# Patient Record
Sex: FEMALE | Race: WHITE | NOT HISPANIC OR LATINO | Employment: OTHER | URBAN - METROPOLITAN AREA
[De-identification: names, ages, dates, MRNs, and addresses within clinical notes are randomized per-mention and may not be internally consistent; named-entity substitution may affect disease eponyms.]

---

## 2018-07-23 ENCOUNTER — TRANSCRIBE ORDERS (OUTPATIENT)
Dept: ADMINISTRATIVE | Facility: HOSPITAL | Age: 18
End: 2018-07-23

## 2018-07-23 ENCOUNTER — APPOINTMENT (OUTPATIENT)
Dept: LAB | Facility: HOSPITAL | Age: 18
End: 2018-07-23
Payer: COMMERCIAL

## 2018-07-23 DIAGNOSIS — Z79.899 ENCOUNTER FOR LONG-TERM (CURRENT) USE OF MEDICATIONS: ICD-10-CM

## 2018-07-23 DIAGNOSIS — Z79.899 ENCOUNTER FOR LONG-TERM (CURRENT) USE OF MEDICATIONS: Primary | ICD-10-CM

## 2018-07-23 LAB
ALBUMIN SERPL BCP-MCNC: 4.2 G/DL (ref 3–5.2)
ALP SERPL-CCNC: 88 U/L (ref 43–122)
ALT SERPL W P-5'-P-CCNC: 28 U/L (ref 9–52)
ANION GAP SERPL CALCULATED.3IONS-SCNC: 10 MMOL/L (ref 5–14)
AST SERPL W P-5'-P-CCNC: 14 U/L (ref 14–36)
BASOPHILS # BLD AUTO: 0 THOUSANDS/ΜL (ref 0–0.1)
BASOPHILS NFR BLD AUTO: 1 % (ref 0–1)
BILIRUB SERPL-MCNC: 0.3 MG/DL
BUN SERPL-MCNC: 10 MG/DL (ref 5–25)
CALCIUM SERPL-MCNC: 9.5 MG/DL (ref 8.4–10.2)
CHLORIDE SERPL-SCNC: 103 MMOL/L (ref 97–108)
CHOLEST SERPL-MCNC: 194 MG/DL
CO2 SERPL-SCNC: 29 MMOL/L (ref 22–30)
CREAT SERPL-MCNC: 0.77 MG/DL (ref 0.6–1.2)
EOSINOPHIL # BLD AUTO: 0.1 THOUSAND/ΜL (ref 0–0.4)
EOSINOPHIL NFR BLD AUTO: 1 % (ref 0–6)
ERYTHROCYTE [DISTWIDTH] IN BLOOD BY AUTOMATED COUNT: 14.1 %
GFR SERPL CREATININE-BSD FRML MDRD: 112 ML/MIN/1.73SQ M
GLUCOSE P FAST SERPL-MCNC: 92 MG/DL (ref 70–99)
HCT VFR BLD AUTO: 37.8 % (ref 36–46)
HDLC SERPL-MCNC: 50 MG/DL (ref 40–59)
HGB BLD-MCNC: 12.1 G/DL (ref 12–16)
LDLC SERPL CALC-MCNC: 133 MG/DL
LYMPHOCYTES # BLD AUTO: 2.2 THOUSANDS/ΜL (ref 0.5–4)
LYMPHOCYTES NFR BLD AUTO: 29 % (ref 20–50)
MCH RBC QN AUTO: 26.1 PG (ref 26–34)
MCHC RBC AUTO-ENTMCNC: 31.9 G/DL (ref 31–36)
MCV RBC AUTO: 82 FL (ref 80–100)
MONOCYTES # BLD AUTO: 0.5 THOUSAND/ΜL (ref 0.2–0.9)
MONOCYTES NFR BLD AUTO: 6 % (ref 1–10)
NEUTROPHILS # BLD AUTO: 4.7 THOUSANDS/ΜL (ref 1.8–7.8)
NEUTS SEG NFR BLD AUTO: 63 % (ref 45–65)
NONHDLC SERPL-MCNC: 144 MG/DL
PLATELET # BLD AUTO: 352 THOUSANDS/UL (ref 150–450)
PMV BLD AUTO: 8.6 FL (ref 8.9–12.7)
POTASSIUM SERPL-SCNC: 4.4 MMOL/L (ref 3.6–5)
PROT SERPL-MCNC: 6.8 G/DL (ref 5.9–8.4)
RBC # BLD AUTO: 4.61 MILLION/UL (ref 4–5.2)
SODIUM SERPL-SCNC: 142 MMOL/L (ref 137–147)
TRIGL SERPL-MCNC: 56 MG/DL
TSH SERPL DL<=0.05 MIU/L-ACNC: 0.78 UIU/ML (ref 0.47–4.68)
WBC # BLD AUTO: 7.5 THOUSAND/UL (ref 4.5–11)

## 2018-07-23 PROCEDURE — 85025 COMPLETE CBC W/AUTO DIFF WBC: CPT

## 2018-07-23 PROCEDURE — 80053 COMPREHEN METABOLIC PANEL: CPT

## 2018-07-23 PROCEDURE — 84443 ASSAY THYROID STIM HORMONE: CPT

## 2018-07-23 PROCEDURE — 36415 COLL VENOUS BLD VENIPUNCTURE: CPT

## 2018-07-23 PROCEDURE — 80061 LIPID PANEL: CPT

## 2018-09-18 ENCOUNTER — OFFICE VISIT (OUTPATIENT)
Dept: OBGYN CLINIC | Facility: CLINIC | Age: 18
End: 2018-09-18
Payer: COMMERCIAL

## 2018-09-18 VITALS
DIASTOLIC BLOOD PRESSURE: 70 MMHG | HEIGHT: 58 IN | SYSTOLIC BLOOD PRESSURE: 120 MMHG | WEIGHT: 169 LBS | BODY MASS INDEX: 35.48 KG/M2

## 2018-09-18 DIAGNOSIS — Z30.011 ENCOUNTER FOR INITIAL PRESCRIPTION OF CONTRACEPTIVE PILLS: Primary | ICD-10-CM

## 2018-09-18 PROCEDURE — 99202 OFFICE O/P NEW SF 15 MIN: CPT | Performed by: OBSTETRICS & GYNECOLOGY

## 2018-09-18 RX ORDER — LEVONORGESTREL AND ETHINYL ESTRADIOL 0.1-0.02MG
1 KIT ORAL DAILY
Qty: 28 TABLET | Refills: 11 | Status: SHIPPED | OUTPATIENT
Start: 2018-09-18 | End: 2019-02-04 | Stop reason: ALTCHOICE

## 2018-09-18 NOTE — PROGRESS NOTES
Assessment/Plan:             Diagnoses and all orders for this visit:    Encounter for initial prescription of contraceptive pills  -     levonorgestrel-ethinyl estradiol (AVIANE,ALESSE,LESSINA) 0 1-20 MG-MCG per tablet; Take 1 tablet by mouth daily    Other orders  -     lurasidone (LATUDA) 40 mg tablet; Take 20 mg by mouth daily with breakfast              Subjective:      Patient ID: Vero Estevez is a 19 y o  female who presents w/ complaint of menstrual cycle changes and to begin OCP's  Her LMP began on 9/6 and lasted 3 days  She describes she had 1 light day then 2 moderately heavy days of bleeding  Her periods have only lasted 3 days for the last 4 months whereas since her menarche at age 4 her periods have lasted 5 days  She expressed she would like to begin taking OCP's to help keep her period regulated  She denies any sexual activity  Her only medication she is taking is Chloé Si  HPI    The following portions of the patient's history were reviewed and updated as appropriate: allergies, current medications, past family history, past medical history, past social history, past surgical history and problem list     Review of Systems      Objective:      /70   Ht 4' 10" (1 473 m)   Wt 76 7 kg (169 lb)   LMP 09/06/2018 (Exact Date)   BMI 35 32 kg/m²          Physical Exam   Constitutional: She is oriented to person, place, and time  She appears well-developed and well-nourished  No distress  Neck: Neck supple  No thyromegaly present  Cardiovascular: Normal rate, regular rhythm and normal heart sounds  Pulmonary/Chest: Effort normal and breath sounds normal  No respiratory distress  She has no wheezes  She has no rales  She exhibits no tenderness  Neurological: She is alert and oriented to person, place, and time  Nursing note and vitals reviewed

## 2018-09-24 ENCOUNTER — CLINICAL SUPPORT (OUTPATIENT)
Dept: FAMILY MEDICINE CLINIC | Facility: CLINIC | Age: 18
End: 2018-09-24
Payer: COMMERCIAL

## 2018-09-24 DIAGNOSIS — Z23 NEED FOR INFLUENZA VACCINATION: Primary | ICD-10-CM

## 2018-09-24 PROCEDURE — 90471 IMMUNIZATION ADMIN: CPT

## 2018-09-24 PROCEDURE — 90682 RIV4 VACC RECOMBINANT DNA IM: CPT

## 2018-10-02 ENCOUNTER — OFFICE VISIT (OUTPATIENT)
Dept: OBGYN CLINIC | Facility: CLINIC | Age: 18
End: 2018-10-02
Payer: COMMERCIAL

## 2018-10-02 VITALS
WEIGHT: 172 LBS | DIASTOLIC BLOOD PRESSURE: 60 MMHG | SYSTOLIC BLOOD PRESSURE: 120 MMHG | HEIGHT: 58 IN | BODY MASS INDEX: 36.11 KG/M2

## 2018-10-02 DIAGNOSIS — N89.8 VAGINAL ITCHING: Primary | ICD-10-CM

## 2018-10-02 PROCEDURE — 87660 TRICHOMONAS VAGIN DIR PROBE: CPT | Performed by: OBSTETRICS & GYNECOLOGY

## 2018-10-02 PROCEDURE — 87510 GARDNER VAG DNA DIR PROBE: CPT | Performed by: OBSTETRICS & GYNECOLOGY

## 2018-10-02 PROCEDURE — 99213 OFFICE O/P EST LOW 20 MIN: CPT | Performed by: OBSTETRICS & GYNECOLOGY

## 2018-10-02 PROCEDURE — 87480 CANDIDA DNA DIR PROBE: CPT | Performed by: OBSTETRICS & GYNECOLOGY

## 2018-10-02 RX ORDER — FLUCONAZOLE 150 MG/1
150 TABLET ORAL ONCE
Qty: 1 TABLET | Refills: 1 | Status: SHIPPED | OUTPATIENT
Start: 2018-10-02 | End: 2018-10-02

## 2018-10-02 NOTE — PROGRESS NOTES
Assessment/Plan:    Patient is a 19year old female presented today with one day duration of labia and vaginal burning and itchiness  Vaginosis DNA probe was done at the office  Fluconazole 150mg PO once is prescribed at this time for suspected Candidiasis  Will follow up test result and further management as needed  Diagnoses and all orders for this visit:    Vaginal itching  -     Cancel: VAGINOSIS DNA PROBE (AFFIRM); Future  -     fluconazole (DIFLUCAN) 150 mg tablet; Take 1 tablet (150 mg total) by mouth once for 1 dose  -     VAGINOSIS DNA PROBE (AFFIRM); Future  -     VAGINOSIS DNA PROBE (AFFIRM)          Subjective:      Patient ID: Nuria Bravo is a 19 y o  female  HPI     Patient is a 19year old female presented today with one day duration of vaginal itching and burning  She also reported urinary burning  Denied any vaginal discharge  Patient is not sexually active  Her LMP was 9/6/18  Reported regular menses montly, usually last for 3 days  Denied fever, chills, nausea, vomiting, abdominal pain  UA was done at the office which was negative  The following portions of the patient's history were reviewed and updated as appropriate: allergies, current medications, past family history, past medical history, past social history, past surgical history and problem list     Review of Systems   Constitutional: Negative for chills and fever  HENT: Negative for sore throat  Respiratory: Negative for shortness of breath  Cardiovascular: Negative for chest pain  Gastrointestinal: Negative for abdominal pain  Genitourinary: Positive for dysuria, frequency, urgency and vaginal pain  Negative for difficulty urinating, vaginal bleeding and vaginal discharge  Musculoskeletal: Negative for back pain  Neurological: Negative for headaches           Objective:      /60   Ht 4' 10" (1 473 m)   Wt 78 kg (172 lb)   LMP 09/06/2018 (Exact Date)   BMI 35 95 kg/m²          Physical Exam Constitutional: She appears well-developed and well-nourished  No distress  Eyes: Pupils are equal, round, and reactive to light  Right eye exhibits no discharge  Left eye exhibits no discharge  Neck: Normal range of motion  Cardiovascular: Normal rate, regular rhythm and normal heart sounds  No murmur heard  Pulmonary/Chest: Breath sounds normal  No respiratory distress  She has no wheezes  She exhibits no tenderness  Abdominal: Soft  Bowel sounds are normal  She exhibits no distension  There is no tenderness  There is no rebound  Genitourinary:       There is tenderness on the right labia  There is tenderness on the left labia  There is tenderness in the vagina

## 2018-10-05 DIAGNOSIS — N76.0 BV (BACTERIAL VAGINOSIS): Primary | ICD-10-CM

## 2018-10-05 DIAGNOSIS — B96.89 BV (BACTERIAL VAGINOSIS): Primary | ICD-10-CM

## 2018-10-05 LAB
CANDIDA RRNA VAG QL PROBE: NEGATIVE
G VAGINALIS RRNA GENITAL QL PROBE: POSITIVE
T VAGINALIS RRNA GENITAL QL PROBE: NEGATIVE

## 2018-10-05 RX ORDER — METRONIDAZOLE 500 MG/1
500 TABLET ORAL EVERY 12 HOURS SCHEDULED
Qty: 14 TABLET | Refills: 0 | Status: SHIPPED | OUTPATIENT
Start: 2018-10-05 | End: 2018-10-12

## 2018-10-28 PROBLEM — E78.2 MIXED HYPERLIPIDEMIA: Status: ACTIVE | Noted: 2017-03-07

## 2018-10-30 ENCOUNTER — OFFICE VISIT (OUTPATIENT)
Dept: FAMILY MEDICINE CLINIC | Facility: CLINIC | Age: 18
End: 2018-10-30
Payer: COMMERCIAL

## 2018-10-30 VITALS
TEMPERATURE: 98.3 F | HEIGHT: 58 IN | WEIGHT: 166 LBS | OXYGEN SATURATION: 98 % | DIASTOLIC BLOOD PRESSURE: 80 MMHG | BODY MASS INDEX: 34.85 KG/M2 | HEART RATE: 69 BPM | SYSTOLIC BLOOD PRESSURE: 110 MMHG

## 2018-10-30 DIAGNOSIS — R11.11 NON-INTRACTABLE VOMITING WITHOUT NAUSEA, UNSPECIFIED VOMITING TYPE: Primary | ICD-10-CM

## 2018-10-30 PROCEDURE — 99213 OFFICE O/P EST LOW 20 MIN: CPT | Performed by: FAMILY MEDICINE

## 2018-10-30 RX ORDER — GABAPENTIN 600 MG/1
TABLET ORAL EVERY EVENING
Status: ON HOLD | COMMUNITY
Start: 2018-10-24 | End: 2018-12-02

## 2018-10-30 RX ORDER — ALBUTEROL SULFATE 90 UG/1
2 AEROSOL, METERED RESPIRATORY (INHALATION) EVERY 6 HOURS PRN
COMMUNITY
Start: 2014-11-19 | End: 2019-04-23

## 2018-10-30 RX ORDER — LORATADINE 10 MG/1
TABLET ORAL EVERY 24 HOURS
Status: ON HOLD | COMMUNITY
Start: 2017-08-28 | End: 2018-11-30 | Stop reason: ALTCHOICE

## 2018-10-30 RX ORDER — IBUPROFEN 200 MG
TABLET ORAL
Status: ON HOLD | COMMUNITY
Start: 2018-08-31 | End: 2018-11-30 | Stop reason: ALTCHOICE

## 2018-10-30 RX ORDER — CLINDAMYCIN PHOSPHATE 10 MG/G
GEL TOPICAL EVERY 12 HOURS PRN
COMMUNITY
Start: 2018-05-30 | End: 2018-12-24 | Stop reason: HOSPADM

## 2018-10-30 NOTE — PROGRESS NOTES
Subjective:   Chief Complaint   Patient presents with    Other     vomitting for 3 days        Patient ID: Janny Sloan is a 19 y o  female  Vomiting    This is a new problem  The current episode started in the past 7 days  The problem occurs less than 2 times per day  The problem has been rapidly improving  There has been no fever  Pertinent negatives include no abdominal pain, chest pain, chills, coughing, diarrhea, dizziness, fever, headaches, myalgias, sweats or weight loss  She has tried nothing for the symptoms  The following portions of the patient's history were reviewed and updated as appropriate: allergies, current medications, past family history, past medical history, past social history, past surgical history and problem list     Review of Systems   Constitutional: Negative for chills, fatigue, fever and weight loss  HENT: Negative for ear pain, sinus pain, sinus pressure and sore throat  Eyes: Negative for pain and redness  Respiratory: Negative for cough, chest tightness and shortness of breath  Cardiovascular: Negative for chest pain, palpitations and leg swelling  Gastrointestinal: Positive for vomiting  Negative for abdominal pain, blood in stool, constipation, diarrhea and nausea  Genitourinary: Negative for flank pain, frequency and hematuria  Musculoskeletal: Negative for back pain, joint swelling and myalgias  Skin: Negative for rash  Neurological: Negative for dizziness, numbness and headaches  Hematological: Does not bruise/bleed easily  Objective:  Vitals:    10/30/18 1107   BP: 110/80   Pulse: 69   Temp: 98 3 °F (36 8 °C)   TempSrc: Oral   SpO2: 98%   Weight: 75 3 kg (166 lb)   Height: 4' 10" (1 473 m)      Physical Exam   Constitutional: She is oriented to person, place, and time  She appears well-developed and well-nourished  HENT:   Head: Normocephalic     Right Ear: External ear normal    Left Ear: External ear normal    Eyes: Conjunctivae and EOM are normal  Right eye exhibits no discharge  Left eye exhibits no discharge  Neck: No JVD present  Cardiovascular: Normal rate, regular rhythm and normal heart sounds  Exam reveals no gallop  No murmur heard  Pulmonary/Chest: Effort normal  No respiratory distress  She has no wheezes  She has no rales  She exhibits no tenderness  Abdominal: She exhibits no mass  There is no tenderness  There is no rebound  Musculoskeletal: She exhibits no edema or tenderness  Neurological: She is alert and oriented to person, place, and time  Skin: No rash noted  No erythema  Assessment/Plan:    No problem-specific Assessment & Plan notes found for this encounter  Diagnoses and all orders for this visit:    Non-intractable vomiting without nausea, unspecified vomiting type  Comments:  Asymptomatic acute we discussed with the patient BRAT diet supportive treatmen including will hydration and rest there is no improvement to call the office    Other orders  -     [facility] IBUPROFEN 200 MG tablet;   -     clindamycin (CLINDAGEL) 1 % gel; Every 12 hours  -     gabapentin (NEURONTIN) 600 MG tablet;   -     loratadine (CLARITIN) 10 mg tablet; every 24 hours  -     albuterol (VENTOLIN HFA) 90 mcg/act inhaler;  Inhale 2 puffs

## 2018-10-31 ENCOUNTER — HOSPITAL ENCOUNTER (EMERGENCY)
Facility: HOSPITAL | Age: 18
Discharge: HOME | End: 2018-10-31
Attending: EMERGENCY MEDICINE | Admitting: EMERGENCY MEDICINE
Payer: COMMERCIAL

## 2018-10-31 ENCOUNTER — APPOINTMENT (EMERGENCY)
Dept: ULTRASOUND IMAGING | Facility: HOSPITAL | Age: 18
End: 2018-10-31
Payer: COMMERCIAL

## 2018-10-31 VITALS
HEART RATE: 73 BPM | BODY MASS INDEX: 34.56 KG/M2 | TEMPERATURE: 97 F | RESPIRATION RATE: 16 BRPM | SYSTOLIC BLOOD PRESSURE: 135 MMHG | OXYGEN SATURATION: 99 % | WEIGHT: 165.34 LBS | DIASTOLIC BLOOD PRESSURE: 76 MMHG

## 2018-10-31 DIAGNOSIS — K80.20 CHOLELITHIASIS: ICD-10-CM

## 2018-10-31 DIAGNOSIS — K29.70 GASTRITIS: ICD-10-CM

## 2018-10-31 DIAGNOSIS — N39.0 UTI (URINARY TRACT INFECTION): Primary | ICD-10-CM

## 2018-10-31 LAB
ALBUMIN SERPL BCP-MCNC: 4.6 G/DL (ref 3–5.2)
ALP SERPL-CCNC: 96 U/L (ref 43–122)
ALT SERPL W P-5'-P-CCNC: 279 U/L (ref 9–52)
ANION GAP SERPL CALCULATED.3IONS-SCNC: 11 MMOL/L (ref 5–14)
AST SERPL W P-5'-P-CCNC: 147 U/L (ref 14–36)
B-HCG SERPL-ACNC: <3 MIU/ML
BACTERIA UR QL AUTO: ABNORMAL /HPF
BASOPHILS # BLD AUTO: 0 THOUSANDS/ΜL (ref 0–0.1)
BASOPHILS NFR BLD AUTO: 1 % (ref 0–1)
BILIRUB SERPL-MCNC: 0.9 MG/DL
BILIRUB UR QL STRIP: ABNORMAL
BUN SERPL-MCNC: 10 MG/DL (ref 5–25)
CALCIUM SERPL-MCNC: 9.2 MG/DL (ref 8.4–10.2)
CHLORIDE SERPL-SCNC: 105 MMOL/L (ref 97–108)
CLARITY UR: ABNORMAL
CO2 SERPL-SCNC: 24 MMOL/L (ref 22–30)
COLOR UR: ABNORMAL
CREAT SERPL-MCNC: 0.76 MG/DL (ref 0.6–1.2)
EOSINOPHIL # BLD AUTO: 0.1 THOUSAND/ΜL (ref 0–0.4)
EOSINOPHIL NFR BLD AUTO: 1 % (ref 0–6)
ERYTHROCYTE [DISTWIDTH] IN BLOOD BY AUTOMATED COUNT: 14.3 %
GFR SERPL CREATININE-BSD FRML MDRD: 113 ML/MIN/1.73SQ M
GLUCOSE SERPL-MCNC: 95 MG/DL (ref 70–99)
GLUCOSE UR STRIP-MCNC: NEGATIVE MG/DL
HCT VFR BLD AUTO: 34.8 % (ref 36–46)
HGB BLD-MCNC: 11.5 G/DL (ref 12–16)
HGB UR QL STRIP.AUTO: 250
KETONES UR STRIP-MCNC: ABNORMAL MG/DL
LEUKOCYTE ESTERASE UR QL STRIP: 25
LIPASE SERPL-CCNC: 30 U/L (ref 23–300)
LYMPHOCYTES # BLD AUTO: 2.1 THOUSANDS/ΜL (ref 0.5–4)
LYMPHOCYTES NFR BLD AUTO: 25 % (ref 20–50)
MCH RBC QN AUTO: 25.9 PG (ref 26–34)
MCHC RBC AUTO-ENTMCNC: 33 G/DL (ref 31–36)
MCV RBC AUTO: 79 FL (ref 80–100)
MONOCYTES # BLD AUTO: 0.8 THOUSAND/ΜL (ref 0.2–0.9)
MONOCYTES NFR BLD AUTO: 9 % (ref 1–10)
MUCOUS THREADS UR QL AUTO: ABNORMAL
NEUTROPHILS # BLD AUTO: 5.6 THOUSANDS/ΜL (ref 1.8–7.8)
NEUTS SEG NFR BLD AUTO: 65 % (ref 45–65)
NITRITE UR QL STRIP: NEGATIVE
NON-SQ EPI CELLS URNS QL MICRO: ABNORMAL /HPF
PH UR STRIP.AUTO: 6 [PH] (ref 4.5–8)
PLATELET # BLD AUTO: 276 THOUSANDS/UL (ref 150–450)
PLATELET BLD QL SMEAR: ADEQUATE
PMV BLD AUTO: 9.1 FL (ref 8.9–12.7)
POTASSIUM SERPL-SCNC: 3.6 MMOL/L (ref 3.6–5)
PROT SERPL-MCNC: 7.4 G/DL (ref 5.9–8.4)
PROT UR STRIP-MCNC: ABNORMAL MG/DL
RBC # BLD AUTO: 4.43 MILLION/UL (ref 4–5.2)
RBC #/AREA URNS AUTO: ABNORMAL /HPF
RBC MORPH BLD: NORMAL
SODIUM SERPL-SCNC: 140 MMOL/L (ref 137–147)
SP GR UR STRIP.AUTO: 1.02 (ref 1–1.04)
UROBILINOGEN UA: 8 MG/DL
WBC # BLD AUTO: 8.6 THOUSAND/UL (ref 4.5–11)
WBC #/AREA URNS AUTO: ABNORMAL /HPF

## 2018-10-31 PROCEDURE — 84702 CHORIONIC GONADOTROPIN TEST: CPT | Performed by: PHYSICIAN ASSISTANT

## 2018-10-31 PROCEDURE — 81003 URINALYSIS AUTO W/O SCOPE: CPT | Performed by: PHYSICIAN ASSISTANT

## 2018-10-31 PROCEDURE — 96374 THER/PROPH/DIAG INJ IV PUSH: CPT

## 2018-10-31 PROCEDURE — 96361 HYDRATE IV INFUSION ADD-ON: CPT

## 2018-10-31 PROCEDURE — 83690 ASSAY OF LIPASE: CPT | Performed by: PHYSICIAN ASSISTANT

## 2018-10-31 PROCEDURE — 81001 URINALYSIS AUTO W/SCOPE: CPT | Performed by: PHYSICIAN ASSISTANT

## 2018-10-31 PROCEDURE — 99284 EMERGENCY DEPT VISIT MOD MDM: CPT

## 2018-10-31 PROCEDURE — 36415 COLL VENOUS BLD VENIPUNCTURE: CPT | Performed by: PHYSICIAN ASSISTANT

## 2018-10-31 PROCEDURE — 80053 COMPREHEN METABOLIC PANEL: CPT | Performed by: PHYSICIAN ASSISTANT

## 2018-10-31 PROCEDURE — 76705 ECHO EXAM OF ABDOMEN: CPT

## 2018-10-31 PROCEDURE — 85025 COMPLETE CBC W/AUTO DIFF WBC: CPT | Performed by: PHYSICIAN ASSISTANT

## 2018-10-31 RX ORDER — ACETAMINOPHEN 325 MG/1
TABLET ORAL
Status: COMPLETED
Start: 2018-10-31 | End: 2018-10-31

## 2018-10-31 RX ORDER — ONDANSETRON 2 MG/ML
INJECTION INTRAMUSCULAR; INTRAVENOUS
Status: COMPLETED
Start: 2018-10-31 | End: 2018-10-31

## 2018-10-31 RX ORDER — SODIUM CHLORIDE 9 MG/ML
250 INJECTION, SOLUTION INTRAVENOUS CONTINUOUS
Status: DISCONTINUED | OUTPATIENT
Start: 2018-10-31 | End: 2018-10-31 | Stop reason: HOSPADM

## 2018-10-31 RX ORDER — SUCRALFATE ORAL 1 G/10ML
SUSPENSION ORAL
Status: COMPLETED
Start: 2018-10-31 | End: 2018-10-31

## 2018-10-31 RX ORDER — ONDANSETRON 4 MG/1
4 TABLET, FILM COATED ORAL EVERY 6 HOURS
Qty: 12 TABLET | Refills: 0 | Status: ON HOLD | OUTPATIENT
Start: 2018-10-31 | End: 2018-11-30 | Stop reason: ALTCHOICE

## 2018-10-31 RX ORDER — MAGNESIUM HYDROXIDE/ALUMINUM HYDROXICE/SIMETHICONE 120; 1200; 1200 MG/30ML; MG/30ML; MG/30ML
SUSPENSION ORAL
Status: COMPLETED
Start: 2018-10-31 | End: 2018-10-31

## 2018-10-31 RX ORDER — SUCRALFATE 1 G/1
1 TABLET ORAL 4 TIMES DAILY
Qty: 40 TABLET | Refills: 0 | Status: ON HOLD | OUTPATIENT
Start: 2018-10-31 | End: 2018-11-30 | Stop reason: ALTCHOICE

## 2018-10-31 RX ORDER — FAMOTIDINE 20 MG/1
20 TABLET, FILM COATED ORAL 2 TIMES DAILY
Qty: 30 TABLET | Refills: 0 | Status: SHIPPED | OUTPATIENT
Start: 2018-10-31 | End: 2018-11-02 | Stop reason: SDUPTHER

## 2018-10-31 RX ORDER — MAGNESIUM HYDROXIDE/ALUMINUM HYDROXICE/SIMETHICONE 120; 1200; 1200 MG/30ML; MG/30ML; MG/30ML
30 SUSPENSION ORAL ONCE
Status: COMPLETED | OUTPATIENT
Start: 2018-10-31 | End: 2018-10-31

## 2018-10-31 RX ORDER — SUCRALFATE ORAL 1 G/10ML
1000 SUSPENSION ORAL ONCE
Status: COMPLETED | OUTPATIENT
Start: 2018-10-31 | End: 2018-10-31

## 2018-10-31 RX ORDER — ONDANSETRON 2 MG/ML
4 INJECTION INTRAMUSCULAR; INTRAVENOUS ONCE
Status: COMPLETED | OUTPATIENT
Start: 2018-10-31 | End: 2018-10-31

## 2018-10-31 RX ORDER — ACETAMINOPHEN 325 MG/1
650 TABLET ORAL ONCE
Status: COMPLETED | OUTPATIENT
Start: 2018-10-31 | End: 2018-10-31

## 2018-10-31 RX ORDER — CEPHALEXIN 500 MG/1
500 CAPSULE ORAL EVERY 8 HOURS SCHEDULED
Qty: 30 CAPSULE | Refills: 0 | Status: SHIPPED | OUTPATIENT
Start: 2018-10-31 | End: 2018-11-10

## 2018-10-31 RX ADMIN — ONDANSETRON 4 MG: 2 INJECTION INTRAMUSCULAR; INTRAVENOUS at 08:03

## 2018-10-31 RX ADMIN — ACETAMINOPHEN 650 MG: 325 TABLET ORAL at 08:44

## 2018-10-31 RX ADMIN — ALUMINUM HYDROXIDE, MAGNESIUM HYDROXIDE, AND SIMETHICONE 30 ML: 200; 200; 20 SUSPENSION ORAL at 08:43

## 2018-10-31 RX ADMIN — SODIUM CHLORIDE 250 ML/HR: 9 INJECTION, SOLUTION INTRAVENOUS at 08:04

## 2018-10-31 RX ADMIN — MAGNESIUM HYDROXIDE/ALUMINUM HYDROXICE/SIMETHICONE 30 ML: 120; 1200; 1200 SUSPENSION ORAL at 08:43

## 2018-10-31 RX ADMIN — SUCRALFATE 1000 MG: 1 SUSPENSION ORAL at 08:43

## 2018-10-31 RX ADMIN — ONDANSETRON 4 MG: 2 INJECTION, SOLUTION INTRAMUSCULAR; INTRAVENOUS at 08:03

## 2018-10-31 RX ADMIN — SUCRALFATE ORAL 1000 MG: 1 SUSPENSION ORAL at 08:43

## 2018-10-31 NOTE — ED NOTES
Patient transported to [address] [facility] Check, [address] [facility]  10/31/18 2244

## 2018-10-31 NOTE — DISCHARGE INSTRUCTIONS
Gallstones   WHAT YOU NEED TO KNOW:   Gallstones, also called cholelithiasis, are hard substances that form in your gallbladder or bile duct  Your gallbladder and bile duct are located on the right side of your abdomen, near your liver  Your gallbladder stores bile, which helps break down the fat that you eat  Your gallbladder also helps remove certain chemicals from your body  DISCHARGE INSTRUCTIONS:   Medicines:   · Prescription pain medicine  may be given  Ask your healthcare provider how to take this medicine safely  · Take your medicine as directed  Contact your healthcare provider if you think your medicine is not helping or if you have side effects  Tell him if you are allergic to any medicine  Keep a list of the medicines, vitamins, and herbs you take  Include the amounts, and when and why you take them  Bring the list or the pill bottles to follow-up visits  Carry your medicine list with you in case of an emergency  Follow up with your healthcare provider as directed:  Write down your questions so you remember to ask them during your visits  Eat a variety of healthy foods: This may help you have more energy and heal faster  Healthy foods include fruit, vegetables, whole-grain breads, low-fat dairy products, beans, lean meat, and fish  Ask if you need to be on a special diet  Exercise as directed:  Talk to your healthcare provider about the best exercise plan for you  Exercise can help you lose weight and improve your health  Contact your healthcare provider if:   · You have nausea and are vomiting  · Your urine is dark  · You have basim-colored bowel movements  · You have questions or concerns about your condition or care  Return to the emergency department if:   · You have a fever and chills  · Your skin or eyes turn yellow  · You have severe pain in your upper abdomen, just below the right ribcage    © 2017 [address] [employer] Information is for End User's use only and may not be sold, redistributed or otherwise used for commercial purposes  All illustrations and images included in CareNotes® are the copyrighted property of [employer]  or Curt Grace  The above information is an  only  It is not intended as medical advice for individual conditions or treatments  Talk to your doctor, nurse or pharmacist before following any medical regimen to see if it is safe and effective for you  Gallstones   WHAT YOU NEED TO KNOW:   What are gallstones? Gallstones, also called cholelithiasis, are hard substances that form in your gallbladder or bile duct  Your gallbladder and bile duct are located on the right side of your abdomen, near your liver  Your gallbladder stores bile, which helps break down the fat that you eat  Your gallbladder also helps remove certain chemicals from your body  What causes gallstones? Gallstones develop when your gallbladder does not empty correctly  Stones can form from different bile materials  The following may increase your risk:  · Obesity    · Pregnancy    · Having a family member with gallstones    · Diabetes or previous surgery of the intestines    · Rapid weight loss    · Certain medicines, such as estrogen, antibiotics, and cholesterol-lowering medicines  What are the signs and symptoms of gallstones? The most common symptom is severe, constant pain in the right upper abdomen  It is usually just below the ribcage  The pain may also be felt in the right shoulder and between the shoulder blades  You may also have any of the following:  · Nausea and vomiting    · Feeling bloated    · Pale bowel movements    · Dark urine  How are gallstones diagnosed? · Blood tests  may show signs of infection or inflammation  · An abdominal ultrasound  uses sound waves to show pictures of your abdomen on a monitor  · A liver and gallbladder scan  may also be called a HIDA scan   You are given a small amount of radioactive dye in your IV and pictures are taken by a scanner  Your healthcare provider looks at the pictures to see if your liver and gallbladder are working normally  · An ERCP  is also called an endoscopic retrograde cholangiopancreatography  This test is done during an endoscopy to find stones, tumors, or other problems  Dye is put into the endoscopy tube  The dye helps your pancreas and bile ducts show up better on x-rays  Tell the healthcare provider if you have ever had an allergic reaction to contrast dye  If you have stones, they may be removed during ERCP  · Oral cholecystography  is a test to look at your gallbladder and its ducts (passages)  You will take pills that have a special dye in them  Then x-rays are taken over time  The dye makes your gallbladder and its ducts show up on the x-rays  This may make it easier for your healthcare provider to see any stones or swelling in your gallbladder  Tell the healthcare provider if you have ever had an allergic reaction to contrast dye  It is also very important to tell your healthcare provider if there is any chance you could be pregnant  Your healthcare provider will tell you what you can and cannot eat before the test  It is important to follow your healthcare provider's instructions or the test may not work  How are gallstones treated? · Antinausea medicine  may be given to calm your stomach and to help prevent vomiting  · Prescription pain medicine  may be given  Ask your healthcare provider how to take this medicine safely  · A cholecystectomy  is surgery to remove your gallbladder  When should I contact my healthcare provider? · You have nausea and are vomiting  · Your urine is dark  · You have basim-colored bowel movements  · You have questions or concerns about your condition or care  When should I seek immediate care or call 911? · You have a fever and chills  · Your eyes or skin turn yellow      · You have severe pain in your upper abdomen, just below the right ribcage  CARE AGREEMENT:   You have the right to help plan your care  Learn about your health condition and how it may be treated  Discuss treatment options with your caregivers to decide what care you want to receive  You always have the right to refuse treatment  The above information is an  only  It is not intended as medical advice for individual conditions or treatments  Talk to your doctor, nurse or pharmacist before following any medical regimen to see if it is safe and effective for you  © 2017 [address] [facility] Information is for End User's use only and may not be sold, redistributed or otherwise used for commercial purposes  All illustrations and images included in CareNotes® are the copyrighted property of [employer]  or Curt Grace  Gastritis   AMBULATORY CARE:   Gastritis  is inflammation or irritation of the lining of your stomach  Common symptoms include the following:   · Stomach pain, burning, or tenderness when you press on it    · Stomach fullness or tightness    · Nausea or vomiting    · Loss of appetite, or feeling full quickly when you eat    · Bad breath    · Fatigue or feeling more tired than usual    · Heartburn  Call 911 for any of the following:   · You develop chest pain or shortness of breath  Seek care immediately if:   · You vomit blood  · You have black or bloody bowel movements  · You have severe stomach or back pain  Contact your healthcare provider if:   · You have a fever  · You have new or worsening symptoms, even after treatment  · You have questions or concerns about your condition or care  Treatment for gastritis:  Your symptoms may go away without treatment  Treatment will depend on what is causing your gastritis  Your healthcare provider may recommend changes to the medicines you take   Medicines may be given to help treat a bacterial infection or decrease stomach acid  Manage or prevent gastritis:   · Do not smoke  Nicotine and other chemicals in cigarettes and cigars can make your symptoms worse and cause lung damage  Ask your healthcare provider for information if you currently smoke and need help to quit  E-cigarettes or smokeless tobacco still contain nicotine  Talk to your healthcare provider before you use these products  · Do not drink alcohol  Alcohol can prevent healing and make your gastritis worse  Talk to your healthcare provider if you need help to stop drinking  · Do not take NSAIDs or aspirin unless directed  These and similar medicines can cause irritation  If your healthcare provider says it is okay to take NSAIDs, take them with food  · Do not eat foods that cause irritation  Foods such as oranges and salsa can cause burning or pain  Eat a variety of healthy foods  Examples include fruits (not citrus), vegetables, low-fat dairy products, beans, whole-grain breads, and lean meats and fish  Try to eat small meals, and drink water with your meals  Do not eat for at least 3 hours before you go to bed  · Find ways to relax and decrease stress  Stress can increase stomach acid and make gastritis worse  Activities such as yoga, meditation, or listening to music can help you relax  Spend time with friends, or do things you enjoy  Follow up with your healthcare provider as directed: You may need ongoing tests or treatment, or referral to a gastroenterologist  Write down your questions so you remember to ask them during your visits  © 2017 [address] [employer] Information is for End User's use only and may not be sold, redistributed or otherwise used for commercial purposes  All illustrations and images included in CareNotes® are the copyrighted property of [employer]  or Curt Grace  The above information is an  only  It is not intended as medical advice for individual conditions or treatments  Talk to your doctor, nurse or pharmacist before following any medical regimen to see if it is safe and effective for you  Urinary Tract Infection in Women, Markie Milton 61 of Obstetricians and Gynecologists: ACOG Practice Bulletin No  91: Treatment of urinary tract infections in nonpregnant women  Obstet Gynecol 2008; 111(3):785-794  [employer]: Recurrent Lower Urinary Tract Infections in Women  [employer]  [place], [place]  2008  Available from URL: https://yeny info/  aspx  As accessed 2009-04-07  Noah MOE & Ольга D : Use of Lactobacillus probiotics for bacterial genitourinary infections in women: a review  Clin Ther 2008; 30(3):453-468  Car J : Urinary tract infections in women: diagnosis and management in primary care  BMJ 2006; 332(1871):94-97  Kiana ME , Abdulkadir GI , Debo T , et al: Probiotics for prevention of recurrent urinary tract infections in women: a review of the evidence from microbiological and clinical studies  Drugs 2006; 66(9):4890-7861  Foster RT : Uncomplicated urinary tract infections in women  Obstet Gynecol Clin [place] 2008; 35(2):235-48, viii  [employer]: Uncomplicated Urinary Tract Infrection in Women  [employer]  [place], [place]  2006  Available from URL: Rylan trinh  As accessed 2009-04-07  Yumiko Linton MA : Evidence-based practice for evaluation and management of female urinary tract infection  Urol Nurs 2007; 27(2):133-136  Emily Joshi , Crow Rodriguez , et al: Cranberry or trimethoprim for the prevention of recurrent urinary tract infections? A randomized controlled trial in older women   J Antimicrob Chemother 2009; 63(2):389-395  Duane CORONA , Gertrude LANG , et al: Uncomplicated urinary tract infection in women  Current practice and the effect of antibiotic resistance on empiric treatment  Can Fam Physician 2006; 05 14 [phone]  Shaista JONES , Gulshan M , Momo R , et al: Oestrogens for preventing recurrent urinary tract infection in postmenopausal women  Breanna Database Syst Rev 2008; 2008(2):1-  Haley JONES , Luis ELLIOTT , [facility] JEN et al: Differences in the pattern of antibiotic prescription profile and recurrence rate for possible urinary tract infections in women with and without diabetes  Diabetes Care 2008; 31(7):0112-4108  Lilia JS & Hollie FG: Urinary tract infection in women  Obstet Gynecol 2005; 106(5 Pt 1):9783-6800  © 2014 [address] is for End User's use only and may not be sold, redistributed or otherwise used for commercial purposes  All illustrations and images included in CareNotes® are the copyrighted property of A D A [employer] , [employer]  or Curt Grace  The above information is an  only  It is not intended as medical advice for individual conditions or treatments  Talk to your doctor, nurse or pharmacist before following any medical regimen to see if it is safe and effective for you

## 2018-11-02 ENCOUNTER — OFFICE VISIT (OUTPATIENT)
Dept: FAMILY MEDICINE CLINIC | Facility: CLINIC | Age: 18
End: 2018-11-02
Payer: COMMERCIAL

## 2018-11-02 VITALS
SYSTOLIC BLOOD PRESSURE: 110 MMHG | HEART RATE: 58 BPM | TEMPERATURE: 99 F | DIASTOLIC BLOOD PRESSURE: 70 MMHG | OXYGEN SATURATION: 97 % | HEIGHT: 57 IN | BODY MASS INDEX: 35.38 KG/M2 | WEIGHT: 164 LBS

## 2018-11-02 DIAGNOSIS — K80.20 CALCULUS OF GALLBLADDER WITHOUT CHOLECYSTITIS WITHOUT OBSTRUCTION: ICD-10-CM

## 2018-11-02 DIAGNOSIS — R74.8 ABNORMAL AST AND ALT: ICD-10-CM

## 2018-11-02 DIAGNOSIS — Z00.01 ENCOUNTER FOR ROUTINE ADULT MEDICAL EXAM WITH ABNORMAL FINDINGS: Primary | ICD-10-CM

## 2018-11-02 DIAGNOSIS — E66.09 CLASS 2 OBESITY DUE TO EXCESS CALORIES WITHOUT SERIOUS COMORBIDITY WITH BODY MASS INDEX (BMI) OF 35.0 TO 35.9 IN ADULT: ICD-10-CM

## 2018-11-02 DIAGNOSIS — K29.60 OTHER GASTRITIS WITHOUT HEMORRHAGE, UNSPECIFIED CHRONICITY: ICD-10-CM

## 2018-11-02 DIAGNOSIS — R71.0 DROP IN HEMOGLOBIN: ICD-10-CM

## 2018-11-02 PROCEDURE — 99395 PREV VISIT EST AGE 18-39: CPT | Performed by: FAMILY MEDICINE

## 2018-11-02 PROCEDURE — 99213 OFFICE O/P EST LOW 20 MIN: CPT | Performed by: FAMILY MEDICINE

## 2018-11-02 PROCEDURE — 3725F SCREEN DEPRESSION PERFORMED: CPT | Performed by: FAMILY MEDICINE

## 2018-11-02 RX ORDER — FAMOTIDINE 20 MG/1
20 TABLET, FILM COATED ORAL DAILY
Qty: 30 TABLET | Refills: 1 | Status: SHIPPED | OUTPATIENT
Start: 2018-11-02 | End: 2018-12-24 | Stop reason: HOSPADM

## 2018-11-02 NOTE — PROGRESS NOTES
[facility] HEALTH MAINTENANCE OFFICE VISIT  [facility] [place] [facility]    NAME: Kiki Key  AGE: 19 y o   SEX: female  : 2000     DATE: 11/3/2018    Assessment and Plan     Problem List Items Addressed This Visit     Class 2 obesity due to excess calories without serious comorbidity with body mass index (BMI) of 35 0 to 35 9 in adult     Increased activity 30 min a day 5 days a week healthy diet portion control discussed with the patient we recommend refer to  dietitian and she declined for today         Calculus of gallbladder without cholecystitis without obstruction     Symptomatic status post ER visit ultrasound of the right upper quadrant her positive for calculus but no cholecystitis patient asymptomatic with abdomen pain proper diet low fat diet low greasy food discussed with the patient will refer her to see general surgeon         Relevant Orders    Ambulatory referral to General Surgery    Drop in hemoglobin     A new diagnosis asymptomatic planned to have anemia workup we discussed with the patient InCase a short of breath or palpitation to call the office         Relevant Orders    CBC and differential    Ferritin    Folate    Iron    Iron Saturation %    Occult Blood, Fecal Immunochemical    Reticulocytes    Vitamin B12    Abnormal AST and ALT     A new diagnosis patient recently had a episode of abdomen pain diagnosed with the calculus of the gallbladder we discussed the patient can cause increased liver enzyme will recheck it          Relevant Orders    CBC and differential    Ferritin    Folate    Iron    Iron Saturation %    Occult Blood, Fecal Immunochemical    Reticulocytes    Vitamin B12      Other Visit Diagnoses     Encounter for routine adult medical exam with abnormal findings    -  Primary    Increased activity healthy diet portion control immunization appropriate for the age discussed with the patient sunscreen and will hydration    Other gastritis without hemorrhage, unspecified chronicity        Relevant Medications    famotidine (PEPCID) 20 mg tablet    Other Relevant Orders    CBC and differential    Ferritin    Folate    Iron    Iron Saturation %    Occult Blood, Fecal Immunochemical    Reticulocytes    Vitamin B12            · Patient Counseling:   · Nutrition: Stressed importance of a well balanced diet, moderation of sodium/saturated fat, caloric balance and sufficient intake of fiber  · Exercise: Stressed the importance of regular exercise with a goal of 150 minutes per week  · Dental Health: Discussed daily flossing and brushing and regular dental visits     · Immunizations reviewed Patient is up-to-date with immunization  · Discussed benefits of screening pt is up-to-date to resume screening for hyperlipidemia and diabetic patient followed by gyn for her woman health  · Discussed the patient's BMI with her  The BMI is above average; BMI management plan is completed     Return in about 4 weeks (around 11/30/2018)          Chief Complaint     Chief Complaint   Patient presents with    Physical Exam       History of Present Illness     Patient in today for her annual physical exam deny any chest pain short of breath no palpitation no headache no blurred vision no weakness or lateralized of the symptom no rash and no change in the weight and no change in the mood patient recently on October 31st was emergency room with abdomen pain and vomiting and nausea during her emergency room visit patient had ultrasound of the abdomen that showed she had call if he acid is without cholecystitis patient was placed on Pepcid today in my office she continued to have abdomen pain mostly in her epigastric area and sometimes sure to her right upper quadrant her and she is not nauseous anymore but her pain comes and goes and she described it as dull 5/10 no diarrhea no constipation no blood in the stool no weight change no fever no chills  The recent blood work discussed with the patient show elevated liver function test which can be secondary to the her recent GI upset the also her hemoglobin is low but patient asymptomatic        Well Adult Physical   Patient here for a comprehensive physical exam       Diet and Physical Activity  Diet: poor diet  Weight concerns: Patient has class 2 obesity (BMI 35 0-39  9)  Exercise: frequently      Depression Screen  PHQ-9 Depression Screening    PHQ-9:    Frequency of the following problems over the past two weeks:       Little interest or pleasure in doing things:  0 - not at all  Feeling down, depressed, or hopeless:  0 - not at all  PHQ-2 Score:  0          General Health  Hearing: Normal:  bilateral  Vision: wears glasses  Dental: regular dental visits    [facility]  [facility] 10/13/18      The following portions of the patient's history were reviewed and updated as appropriate: allergies, current medications, past family history, past medical history, past social history, past surgical history and problem list     Review of Systems     Review of Systems   Constitutional: Negative for fatigue and fever  HENT: Negative for ear pain, sinus pain, sinus pressure and sore throat  Eyes: Negative for pain and redness  Respiratory: Negative for cough, chest tightness and shortness of breath  Cardiovascular: Negative for chest pain, palpitations and leg swelling  Gastrointestinal: Positive for abdominal pain  Negative for blood in stool, constipation, diarrhea and nausea  Genitourinary: Negative for flank pain, frequency and hematuria  Musculoskeletal: Negative for back pain and joint swelling  Skin: Negative for rash  Neurological: Negative for dizziness, numbness and headaches  Hematological: Does not bruise/bleed easily         Past Medical History     Past Medical History:   Diagnosis Date    Asthma     Class 2 obesity due to excess calories without serious comorbidity with body mass index (BMI) of 35 0 to 35 9 in adult 11/3/2018    Scoliosis        Past Surgical History     History reviewed  No pertinent surgical history  Social History     Social History     Social History    Marital status: Single     Spouse name: N/A    Number of children: N/A    Years of education: N/A     Social History Main Topics    Smoking status: Never Smoker    Smokeless tobacco: Never Used      Comment: no passive smoke exposure    Alcohol use No    Drug use: No    Sexual activity: No     Other Topics Concern    None     Social History Narrative    None       Family History     History reviewed  No pertinent family history      Current Medications       Current Outpatient Prescriptions:     albuterol (VENTOLIN HFA) 90 mcg/act inhaler, Inhale 2 puffs, Disp: , Rfl:     cephalexin (KEFLEX) 500 mg capsule, Take 1 capsule (500 mg total) by mouth every 8 (eight) hours for 10 days, Disp: 30 capsule, Rfl: 0    clindamycin (CLINDAGEL) 1 % gel, Every 12 hours, Disp: , Rfl:     [employer] IBUPROFEN 200 MG tablet, , Disp: , Rfl:     famotidine (PEPCID) 20 mg tablet, Take 1 tablet (20 mg total) by mouth daily, Disp: 30 tablet, Rfl: 1    gabapentin (NEURONTIN) 600 MG tablet, , Disp: , Rfl:     levonorgestrel-ethinyl estradiol (AVIANE,ALESSE,LESSINA) 0 1-20 MG-MCG per tablet, Take 1 tablet by mouth daily, Disp: 28 tablet, Rfl: 11    loratadine (CLARITIN) 10 mg tablet, every 24 hours, Disp: , Rfl:     lurasidone (LATUDA) 40 mg tablet, Take 20 mg by mouth daily with breakfast, Disp: , Rfl:     ondansetron (ZOFRAN) 4 mg tablet, Take 1 tablet (4 mg total) by mouth every 6 (six) hours, Disp: 12 tablet, Rfl: 0    sucralfate (CARAFATE) 1 g tablet, Take 1 tablet (1 g total) by mouth 4 (four) times a day, Disp: 40 tablet, Rfl: 0     Allergies     No Known Allergies    Objective     /70   Pulse 58   Temp 99 °F (37 2 °C) (Oral)   Ht 4' 9 25" (1 454 m)   Wt 74 4 kg (164 lb)   LMP 10/13/2018   SpO2 97% Breastfeeding? No   BMI 35 18 kg/m²      Physical Exam   Constitutional: She is oriented to person, place, and time  She appears well-developed and well-nourished  HENT:   Head: Normocephalic  Right Ear: External ear normal    Left Ear: External ear normal    Eyes: Conjunctivae and EOM are normal  Right eye exhibits no discharge  Left eye exhibits no discharge  Neck: No JVD present  Cardiovascular: Normal rate, regular rhythm and normal heart sounds  Exam reveals no gallop  No murmur heard  Pulmonary/Chest: Effort normal  No respiratory distress  She has no wheezes  She has no rales  She exhibits no tenderness  Abdominal: She exhibits no mass  There is tenderness  There is no rebound  Mild tenderness in the epigastric area no rebound no rigidity   Musculoskeletal: She exhibits no edema or tenderness  Neurological: She is alert and oriented to person, place, and time  Skin: No rash noted  No erythema           Health Maintenance     Health Maintenance   Topic Date Due    Depression Screening PHQ  11/02/2019    DTaP,Tdap,and Td Vaccines (8 - Td) 05/20/2025    INFLUENZA VACCINE  Completed     Immunization History   Administered Date(s) Administered    DTaP 5 1998, 1998, 1998, 07/14/1999, 09/20/2002    HPV Quadrivalent 04/02/2009, 06/09/2009, 02/04/2011    Hep A, adult 06/05/2008, 02/04/2011    Hep A, ped/adol, 2 dose 06/05/2008, 05/20/2015    Hep B, adult 1998, 1998, 03/26/2001    Hib (PRP-OMP) 1998, 1998, 03/26/2001    IPV 1998, 1998, 04/14/1999, 09/20/2002    Influenza 10/16/2015, 08/16/2016    Influenza TIV (IM) 10/13/1999, 01/05/2007, 02/04/2011, 10/05/2011    Influenza, recombinant, quadrivalent,injectable, preservative free 09/24/2018    MMR 04/14/1999, 09/20/2002    Meningococcal Conjugate (MCV4O) 05/20/2015    Meningococcal, Unknown Serogroups 02/04/2011, 11/19/2014    Tdap 02/04/2011, 05/20/2015    Varicella 04/14/1999, 06/05/2008, 08/05/2008, 04/02/2009       Sin Orellana MD  [address] [place] [address]

## 2018-11-02 NOTE — PATIENT INSTRUCTIONS

## 2018-11-03 PROBLEM — R71.0 DROP IN HEMOGLOBIN: Status: ACTIVE | Noted: 2018-11-03

## 2018-11-03 PROBLEM — E66.09 CLASS 2 OBESITY DUE TO EXCESS CALORIES WITHOUT SERIOUS COMORBIDITY WITH BODY MASS INDEX (BMI) OF 35.0 TO 35.9 IN ADULT: Status: ACTIVE | Noted: 2018-11-03

## 2018-11-03 PROBLEM — K80.20 CALCULUS OF GALLBLADDER WITHOUT CHOLECYSTITIS WITHOUT OBSTRUCTION: Status: ACTIVE | Noted: 2018-11-03

## 2018-11-03 PROBLEM — R74.8 ABNORMAL AST AND ALT: Status: ACTIVE | Noted: 2018-11-03

## 2018-11-03 PROBLEM — E66.812 CLASS 2 OBESITY DUE TO EXCESS CALORIES WITHOUT SERIOUS COMORBIDITY WITH BODY MASS INDEX (BMI) OF 35.0 TO 35.9 IN ADULT: Status: ACTIVE | Noted: 2018-11-03

## 2018-11-03 NOTE — ASSESSMENT & PLAN NOTE
Increased activity 30 min a day 5 days a week healthy diet portion control discussed with the patient we recommend refer to  dietitian and she declined for today

## 2018-11-03 NOTE — ASSESSMENT & PLAN NOTE
A new diagnosis asymptomatic planned to have anemia workup we discussed with the patient InCase a short of breath or palpitation to call the office

## 2018-11-12 ENCOUNTER — OFFICE VISIT (OUTPATIENT)
Dept: SURGERY | Facility: CLINIC | Age: 18
End: 2018-11-12
Payer: COMMERCIAL

## 2018-11-12 VITALS
WEIGHT: 164 LBS | DIASTOLIC BLOOD PRESSURE: 82 MMHG | SYSTOLIC BLOOD PRESSURE: 120 MMHG | RESPIRATION RATE: 16 BRPM | BODY MASS INDEX: 34.43 KG/M2 | HEART RATE: 73 BPM | HEIGHT: 58 IN

## 2018-11-12 DIAGNOSIS — K80.20 CALCULUS OF GALLBLADDER WITHOUT CHOLECYSTITIS WITHOUT OBSTRUCTION: ICD-10-CM

## 2018-11-12 PROCEDURE — 99243 OFF/OP CNSLTJ NEW/EST LOW 30: CPT | Performed by: PHYSICIAN ASSISTANT

## 2018-11-12 RX ORDER — DOXEPIN HYDROCHLORIDE 10 MG/ML
SOLUTION ORAL
Status: ON HOLD | COMMUNITY
Start: 2018-11-11 | End: 2018-11-30 | Stop reason: ALTCHOICE

## 2018-11-12 RX ORDER — BUPROPION HYDROCHLORIDE 300 MG/1
TABLET ORAL
Status: ON HOLD | COMMUNITY
Start: 2018-11-11 | End: 2018-11-27 | Stop reason: ALTCHOICE

## 2018-11-12 NOTE — PROGRESS NOTES
Assessment/Plan:   Garry Mukherjee is a 19 y  o female who is here for Gallbladder disease     Patient with constant upper abdomen into chest pain for 12 days  Associated with nausea and vomiting  Any type of food makes it worse  Patient takes some Prilosec with some relief  Patient seen in ED on 10/31 and found to have gallstone along with urinary tract infection  Elevated AST and ALT  Upon evaluation today patient has: Gallstones on Ultrasound       Plan: Would get Upper Endoscopy to evaluate for H  Pylori, ulcer disease or acid reflux  If EGD negative consider  Laparoscopic Cholecystectomy with possible Intraoperative Cholangiogram  Possible Robotic assisted  Low Fat diet discussed  Preoperative Clearance: None          Images:         ____________________________________________________    HPI:  aGrry Mukherjee is a 19 y  o female who was referred for evaluation of The encounter diagnosis was Calculus of gallbladder without cholecystitis without obstruction  Abdominal pain Location: in the epigastrium with radiation to chest  Quality: cramping and pressure-like  Quantity: 4/10 in intensity  Chronicity: Onset 12 days ago, unchanged since then  Aggravating factors: food  Alleviating factors: prilosec   which is Intermittent      nausea and vomiting,     regular bowel movement       Duration of pain or symptoms: Constant for 12 dyas      Prior Colonoscopy : None     Prior Abdominal Surgery: none    Anticoagulation: none    Imaging:   No orders to display           LABS:    Lab Results   Component Value Date    K 3 6 10/31/2018     10/31/2018    CO2 24 10/31/2018    BUN 10 10/31/2018    CREATININE 0 76 10/31/2018    GLUF 92 07/23/2018    CALCIUM 9 2 10/31/2018     (H) 10/31/2018     (H) 10/31/2018    ALKPHOS 96 10/31/2018    EGFR 113 10/31/2018       Lab Results   Component Value Date    WBC 8 60 10/31/2018    HGB 11 5 (L) 10/31/2018    HCT 34 8 (L) 10/31/2018    MCV 79 (L) 10/31/2018     10/31/2018     No results found for: INR, PROTIME  No results found for: HGBA1C        ROS:  General ROS: negative for - chills, fatigue, fever or night sweats, weight loss  Respiratory ROS: no cough, shortness of breath, or wheezing  Cardiovascular ROS: no chest pain or dyspnea on exertion  Genito-Urinary ROS: no dysuria, trouble voiding, or hematuria  Musculoskeletal ROS: negative for - gait disturbance, joint pain or muscle pain  Neurological ROS: no TIA or stroke symptoms  Gastrointestinal ROS: See HPI   GI ROS: see HPI  Skin ROS: no new rashes or lesions   Lymphatic ROS: no new adenopathy noted by pt  GYN ROS: see HPI, no new GYN history or bleeding noted  Psy ROS: no new mental or behavioral disturbances       Patient Active Problem List   Diagnosis    Acne vulgaris    ADHD (attention deficit hyperactivity disorder)    Asthma    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    Mixed hyperlipidemia    Scoliosis (and kyphoscoliosis), idiopathic    Class 2 obesity due to excess calories without serious comorbidity with body mass index (BMI) of 35 0 to 35 9 in adult    Calculus of gallbladder without cholecystitis without obstruction    Drop in hemoglobin    Abnormal AST and ALT         Allergies:  Patient has no known allergies        Current Outpatient Prescriptions:     albuterol (VENTOLIN HFA) 90 mcg/act inhaler, Inhale 2 puffs, Disp: , Rfl:     buPROPion (WELLBUTRIN XL) 300 mg 24 hr tablet, , Disp: , Rfl:     clindamycin (CLINDAGEL) 1 % gel, Every 12 hours, Disp: , Rfl:     CVS IBUPROFEN 200 MG tablet, , Disp: , Rfl:     doxepin (SINEquan) 10 mg/mL solution, , Disp: , Rfl:     famotidine (PEPCID) 20 mg tablet, Take 1 tablet (20 mg total) by mouth daily, Disp: 30 tablet, Rfl: 1    gabapentin (NEURONTIN) 600 MG tablet, , Disp: , Rfl:     levonorgestrel-ethinyl estradiol (AVIANE,ALESSE,LESSINA) 0 1-20 MG-MCG per tablet, Take 1 tablet by mouth daily, Disp: 28 tablet, Rfl: 11    loratadine (CLARITIN) 10 mg tablet, every 24 hours, Disp: , Rfl:     lurasidone (LATUDA) 40 mg tablet, Take 20 mg by mouth daily with breakfast, Disp: , Rfl:     ondansetron (ZOFRAN) 4 mg tablet, Take 1 tablet (4 mg total) by mouth every 6 (six) hours, Disp: 12 tablet, Rfl: 0    sucralfate (CARAFATE) 1 g tablet, Take 1 tablet (1 g total) by mouth 4 (four) times a day, Disp: 40 tablet, Rfl: 0    Past Medical History:   Diagnosis Date    Asthma     Class 2 obesity due to excess calories without serious comorbidity with body mass index (BMI) of 35 0 to 35 9 in adult 11/3/2018    Scoliosis        No past surgical history on file  History reviewed  No pertinent family history  reports that she has never smoked  She has never used smokeless tobacco  She reports that she does not drink alcohol or use drugs  Exam:   Vitals:    11/12/18 1310   BP: 120/82   Pulse: 73   Resp: 16       PHYSICAL EXAM  General Appearance:    Alert, cooperative, no distress   Head:    Normocephalic without obvious abnormality   Eyes:    PERRL, conjunctiva/corneas clear, EOM's intact        Neck:   Supple, no adenopathy, no JVD   Back:     Symmetric, no spinal or CVA tenderness   Lungs:     Clear to auscultation bilaterally, no wheezing or rhonchi   Heart:    Regular rate and rhythm, S1 and S2 normal, no murmur   Abdomen:     mild tenderness in the in the epigastrium  Bowel sounds are normal      Extremities:   Extremities normal  No clubbing, cyanosis or edema   Psych:   Normal Affect, AOx3  Neurologic:  Skin:   CNII-XII intact  Strength symmetric, speech intact    Warm, dry, intact, no visible rashes or lesions      Informed consent for procedure was personally discussed, reviewed, and signed by Dr Barby Cameron  Discussion by Dr Barby Cameron was carried out regarding risks, benefits, and alternatives with the patient   Risks include but are not limited to:  bleeding, infection, and delayed wound healing or an open wound, pulmonary embolus, leaks from bowel or bile ducts or other viscus, transfusions, death  Discussed in further detail the more common complications and their rates of occurrence   was used if necessary  Patient expressed understanding of the issues discussed and wished/consented to proceed  All questions were answered by Dr Pricila Walsh  Discussion performed between patient and the provider signing below  Signature:   Mare Brittle, MD    Date: 11/12/2018 Time: 1:38 PM                          Some portions of this record may have been generated with voice recognition software  There may be translation, syntax,  or grammatical errors  Occasional wrong word or "sound-a-like" substitutions may have occurred due to the inherent limitations of the voice recognition software  Read the chart carefully and recognize, using context, where substitutions may have occurred  If you have any questions, please contact the dictating provider for clarification or correction, as needed  This encounter has been coded by a non-certified coder

## 2018-11-12 NOTE — LETTER
November 12, 2018     Yasmany Taylor MD  [address] [facility]  [address]    Patient: Dennis Fields   YOB: 2000   Date of Visit: 11/12/2018       Dear Dr Gary Lawson: Thank you for referring Lennox Raman to me for evaluation  Below are my notes for this consultation  If you have questions, please do not hesitate to call me  I look forward to following your patient along with you  Sincerely,        Jimi Hernandez MD        CC: No Recipients  Manolo Santos  11/12/2018  1:44 PM  Sign at close encounter  Assessment/Plan:   Dennis Fields is a 19 y  o female who is here for Gallbladder disease     Patient with constant upper abdomen into chest pain for 12 days  Associated with nausea and vomiting  Any type of food makes it worse  Patient takes some Prilosec with some relief  Patient seen in ED on 10/31 and found to have gallstone along with urinary tract infection  Elevated AST and ALT  Upon evaluation today patient has: Gallstones on Ultrasound       Plan: Would get Upper Endoscopy to evaluate for H  Pylori, ulcer disease or acid reflux  If EGD negative consider  Laparoscopic Cholecystectomy with possible Intraoperative Cholangiogram  Possible Robotic assisted  Low Fat diet discussed  Preoperative Clearance: None          Images:         ____________________________________________________    HPI:  Dennis Fields is a 19 y  o female who was referred for evaluation of The encounter diagnosis was Calculus of gallbladder without cholecystitis without obstruction  Abdominal pain Location: in the epigastrium with radiation to chest  Quality: cramping and pressure-like  Quantity: 4/10 in intensity  Chronicity: Onset 12 days ago, unchanged since then  Aggravating factors: food  Alleviating factors: prilosec   which is Intermittent      nausea and vomiting,     regular bowel movement       Duration of pain or symptoms: Constant for 12 dyas      Prior Colonoscopy : None     Prior Abdominal Surgery: none    Anticoagulation: none    Imaging:   No orders to display           LABS:    Lab Results   Component Value Date    K 3 6 10/31/2018     10/31/2018    CO2 24 10/31/2018    BUN 10 10/31/2018    CREATININE 0 76 10/31/2018    GLUF 92 07/23/2018    CALCIUM 9 2 10/31/2018     (H) 10/31/2018     (H) 10/31/2018    ALKPHOS 96 10/31/2018    EGFR 113 10/31/2018       Lab Results   Component Value Date    WBC 8 60 10/31/2018    HGB 11 5 (L) 10/31/2018    HCT 34 8 (L) 10/31/2018    MCV 79 (L) 10/31/2018     10/31/2018     No results found for: INR, PROTIME  No results found for: HGBA1C        ROS:  General ROS: negative for - chills, fatigue, fever or night sweats, weight loss  Respiratory ROS: no cough, shortness of breath, or wheezing  Cardiovascular ROS: no chest pain or dyspnea on exertion  Genito-Urinary ROS: no dysuria, trouble voiding, or hematuria  Musculoskeletal ROS: negative for - gait disturbance, joint pain or muscle pain  Neurological ROS: no TIA or stroke symptoms  Gastrointestinal ROS: See HPI   GI ROS: see HPI  Skin ROS: no new rashes or lesions   Lymphatic ROS: no new adenopathy noted by pt  GYN ROS: see HPI, no new GYN history or bleeding noted  Psy ROS: no new mental or behavioral disturbances       Patient Active Problem List   Diagnosis    Acne vulgaris    ADHD (attention deficit hyperactivity disorder)    Asthma    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    Mixed hyperlipidemia    Scoliosis (and kyphoscoliosis), idiopathic    Class 2 obesity due to excess calories without serious comorbidity with body mass index (BMI) of 35 0 to 35 9 in adult    Calculus of gallbladder without cholecystitis without obstruction    Drop in hemoglobin    Abnormal AST and ALT         Allergies:  Patient has no known allergies        Current Outpatient Prescriptions:    albuterol (VENTOLIN HFA) 90 mcg/act inhaler, Inhale 2 puffs, Disp: , Rfl:     buPROPion (WELLBUTRIN XL) 300 mg 24 hr tablet, , Disp: , Rfl:     clindamycin (CLINDAGEL) 1 % gel, Every 12 hours, Disp: , Rfl:     [employer] IBUPROFEN 200 MG tablet, , Disp: , Rfl:     doxepin (SINEquan) 10 mg/mL solution, , Disp: , Rfl:     famotidine (PEPCID) 20 mg tablet, Take 1 tablet (20 mg total) by mouth daily, Disp: 30 tablet, Rfl: 1    gabapentin (NEURONTIN) 600 MG tablet, , Disp: , Rfl:     levonorgestrel-ethinyl estradiol (AVIANE,ALESSE,LESSINA) 0 1-20 MG-MCG per tablet, Take 1 tablet by mouth daily, Disp: 28 tablet, Rfl: 11    loratadine (CLARITIN) 10 mg tablet, every 24 hours, Disp: , Rfl:     lurasidone (LATUDA) 40 mg tablet, Take 20 mg by mouth daily with breakfast, Disp: , Rfl:     ondansetron (ZOFRAN) 4 mg tablet, Take 1 tablet (4 mg total) by mouth every 6 (six) hours, Disp: 12 tablet, Rfl: 0    sucralfate (CARAFATE) 1 g tablet, Take 1 tablet (1 g total) by mouth 4 (four) times a day, Disp: 40 tablet, Rfl: 0    Past Medical History:   Diagnosis Date    Asthma     Class 2 obesity due to excess calories without serious comorbidity with body mass index (BMI) of 35 0 to 35 9 in adult 11/3/2018    Scoliosis        No past surgical history on file  History reviewed  No pertinent family history  reports that she has never smoked  She has never used smokeless tobacco  She reports that she does not drink alcohol or use drugs        Exam:   Vitals:    11/12/18 1310   BP: 120/82   Pulse: 73   Resp: 16       PHYSICAL EXAM  General Appearance:    Alert, cooperative, no distress   Head:    Normocephalic without obvious abnormality   Eyes:    PERRL, conjunctiva/corneas clear, EOM's intact        Neck:   Supple, no adenopathy, no JVD   Back:     Symmetric, no spinal or CVA tenderness   Lungs:     Clear to auscultation bilaterally, no wheezing or rhonchi   Heart:    Regular rate and rhythm, S1 and S2 normal, no murmur Abdomen:     mild tenderness in the in the epigastrium  Bowel sounds are normal      Extremities:   Extremities normal  No clubbing, cyanosis or edema   Psych:   Normal Affect, AOx3  Neurologic:  Skin:   CNII-XII intact  Strength symmetric, speech intact    Warm, dry, intact, no visible rashes or lesions      Informed consent for procedure was personally discussed, reviewed, and signed by Dr Concepcion Gan  Discussion by Dr Concepcion Gan was carried out regarding risks, benefits, and alternatives with the patient  Risks include but are not limited to:  bleeding, infection, and delayed wound healing or an open wound, pulmonary embolus, leaks from bowel or bile ducts or other viscus, transfusions, death  Discussed in further detail the more common complications and their rates of occurrence   was used if necessary  Patient expressed understanding of the issues discussed and wished/consented to proceed  All questions were answered by Dr Concepcion Gan  Discussion performed between patient and the provider signing below  Signature:   Stephania Zamora MD    Date: 11/12/2018 Time: 1:38 PM                          Some portions of this record may have been generated with voice recognition software  There may be translation, syntax,  or grammatical errors  Occasional wrong word or "sound-a-like" substitutions may have occurred due to the inherent limitations of the voice recognition software  Read the chart carefully and recognize, using context, where substitutions may have occurred  If you have any questions, please contact the dictating provider for clarification or correction, as needed  This encounter has been coded by a non-certified coder

## 2018-11-14 PROBLEM — K21.00 REFLUX ESOPHAGITIS: Status: ACTIVE | Noted: 2018-11-14

## 2018-11-26 ENCOUNTER — ANESTHESIA EVENT (OUTPATIENT)
Dept: GASTROENTEROLOGY | Facility: HOSPITAL | Age: 18
End: 2018-11-26
Payer: COMMERCIAL

## 2018-11-27 ENCOUNTER — ANESTHESIA (OUTPATIENT)
Dept: GASTROENTEROLOGY | Facility: HOSPITAL | Age: 18
End: 2018-11-27
Payer: COMMERCIAL

## 2018-11-27 ENCOUNTER — HOSPITAL ENCOUNTER (OUTPATIENT)
Facility: HOSPITAL | Age: 18
Setting detail: OUTPATIENT SURGERY
Discharge: HOME | End: 2018-11-27
Attending: SURGERY | Admitting: SURGERY
Payer: COMMERCIAL

## 2018-11-27 VITALS
BODY MASS INDEX: 34.63 KG/M2 | WEIGHT: 165 LBS | RESPIRATION RATE: 16 BRPM | SYSTOLIC BLOOD PRESSURE: 102 MMHG | TEMPERATURE: 98.4 F | HEIGHT: 58 IN | DIASTOLIC BLOOD PRESSURE: 74 MMHG | HEART RATE: 72 BPM | OXYGEN SATURATION: 99 %

## 2018-11-27 DIAGNOSIS — K21.00 REFLUX ESOPHAGITIS: ICD-10-CM

## 2018-11-27 LAB — EXT PREGNANCY TEST URINE: NEGATIVE

## 2018-11-27 PROCEDURE — 88305 TISSUE EXAM BY PATHOLOGIST: CPT | Performed by: PATHOLOGY

## 2018-11-27 PROCEDURE — 88342 IMHCHEM/IMCYTCHM 1ST ANTB: CPT | Performed by: PATHOLOGY

## 2018-11-27 PROCEDURE — 81025 URINE PREGNANCY TEST: CPT | Performed by: ANESTHESIOLOGY

## 2018-11-27 PROCEDURE — 43239 EGD BIOPSY SINGLE/MULTIPLE: CPT | Performed by: SURGERY

## 2018-11-27 RX ORDER — PROPOFOL 10 MG/ML
INJECTION, EMULSION INTRAVENOUS AS NEEDED
Status: DISCONTINUED | OUTPATIENT
Start: 2018-11-27 | End: 2018-11-27 | Stop reason: SURG

## 2018-11-27 RX ORDER — SODIUM CHLORIDE 9 MG/ML
125 INJECTION, SOLUTION INTRAVENOUS CONTINUOUS
Status: DISCONTINUED | OUTPATIENT
Start: 2018-11-27 | End: 2018-11-27 | Stop reason: HOSPADM

## 2018-11-27 RX ADMIN — LIDOCAINE HYDROCHLORIDE 50 MG: 20 INJECTION, SOLUTION INTRAVENOUS at 11:45

## 2018-11-27 RX ADMIN — SODIUM CHLORIDE 125 ML/HR: 0.9 INJECTION, SOLUTION INTRAVENOUS at 10:53

## 2018-11-27 RX ADMIN — PROPOFOL 200 MG: 10 INJECTION, EMULSION INTRAVENOUS at 11:45

## 2018-11-27 NOTE — OP NOTE
ESOPHAGOGASTRODUODENOSCOPY (EGD)  Postoperative Note  PATIENT NAME: Tamiko Munroe  : 2000  MRN: [id number]  [facility] ROOM 01    Surgery Date: 2018      Pre operative diagnosis:   Reflux esophagitis [K21 0]    Operative Indications:  GERD    Consent:  The risks, benefits, and alternatives to the surgery were discussed with the patient and with the family prior to surgery if available, personally by Dr Charu Hobson  If the consent was obtained by the physician assistant or other representative, the consent was reviewed once again personally by the operating physician  Common complications particular for this procedure as well as unusual complications were discussed, including but not limited to:  bleeding, wound infection, prolonged wound healing, open wounds, reoperation, leak from the bowel or viscus, leak from the bile duct or injury to adjacent or other organs or blood vessels in the abdomen  If the surgery was laparoscopic, it was discussed that possible open surgery could also occur during that same surgery and is always an option in laparoscopic surgery and/or possible reoperation  A  was used if necessary  The patient expressed understanding of the issues discussed and wished and consented to the procedure to proceed  All questions were answered  Dr Charu Hobson personally discussed the informed consent with this patient  Post operative dx and Findings:  Normal Duodenum, Normal Esophagus, Mild Gastritis and Hiatal hernia < 1 cm    Procedure:   Procedure(s):  ESOPHAGOGASTRODUODENOSCOPY (EGD)  EGD with multiple cold biopsies     Surgeon(s) and Role:     * Brooke Fuentes MD - Primary  I was present for the entire procedure       Specimens:  ID Type Source Tests Collected by Time Destination   1 : random duodenum bx  Tissue Duodenum TISSUE EXAM Brooke Fuentes MD 2018 1147    2 : gastric antrum bx, gastritis, R/O H  Pylori Tissue Stomach TISSUE EXAM Brooke Fuentes MD 2018 1148    3 : bx R/O Maria's Tissue Esophagogastric junction TISSUE EXAM Shanthi Lyles MD 2018 1148        Estimated Blood Loss:   Minimal    Anesthesia Type:   Choice     Procedure: The patient was seen in the Holding Room  The risks, benefits, complications, treatment options, and expected outcomes were discussed with the patient  The possibilities of reaction to medication, pulmonary aspiration, perforation of viscus, bleeding, recurrent infection, the need for additional procedures, failure to diagnose a condition, and creating a complication requiring transfusion or operation were discussed with the patient  The patient concurred with the proposed plan, giving informed consent  The patient was taken to Endoscopy Suite, identified as [facility]  Staff confirmed patient name, , and procedure  A Time Out was held and the above information confirmed  The endoscope was passed per orum into the stomach and duodeum without difficulty  The duodenum showed Normal Duodenum  The duodenum was biopsied with cold forceps and sent for pathology  The scope was withdrawn into the antrum  There was  no  obvious ulcer in the antrum; there was  mild gastritis was seen  The antrum and body of the stomach were biopsied using the cold forceps,  in multiple locations, and sent for pathology  The scope was retroflexed  There was < 1 cm  hiatal hernia  The GE junction otherwise showed No  esophagitis  The esophagus was otherwise normal       The scope was removed  The patient tolerated the procedure well  Some portions of this record may have been generated with voice recognition software  There may be translation, syntax,  or grammatical errors  Occasional wrong word or "sound-a-like" substitutions may have occurred due to the inherent limitations of the voice recognition software  Read the chart carefully and recognize, using context, where substations may have occurred   If you have any questions, please contact the dictating provider for clarification or correction, as needed     Complications: None    EBL: < 0 5 or none cc    Condition: Stable to PACU    SIGNATURE: Judd Mccloud MD   DATE: November 27, 2018   TIME: 11:49 AM

## 2018-11-27 NOTE — DISCHARGE SUMMARY
Discharge Summary - Gavin Arnold 19 y o  female MRN: [id number]    Unit/Bed#: ENDO POOL Encounter: [id number]      Pre-Operative Diagnosis: Pre-Op Diagnosis Codes: * Reflux esophagitis [K21 0]    Post-Operative Diagnosis: Post-Op Diagnosis Codes: * Reflux esophagitis [K21 0]     * Hiatal hernia [K44 9]     * Gastritis determined by endoscopy [K29 70]    Procedures Performed:  Procedure(s):  ESOPHAGOGASTRODUODENOSCOPY (EGD)    Surgeon: Mare Brittle, MD    See H & P for full details of admission and Operative Note for full details of operations performed  Patient was seen and examined prior to discharge  Provisions for Follow-Up Care:  See After Visit Summary for information related to follow-up care and home orders  Disposition: Home, in stable condition  Planned Readmission: No    Discharge Medications:  See after visit summary for reconciled discharge medications provided to patient and family  Post Operative instructions: Reviewed with patient and/or family  Some portions of this record may have been generated with voice recognition software  There may be translation, syntax,  or grammatical errors  Occasional wrong word or "sound-a-like" substitutions may have occurred due to the inherent limitations of the voice recognition software  Read the chart carefully and recognize, using context, where substitutions may have occurred  If you have any questions, please contact the dictating provider for clarification or correction, as needed  This encounter has been coded by non certified Coder      Signature:   Mare Brittle, MD  Date: 11/27/2018 Time: 11:50 AM

## 2018-11-27 NOTE — DISCHARGE INSTRUCTIONS
Nitin Samuels  Endoscopy Post-Operative Instructions  Dr Rose King MD, FACS    Procedure: egd    Findings:  Gastritis  and Small hiatal hernia     May proceed with gallbladder surgery  Please call office to schedule    Follow-Up: You will need a repeat Endoscopy in (generally)3 years  Will await final pathology report for final determination of number of years until your follow up endoscopy, if you had polyps on this exam   Different types of polyps require different lengths of follow up surveillance  Please call our office or your primary doctor's office if you have any questions, once the report is returned  You should have an endoscopy sooner than recommended if you have any symptoms of bleeding or change in stools or other concerns  You will receive a call from our office with your results, in addition to the the preliminary results you received today  You will usually receive a follow-up letter from our office in 1-2 weeks  Call the office if you do not hear from us  You are welcome to also schedule an office visit if desired to discuss the results further  It is your responsibility to contact our office for results in 1- 2 weeks if you do not hear from us  If a follow up endoscopy is needed, you are responsible for arranging that follow up appointment at the appropriate time  The office may or may not issue a reminder at that future time  Please take responsibility for your own follow up healthcare  Diet: Eat a light snack first, and then resume your previous diet  Discharge Medications:  See after visit summary for reconciled discharge medications provided to patient and family        Current Facility-Administered Medications:     sodium chloride 0 9 % infusion, 125 mL/hr, Intravenous, Continuous, Laboni Benjamin, DO, Last Rate: 125 mL/hr at 11/27/18 1053, 125 mL/hr at 11/27/18 1053  Do not take aspirin or blood thinning medications for 2 days following procedure  Tylenol is okay  You may have been given a new medication  Please take this (usually an anti-ulcer -type medication) and see your primary care doctor for refills and follow up medications if needed       After the test: Notify physician for arm swelling or pain at the intravenous site  You may have some abdominal discomfort following the procedure  Belching or passing flatus will help relieve it  Following upper endoscopy, you may experience a temporary sore throat  Saline gargles or lozenges can be taken to relieve sore throat Following lower endoscopy, minor rectal bleeding is not uncommon      Activity: Do not drive a car, operate machinery, or sign legal documents for 24 hours after your procedure  Normal activity may be resumed on the day following the procedure      Call the office at [phone] for any of the following: Severe abdominal pain, significant rectal bleeding, chills, or fever above 100°, new onset of persistent cough or persistent vomiting      [address], [address] [place] [address], [address]   Phone: [phone]            Hiatal Hernia   WHAT YOU NEED TO KNOW:   A hiatal hernia is a condition that causes part of your stomach to bulge through the hiatus (small opening) in your diaphragm  The part of the stomach may move up and down, or it may get trapped above the diaphragm  DISCHARGE INSTRUCTIONS:   Seek care immediately if:   · You have severe abdominal pain  · You try to vomit but nothing comes out (retching)  · You have severe chest pain and sudden trouble breathing  · Your bowel movements are black or bloody  · Your vomit looks like coffee grounds or has blood in it  Contact your healthcare provider if:   · Your symptoms are getting worse  · You have nausea, and you are vomiting  · You are losing weight without trying  · You have questions or concerns about your condition or care    Medicines: · Medicines  may be given to relieve heartburn symptoms  These medicines help to decrease or block stomach acid  You may also be given medicines that help to tighten the esophageal sphincter  · Take your medicine as directed  Contact your healthcare provider if you think your medicine is not helping or if you have side effects  Tell him or her if you are allergic to any medicine  Keep a list of the medicines, vitamins, and herbs you take  Include the amounts, and when and why you take them  Bring the list or the pill bottles to follow-up visits  Carry your medicine list with you in case of an emergency  Follow up with your healthcare provider as directed:  Write down your questions so you remember to ask them during your visits  Self care:   · Avoid foods that make your symptoms worse  These may include spicy foods, fruit juices, alcohol, caffeine, chocolate, and mint  · Eat several small meals during the day  Small meals give your stomach less food to digest     · Avoid lying down and bending forward after you eat  Do not eat meals 2 to 3 hours before bedtime  This decreases your risk for reflux  · Maintain a healthy weight  If you are overweight, weight loss may help relieve your symptoms  · Sleep with your head elevated  at least 6 inches  · Do not smoke  Smoking can increase your symptoms of heartburn  © 2017 [address] [facility] Information is for End User's use only and may not be sold, redistributed or otherwise used for commercial purposes  All illustrations and images included in CareNotes® are the copyrighted property of [employer]  or [employer]  The above information is an  only  It is not intended as medical advice for individual conditions or treatments  Talk to your doctor, nurse or pharmacist before following any medical regimen to see if it is safe and effective for you        Gastritis   WHAT YOU NEED TO KNOW:   Gastritis is inflammation or irritation of the lining of your stomach  DISCHARGE INSTRUCTIONS:   Call 911 for any of the following:   · You develop chest pain or shortness of breath  Seek care immediately if:   · You vomit blood  · You have black or bloody bowel movements  · You have severe stomach or back pain  Contact your healthcare provider if:   · You have a fever  · You have new or worsening symptoms, even after treatment  · You have questions or concerns about your condition or care  Medicines:   · Medicines  may be given to help treat a bacterial infection or decrease stomach acid  · Take your medicine as directed  Contact your healthcare provider if you think your medicine is not helping or if you have side effects  Tell him or her if you are allergic to any medicine  Keep a list of the medicines, vitamins, and herbs you take  Include the amounts, and when and why you take them  Bring the list or the pill bottles to follow-up visits  Carry your medicine list with you in case of an emergency  Manage or prevent gastritis:   · Do not smoke  Nicotine and other chemicals in cigarettes and cigars can make your symptoms worse and cause lung damage  Ask your healthcare provider for information if you currently smoke and need help to quit  E-cigarettes or smokeless tobacco still contain nicotine  Talk to your healthcare provider before you use these products  · Do not drink alcohol  Alcohol can prevent healing and make your gastritis worse  Talk to your healthcare provider if you need help to stop drinking  · Do not take NSAIDs or aspirin unless directed  These and similar medicines can cause irritation  If your healthcare provider says it is okay to take NSAIDs, take them with food  · Do not eat foods that cause irritation  Foods such as oranges and salsa can cause burning or pain  Eat a variety of healthy foods   Examples include fruits (not citrus), vegetables, low-fat dairy products, beans, whole-grain breads, and lean meats and fish  Try to eat small meals, and drink water with your meals  Do not eat for at least 3 hours before you go to bed  · Find ways to relax and decrease stress  Stress can increase stomach acid and make gastritis worse  Activities such as yoga, meditation, or listening to music can help you relax  Spend time with friends, or do things you enjoy  Follow up with your healthcare provider as directed: You may need ongoing tests or treatment, or referral to a gastroenterologist  Write down your questions so you remember to ask them during your visits  © 2017 [address] [employer]  Information is for End User's use only and may not be sold, redistributed or otherwise used for commercial purposes  All illustrations and images included in CareNotes® are the copyrighted property of [employer]  or Curt Grace  The above information is an  only  It is not intended as medical advice for individual conditions or treatments  Talk to your doctor, nurse or pharmacist before following any medical regimen to see if it is safe and effective for you  Gastritis   WHAT YOU NEED TO KNOW:   Gastritis is inflammation or irritation of the lining of your stomach  DISCHARGE INSTRUCTIONS:   Call 911 for any of the following:   · You develop chest pain or shortness of breath  Seek care immediately if:   · You vomit blood  · You have black or bloody bowel movements  · You have severe stomach or back pain  Contact your healthcare provider if:   · You have a fever  · You have new or worsening symptoms, even after treatment  · You have questions or concerns about your condition or care  Medicines:   · Medicines  may be given to help treat a bacterial infection or decrease stomach acid  · Take your medicine as directed    Contact your healthcare provider if you think your medicine is not helping or if you have side effects  Tell him or her if you are allergic to any medicine  Keep a list of the medicines, vitamins, and herbs you take  Include the amounts, and when and why you take them  Bring the list or the pill bottles to follow-up visits  Carry your medicine list with you in case of an emergency  Manage or prevent gastritis:   · Do not smoke  Nicotine and other chemicals in cigarettes and cigars can make your symptoms worse and cause lung damage  Ask your healthcare provider for information if you currently smoke and need help to quit  E-cigarettes or smokeless tobacco still contain nicotine  Talk to your healthcare provider before you use these products  · Do not drink alcohol  Alcohol can prevent healing and make your gastritis worse  Talk to your healthcare provider if you need help to stop drinking  · Do not take NSAIDs or aspirin unless directed  These and similar medicines can cause irritation  If your healthcare provider says it is okay to take NSAIDs, take them with food  · Do not eat foods that cause irritation  Foods such as oranges and salsa can cause burning or pain  Eat a variety of healthy foods  Examples include fruits (not citrus), vegetables, low-fat dairy products, beans, whole-grain breads, and lean meats and fish  Try to eat small meals, and drink water with your meals  Do not eat for at least 3 hours before you go to bed  · Find ways to relax and decrease stress  Stress can increase stomach acid and make gastritis worse  Activities such as yoga, meditation, or listening to music can help you relax  Spend time with friends, or do things you enjoy  Follow up with your healthcare provider as directed: You may need ongoing tests or treatment, or referral to a gastroenterologist  Write down your questions so you remember to ask them during your visits    © 2017 [address] [employer] Information is for End User's use only and may not be sold, redistributed or otherwise used for commercial purposes  All illustrations and images included in CareNotes® are the copyrighted property of [employer] M [employer]  or [employer]  The above information is an  only  It is not intended as medical advice for individual conditions or treatments  Talk to your doctor, nurse or pharmacist before following any medical regimen to see if it is safe and effective for you

## 2018-11-27 NOTE — ANESTHESIA PREPROCEDURE EVALUATION
Review of Systems/Medical History  Patient summary reviewed  Chart reviewed  No history of anesthetic complications     Cardiovascular  Hyperlipidemia,    Pulmonary  Asthma , well controlled/ stable Last rescue: < 6 months ago Asthma type of rescue: PRN inhaler,        GI/Hepatic    GERD well controlled,             Endo/Other    Obesity    GYN       Hematology  Negative hematology ROS      Musculoskeletal  Scoliosis lumbar scoliosis and thoracic kyphosis,        Neurology   Psychology   Psychiatric history (adhd), Anxiety, Depression ,              Physical Exam    Airway    Mallampati score: III  TM Distance: >3 FB  Neck ROM: full     Dental   No notable dental hx     Cardiovascular  Rhythm: regular, Rate: normal, Cardiovascular exam normal    Pulmonary  Pulmonary exam normal Breath sounds clear to auscultation,     Other Findings        Anesthesia Plan  ASA Score- 3     Anesthesia Type- IV sedation with anesthesia with ASA Monitors  Additional Monitors:   Airway Plan:         Plan Factors-Patient not instructed to abstain from smoking on day of procedure  Patient did not smoke on day of surgery  Induction- intravenous  Postoperative Plan-     Informed Consent- Anesthetic plan and risks discussed with patient and mother  I personally reviewed this patient with the CRNA  Discussed and agreed on the Anesthesia Plan with the CRNA  Osvaldo [place]

## 2018-11-27 NOTE — H&P (VIEW-ONLY)
Assessment/Plan:   Neil Mortensen is a 19 y  o female who is here for Gallbladder disease     Patient with constant upper abdomen into chest pain for 12 days  Associated with nausea and vomiting  Any type of food makes it worse  Patient takes some Prilosec with some relief  Patient seen in ED on 10/31 and found to have gallstone along with urinary tract infection  Elevated AST and ALT  Upon evaluation today patient has: Gallstones on Ultrasound       Plan: Would get Upper Endoscopy to evaluate for H  Pylori, ulcer disease or acid reflux  If EGD negative consider  Laparoscopic Cholecystectomy with possible Intraoperative Cholangiogram  Possible Robotic assisted  Low Fat diet discussed  Preoperative Clearance: None          Images:         ____________________________________________________    HPI:  Neil Mortensen is a 19 y  o female who was referred for evaluation of The encounter diagnosis was Calculus of gallbladder without cholecystitis without obstruction  Abdominal pain Location: in the epigastrium with radiation to chest  Quality: cramping and pressure-like  Quantity: 4/10 in intensity  Chronicity: Onset 12 days ago, unchanged since then  Aggravating factors: food  Alleviating factors: prilosec   which is Intermittent      nausea and vomiting,     regular bowel movement       Duration of pain or symptoms: Constant for 12 dyas      Prior Colonoscopy : None     Prior Abdominal Surgery: none    Anticoagulation: none    Imaging:   No orders to display           LABS:    Lab Results   Component Value Date    K 3 6 10/31/2018     10/31/2018    CO2 24 10/31/2018    BUN 10 10/31/2018    CREATININE 0 76 10/31/2018    GLUF 92 07/23/2018    CALCIUM 9 2 10/31/2018     (H) 10/31/2018     (H) 10/31/2018    ALKPHOS 96 10/31/2018    EGFR 113 10/31/2018       Lab Results   Component Value Date    WBC 8 60 10/31/2018    HGB 11 5 (L) 10/31/2018    HCT 34 8 (L) 10/31/2018    MCV 79 (L) 10/31/2018     10/31/2018     No results found for: INR, PROTIME  No results found for: HGBA1C        ROS:  General ROS: negative for - chills, fatigue, fever or night sweats, weight loss  Respiratory ROS: no cough, shortness of breath, or wheezing  Cardiovascular ROS: no chest pain or dyspnea on exertion  Genito-Urinary ROS: no dysuria, trouble voiding, or hematuria  Musculoskeletal ROS: negative for - gait disturbance, joint pain or muscle pain  Neurological ROS: no TIA or stroke symptoms  Gastrointestinal ROS: See HPI   GI ROS: see HPI  Skin ROS: no new rashes or lesions   Lymphatic ROS: no new adenopathy noted by pt  GYN ROS: see HPI, no new GYN history or bleeding noted  Psy ROS: no new mental or behavioral disturbances       Patient Active Problem List   Diagnosis    Acne vulgaris    ADHD (attention deficit hyperactivity disorder)    Asthma    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    Mixed hyperlipidemia    Scoliosis (and kyphoscoliosis), idiopathic    Class 2 obesity due to excess calories without serious comorbidity with body mass index (BMI) of 35 0 to 35 9 in adult    Calculus of gallbladder without cholecystitis without obstruction    Drop in hemoglobin    Abnormal AST and ALT         Allergies:  Patient has no known allergies        Current Outpatient Prescriptions:     albuterol (VENTOLIN HFA) 90 mcg/act inhaler, Inhale 2 puffs, Disp: , Rfl:     buPROPion (WELLBUTRIN XL) 300 mg 24 hr tablet, , Disp: , Rfl:     clindamycin (CLINDAGEL) 1 % gel, Every 12 hours, Disp: , Rfl:     [employer] IBUPROFEN 200 MG tablet, , Disp: , Rfl:     doxepin (SINEquan) 10 mg/mL solution, , Disp: , Rfl:     famotidine (PEPCID) 20 mg tablet, Take 1 tablet (20 mg total) by mouth daily, Disp: 30 tablet, Rfl: 1    gabapentin (NEURONTIN) 600 MG tablet, , Disp: , Rfl:     levonorgestrel-ethinyl estradiol (AVIANE,ALESSE,LESSINA) 0 1-20 MG-MCG per tablet, Take 1 tablet by mouth daily, Disp: 28 tablet, Rfl: 11    loratadine (CLARITIN) 10 mg tablet, every 24 hours, Disp: , Rfl:     lurasidone (LATUDA) 40 mg tablet, Take 20 mg by mouth daily with breakfast, Disp: , Rfl:     ondansetron (ZOFRAN) 4 mg tablet, Take 1 tablet (4 mg total) by mouth every 6 (six) hours, Disp: 12 tablet, Rfl: 0    sucralfate (CARAFATE) 1 g tablet, Take 1 tablet (1 g total) by mouth 4 (four) times a day, Disp: 40 tablet, Rfl: 0    Past Medical History:   Diagnosis Date    Asthma     Class 2 obesity due to excess calories without serious comorbidity with body mass index (BMI) of 35 0 to 35 9 in adult 11/3/2018    Scoliosis        No past surgical history on file  History reviewed  No pertinent family history  reports that she has never smoked  She has never used smokeless tobacco  She reports that she does not drink alcohol or use drugs  Exam:   Vitals:    11/12/18 1310   BP: 120/82   Pulse: 73   Resp: 16       PHYSICAL EXAM  General Appearance:    Alert, cooperative, no distress   Head:    Normocephalic without obvious abnormality   Eyes:    PERRL, conjunctiva/corneas clear, EOM's intact        Neck:   Supple, no adenopathy, no JVD   Back:     Symmetric, no spinal or CVA tenderness   Lungs:     Clear to auscultation bilaterally, no wheezing or rhonchi   Heart:    Regular rate and rhythm, S1 and S2 normal, no murmur   Abdomen:     mild tenderness in the in the epigastrium  Bowel sounds are normal      Extremities:   Extremities normal  No clubbing, cyanosis or edema   Psych:   Normal Affect, AOx3  Neurologic:  Skin:   CNII-XII intact  Strength symmetric, speech intact    Warm, dry, intact, no visible rashes or lesions      Informed consent for procedure was personally discussed, reviewed, and signed by Dr Danni Bravo  Discussion by Dr Danni Bravo was carried out regarding risks, benefits, and alternatives with the patient   Risks include but are not limited to:  bleeding, infection, and delayed wound healing or an open wound, pulmonary embolus, leaks from bowel or bile ducts or other viscus, transfusions, death  Discussed in further detail the more common complications and their rates of occurrence   was used if necessary  Patient expressed understanding of the issues discussed and wished/consented to proceed  All questions were answered by Dr Bony Sanford  Discussion performed between patient and the provider signing below  Signature:   Dequan Rivera MD    Date: 11/12/2018 Time: 1:38 PM                          Some portions of this record may have been generated with voice recognition software  There may be translation, syntax,  or grammatical errors  Occasional wrong word or "sound-a-like" substitutions may have occurred due to the inherent limitations of the voice recognition software  Read the chart carefully and recognize, using context, where substitutions may have occurred  If you have any questions, please contact the dictating provider for clarification or correction, as needed  This encounter has been coded by a non-certified coder

## 2018-11-28 ENCOUNTER — TELEPHONE (OUTPATIENT)
Dept: SURGERY | Facility: CLINIC | Age: 18
End: 2018-11-28

## 2018-11-28 NOTE — TELEPHONE ENCOUNTER
Called patient post EGD 11/27/18  Patient's mother states patient is feeling well  No F/C/N/V  Aware that pathology has been sent out and that she will receive a call with results  Patient aware that she will receive a letter in the mail with recommended follow up  Confirmed appt for 11/30  Will call office with questions or concerns  Path pending

## 2018-11-29 ENCOUNTER — HOSPITAL ENCOUNTER (EMERGENCY)
Facility: HOSPITAL | Age: 18
Discharge: HOME | End: 2018-11-29
Attending: EMERGENCY MEDICINE | Admitting: EMERGENCY MEDICINE
Payer: COMMERCIAL

## 2018-11-29 ENCOUNTER — HOSPITAL ENCOUNTER (EMERGENCY)
Facility: HOSPITAL | Age: 18
End: 2018-11-30
Attending: EMERGENCY MEDICINE
Payer: COMMERCIAL

## 2018-11-29 VITALS
HEIGHT: 58 IN | TEMPERATURE: 100.2 F | SYSTOLIC BLOOD PRESSURE: 135 MMHG | DIASTOLIC BLOOD PRESSURE: 72 MMHG | HEART RATE: 73 BPM | RESPIRATION RATE: 16 BRPM | OXYGEN SATURATION: 99 % | WEIGHT: 156 LBS | BODY MASS INDEX: 32.75 KG/M2

## 2018-11-29 DIAGNOSIS — F32.A DEPRESSION: Primary | ICD-10-CM

## 2018-11-29 DIAGNOSIS — R79.89 ELEVATED LIVER FUNCTION TESTS: ICD-10-CM

## 2018-11-29 DIAGNOSIS — R45.851 SUICIDAL IDEATION: ICD-10-CM

## 2018-11-29 LAB
ALBUMIN SERPL BCP-MCNC: 5.1 G/DL (ref 3–5.2)
ALP SERPL-CCNC: 105 U/L (ref 43–122)
ALT SERPL W P-5'-P-CCNC: 592 U/L (ref 9–52)
AMORPH URATE CRY URNS QL MICRO: ABNORMAL /HPF
AMPHETAMINES SERPL QL SCN: NEGATIVE
ANION GAP SERPL CALCULATED.3IONS-SCNC: 17 MMOL/L (ref 5–14)
AST SERPL W P-5'-P-CCNC: 212 U/L (ref 14–36)
BACTERIA UR QL AUTO: ABNORMAL /HPF
BARBITURATES UR QL: NEGATIVE
BASOPHILS # BLD AUTO: 0 THOUSANDS/ΜL (ref 0–0.1)
BASOPHILS NFR BLD AUTO: 0 % (ref 0–1)
BENZODIAZ UR QL: NEGATIVE
BILIRUB SERPL-MCNC: 1 MG/DL
BILIRUB UR QL STRIP: NEGATIVE
BUN SERPL-MCNC: 9 MG/DL (ref 5–25)
CALCIUM SERPL-MCNC: 9.9 MG/DL (ref 8.4–10.2)
CHLORIDE SERPL-SCNC: 104 MMOL/L (ref 97–108)
CLARITY UR: ABNORMAL
CO2 SERPL-SCNC: 20 MMOL/L (ref 22–30)
COCAINE UR QL: NEGATIVE
COLOR UR: YELLOW
CREAT SERPL-MCNC: 0.7 MG/DL (ref 0.6–1.2)
EOSINOPHIL # BLD AUTO: 0 THOUSAND/ΜL (ref 0–0.4)
EOSINOPHIL NFR BLD AUTO: 0 % (ref 0–6)
ERYTHROCYTE [DISTWIDTH] IN BLOOD BY AUTOMATED COUNT: 14.7 %
ETHANOL SERPL-MCNC: <10 MG/DL (ref 0–10)
EXT PREG TEST URINE: NEGATIVE
GFR SERPL CREATININE-BSD FRML MDRD: 125 ML/MIN/1.73SQ M
GLUCOSE SERPL-MCNC: 99 MG/DL (ref 70–99)
GLUCOSE UR STRIP-MCNC: NEGATIVE MG/DL
HCT VFR BLD AUTO: 39.6 % (ref 36–46)
HGB BLD-MCNC: 12.5 G/DL (ref 12–16)
HGB UR QL STRIP.AUTO: NEGATIVE
KETONES UR STRIP-MCNC: NEGATIVE MG/DL
LEUKOCYTE ESTERASE UR QL STRIP: NEGATIVE
LYMPHOCYTES # BLD AUTO: 0.8 THOUSANDS/ΜL (ref 0.5–4)
LYMPHOCYTES NFR BLD AUTO: 7 % (ref 20–50)
MCH RBC QN AUTO: 25.1 PG (ref 26–34)
MCHC RBC AUTO-ENTMCNC: 31.6 G/DL (ref 31–36)
MCV RBC AUTO: 79 FL (ref 80–100)
METHADONE UR QL: NEGATIVE
MICROCYTES BLD QL AUTO: PRESENT
MONOCYTES # BLD AUTO: 0.6 THOUSAND/ΜL (ref 0.2–0.9)
MONOCYTES NFR BLD AUTO: 5 % (ref 1–10)
NEUTROPHILS # BLD AUTO: 11 THOUSANDS/ΜL (ref 1.8–7.8)
NEUTS SEG NFR BLD AUTO: 88 % (ref 45–65)
NITRITE UR QL STRIP: NEGATIVE
NON-SQ EPI CELLS URNS QL MICRO: ABNORMAL /HPF
OPIATES UR QL SCN: NEGATIVE
PCP UR QL: NEGATIVE
PH UR STRIP.AUTO: 5 [PH] (ref 4.5–8)
PLATELET # BLD AUTO: 315 THOUSANDS/UL (ref 150–450)
PLATELET BLD QL SMEAR: ADEQUATE
PMV BLD AUTO: 9.4 FL (ref 8.9–12.7)
POTASSIUM SERPL-SCNC: 4.1 MMOL/L (ref 3.6–5)
PROT SERPL-MCNC: 8.3 G/DL (ref 5.9–8.4)
PROT UR STRIP-MCNC: ABNORMAL MG/DL
RBC # BLD AUTO: 4.99 MILLION/UL (ref 4–5.2)
RBC #/AREA URNS AUTO: ABNORMAL /HPF
RBC MORPH BLD: NORMAL
SODIUM SERPL-SCNC: 141 MMOL/L (ref 137–147)
SP GR UR STRIP.AUTO: 1.02 (ref 1–1.04)
THC UR QL: NEGATIVE
UROBILINOGEN UA: NEGATIVE MG/DL
WBC # BLD AUTO: 12.4 THOUSAND/UL (ref 4.5–11)
WBC #/AREA URNS AUTO: ABNORMAL /HPF

## 2018-11-29 PROCEDURE — 80053 COMPREHEN METABOLIC PANEL: CPT | Performed by: EMERGENCY MEDICINE

## 2018-11-29 PROCEDURE — 81001 URINALYSIS AUTO W/SCOPE: CPT | Performed by: EMERGENCY MEDICINE

## 2018-11-29 PROCEDURE — 81025 URINE PREGNANCY TEST: CPT | Performed by: EMERGENCY MEDICINE

## 2018-11-29 PROCEDURE — 99285 EMERGENCY DEPT VISIT HI MDM: CPT

## 2018-11-29 PROCEDURE — 80307 DRUG TEST PRSMV CHEM ANLYZR: CPT | Performed by: EMERGENCY MEDICINE

## 2018-11-29 PROCEDURE — 85025 COMPLETE CBC W/AUTO DIFF WBC: CPT | Performed by: EMERGENCY MEDICINE

## 2018-11-29 PROCEDURE — 96372 THER/PROPH/DIAG INJ SC/IM: CPT

## 2018-11-29 PROCEDURE — 80320 DRUG SCREEN QUANTALCOHOLS: CPT | Performed by: EMERGENCY MEDICINE

## 2018-11-29 PROCEDURE — 36415 COLL VENOUS BLD VENIPUNCTURE: CPT | Performed by: EMERGENCY MEDICINE

## 2018-11-29 PROCEDURE — 99283 EMERGENCY DEPT VISIT LOW MDM: CPT

## 2018-11-29 RX ORDER — HALOPERIDOL 5 MG/ML
5 INJECTION INTRAMUSCULAR ONCE
Status: COMPLETED | OUTPATIENT
Start: 2018-11-29 | End: 2018-11-29

## 2018-11-29 RX ORDER — LORAZEPAM 2 MG/ML
2 INJECTION INTRAMUSCULAR ONCE
Status: DISCONTINUED | OUTPATIENT
Start: 2018-11-29 | End: 2018-11-29

## 2018-11-29 RX ORDER — LORAZEPAM 2 MG/ML
2 INJECTION INTRAMUSCULAR ONCE
Status: COMPLETED | OUTPATIENT
Start: 2018-11-29 | End: 2018-11-29

## 2018-11-29 RX ORDER — BENZTROPINE MESYLATE 1 MG/ML
1 INJECTION INTRAMUSCULAR; INTRAVENOUS ONCE
Status: COMPLETED | OUTPATIENT
Start: 2018-11-29 | End: 2018-11-29

## 2018-11-29 RX ADMIN — LORAZEPAM 2 MG: 2 INJECTION INTRAMUSCULAR; INTRAVENOUS at 21:31

## 2018-11-29 RX ADMIN — BENZTROPINE MESYLATE 1 MG: 1 INJECTION INTRAMUSCULAR; INTRAVENOUS at 21:32

## 2018-11-29 RX ADMIN — HALOPERIDOL LACTATE 5 MG: 5 INJECTION, SOLUTION INTRAMUSCULAR at 21:31

## 2018-11-29 NOTE — LETTER
[facility] EMERGENCY DEPARTMENT  [address] [facility] [id number] [phone]  Dept: [address] [facility] TRANSFER CONSENT    NAME Diamond Rosa                                         2000                              MRN [id number]    I have been informed of my rights regarding examination, treatment, and transfer   by Dr Richie Pham DO    Benefits: Specialized equipment and/or services available at the receiving facility (Include comment)________________________ (psych)    Risks: Increased discomfort during transfer      Consent for Transfer:  I acknowledge that my medical condition has been evaluated and explained to me by the emergency department physician or other qualified medical person and/or my attending physician, who has recommended that I be transferred to the service of  Accepting Physician: Dr Balta Koenig at [address] [facility] Name, [facility] : Sherif CORONA  The above potential benefits of such transfer, the potential risks associated with such transfer, and the probable risks of not being transferred have been explained to me, and I fully understand them  The doctor has explained that, in my case, the benefits of transfer outweigh the risks  I agree to be transferred  I authorize the performance of emergency medical procedures and treatments upon me in both transit and upon arrival at the receiving facility  Additionally, I authorize the release of any and all medical records to the receiving facility and request they be transported with me, if possible  I understand that the safest mode of transportation during a medical emergency is an ambulance and that the Hospital advocates the use of this mode of transport  Risks of traveling to the receiving facility by car, including absence of medical control, life sustaining equipment, such as oxygen, and medical personnel has been explained to me and I fully understand them      ([address]RECT [address]OX BELOW)  [ X ]  I consent to the stated transfer and to be transported by ambulance/helicopter  [  ]  I consent to the stated transfer, but refuse transportation by ambulance and accept full responsibility for my transportation by car  I understand the risks of non-ambulance transfers and I exonerate the Hospital and its staff from any deterioration in my condition that results from this refusal     X___________________________________________    DATE  18  TIME________  Signature of patient or legally responsible individual signing on patient behalf           RELATIONSHIP TO PATIENT_________________________              Provider Certification    NAME Penny George                                         2000                              MRN [id number]    A medical screening exam was performed on the above named patient  Based on the examination:    Condition Necessitating Transfer The primary encounter diagnosis was Depression  A diagnosis of Suicidal ideation was also pertinent to this visit      Patient Condition: The patient has been stabilized such that within reasonable medical probability, no material deterioration of the patient condition or the condition of the unborn child(vicenta) is likely to result from the transfer    Reason for Transfer: Level of Care needed not available at this facility    Transfer Requirements: [address] [place]   · Space available and qualified personnel available for treatment as acknowledged by Angelique Damon [phone]  · Agreed to accept transfer and to provide appropriate medical treatment as acknowledged by       Dr Kristina Tipton  · Appropriate medical records of the examination and treatment of the patient are provided at the time of transfer   [address], [address] __[place]_____  · Transfer will be performed by qualified personnel from [facility]  and appropriate transfer equipment as required, including the use of necessary and appropriate life support measures  Provider Certification: I have examined the patient and explained the following risks and benefits of being transferred/refusing transfer to the patient/family:         Based on these reasonable risks and benefits to the patient and/or the unborn child(vicenta), and based upon the information available at the time of the patients examination, I certify that the medical benefits reasonably to be expected from the provision of appropriate medical treatments at another medical facility outweigh the increasing risks, if any, to the individuals medical condition, and in the case of labor to the unborn child, from effecting the transfer      X____________________________________________ DATE 11/30/18        TIME_______      ORIGINAL - SEND TO MEDICAL RECORDS   COPY - SEND WITH PATIENT DURING TRANSFER

## 2018-11-29 NOTE — ED NOTES
Pt changed into paper scrubs per protocol and all non essential equipment removed from room for pt's safety  Pt's family remains at bedside        Janny Her RN  11/29/18 6027

## 2018-11-29 NOTE — ED PROVIDER NOTES
History  Chief Complaint   Patient presents with    Psychiatric Evaluation     pt reports increased anxiety and intentionally gagging herself to make herself throw up and dry heave  pt reports desire to hurt herself but denies being suicidal      57-year-old female presents with thoughts of self-harm and self-induced vomiting  Her family reports that two days ago she had to stop her medications to have an EGD performed  They believe that she was without them for approximately 24 hours  She had been doing well until she returned home after having the EGD performed  At that time she began to repeatedly gag herself to induce vomiting  The patient states that she is doing this with the intent of self-harm but does not specifically want to kill herself  She denies homicidal ideation or hallucinations  Other than positive medication and/or to have the procedure performed, she has not missed any doses and has regularly been following up with her therapist and physicians  There is no report of other acute issues, problems, complaints  Psychiatric Evaluation   Presenting symptoms: depression and self-mutilation (Self-harm in the form of recurrent induced vomiting)    Presenting symptoms: no aggressive behavior, no agitation, no bizarre behavior, no delusions, no hallucinations, no homicidal ideas, no paranoid behavior, no suicidal threats and no suicide attempt    Degree of incapacity (severity): Moderate  Onset quality:  Gradual  Duration:  2 days  Timing:  Constant  Progression:  Waxing and waning  Treatment compliance:   All of the time  Worsened by:  Nothing  Ineffective treatments:  None tried  Associated symptoms: anxiety    Associated symptoms: no abdominal pain, no anhedonia, no appetite change, no chest pain, no decreased need for sleep, not distractible, no euphoric mood, no fatigue, no feelings of worthlessness, no headaches, no hypersomnia, no hyperventilation, no insomnia and no irritability Prior to Admission Medications   Prescriptions Last Dose Informant Patient Reported? Taking? [facility] IBUPROFEN 200 MG tablet  Self Yes No   albuterol (VENTOLIN HFA) 90 mcg/act inhaler  Self Yes No   Sig: Inhale 2 puffs   benzonatate (TESSALON PERLES) 100 mg capsule   Yes No   Sig: 3 (three) times a day   clindamycin (CLINDAGEL) 1 % gel  Self Yes No   Sig: Every 12 hours   doxepin (SINEquan) 10 mg/mL solution  Self Yes No   famotidine (PEPCID) 20 mg tablet  Self No No   Sig: Take 1 tablet (20 mg total) by mouth daily   fluconazole (DIFLUCAN) 150 mg tablet   Yes No   Sig: TAKE 1 TABLET BY MOUTH AS ONE DOSE   fluticasone (FLONASE) 50 mcg/act nasal spray   Yes No   Sig: every 24 hours   gabapentin (NEURONTIN) 600 MG tablet  Self Yes No   levonorgestrel-ethinyl estradiol (AVIANE,ALESSE,LESSINA) 0 1-20 MG-MCG per tablet  Self No No   Sig: Take 1 tablet by mouth daily   loratadine (CLARITIN) 10 mg tablet  Self Yes No   Sig: every 24 hours     lurasidone (LATUDA) 40 mg tablet  Self Yes No   Sig: Take 20 mg by mouth daily with breakfast   ondansetron (ZOFRAN) 4 mg tablet  Self No No   Sig: Take 1 tablet (4 mg total) by mouth every 6 (six) hours   sucralfate (CARAFATE) 1 g tablet  Self No No   Sig: Take 1 tablet (1 g total) by mouth 4 (four) times a day      Facility-Administered Medications: None       Past Medical History:   Diagnosis Date    ADHD     Anxiety     Asthma     Class 2 obesity due to excess calories without serious comorbidity with body mass index (BMI) of 35 0 to 35 9 in adult 11/3/2018    Depression     Gallstones     GERD (gastroesophageal reflux disease)     Hyperlipidemia     Scoliosis        Past Surgical History:   Procedure Laterality Date    EYE MUSCLE SURGERY      RI ESOPHAGOGASTRODUODENOSCOPY TRANSORAL DIAGNOSTIC N/A 11/27/2018    Procedure: ESOPHAGOGASTRODUODENOSCOPY (EGD) with bx;  Surgeon: Jimi Hernandez MD;  Location: [facility]; Service: General       History reviewed   No pertinent family history  I have reviewed and agree with the history as documented  Social History   Substance Use Topics    Smoking status: Never Smoker    Smokeless tobacco: Never Used      Comment: no passive smoke exposure    Alcohol use No        Review of Systems   Constitutional: Negative for appetite change, chills, fatigue, fever and irritability  HENT: Negative for postnasal drip, sinus pain and trouble swallowing  Eyes: Negative for redness and itching  Respiratory: Negative for chest tightness, shortness of breath and wheezing  Cardiovascular: Negative for chest pain and leg swelling  Gastrointestinal: Negative for abdominal pain, constipation, diarrhea, nausea and vomiting  Endocrine: Negative  Genitourinary: Negative for difficulty urinating and dysuria  Musculoskeletal: Negative for back pain and myalgias  Skin: Negative for rash  Allergic/Immunologic: Negative  Neurological: Negative for dizziness, numbness and headaches  Hematological: Negative  Psychiatric/Behavioral: Positive for self-injury (Self-harm in the form of recurrent induced vomiting)  Negative for agitation, hallucinations, homicidal ideas and paranoia  The patient is nervous/anxious  The patient does not have insomnia  Physical Exam  Physical Exam   Constitutional: She is oriented to person, place, and time  She appears well-developed and well-nourished  HENT:   Head: Normocephalic and atraumatic  Nose: Nose normal    Mouth/Throat: Oropharynx is clear and moist    Eyes: Pupils are equal, round, and reactive to light  Conjunctivae and EOM are normal    Neck: Normal range of motion  Neck supple  Cardiovascular: Normal rate, regular rhythm and normal heart sounds  Pulmonary/Chest: Effort normal and breath sounds normal  No respiratory distress  She has no wheezes  Abdominal: Soft  Bowel sounds are normal  There is no tenderness  There is no guarding     Musculoskeletal: She exhibits no edema, tenderness or deformity  Neurological: She is alert and oriented to person, place, and time  Skin: Skin is warm and dry  Capillary refill takes less than 2 seconds  No rash noted  Psychiatric: She has a normal mood and affect  Her behavior is normal    Nursing note and vitals reviewed  Vital Signs  ED Triage Vitals [11/29/18 1503]   Temperature Pulse Respirations Blood Pressure SpO2   99 5 °F (37 5 °C) 85 16 148/83 96 %      Temp Source Heart Rate Source Patient Position - Orthostatic VS BP Location FiO2 (%)   Tympanic Monitor Sitting Left arm --      Pain Score       5           Vitals:    11/29/18 1503   BP: 148/83   Pulse: 85   Patient Position - Orthostatic VS: Sitting       Visual Acuity      ED Medications  Medications - No data to display    Diagnostic Studies  Results Reviewed     Procedure Component Value Units Date/Time    Ethanol [[id number]]  (Normal) Collected:  11/29/18 1553    Lab Status:  Final result Specimen:  Blood from Hand, Right Updated:  11/29/18 1614     Ethanol Lvl <10 mg/dL     Comprehensive metabolic panel [[id number]]  (Abnormal) Collected:  11/29/18 1553    Lab Status:  Final result Specimen:  Blood from Hand, Right Updated:  11/29/18 1613     Sodium 141 mmol/L      Potassium 4 1 mmol/L      Chloride 104 mmol/L      CO2 20 (L) mmol/L      ANION GAP 17 (H) mmol/L      BUN 9 mg/dL      Creatinine 0 70 mg/dL      Glucose 99 mg/dL      Calcium 9 9 mg/dL       (H) U/L       (H) U/L      Alkaline Phosphatase 105 U/L      Total Protein 8 3 g/dL      Albumin 5 1 g/dL      Total Bilirubin 1 00 mg/dL      eGFR 125 ml/min/1 73sq m     Narrative:         National Kidney Disease Education Program recommendations are as follows:  GFR calculation is accurate only with a steady state creatinine  Chronic Kidney disease less than 60 ml/min/1 73 sq  meters  Kidney failure less than 15 ml/min/1 73 sq  meters      CBC and differential [[id number]]  (Abnormal) Collected: 11/29/18 1553    Lab Status:  Final result Specimen:  Blood from Hand, Right Updated:  11/29/18 1606     WBC 12 40 (H) Thousand/uL      RBC 4 99 Million/uL      Hemoglobin 12 5 g/dL      Hematocrit 39 6 %      MCV 79 (L) fL      MCH 25 1 (L) pg      MCHC 31 6 g/dL      RDW 14 7 %      MPV 9 4 fL      Platelets 255 Thousands/uL      Neutrophils Relative 88 (H) %      Lymphocytes Relative 7 (L) %      Monocytes Relative 5 %      Eosinophils Relative 0 %      Basophils Relative 0 %      Neutrophils Absolute 11 00 (H) Thousands/µL      Lymphocytes Absolute 0 80 Thousands/µL      Monocytes Absolute 0 60 Thousand/µL      Eosinophils Absolute 0 00 Thousand/µL      Basophils Absolute 0 00 Thousands/µL     UA w Reflex to Microscopic [[id number]]     Lab Status:  No result Specimen:  Urine     Rapid drug screen, urine [[id number]]     Lab Status:  No result Specimen:  Urine     POCT pregnancy, urine [[id number]]     Lab Status:  No result                  No orders to display              Procedures  Procedures       Phone Contacts  ED Phone Contact    ED Course                               MDM  Number of Diagnoses or Management Options  Depression:   Elevated liver function tests:   Diagnosis management comments: 12-year-old female presents with symptoms of increasing depression and self induce vomiting  She was evaluated by crisis and it was questionable whether not she would evening criteria for admission  Patient's family at this time is requesting discharge  The patient prefers to go home as well  They have contacted her therapist and have follow-up arranged  I discussed the elevated liver function tests  They will follow up with her family physician for this to trend them and make sure that there are no other acute issues going on  They are aware of the importance of close outpatient follow-up as well as reasons to return to the ER         Amount and/or Complexity of Data Reviewed  Clinical lab tests: ordered and reviewed  Tests in the medicine section of CPT®: ordered and reviewed      CritCare Time    Disposition  Final diagnoses:   Depression   Elevated liver function tests     Time reflects when diagnosis was documented in both MDM as applicable and the Disposition within this note     Time User Action Codes Description Comment    11/29/2018  4:31 PM Ame Osprey Add [F32 9] Depression     11/29/2018  4:32 PM Ame Osprey Add [R94 5] Elevated liver function tests       ED Disposition     ED Disposition Condition Comment    Discharge  Exie Lat discharge to home/self care  Condition at discharge: Good        Follow-up Information     Follow up With Specialties Details Why Contact Info       Follow-up as discussed           Patient's Medications   Discharge Prescriptions    No medications on file     No discharge procedures on file      ED Provider  Electronically Signed by           Trevor Pickett DO  11/29/18 9800

## 2018-11-29 NOTE — LETTER
Section I - General Information    Name of Patient: Janny Sloan                 : 2000    Medicare #:____________________  Transport Date: 18 (PCS is valid for round trips on this date and for all repetitive trips in the 60-day range as noted below )  Origin: [address] [place] [address]                                                         Destination:__[facility]______________  Is the pt's stay covered under Medicare Part A (PPS/DRG)     (_) YES  (X) NO  Closest appropriate facility?  (_) YES  (X) NO  If no, why is transport to more distant facility required?_____201_____________  If hosp-hosp transfer, describe services needed at 2nd facility not available at 1st facility? _________________________________  If hospice pt, is this transport related to pt's terminal illness? (_) YES (_) NO Describe____________________________________    Section II - Medical Necessity Questionnaire  Ambulance transportation is medically necessary only if other means of transport are contraindicated or would be potentially harmful to the patient  To meet this requirement, the patient must either be "bed confined" or suffer from a condition such that transport by means other than ambulance is contraindicated by the patient's condition   The following questions must be answered by the medical professional signing below for this form to be valid:    1)  Describe the MEDICAL CONDITION (physical and/or mental) of this patient AT [facility] that requires the patient to be transported in an ambulance and why transport by other means is contraindicated by the patient's condition:__Behavioral Health Transport_________________________________________________________________________    2) Is the patient "bed confined" as defined below?     (_) YES  (X) NO  To be "be confined" the patient must satisfy all three of the following conditions: (1) unable to get up from bed without Assistance; AND (2) unable to ambulate; AND (3) unable to sit in a chair or wheelchair  3) Can this patient safely be transported by car or wheelchair [facility] (i e , seated during transport without a medical attendant or monitoring)?   (_) YES  (X) NO    4) In addition to completing questions 1-3 above, please check any of the following conditions that apply*:  *Note: supporting documentation for any boxes checked must be maintained in the patient's medical records  (_)Contractures   (_)Non-Healed Fractures  (_)Patient is confused (_)Patient is comatose (_)Moderate/severe pain on movement (X)Danger to self/others  (_)IV meds/fluids required (_)Patient is combative(_)Need or possible need for restraints (_)DVT requires elevation of lower extremity  (_)Medical attendant required (_)Requires oxygen-unable to self administer (_)Special handling/isolation/infection control precautions required (_)Unable to tolerate seated position for time needed to transport (_)Hemodynamic monitoring required en route (_)Unable to sit in a chair or wheelchair due to decubitus ulcers or other wounds (_)Cardiac monitoring required en route (_)Morbid obesity requires additional personnel/equipment to safely handle patient (_)Orthopedic device (backboard, halo, pins, traction, brace, wedge, etc,) requiring special handling during transport (_)Other(specify)_______________________________________________    Section III - Signature of Physician or Healthcare Professional  I certify that the above information is true and correct based on my evaluation of this patient, and represent that the patient requires transport by ambulance and that other forms of transport are contraindicated   I understand that this information will be used by the Centers for Medicare and Medicaid Services (CMS) to support the determination of medical necessity for ambulance services, and I represent that I have personal knowledge of the patient's condition at time of transport  (_) If this box is checked, I also certify that the patient is physically or mentally incapable of signing the ambulance service's claim and that the institution with which I am affiliated has furnished care, services, or assistance to the patient  My signature below is made on behalf of the patient pursuant to 42 CFR §424 36(b)(4)  In accordance with 42 CFR §424 37, the specific reason(s) that the patient is physically or mentally incapable of signing the claim form is as follows: _________________________________________________________________________________________________________      Signature of Physician* or Healthcare Professional______________________________________________________________  Signature Date 11/30/18 (For scheduled repetitive transports, this form is not valid for transports performed more than 60 days after this date)    Printed Name & Credentials of Physician or Healthcare Professional (MD, DO, RN, etc )_Kalina CLANCY (Crisis Worker)  *Form must be signed by patient's attending physician for scheduled, repetitive transports   For non-repetitive, unscheduled ambulance transports, if unable to obtain the signature of the attending physician, any of the following may sign (choose appropriate option below)  (_) Physician Assistant (_)  Clinical Nurse Specialist (_)  Registered Nurse  (_)  Nurse Practitioner  (X) Discharge Planner

## 2018-11-29 NOTE — DISCHARGE INSTRUCTIONS
Depression, Ambulatory Care   GENERAL INFORMATION:   Depression  is a medical condition that causes feelings of sadness or hopelessness that do not go away  Depression may cause you to lose interest in things you used to enjoy  These feelings may interfere with your daily life  Common symptoms include the following:   · Appetite changes, or weight gain or loss    · Trouble going to sleep or staying asleep, or sleeping too much    · Fatigue or lack of energy    · Feeling restless, irritable, or withdrawn    · Feeling worthless, hopeless, discouraged, or very guilty    · Trouble concentrating, remembering things, doing daily tasks, or making decisions    · Thoughts about hurting or killing yourself  Seek immediate care for the following symptoms:   · You think about harming yourself or someone else  Treatment for depression  may include medicine to improve or balance your mood  Therapy may also be used to treat your depression  A therapist will help you learn to cope with your thoughts and feelings  Therapy can be done alone or in a group  It may also be done with family members or a significant other  Manage depression:   · Get regular physical activity  Try to exercise for 30 minutes, 3 to 5 days a week  Work with your healthcare provider to develop an exercise plan that you enjoy  · Get enough sleep  Create a routine to help you relax before bed  Try to go to bed and wake up at the same time every day  Sleep is important for emotional health  · Eat a variety of healthy foods  Healthy foods include fruits, vegetables, whole-grain breads, low-fat dairy products, beans, lean meats, and fish  A healthy meal plan is low in fat, salt, and added sugar  · Avoid or limit alcohol  Ask your healthcare provider how much alcohol is safe for you to drink  A drink of alcohol is 12 ounces of beer, 5 ounces of wine, or 1½ ounces of liquor  Follow up with your healthcare provider as directed:   You will need to return so your healthcare provider can monitor your progress  Write down your questions so you remember to ask them during your visits  CARE AGREEMENT:   You have the right to help plan your care  Learn about your health condition and how it may be treated  Discuss treatment options with your caregivers to decide what care you want to receive  You always have the right to refuse treatment  The above information is an  only  It is not intended as medical advice for individual conditions or treatments  Talk to your doctor, nurse or pharmacist before following any medical regimen to see if it is safe and effective for you  © 2014 [address] is for End User's use only and may not be sold, redistributed or otherwise used for commercial purposes  All illustrations and images included in CareNotes® are the copyrighted property of A D A M [employer]  or [employer]  Liver Profile   WHAT YOU NEED TO KNOW:   What is a liver profile? A liver profile is a group of blood tests that show how well your liver is working  The liver makes enzymes and bile that help digest food and gives your body energy  It also removes harmful material from your body, such as alcohol and other chemicals  What blood tests are part of a liver profile? · Liver enzyme tests  measure alanine aminotransferase (ALT), alkaline phosphatase (ALP), and aspartate aminotransferase (AST) enzymes  These tests may also include gamma-glutamyl transpeptidase (GGT)  · Liver protein tests  measure albumin and other proteins in your blood  This includes antibodies that help to fight infections  · Bilirubin tests  measure the amount of bilirubin in your blood  Bilirubin is a yellow fluid made in your body when red blood cells break down  How do I get ready for the test?  Healthcare providers may tell you not to eat or drink anything, except water, after midnight   Several medicines can affect the results of your liver function tests  Ask your healthcare provider if you should wait to take your medicines until after your blood is taken  Wear a short-sleeved or loose shirt on the day of the test  This will make it easier to draw your blood  What do abnormal test results mean? Abnormal levels of liver enzymes, proteins, or bilirubin may be a sign of liver damage or disease  You may need another liver profile or other tests to find the cause of your abnormal test results  CARE AGREEMENT:   You have the right to help plan your care  To help with this plan, you must learn about your lab tests  You can then discuss the results with your caregivers  Work with them to decide what care may be used to treat you  You always have the right to refuse treatment  The above information is an  only  It is not intended as medical advice for individual conditions or treatments  Talk to your doctor, nurse or pharmacist before following any medical regimen to see if it is safe and effective for you  © 2017 [address] [facility] Information is for End User's use only and may not be sold, redistributed or otherwise used for commercial purposes  All illustrations and images included in CareNotes® are the copyrighted property of A D A M [employer]  or [employer] Grace

## 2018-11-30 ENCOUNTER — APPOINTMENT (INPATIENT)
Dept: ULTRASOUND IMAGING | Facility: HOSPITAL | Age: 18
DRG: 753 | End: 2018-11-30
Payer: COMMERCIAL

## 2018-11-30 ENCOUNTER — HOSPITAL ENCOUNTER (INPATIENT)
Facility: HOSPITAL | Age: 18
LOS: 1 days | DRG: 753 | End: 2018-12-01
Attending: PSYCHIATRY & NEUROLOGY | Admitting: PSYCHIATRY & NEUROLOGY
Payer: COMMERCIAL

## 2018-11-30 VITALS
RESPIRATION RATE: 16 BRPM | TEMPERATURE: 97.7 F | BODY MASS INDEX: 31.6 KG/M2 | OXYGEN SATURATION: 98 % | DIASTOLIC BLOOD PRESSURE: 91 MMHG | HEART RATE: 84 BPM | SYSTOLIC BLOOD PRESSURE: 171 MMHG | WEIGHT: 156.75 LBS | HEIGHT: 59 IN

## 2018-11-30 DIAGNOSIS — K80.20 CALCULUS OF GALLBLADDER WITHOUT CHOLECYSTITIS WITHOUT OBSTRUCTION: ICD-10-CM

## 2018-11-30 DIAGNOSIS — F39 MOOD DISORDER (HCC): ICD-10-CM

## 2018-11-30 DIAGNOSIS — R74.8 ABNORMAL AST AND ALT: ICD-10-CM

## 2018-11-30 DIAGNOSIS — F32.A DEPRESSION: Primary | ICD-10-CM

## 2018-11-30 LAB — APAP SERPL-MCNC: <10 UG/ML (ref 10–30)

## 2018-11-30 PROCEDURE — 99252 IP/OBS CONSLTJ NEW/EST SF 35: CPT | Performed by: PHYSICIAN ASSISTANT

## 2018-11-30 PROCEDURE — 76700 US EXAM ABDOM COMPLETE: CPT

## 2018-11-30 PROCEDURE — 99223 1ST HOSP IP/OBS HIGH 75: CPT | Performed by: PSYCHIATRY & NEUROLOGY

## 2018-11-30 PROCEDURE — 80329 ANALGESICS NON-OPIOID 1 OR 2: CPT | Performed by: INTERNAL MEDICINE

## 2018-11-30 RX ORDER — BENZTROPINE MESYLATE 1 MG/ML
1 INJECTION INTRAMUSCULAR; INTRAVENOUS EVERY 6 HOURS PRN
Status: DISCONTINUED | OUTPATIENT
Start: 2018-11-30 | End: 2018-12-01 | Stop reason: HOSPADM

## 2018-11-30 RX ORDER — MAGNESIUM HYDROXIDE/ALUMINUM HYDROXICE/SIMETHICONE 120; 1200; 1200 MG/30ML; MG/30ML; MG/30ML
30 SUSPENSION ORAL EVERY 4 HOURS PRN
Status: CANCELLED | OUTPATIENT
Start: 2018-11-30

## 2018-11-30 RX ORDER — HALOPERIDOL 5 MG/ML
5 INJECTION INTRAMUSCULAR EVERY 6 HOURS PRN
Status: CANCELLED | OUTPATIENT
Start: 2018-11-30

## 2018-11-30 RX ORDER — LORAZEPAM 0.5 MG/1
1 TABLET ORAL ONCE
Status: COMPLETED | OUTPATIENT
Start: 2018-11-30 | End: 2018-11-30

## 2018-11-30 RX ORDER — BENZTROPINE MESYLATE 1 MG/1
1 TABLET ORAL EVERY 6 HOURS PRN
Status: DISCONTINUED | OUTPATIENT
Start: 2018-11-30 | End: 2018-12-01 | Stop reason: HOSPADM

## 2018-11-30 RX ORDER — BENZTROPINE MESYLATE 1 MG/ML
1 INJECTION INTRAMUSCULAR; INTRAVENOUS EVERY 6 HOURS PRN
Status: CANCELLED | OUTPATIENT
Start: 2018-11-30

## 2018-11-30 RX ORDER — ONDANSETRON 4 MG/1
4 TABLET, ORALLY DISINTEGRATING ORAL ONCE
Status: COMPLETED | OUTPATIENT
Start: 2018-11-30 | End: 2018-11-30

## 2018-11-30 RX ORDER — HALOPERIDOL 5 MG
5 TABLET ORAL EVERY 6 HOURS PRN
Status: DISCONTINUED | OUTPATIENT
Start: 2018-11-30 | End: 2018-12-01 | Stop reason: HOSPADM

## 2018-11-30 RX ORDER — HALOPERIDOL 5 MG/ML
5 INJECTION INTRAMUSCULAR EVERY 6 HOURS PRN
Status: DISCONTINUED | OUTPATIENT
Start: 2018-11-30 | End: 2018-12-01 | Stop reason: HOSPADM

## 2018-11-30 RX ORDER — LORAZEPAM 2 MG/ML
2 INJECTION INTRAMUSCULAR EVERY 6 HOURS PRN
Status: DISCONTINUED | OUTPATIENT
Start: 2018-11-30 | End: 2018-12-01 | Stop reason: HOSPADM

## 2018-11-30 RX ORDER — ACETAMINOPHEN 325 MG/1
650 TABLET ORAL EVERY 6 HOURS PRN
Status: DISCONTINUED | OUTPATIENT
Start: 2018-11-30 | End: 2018-12-01 | Stop reason: HOSPADM

## 2018-11-30 RX ORDER — LORAZEPAM 1 MG/1
1 TABLET ORAL EVERY 6 HOURS PRN
Status: DISCONTINUED | OUTPATIENT
Start: 2018-11-30 | End: 2018-12-01 | Stop reason: HOSPADM

## 2018-11-30 RX ORDER — ACETAMINOPHEN 325 MG/1
650 TABLET ORAL EVERY 6 HOURS PRN
Status: CANCELLED | OUTPATIENT
Start: 2018-11-30

## 2018-11-30 RX ORDER — TRAZODONE HYDROCHLORIDE 100 MG/1
50 TABLET ORAL
Status: CANCELLED | OUTPATIENT
Start: 2018-11-30

## 2018-11-30 RX ORDER — ONDANSETRON 4 MG/1
4 TABLET, ORALLY DISINTEGRATING ORAL EVERY 6 HOURS PRN
Status: DISCONTINUED | OUTPATIENT
Start: 2018-11-30 | End: 2018-12-01 | Stop reason: HOSPADM

## 2018-11-30 RX ORDER — HALOPERIDOL 5 MG
5 TABLET ORAL EVERY 6 HOURS PRN
Status: CANCELLED | OUTPATIENT
Start: 2018-11-30

## 2018-11-30 RX ORDER — RISPERIDONE 1 MG/1
1 TABLET, ORALLY DISINTEGRATING ORAL 2 TIMES DAILY
Status: DISCONTINUED | OUTPATIENT
Start: 2018-11-30 | End: 2018-12-01 | Stop reason: HOSPADM

## 2018-11-30 RX ORDER — MAGNESIUM HYDROXIDE/ALUMINUM HYDROXICE/SIMETHICONE 120; 1200; 1200 MG/30ML; MG/30ML; MG/30ML
30 SUSPENSION ORAL EVERY 4 HOURS PRN
Status: DISCONTINUED | OUTPATIENT
Start: 2018-11-30 | End: 2018-12-01 | Stop reason: HOSPADM

## 2018-11-30 RX ORDER — LORAZEPAM 2 MG/ML
2 INJECTION INTRAMUSCULAR EVERY 6 HOURS PRN
Status: CANCELLED | OUTPATIENT
Start: 2018-11-30

## 2018-11-30 RX ORDER — LORAZEPAM 0.5 MG/1
1 TABLET ORAL EVERY 6 HOURS PRN
Status: CANCELLED | OUTPATIENT
Start: 2018-11-30

## 2018-11-30 RX ORDER — TRAZODONE HYDROCHLORIDE 50 MG/1
50 TABLET ORAL
Status: DISCONTINUED | OUTPATIENT
Start: 2018-11-30 | End: 2018-12-01 | Stop reason: HOSPADM

## 2018-11-30 RX ORDER — GABAPENTIN 300 MG/1
600 CAPSULE ORAL
Status: DISCONTINUED | OUTPATIENT
Start: 2018-11-30 | End: 2018-12-01 | Stop reason: HOSPADM

## 2018-11-30 RX ORDER — BENZTROPINE MESYLATE 0.5 MG/1
1 TABLET ORAL EVERY 6 HOURS PRN
Status: CANCELLED | OUTPATIENT
Start: 2018-11-30

## 2018-11-30 RX ADMIN — ONDANSETRON 4 MG: 4 TABLET, ORALLY DISINTEGRATING ORAL at 06:06

## 2018-11-30 RX ADMIN — GABAPENTIN 600 MG: 300 CAPSULE ORAL at 21:24

## 2018-11-30 RX ADMIN — ALUMINUM HYDROXIDE, MAGNESIUM HYDROXIDE, AND SIMETHICONE 30 ML: 200; 200; 20 SUSPENSION ORAL at 21:24

## 2018-11-30 RX ADMIN — RISPERIDONE 1 MG: 1 TABLET, ORALLY DISINTEGRATING ORAL at 10:57

## 2018-11-30 RX ADMIN — RISPERIDONE 1 MG: 1 TABLET, ORALLY DISINTEGRATING ORAL at 17:12

## 2018-11-30 RX ADMIN — ALUMINUM HYDROXIDE, MAGNESIUM HYDROXIDE, AND SIMETHICONE 30 ML: 200; 200; 20 SUSPENSION ORAL at 17:13

## 2018-11-30 RX ADMIN — LORAZEPAM 1 MG: 0.5 TABLET ORAL at 06:14

## 2018-11-30 NOTE — ED NOTES
Pt threw up small amount of emesis on floor by doorway, pt directed back into room to maintain pt safety       Mahendra Magallanes RN  11/29/18 6808

## 2018-11-30 NOTE — ED NOTES
Per Venkata Estimable from [place], [address] has a history of purging, first onset October 2015

## 2018-11-30 NOTE — ED NOTES
Pt changed into psych scrubs as per protocol  All non essential equipment removed from the room to maintain pt safety       Julián Nelson RN  11/29/18 6633

## 2018-11-30 NOTE — ED NOTES
This is the patient's second presentation within several hours  She has a history of ADHD and behavioral issues  It is apparent there are some learning issues and/ or developmental issues; however, the family was not specifying this  Mother stated the patient has had 2 previous admissions, the last about 2 years ago  She has been stable as an outpatient  Mother states the patient had an EGD done 2 days ago and since that time has been depressed, gagging and "making herself vomit  The patient is not physically inducing vomiting but coughs and gags until she dry heaves  Mother stated she has not eaten or had anything to drink "for 2 days"  The patient states she is depressed but vacilates on suicidality  Mother stated the patient has been attempting to elope from the home, which she has not done for 2 years  She suspects her daughter is responding to auditory hallucinations, which was the case prior to previous admissions  Patient denies hearing anything except "it telling me to go to sleep " She cannot identify what "it" refers to  The patient was tearful at times  Family had decided to return home with the patient as they had her psychiatric appointment moved up to 12/4/2018  The patient now returns with her mother and reports she is suicidal and plans to starve herself to death

## 2018-11-30 NOTE — ED NOTES
201 faxed to Eunice Olguin at [place] to be reviewed for [facility] ER admission

## 2018-11-30 NOTE — ED NOTES
Pt was out of bed, pacing in the room coughing trying to make herself vomit  When asked what was the purpose of making herself vomit, pt stated that she would like to loose weight and the voices in her head tell her to make herself vomit  Pt states people make fun of her wait and she would like to feel happy about herself  We spoke about excersice and healthy eating, which pt confirms she has tried before with no success    When asked if she felt her parents loved her, she said she knows that they do love her and that is why she was brought in      William Rucker RN  11/30/18 8841

## 2018-11-30 NOTE — ED NOTES
Pt received in bed, seems to be sleeping at this time  Chest rising and dropping  Pt seems not to be in distress or discomfort will continue to monitor       Jr Portillo RN  11/30/18 0970

## 2018-11-30 NOTE — ED NOTES
Insurance Authorization:   Phone call placed to [facility]  Phone number: [phone]  Spoke to [email or web address]      4 days approved  Level of care: inpatient psych  Review date pending admission date  Authorization # upon arrival to accepting facility

## 2018-11-30 NOTE — ED NOTES
Pt calm and cooperative, pt compliant with medications  No distress noted  Lights dimmed, pt provided with pillow as requested        Lester Christian RN  11/29/18 2140       Lester Christian RN  11/29/18 2141

## 2018-11-30 NOTE — H&P
Psychiatric Evaluation - Boaz 19 y o  female MRN: [id number]  Unit/Bed#: [facility] 254-01 Encounter: [id number]    Assessment/Plan   Principal Problem:    Mood disorder ([address] )  Active Problems:    ADHD (attention deficit hyperactivity disorder)    Class 2 obesity due to excess calories without serious comorbidity with body mass index (BMI) of 35 0 to 35 9 in adult    Abnormal AST and ALT    Plan:   1  Check admission labs  2  Collaborate with family for baseline assessment and disposition planning  3  Consult Gastroenterology for abnormal increase in AST and ALT with recent increase in nausea and vomiting  4  Switch lurasidone to risperidone M tab 1 mg b i d  for psychosis and mood stabilization  5  Continue gabapentin 600 mg at HS  Risks, benefits and possible side effects of Medications:   Risks, benefits, and possible side effects of medications explained to patient and patient verbalizes understanding  Risks of medications in pregnancy explained if female patient  Patient verbalizes understanding and agrees to notify her doctor if she becomes pregnant  Chief Complaint: "I was hearing voices telling me to hurt myself"    History of Present Illness     Patient is a 19 y o  female presents on 12 with recent increase in command auditory hallucination telling her to not eat meals and is asking her to induce vomiting  Patient had EGD performed 2 days ago  According to family patient started gagging herself and is inducing vomiting  Patient is not eating food and/or water for last 2 days  In the emergency room patient expressed depression and suicidal ideation and was noted to be crying frequently  Patient with reported history of bipolar disorder & intellectual disability is stable on combination of lurasidone and gabapentin  Patient is not compliant with medication for 2 days  Patient express difficulty swallowing medication at this time    We discussed the option of using ODT formulations of medication  Risperidone and Zyprexa were discussed in detail  Patient is concerned about excessive weight gain from Zyprexa compared to risperidone  Patient agreed with risperidone 1 mg b i d  with slow titration  Patient is on lurasidone and agreed with plan of discontinuing lurasidone and monitoring for mood destabilization after discontinuation  Patient is complaining of nausea but no other physical complaint  Patient did remained cooperative during entire evaluation  Patient later verbalized that she is hearing many voices telling her to not eat  She describes hearing voices all the time and agreed with risperidone b i d  dosing at this time  According to family patient has attempted to elope in last 2 days from home  This behavior is consistent with her past to inpatient psychiatric hospitalization  Last admission was 2 years ago  Patient does report feeling safe on the unit and is consenting for safety on the unit  I reviewed her labs- only abnormality is marked increase in ALT and AST  Gastroenterology consult is ordered for management recommendation  Medical Review Of Systems:  nausea    Psychiatric Review Of Systems:  sleep: yes  appetite changes: yes  weight changes: no  energy/anergy: yes  interest/pleasure/anhedonia: yes  somatic symptoms: yes  anxiety/panic: yes  diamond: no  guilty/hopeless: yes  self injurious behavior/risky behavior: yes    Historical Information     Past Psychiatric History: To prior inpatient psychiatric admissions in past   Currently in treatment with outpatient psychiatrist-name not known to patient  Past Suicide attempts:   Denies  Past Violent behavior:   Denies  Past Psychiatric medication trial:   Lurasidone, gabapentin    Patient do not remember names of other medication trials in past     Substance Abuse History:  Denies    Social History     Tobacco History     Smoking Status  Never Smoker    Smokeless Tobacco Use  Never Used    Tobacco Comment  no passive smoke exposure          Alcohol History     Alcohol Use Status  No          Drug Use     Drug Use Status  No          Sexual Activity     Sexually Active  No          Activities of Daily Living    Not Asked               Additional Substance Use Detail     Questions Responses    Substance Use Assessment Denies substance use within the past 12 months    Alcohol Use Frequency Denies use in past 12 months    Cannabis frequency Never used    Comment: Never used on 11/30/2018     Heroin Frequency Denies use in past 12 months    Cocaine frequency Never used    Comment: Never used on 11/30/2018     Crack Cocaine Frequency Denies use in past 12 months    Methamphetamine Frequency Denies use in past 12 months    Narcotic Frequency Denies use in past 12 months    Benzodiazepine Frequency Denies use in past 12 months    Amphetamine frequency Denies use in past 12 months    Barbituate Frequency Denies use use in past 12 months    Inhalant frequency Never used    Comment: Never used on 11/30/2018     Hallucinogen frequency Never used    Comment: Never used on 11/30/2018     Ecstasy frequency Never used    Comment: Never used on 11/30/2018     Other drug frequency Never used    Comment: Never used on 11/30/2018     Opiate frequency Denies use in past 12 months    Last reviewed by Debbie Martinez RN on 11/30/2018        I have assessed this patient for substance use within the past 12 months    Family Psychiatric History:   Not known    Social History:  Lives with family  Describes family as supportive      Past Medical History:   Diagnosis Date    ADHD     Anxiety     Asthma     Class 2 obesity due to excess calories without serious comorbidity with body mass index (BMI) of 35 0 to 35 9 in adult 11/3/2018    Depression     Gallstones     GERD (gastroesophageal reflux disease)     Hyperlipidemia     Psychiatric illness     Scoliosis            Meds/Allergies   all current active meds have been reviewed  No Known Allergies    Objective   Vital signs in last 24 hours:  Temp:  [97 1 °F (36 2 °C)-100 2 °F (37 9 °C)] 97 1 °F (36 2 °C)  HR:  [] 80  Resp:  [16-20] 20  BP: (135-171)/(60-99) 135/60    No intake or output data in the 24 hours ending 11/30/18 1201    Mental Status Evaluation:  Appearance:  casually dressed   Behavior:  guarded   Speech:  soft   Mood:  anxious and depressed   Affect:  constricted   Language: naming objects and repeating phrases   Thought Process:  circumstantial and disorganized   Thought Content:  delusions  persecutory   Perceptual Disturbances: Auditory hallucinations with commands to not eat meals   Risk Potential: Suicidal Ideations without plan, Homicidal Ideations none and Potential for Aggression No   Sensorium:  person and place   Cognition:  grossly intact   Consciousness:  awake    Attention: attention span appeared shorter than expected for age   Intellect: decreased   Fund of Knowledge: awareness of current events: fair   Insight:  limited   Judgment: limited   Muscle Strength and Tone: arm(s): bilateral   Gait/Station: normal gait/station   Motor Activity: no abnormal movements     Memory: Short and long term memory  fair       Laboratory results:    I have personally reviewed all pertinent laboratory/tests results    Labs in last 72 hours:   Recent Labs      11/29/18   1553   WBC  12 40*   RBC  4 99   HGB  12 5   HCT  39 6   PLT  315   RDW  14 7   NEUTROABS  11 00*   SODIUM  141   K  4 1   CL  104   CO2  20*   BUN  9   CREATININE  0 70   GLUC  99   CALCIUM  9 9   AST  212*   ALT  592*   ALKPHOS  105   TP  8 3   ALB  5 1   TBILI  1 00     Admission Labs:   Admission on 11/29/2018, Discharged on 11/30/2018   Component Date Value    Amph/Meth UR 11/29/2018 Negative     Barbiturate Ur 11/29/2018 Negative     Benzodiazepine Urine 11/29/2018 Negative     Cocaine Urine 11/29/2018 Negative     Methadone Urine 11/29/2018 Negative     Opiate Urine 11/29/2018 Negative     PCP Ur 11/29/2018 Negative     THC Urine 11/29/2018 Negative     Color, UA 11/29/2018 Yellow     Clarity, UA 11/29/2018 Slightly Cloudy*    Specific Gravity, UA 11/29/2018 1 025     pH, UA 11/29/2018 5 0     Leukocytes, UA 11/29/2018 Negative     Nitrite, UA 11/29/2018 Negative     Protein, UA 11/29/2018 30 (1+)*    Glucose, UA 11/29/2018 Negative     Ketones, UA 11/29/2018 Negative     Bilirubin, UA 11/29/2018 Negative     Blood, UA 11/29/2018 Negative     UROBILINOGEN UA 11/29/2018 Negative     EXT PREG TEST UR (Ref: N* 11/29/2018 Negative     RBC, UA 11/29/2018 2-4*    WBC, UA 11/29/2018 2-4*    Epithelial Cells 11/29/2018 Moderate*    Bacteria, UA 11/29/2018 Moderate*    AMORPH URATES 11/29/2018 Occasional      Risks / Benefits of Treatment:     Risks, benefits, and possible side effects of medications explained to patient  The patient verbalizes understanding and agreement for treatment  Counseling / Coordination of Care:     Patient's presentation on admission and proposed treatment plan discussed with treatment team   Diagnosis, medication changes and treatment plan reviewed with patient  Recent stressors discussed with patient     Events leading to admission reviewed with patient  Importance of medication and treatment compliance reviewed with patient  Inpatient Psychiatric Certification:     Certification: Based upon physical, mental and social evaluations, I certify that inpatient psychiatric services are medically necessary for this patient for a duration of 7 midnights for the treatment of Mood disorder [facility])    Available alternative community resources do not meet the patient's mental health care needs  I further attest that an established written individualized plan of care has been implemented and is outlined in the patient's medical records  This note has been constructed using a voice recognition system      There may be translation, syntax,  or grammatical errors  If you have any questions, please contact the dictating provider

## 2018-11-30 NOTE — ED NOTES
Pt got out of bed, sat on the floor and vomited  When asked why was she on the floor, pt stated: " I was coughing and I needed more oxygen"  She was repeating the statement  Pt was given a towel to clean her emesis and go back to bed, which pt did       Nanette Dash, RN  11/30/18 1604

## 2018-11-30 NOTE — TREATMENT PLAN
TREATMENT PLAN REVIEW - [facility] 19 y o  2000 female MRN: [id number]    [address] [place] [address] Room / Bed: [facility] 254/[facility] [address] Encounter: [id number]          Admit Date/Time:  11/30/2018  9:30 AM    Treatment Team: Attending Provider: Samantha Christopher; Registered Nurse: Jean-Paul Willams, RN; Medications RN: Delpha Romberg, RN    Diagnosis: Principal Problem:    Mood disorder [facility]  Active Problems:    ADHD (attention deficit hyperactivity disorder)    Class 2 obesity due to excess calories without serious comorbidity with body mass index (BMI) of 35 0 to 35 9 in adult    Abnormal AST and ALT    Patient Strengths/Assets: cooperative, good past treatment response, motivation for treatment/growth, patient is on a voluntary commitment    Patient Barriers/Limitations: low self esteem    Short Term Goals: decrease in depressive symptoms, decrease in anxiety symptoms, decrease in psychotic symptoms, decrease in suicidal thoughts    Long Term Goals: improvement in depression, improvement in anxiety, stabilization of mood, free of suicidal thoughts, resolution of psychotic symptoms, acceptance of need for psychiatric medications, acceptance of need for psychiatric treatment, acceptance of need for psychiatric follow up after discharge    Progress Towards Goals: starting psychiatric medications as prescribed    Recommended Treatment: medication management, patient medication education, group therapy, milieu therapy, continued Behavioral Health psychiatric evaluation/assessment process    Treatment Frequency: daily medication monitoring, group and milieu therapy daily, monitoring through interdisciplinary rounds, monitoring through weekly patient care conferences    Expected Discharge Date: 7 days    Discharge Plan: referral for outpatient medication management with a psychiatrist, referral for outpatient psychotherapy    Treatment Plan Created/Updated By: Tommas Heriberto

## 2018-11-30 NOTE — CONSULTS
Telepsychiatry Telephone Admission Note    I was consulted by phone to review the case of this 24yo woman who was medically cleared and admitted for self-harming behavior, questionable psychosis  PMH significant for obesity, HLD, GERD, asthma, scoliosis  NKDA  AVSS, reassuring admission labs  UDS/BAL negative  Pregnancy test negative  Plan:   --Admit to psychiatry  --Suicide precautions  --Routine admission orders placed

## 2018-11-30 NOTE — ED NOTES
Pt tachycardic and hypertensive due to coughing and inducing vomit       Chery Wilkerson, RN  11/30/18 0458

## 2018-11-30 NOTE — CASE MANAGEMENT
CHARLIE outreached to [facility] and spoke with Mike Colón who confirmed a good fax number and was also able to confirm the appointments that have been scheduled for pt  CHARLIE advised Mike Colón that appointments may need to be changed if pt is still hospitalized

## 2018-11-30 NOTE — PLAN OF CARE
Problem: DISCHARGE PLANNING  Goal: Discharge to home or other facility with appropriate resources  INTERVENTIONS:  - Identify barriers to discharge w/patient and caregiver  - Arrange for needed discharge resources and transportation as appropriate  - Identify discharge learning needs (meds, wound care, etc )  - Arrange for interpretive services to assist at discharge as needed  - Refer to Case Management Department for coordinating discharge planning if the patient needs post-hospital services based on physician/advanced practitioner order or complex needs related to functional status, cognitive ability, or social support system   Outcome: Progressing  Outpatient appointments are set up  Pt not agreeable to Partial at this time  Not agreeable to psych rehab or ICM referral  Mother is able to  and plans on visiting tonight

## 2018-11-30 NOTE — CASE MANAGEMENT
SW met with pt to discuss dc planning and hospitalization  Pt reports that she was admitted to [facility] Emergency room after missing her medications for 2 days  Pt reports this was due to an EGD exam  Family brought pt to ED after pt complained of wanting to self harm and induce vomiting  Pt admitted to [facility] on a 201 commitment  Pt reports family are very supportive  Extensive mental health issues in both mother and father (depression and bipolar)  Pt lives with parents and brother at [de-identified] address  Pt denies trauma hx  Pt has been admitted to [facility] approximately 2 years ago  Pt is supported well in the community  Pt goes to [facility] in [facility]  Pt sees Psychiatrist Dr Radha Rodríguez (appointment scheduled for 12-4-18 @ 4:15)  Pt also sees therapist Jayna Cummins on 12-4-18 @ 2:30  Pt reports having a good rapport with therapist      Pt states she does not have an ICM at this time  Not agreeable to a referral      Pt reports she wants to vomit to lost weight  Pt is motivated to be discharged to she can see her therapist on Tuesday  Pt experiences voices but reports they are more pronounces when she is stressed  Pt receives $533 in SSI  Food stamps  No driving license  No legal hx  No PO  No D&A use  Pharmacy preference is [facility]  [address]  Pt signed TX plan and RAQUEL's for the following people: Mother - Cass Meade ([phone]);  [facility] ([phone]); PCP Dr Chyna Alaniz ([phone])

## 2018-11-30 NOTE — CONSULTS
Consultation - Gavin Arnold 19 y o  female MRN: [id number]    Unit/Bed#: U 254-01 Encounter: [id number]      Assessment/Plan     Mood disorder  Medically cleared for inpatient psychiatric evaluation and treatment    Intermittent Nausea with elevated LFTs  GI on board with possible GB disease as source, Gen/Surg consulted  History of Present Illness   HPI: Gavin Arnold is a 19y o  year old female who presents with increased depression and anxiety with self harm  She reports she has been making herself vomit as a form of self harm  She reports nausea which began two days ago and denies abdominal pain to me  She denies chronic illness, recent illness, open wounds, or pain  Inpatient consult for Medical Clearance for [facility] patient  Consult performed by: Abhay Jordan  Consult ordered by: Ayesha Mix          Review of Systems   Constitutional: Negative for activity change, chills, diaphoresis, fatigue and fever  HENT: Negative  Eyes: Negative  Respiratory: Negative  Cardiovascular: Negative  Gastrointestinal: Positive for nausea and vomiting (reports it is self-induced)  Negative for abdominal distention, abdominal pain and diarrhea  Genitourinary: Negative  Musculoskeletal: Negative  Skin: Negative  Neurological: Negative  Psychiatric/Behavioral: Positive for behavioral problems, dysphoric mood, self-injury and suicidal ideas  The patient is nervous/anxious          Historical Information   Past Medical History:   Diagnosis Date    ADHD     Anxiety     Asthma     Class 2 obesity due to excess calories without serious comorbidity with body mass index (BMI) of 35 0 to 35 9 in adult 11/3/2018    Depression     Gallstones     GERD (gastroesophageal reflux disease)     Hyperlipidemia     Psychiatric illness     Scoliosis      Past Surgical History:   Procedure Laterality Date    EYE MUSCLE SURGERY      [place] ESOPHAGOGASTRODUODENOSCOPY TRANSORAL DIAGNOSTIC N/A 11/27/2018    Procedure: ESOPHAGOGASTRODUODENOSCOPY (EGD) with bx;  Surgeon: Alysia Ambrose MD;  Location: [facility]; Service: General     Social History   History   Alcohol Use No     History   Drug Use No     History   Smoking Status    Never Smoker   Smokeless Tobacco    Never Used     Comment: no passive smoke exposure     Family History: non-contributory    Meds/Allergies   PTA meds:   Prior to Admission Medications   Prescriptions Last Dose Informant Patient Reported? Taking? albuterol (VENTOLIN HFA) 90 mcg/act inhaler  Self Yes No   Sig: Inhale 2 puffs every 6 (six) hours as needed     clindamycin (CLINDAGEL) 1 % gel  Self Yes No   Sig: every 12 (twelve) hours as needed     famotidine (PEPCID) 20 mg tablet  Self No No   Sig: Take 1 tablet (20 mg total) by mouth daily   gabapentin (NEURONTIN) 600 MG tablet  Self Yes No   Sig: Take by mouth every evening     levonorgestrel-ethinyl estradiol (AVIANE,ALESSE,LESSINA) 0 1-20 MG-MCG per tablet  Self No No   Sig: Take 1 tablet by mouth daily   lurasidone (LATUDA) 40 mg tablet  Self Yes No   Sig: Take 40 mg by mouth daily with dinner        Facility-Administered Medications: None     No Known Allergies    Objective   Vitals: Blood pressure 135/60, pulse 80, temperature (!) 97 1 °F (36 2 °C), temperature source Tympanic, resp  rate 20, height 4' 10" (1 473 m), weight 70 9 kg (156 lb 6 4 oz), last menstrual period 11/13/2018, not currently breastfeeding  No intake or output data in the 24 hours ending 11/30/18 1500  Invasive Devices          No matching active lines, drains, or airways          Physical Exam   Constitutional: She is oriented to person, place, and time  She appears well-developed and well-nourished  No distress  HENT:   Head: Normocephalic and atraumatic  Eyes: Pupils are equal, round, and reactive to light  EOM are normal    Neck: Normal range of motion  Cardiovascular: Normal rate and regular rhythm    Exam reveals no gallop and no friction rub  No murmur heard  Pulmonary/Chest: Effort normal  She has no wheezes  She has no rales  Abdominal: Soft  She exhibits no distension  There is no tenderness  There is no rebound  Musculoskeletal: Normal range of motion  Neurological: She is alert and oriented to person, place, and time  Skin: Skin is warm and dry  Psychiatric: Her speech is delayed  She is slowed and withdrawn  She exhibits a depressed mood  Lab Results: I have personally reviewed pertinent reports  Imaging Studies: I have personally reviewed pertinent reports

## 2018-11-30 NOTE — CASE MANAGEMENT
Pt's mother Bentley Edeg ([phone]) is not sure what triggered this latest episode  The last time pt struggles psychiatrically was at [facility] adolescent unit and then was transferred to [facility]  Mother reports that she is also not sure why pt has been wanting to elope from the house  Mother seemed genuinely unable to understand what has triggered this admission  Mother did say that pt has been diagnosed with autism this year

## 2018-11-30 NOTE — ED NOTES
Patient is accepted at [address] [facility]  Patient is accepted by Dr Edward Barney per Sree Mcbride  Transportation is arranged with [address] is scheduled for 730-8am  Patient may go to the floor at any time    Nurse report is to be called to [phone] prior to patient transfer

## 2018-11-30 NOTE — CONSULTS
Consultation - Anabella Eid 19 y o  female MRN: [id number]  Unit/Bed#: [facility] 254-01 Encounter: [id number]    Consults    ASSESSMENT:  Nausea and abnormal LFTs - LFTs normal in 7/2018, transaminitis noted in 10/2018, now doubled  and   UTOX negative  US showing gallstones  EGD 11/28 w gastritis/biopsies negative  Differential diagnosis includes biliary colic given the clinical picture of intermittent nausea and bump in LFTs, but also on the differential is other etiologies for abnormal LFTs including medication induced (mult psych meds often causes elev LFTs), autoimmune, metabolic, and viral      PLAN:  - Abd US   - Monitor LFTs  - Full work up for abnormal LFTs including above  - Medication Review recommended given worsening LFTs  - Surgery consult for lap charanjit once other etiologies for abnl LFTs ruled out given pt is currently asymptomatic    CC: nausea, suicidal ideations    HPI:   This is a 19 y  o female who GI is consulted for abnormal lfts and nausea  Pt is currently admitted in psych unit for hallucinations, suicide ideations, attempts for eloping  RE her GI symptoms, she's been having intermittent nausea for the past 2 months  She had a similar episode one month ago showing abnormal LFTs and US showing gallstones  She had an EGD done 2 days ago showing mild gastritis (biopsies negative) and a small hiatal hernia  Pt states that she gets these waves of nausea every once in awhile  Never really vomits  No significant abdominal pains or fevers  Bowels are normal  She's eating but does not want to  Never any GI bleeding      Past Medical History:   Diagnosis Date    ADHD     Anxiety     Asthma     Class 2 obesity due to excess calories without serious comorbidity with body mass index (BMI) of 35 0 to 35 9 in adult 11/3/2018    Depression     Gallstones     GERD (gastroesophageal reflux disease)     Hyperlipidemia     Psychiatric illness     Scoliosis        Past Surgical History:   Procedure Laterality Date    EYE MUSCLE SURGERY      [place] ESOPHAGOGASTRODUODENOSCOPY TRANSORAL DIAGNOSTIC N/A 11/27/2018    Procedure: ESOPHAGOGASTRODUODENOSCOPY (EGD) with bx;  Surgeon: Kera Castro MD;  Location: [facility]; Service: General       Prescriptions Prior to Admission   Medication    albuterol (VENTOLIN HFA) 90 mcg/act inhaler    clindamycin (CLINDAGEL) 1 % gel    famotidine (PEPCID) 20 mg tablet    gabapentin (NEURONTIN) 600 MG tablet    levonorgestrel-ethinyl estradiol (AVIANE,ALESSE,LESSINA) 0 1-20 MG-MCG per tablet    lurasidone (LATUDA) 40 mg tablet       No Known Allergies    Social History     History reviewed  No pertinent family history      Review of Systems:  General ROS: negative for - chills, fatigue, fever, night sweats or weight loss  Psychological ROS: positive for depression/anxiety/SI  ENT ROS: negative for - headaches or sore throat  Hematological and Lymphatic ROS: negative for - bleeding problems or bruising  Endocrine ROS: negative for - malaise/lethargy, palpitations, polydipsia/polyuria or temperature intolerance  Respiratory ROS: no cough, shortness of breath, or wheezing  negative for - orthopnea, shortness of breath, sputum changes, stridor or wheezing  Cardiovascular ROS: negative for - chest pain, dyspnea on exertion, edema, irregular heartbeat or palpitations  Gastrointestinal ROS: positive for - appetite loss, nausea/vomiting  Negative for abdominal pain, blood in stools, change in bowel habits, change in stools, constipation, diarrhea, gas/bloating, heartburn, hematemesis, melena,  stool incontinence  Genito-Urinary ROS: negative for - hematuria, incontinence or urinary frequency/urgency  Musculoskeletal ROS: negative for - joint pain or muscular weakness  Neurological ROS: negative for - confusion, dizziness, headaches, seizures or tremors    /60 (BP Location: Right arm)   Pulse 80   Temp (!) 97 1 °F (36 2 °C) (Tympanic)   Resp 20   Ht 4' 10" (1 473 m)   Wt 70 9 kg (156 lb 6 4 oz)   LMP 11/13/2018 (Exact Date)   BMI 32 69 kg/m²     PHYSICALEXAM:  General appearance: alert, appears stated age and cooperative, flat affect  HEENT: Normocephalic, w/o obvious abnormality, atraumatic, PERRL, EOM's intact  Throat: lips, mucosa, and tongue normal; teeth and gums normal  Neck: no adenopathy, no JVD, supple, symmetrical, trachea midline  Lungs: clear to auscultation bilaterally, No wheeze/rales  Heart: regular rate and rhythm, S1, S2 normal, no murmur  Abdomen: soft, non-tender; bowel sounds normal; no masses,  no organomegaly  Extremities: extremities normal, atraumatic, no cyanosis or edema  Skin: Skin color, texture, turgor normal  No rashes or lesions  Neurologic: Grossly normal      Lab Results   Component Value Date    CALCIUM 9 9 11/29/2018    K 4 1 11/29/2018    CO2 20 (L) 11/29/2018     11/29/2018    BUN 9 11/29/2018    CREATININE 0 70 11/29/2018     Lab Results   Component Value Date    WBC 12 40 (H) 11/29/2018    HGB 12 5 11/29/2018    HCT 39 6 11/29/2018    MCV 79 (L) 11/29/2018     11/29/2018     Lab Results   Component Value Date     (H) 11/29/2018     (H) 11/29/2018    ALKPHOS 105 11/29/2018     No results found for: INR  No components found for: AMYLJKJJJASE  Lab Results   Component Value Date    LIPASE 30 10/31/2018     No results found for: IRON, TIBC, FERRITIN

## 2018-11-30 NOTE — EMTALA/ACUTE CARE TRANSFER
[facility] EMERGENCY DEPARTMENT  [address] [facility] [address]  [phone]  Dept: [address] [facility] TRANSFER CONSENT    NAME Adam Smith                                         2000                              MRN [id number]    I have been informed of my rights regarding examination, treatment, and transfer   by Dr Jose Resendiz DO    Benefits: Specialized equipment and/or services available at the receiving facility (Include comment)________________________ (psych)    Risks: Increased discomfort during transfer      Consent for Transfer:  I acknowledge that my medical condition has been evaluated and explained to me by the emergency department physician or other qualified medical person and/or my attending physician, who has recommended that I be transferred to the service of  Accepting Physician: Dr Javid Farias at [address]  Name, [facility] & State : [facility]  The above potential benefits of such transfer, the potential risks associated with such transfer, and the probable risks of not being transferred have been explained to me, and I fully understand them  The doctor has explained that, in my case, the benefits of transfer outweigh the risks  I agree to be transferred  I authorize the performance of emergency medical procedures and treatments upon me in both transit and upon arrival at the receiving facility  Additionally, I authorize the release of any and all medical records to the receiving facility and request they be transported with me, if possible  I understand that the safest mode of transportation during a medical emergency is an ambulance and that the Hospital advocates the use of this mode of transport  Risks of traveling to the receiving facility by car, including absence of medical control, life sustaining equipment, such as oxygen, and medical personnel has been explained to me and I fully understand them      ([address]OW)  [  x]  I consent to the stated transfer and to be transported by ambulance/helicopter  [  ]  I consent to the stated transfer, but refuse transportation by ambulance and accept full responsibility for my transportation by car  I understand the risks of non-ambulance transfers and I exonerate the Hospital and its staff from any deterioration in my condition that results from this refusal     X___________________________________________    DATE  18  TIME________  Signature of patient or legally responsible individual signing on patient behalf           RELATIONSHIP TO PATIENT_________________________          Provider Certification    NAME Gavin Arnold                                        THUY 2000                              MRN [id number]    A medical screening exam was performed on the above named patient  Based on the examination:    Condition Necessitating Transfer The primary encounter diagnosis was Depression  A diagnosis of Suicidal ideation was also pertinent to this visit      Patient Condition: The patient has been stabilized such that within reasonable medical probability, no material deterioration of the patient condition or the condition of the unborn child(vicenta) is likely to result from the transfer    Reason for Transfer: Level of Care needed not available at this facility    Transfer Requirements: [address] [place]   · Space available and qualified personnel available for treatment as acknowledged by Joe Oreilly [phone]  · Agreed to accept transfer and to provide appropriate medical treatment as acknowledged by       Dr Liyah Barton  · Appropriate medical records of the examination and treatment of the patient are provided at the time of transfer   [address] [facility], [address] _______  · Transfer will be performed by qualified personnel from [facility]  and appropriate transfer equipment as required, including the use of necessary and appropriate life support measures  Provider Certification: I have examined the patient and explained the following risks and benefits of being transferred/refusing transfer to the patient/family:         Based on these reasonable risks and benefits to the patient and/or the unborn child(vicenta), and based upon the information available at the time of the patients examination, I certify that the medical benefits reasonably to be expected from the provision of appropriate medical treatments at another medical facility outweigh the increasing risks, if any, to the individuals medical condition, and in the case of labor to the unborn child, from effecting the transfer      X____________________________________________ DATE 11/30/18        TIME_______      ORIGINAL - SEND TO MEDICAL RECORDS   COPY - SEND WITH PATIENT DURING TRANSFER

## 2018-11-30 NOTE — ED NOTES
Patient is not following nurses expectations to stay on the bed  Patient continues to attempt to manipulate the situation by waiting for the nurse to be distracted so she can hide in the corner behind the wall to attempt to purge

## 2018-11-30 NOTE — ED PROVIDER NOTES
History  Chief Complaint   Patient presents with    Suicidal     pt states she is returning because she is still depressed and wants to kill herself  pt states her plan is to not eat or drink and vomit until she dies  pt denies HI/AH/VH  23-year-old female presents again with depression  She was seen here recently and at that time was denying suicidality  She now states that she is suicidal   She states that she wants to starve herself so that she can die  She denies homicidal ideation or hallucinations  There are no other acute changes from when she was just evaluated  Psychiatric Evaluation   Presenting symptoms: depression, suicidal thoughts and suicidal threats    Duration:  1 hour  Timing:  Constant  Progression:  Worsening  Chronicity:  Recurrent  Relieved by:  Nothing  Worsened by:  Nothing  Ineffective treatments:  None tried  Associated symptoms: no abdominal pain, no appetite change, no chest pain, no decreased need for sleep, not distractible, no fatigue, no headaches, no hyperventilation, no insomnia and no irritability        Prior to Admission Medications   Prescriptions Last Dose Informant Patient Reported? Taking?    [facility] IBUPROFEN 200 MG tablet  Self Yes No   albuterol (VENTOLIN HFA) 90 mcg/act inhaler  Self Yes No   Sig: Inhale 2 puffs   benzonatate (TESSALON PERLES) 100 mg capsule   Yes No   Sig: 3 (three) times a day   clindamycin (CLINDAGEL) 1 % gel  Self Yes No   Sig: Every 12 hours   doxepin (SINEquan) 10 mg/mL solution  Self Yes No   famotidine (PEPCID) 20 mg tablet  Self No No   Sig: Take 1 tablet (20 mg total) by mouth daily   fluconazole (DIFLUCAN) 150 mg tablet   Yes No   Sig: TAKE 1 TABLET BY MOUTH AS ONE DOSE   fluticasone (FLONASE) 50 mcg/act nasal spray   Yes No   Sig: every 24 hours   gabapentin (NEURONTIN) 600 MG tablet  Self Yes No   levonorgestrel-ethinyl estradiol (AVIANE,ALESSE,LESSINA) 0 1-20 MG-MCG per tablet  Self No No   Sig: Take 1 tablet by mouth daily loratadine (CLARITIN) 10 mg tablet  Self Yes No   Sig: every 24 hours     lurasidone (LATUDA) 40 mg tablet  Self Yes No   Sig: Take 20 mg by mouth daily with breakfast   ondansetron (ZOFRAN) 4 mg tablet  Self No No   Sig: Take 1 tablet (4 mg total) by mouth every 6 (six) hours   sucralfate (CARAFATE) 1 g tablet  Self No No   Sig: Take 1 tablet (1 g total) by mouth 4 (four) times a day      Facility-Administered Medications: None       Past Medical History:   Diagnosis Date    ADHD     Anxiety     Asthma     Class 2 obesity due to excess calories without serious comorbidity with body mass index (BMI) of 35 0 to 35 9 in adult 11/3/2018    Depression     Gallstones     GERD (gastroesophageal reflux disease)     Hyperlipidemia     Scoliosis        Past Surgical History:   Procedure Laterality Date    EYE MUSCLE SURGERY      [place] ESOPHAGOGASTRODUODENOSCOPY TRANSORAL DIAGNOSTIC N/A 11/27/2018    Procedure: ESOPHAGOGASTRODUODENOSCOPY (EGD) with bx;  Surgeon: Fran Espana MD;  Location: [facility]; Service: General       History reviewed  No pertinent family history  I have reviewed and agree with the history as documented  Social History   Substance Use Topics    Smoking status: Never Smoker    Smokeless tobacco: Never Used      Comment: no passive smoke exposure    Alcohol use No        Review of Systems   Constitutional: Negative for appetite change, chills, fatigue, fever and irritability  HENT: Negative for postnasal drip, sinus pain and trouble swallowing  Eyes: Negative for redness and itching  Respiratory: Negative for chest tightness, shortness of breath and wheezing  Cardiovascular: Negative for chest pain and leg swelling  Gastrointestinal: Positive for vomiting (Self-induced only)  Negative for abdominal pain, constipation, diarrhea and nausea  Endocrine: Negative  Genitourinary: Negative for difficulty urinating and dysuria     Musculoskeletal: Negative for back pain and myalgias  Skin: Negative for rash  Allergic/Immunologic: Negative  Neurological: Negative for dizziness, numbness and headaches  Hematological: Negative  Psychiatric/Behavioral: Positive for sleep disturbance and suicidal ideas  The patient does not have insomnia  Physical Exam  Physical Exam   Constitutional: She is oriented to person, place, and time  She appears well-developed and well-nourished  HENT:   Head: Normocephalic and atraumatic  Nose: Nose normal    Mouth/Throat: Oropharynx is clear and moist    Eyes: Pupils are equal, round, and reactive to light  Conjunctivae and EOM are normal    Neck: Normal range of motion  Neck supple  Cardiovascular: Normal rate, regular rhythm and normal heart sounds  Pulmonary/Chest: Effort normal and breath sounds normal  No respiratory distress  She has no wheezes  Abdominal: Soft  Bowel sounds are normal  There is no tenderness  There is no guarding  Musculoskeletal: She exhibits no edema, tenderness or deformity  Neurological: She is alert and oriented to person, place, and time  Skin: Skin is warm and dry  Capillary refill takes less than 2 seconds  No rash noted  Psychiatric: She has a normal mood and affect  Her behavior is normal    Nursing note and vitals reviewed        Vital Signs  ED Triage Vitals [11/29/18 1841]   Temperature Pulse Respirations Blood Pressure SpO2   97 7 °F (36 5 °C) (!) 106 18 137/91 99 %      Temp Source Heart Rate Source Patient Position - Orthostatic VS BP Location FiO2 (%)   Tympanic Monitor Sitting Left arm --      Pain Score       --           Vitals:    11/29/18 1841   BP: 137/91   Pulse: (!) 106   Patient Position - Orthostatic VS: Sitting       Visual Acuity      ED Medications  Medications   haloperidol lactate (HALDOL) injection 5 mg (not administered)   benztropine (COGENTIN) injection 1 mg (not administered)   LORazepam (ATIVAN) 2 mg/mL injection 2 mg (not administered)       Diagnostic Studies  Results Reviewed     Procedure Component Value Units Date/Time    Rapid drug screen, urine [[id number]]  (Normal) Collected:  11/29/18 1945    Lab Status:  Final result Specimen:  Urine from Urine, Clean Catch Updated:  11/29/18 2025     Amph/Meth UR Negative     Barbiturate Ur Negative     Benzodiazepine Urine Negative     Cocaine Urine Negative     Methadone Urine Negative     Opiate Urine Negative     PCP Ur Negative     THC Urine Negative    Narrative:         FOR MEDICAL PURPOSES ONLY  IF CONFIRMATION NEEDED PLEASE CONTACT THE LAB WITHIN 5 DAYS      Drug Screen Cutoff Levels:  AMPHETAMINE/METHAMPHETAMINES  1000 ng/mL  BARBITURATES     200 ng/mL  BENZODIAZEPINES     200 ng/mL  COCAINE      300 ng/mL  METHADONE      300 ng/mL  OPIATES      300 ng/mL  PHENCYCLIDINE     25 ng/mL  THC       50 ng/mL    Urine Microscopic [[id number]]  (Abnormal) Collected:  11/29/18 1945    Lab Status:  Final result Specimen:  Urine from Urine, Clean Catch Updated:  11/29/18 2012     RBC, UA 2-4 (A) /hpf      WBC, UA 2-4 (A) /hpf      Epithelial Cells Moderate (A) /hpf      Bacteria, UA Moderate (A) /hpf      AMORPH URATES Occasional /hpf     UA w Reflex to Microscopic [[id number]]  (Abnormal) Collected:  11/29/18 1945    Lab Status:  Final result Specimen:  Urine from Urine, Clean Catch Updated:  11/29/18 1959     Color, UA Yellow     Clarity, UA Slightly Cloudy (A)     Specific Gravity, UA 1 025     pH, UA 5 0     Leukocytes, UA Negative     Nitrite, UA Negative     Protein, UA 30 (1+) (A) mg/dl      Glucose, UA Negative mg/dl      Ketones, UA Negative mg/dl      Bilirubin, UA Negative     Blood, UA Negative     UROBILINOGEN UA Negative mg/dL     POCT pregnancy, urine [[id number]]  (Normal) Resulted:  11/29/18 1951    Lab Status:  Final result Updated:  11/29/18 1951     EXT PREG TEST UR (Ref: Negative) Negative                 No orders to display              Procedures  Procedures       Phone Contacts  ED Phone Contact    ED Course                               MDM  Number of Diagnoses or Management Options  Depression:   Suicidal ideation:   Diagnosis management comments: 20-year-old female presents depression and increasing suicidal ideation  She states that she now is from the wanting to kill herself and plans to do so by starting herself and making herself vomit recurrently  Will have crisis re-evaluate her  After a period of observation in department she continued to induce vomiting despite multiple attempts by staff to intervene  Mother states that she has not slept well in days  Will give medications to help relax sleep at this time  Amount and/or Complexity of Data Reviewed  Clinical lab tests: ordered and reviewed  Tests in the medicine section of CPT®: ordered and reviewed      CritCare Time    Disposition  Final diagnoses:   Depression   Suicidal ideation     Time reflects when diagnosis was documented in both MDM as applicable and the Disposition within this note     Time User Action Codes Description Comment    11/29/2018  9:16 PM Nhi Berea [F32 9] Depression     11/29/2018  9:16 PM Ame Phoenicia Add [R46 89] Suicidal behavior     11/29/2018  9:16 PM Ame Phoenicia Remove [R46 89] Suicidal behavior     11/29/2018  9:16 PM Ame Phoenicia Add [C30 954] Suicidal ideation       ED Disposition     None      MD Documentation      Most Recent Value   Sending MD Dr Ha Vang      Follow-up Information    None         Patient's Medications   Discharge Prescriptions    No medications on file     No discharge procedures on file      ED Provider  Electronically Signed by           Trevor Pickett,   11/29/18 [address] [place] [address],   11/29/18 1344

## 2018-12-01 ENCOUNTER — ANESTHESIA (INPATIENT)
Dept: PERIOP | Facility: HOSPITAL | Age: 18
DRG: 263 | End: 2018-12-01
Payer: COMMERCIAL

## 2018-12-01 ENCOUNTER — ANESTHESIA EVENT (INPATIENT)
Dept: PERIOP | Facility: HOSPITAL | Age: 18
DRG: 263 | End: 2018-12-01
Payer: COMMERCIAL

## 2018-12-01 ENCOUNTER — APPOINTMENT (INPATIENT)
Dept: RADIOLOGY | Facility: HOSPITAL | Age: 18
DRG: 263 | End: 2018-12-01
Payer: COMMERCIAL

## 2018-12-01 ENCOUNTER — HOSPITAL ENCOUNTER (INPATIENT)
Facility: HOSPITAL | Age: 18
LOS: 1 days | Discharge: HOME | DRG: 263 | End: 2018-12-02
Attending: SURGERY | Admitting: SURGERY
Payer: COMMERCIAL

## 2018-12-01 VITALS
TEMPERATURE: 96.8 F | WEIGHT: 144.6 LBS | SYSTOLIC BLOOD PRESSURE: 141 MMHG | BODY MASS INDEX: 30.35 KG/M2 | RESPIRATION RATE: 16 BRPM | HEIGHT: 58 IN | HEART RATE: 106 BPM | DIASTOLIC BLOOD PRESSURE: 75 MMHG

## 2018-12-01 DIAGNOSIS — F39 MOOD DISORDER (HCC): ICD-10-CM

## 2018-12-01 DIAGNOSIS — K80.71: ICD-10-CM

## 2018-12-01 DIAGNOSIS — F90.9 ATTENTION DEFICIT HYPERACTIVITY DISORDER (ADHD), UNSPECIFIED ADHD TYPE: ICD-10-CM

## 2018-12-01 DIAGNOSIS — K80.20 CALCULUS OF GALLBLADDER WITHOUT CHOLECYSTITIS WITHOUT OBSTRUCTION: Primary | ICD-10-CM

## 2018-12-01 PROBLEM — F79 INTELLECTUAL DISABILITY: Status: ACTIVE | Noted: 2018-12-01

## 2018-12-01 PROBLEM — K80.12 CALCULUS OF GALLBLADDER WITH ACUTE AND CHRONIC CHOLECYSTITIS WITHOUT OBSTRUCTION: Status: ACTIVE | Noted: 2018-12-01

## 2018-12-01 LAB
ALBUMIN SERPL BCP-MCNC: 3.9 G/DL (ref 3.5–5)
ALP SERPL-CCNC: 95 U/L (ref 46–116)
ALT SERPL W P-5'-P-CCNC: 559 U/L (ref 12–78)
ANION GAP SERPL CALCULATED.3IONS-SCNC: 14 MMOL/L (ref 4–13)
AST SERPL W P-5'-P-CCNC: 131 U/L (ref 5–45)
BASOPHILS # BLD AUTO: 0.06 THOUSANDS/ΜL (ref 0–0.1)
BASOPHILS NFR BLD AUTO: 0 % (ref 0–1)
BILIRUB SERPL-MCNC: 0.8 MG/DL (ref 0.2–1)
BUN SERPL-MCNC: 11 MG/DL (ref 5–25)
CALCIUM SERPL-MCNC: 8.8 MG/DL (ref 8.3–10.1)
CHLORIDE SERPL-SCNC: 105 MMOL/L (ref 100–108)
CO2 SERPL-SCNC: 24 MMOL/L (ref 21–32)
CREAT SERPL-MCNC: 0.81 MG/DL (ref 0.6–1.3)
EOSINOPHIL # BLD AUTO: 0.04 THOUSAND/ΜL (ref 0–0.61)
EOSINOPHIL NFR BLD AUTO: 0 % (ref 0–6)
ERYTHROCYTE [DISTWIDTH] IN BLOOD BY AUTOMATED COUNT: 14.8 % (ref 11.6–15.1)
FERRITIN SERPL-MCNC: 140 NG/ML (ref 8–388)
FERRITIN SERPL-MCNC: 142 NG/ML (ref 8–388)
GFR SERPL CREATININE-BSD FRML MDRD: 105 ML/MIN/1.73SQ M
GLUCOSE P FAST SERPL-MCNC: 81 MG/DL (ref 65–99)
GLUCOSE SERPL-MCNC: 81 MG/DL (ref 65–140)
HCT VFR BLD AUTO: 37.3 % (ref 34.8–46.1)
HGB BLD-MCNC: 11.7 G/DL (ref 11.5–15.4)
IMM GRANULOCYTES # BLD AUTO: 0.06 THOUSAND/UL (ref 0–0.2)
IMM GRANULOCYTES NFR BLD AUTO: 0 % (ref 0–2)
IRON SATN MFR SERPL: 19 %
IRON SERPL-MCNC: 58 UG/DL (ref 50–170)
LYMPHOCYTES # BLD AUTO: 3.51 THOUSANDS/ΜL (ref 0.6–4.47)
LYMPHOCYTES NFR BLD AUTO: 26 % (ref 14–44)
MCH RBC QN AUTO: 25.4 PG (ref 26.8–34.3)
MCHC RBC AUTO-ENTMCNC: 31.4 G/DL (ref 31.4–37.4)
MCV RBC AUTO: 81 FL (ref 82–98)
MONOCYTES # BLD AUTO: 1.31 THOUSAND/ΜL (ref 0.17–1.22)
MONOCYTES NFR BLD AUTO: 10 % (ref 4–12)
NEUTROPHILS # BLD AUTO: 8.46 THOUSANDS/ΜL (ref 1.85–7.62)
NEUTS SEG NFR BLD AUTO: 64 % (ref 43–75)
NRBC BLD AUTO-RTO: 0 /100 WBCS
PLATELET # BLD AUTO: 312 THOUSANDS/UL (ref 149–390)
PMV BLD AUTO: 11.3 FL (ref 8.9–12.7)
POTASSIUM SERPL-SCNC: 3.6 MMOL/L (ref 3.5–5.3)
PROT SERPL-MCNC: 6.9 G/DL (ref 6.4–8.2)
RBC # BLD AUTO: 4.6 MILLION/UL (ref 3.81–5.12)
SALICYLATES SERPL-MCNC: <3 MG/DL (ref 3–20)
SODIUM SERPL-SCNC: 143 MMOL/L (ref 136–145)
TIBC SERPL-MCNC: 306 UG/DL (ref 250–450)
WBC # BLD AUTO: 13.44 THOUSAND/UL (ref 4.31–10.16)

## 2018-12-01 PROCEDURE — 99220 PR INITIAL OBSERVATION CARE/DAY 70 MINUTES: CPT | Performed by: SURGERY

## 2018-12-01 PROCEDURE — 82784 ASSAY IGA/IGD/IGG/IGM EACH: CPT | Performed by: INTERNAL MEDICINE

## 2018-12-01 PROCEDURE — 85025 COMPLETE CBC W/AUTO DIFF WBC: CPT | Performed by: INTERNAL MEDICINE

## 2018-12-01 PROCEDURE — 47563 LAPARO CHOLECYSTECTOMY/GRAPH: CPT | Performed by: SURGERY

## 2018-12-01 PROCEDURE — 86255 FLUORESCENT ANTIBODY SCREEN: CPT | Performed by: INTERNAL MEDICINE

## 2018-12-01 PROCEDURE — 86038 ANTINUCLEAR ANTIBODIES: CPT | Performed by: INTERNAL MEDICINE

## 2018-12-01 PROCEDURE — 86665 EPSTEIN-BARR CAPSID VCA: CPT | Performed by: INTERNAL MEDICINE

## 2018-12-01 PROCEDURE — 86663 EPSTEIN-BARR ANTIBODY: CPT | Performed by: INTERNAL MEDICINE

## 2018-12-01 PROCEDURE — 83516 IMMUNOASSAY NONANTIBODY: CPT | Performed by: INTERNAL MEDICINE

## 2018-12-01 PROCEDURE — 88304 TISSUE EXAM BY PATHOLOGIST: CPT | Performed by: PATHOLOGY

## 2018-12-01 PROCEDURE — 82728 ASSAY OF FERRITIN: CPT | Performed by: INTERNAL MEDICINE

## 2018-12-01 PROCEDURE — BF101ZZ FLUOROSCOPY OF BILE DUCTS USING LOW OSMOLAR CONTRAST: ICD-10-PCS | Performed by: SURGERY

## 2018-12-01 PROCEDURE — 86235 NUCLEAR ANTIGEN ANTIBODY: CPT | Performed by: INTERNAL MEDICINE

## 2018-12-01 PROCEDURE — 82390 ASSAY OF CERULOPLASMIN: CPT | Performed by: INTERNAL MEDICINE

## 2018-12-01 PROCEDURE — 80053 COMPREHEN METABOLIC PANEL: CPT | Performed by: INTERNAL MEDICINE

## 2018-12-01 PROCEDURE — 47563 LAPARO CHOLECYSTECTOMY/GRAPH: CPT | Performed by: PHYSICIAN ASSISTANT

## 2018-12-01 PROCEDURE — 86664 EPSTEIN-BARR NUCLEAR ANTIGEN: CPT | Performed by: INTERNAL MEDICINE

## 2018-12-01 PROCEDURE — 80329 ANALGESICS NON-OPIOID 1 OR 2: CPT | Performed by: PHYSICIAN ASSISTANT

## 2018-12-01 PROCEDURE — 74300 X-RAY BILE DUCTS/PANCREAS: CPT

## 2018-12-01 PROCEDURE — 83550 IRON BINDING TEST: CPT | Performed by: INTERNAL MEDICINE

## 2018-12-01 PROCEDURE — 80074 ACUTE HEPATITIS PANEL: CPT | Performed by: INTERNAL MEDICINE

## 2018-12-01 PROCEDURE — 0FT44ZZ RESECTION OF GALLBLADDER, PERCUTANEOUS ENDOSCOPIC APPROACH: ICD-10-PCS | Performed by: SURGERY

## 2018-12-01 PROCEDURE — 86256 FLUORESCENT ANTIBODY TITER: CPT | Performed by: INTERNAL MEDICINE

## 2018-12-01 PROCEDURE — 99254 IP/OBS CNSLTJ NEW/EST MOD 60: CPT | Performed by: SURGERY

## 2018-12-01 PROCEDURE — 99239 HOSP IP/OBS DSCHRG MGMT >30: CPT | Performed by: STUDENT IN AN ORGANIZED HEALTH CARE EDUCATION/TRAINING PROGRAM

## 2018-12-01 PROCEDURE — 83540 ASSAY OF IRON: CPT | Performed by: INTERNAL MEDICINE

## 2018-12-01 RX ORDER — CEFAZOLIN SODIUM 1 G/50ML
SOLUTION INTRAVENOUS AS NEEDED
Status: DISCONTINUED | OUTPATIENT
Start: 2018-12-01 | End: 2018-12-01 | Stop reason: SURG

## 2018-12-01 RX ORDER — ONDANSETRON 2 MG/ML
INJECTION INTRAMUSCULAR; INTRAVENOUS AS NEEDED
Status: DISCONTINUED | OUTPATIENT
Start: 2018-12-01 | End: 2018-12-01 | Stop reason: SURG

## 2018-12-01 RX ORDER — ONDANSETRON 2 MG/ML
4 INJECTION INTRAMUSCULAR; INTRAVENOUS EVERY 6 HOURS PRN
Status: DISCONTINUED | OUTPATIENT
Start: 2018-12-01 | End: 2018-12-02 | Stop reason: HOSPADM

## 2018-12-01 RX ORDER — OXYCODONE HYDROCHLORIDE AND ACETAMINOPHEN 5; 325 MG/1; MG/1
2 TABLET ORAL EVERY 6 HOURS PRN
Status: DISCONTINUED | OUTPATIENT
Start: 2018-12-01 | End: 2018-12-02 | Stop reason: HOSPADM

## 2018-12-01 RX ORDER — SODIUM CHLORIDE, SODIUM LACTATE, POTASSIUM CHLORIDE, CALCIUM CHLORIDE 600; 310; 30; 20 MG/100ML; MG/100ML; MG/100ML; MG/100ML
INJECTION, SOLUTION INTRAVENOUS CONTINUOUS PRN
Status: DISCONTINUED | OUTPATIENT
Start: 2018-12-01 | End: 2018-12-01 | Stop reason: SURG

## 2018-12-01 RX ORDER — FENTANYL CITRATE/PF 50 MCG/ML
25 SYRINGE (ML) INJECTION
Status: DISCONTINUED | OUTPATIENT
Start: 2018-12-01 | End: 2018-12-01 | Stop reason: HOSPADM

## 2018-12-01 RX ORDER — SODIUM CHLORIDE, SODIUM LACTATE, POTASSIUM CHLORIDE, CALCIUM CHLORIDE 600; 310; 30; 20 MG/100ML; MG/100ML; MG/100ML; MG/100ML
75 INJECTION, SOLUTION INTRAVENOUS CONTINUOUS
Status: DISCONTINUED | OUTPATIENT
Start: 2018-12-01 | End: 2018-12-02 | Stop reason: HOSPADM

## 2018-12-01 RX ORDER — GLYCOPYRROLATE 0.2 MG/ML
INJECTION INTRAMUSCULAR; INTRAVENOUS AS NEEDED
Status: DISCONTINUED | OUTPATIENT
Start: 2018-12-01 | End: 2018-12-01 | Stop reason: SURG

## 2018-12-01 RX ORDER — KETOROLAC TROMETHAMINE 30 MG/ML
15 INJECTION, SOLUTION INTRAMUSCULAR; INTRAVENOUS EVERY 6 HOURS PRN
Status: DISCONTINUED | OUTPATIENT
Start: 2018-12-01 | End: 2018-12-02 | Stop reason: HOSPADM

## 2018-12-01 RX ORDER — FAMOTIDINE 20 MG/1
20 TABLET, FILM COATED ORAL 2 TIMES DAILY
Status: DISCONTINUED | OUTPATIENT
Start: 2018-12-01 | End: 2018-12-02 | Stop reason: HOSPADM

## 2018-12-01 RX ORDER — HYDROMORPHONE HCL/PF 1 MG/ML
0.5 SYRINGE (ML) INJECTION
Status: DISCONTINUED | OUTPATIENT
Start: 2018-12-01 | End: 2018-12-01 | Stop reason: HOSPADM

## 2018-12-01 RX ORDER — RISPERIDONE 1 MG/1
1 TABLET, ORALLY DISINTEGRATING ORAL 2 TIMES DAILY
Qty: 60 TABLET | Refills: 0 | Status: ON HOLD | OUTPATIENT
Start: 2018-12-01 | End: 2018-12-02

## 2018-12-01 RX ORDER — ACETAMINOPHEN 325 MG/1
650 TABLET ORAL EVERY 6 HOURS PRN
Status: DISCONTINUED | OUTPATIENT
Start: 2018-12-01 | End: 2018-12-02 | Stop reason: HOSPADM

## 2018-12-01 RX ORDER — ROCURONIUM BROMIDE 10 MG/ML
INJECTION, SOLUTION INTRAVENOUS AS NEEDED
Status: DISCONTINUED | OUTPATIENT
Start: 2018-12-01 | End: 2018-12-01 | Stop reason: SURG

## 2018-12-01 RX ORDER — HEPARIN SODIUM 5000 [USP'U]/ML
5000 INJECTION, SOLUTION INTRAVENOUS; SUBCUTANEOUS EVERY 8 HOURS SCHEDULED
Status: DISCONTINUED | OUTPATIENT
Start: 2018-12-02 | End: 2018-12-02 | Stop reason: HOSPADM

## 2018-12-01 RX ORDER — MORPHINE SULFATE 4 MG/ML
4 INJECTION, SOLUTION INTRAMUSCULAR; INTRAVENOUS EVERY 4 HOURS PRN
Status: DISCONTINUED | OUTPATIENT
Start: 2018-12-01 | End: 2018-12-02 | Stop reason: HOSPADM

## 2018-12-01 RX ORDER — BUPIVACAINE HYDROCHLORIDE 5 MG/ML
INJECTION, SOLUTION PERINEURAL AS NEEDED
Status: DISCONTINUED | OUTPATIENT
Start: 2018-12-01 | End: 2018-12-01 | Stop reason: HOSPADM

## 2018-12-01 RX ORDER — MIDAZOLAM HYDROCHLORIDE 1 MG/ML
INJECTION INTRAMUSCULAR; INTRAVENOUS AS NEEDED
Status: DISCONTINUED | OUTPATIENT
Start: 2018-12-01 | End: 2018-12-01 | Stop reason: SURG

## 2018-12-01 RX ORDER — ALBUTEROL SULFATE 2.5 MG/3ML
2.5 SOLUTION RESPIRATORY (INHALATION) EVERY 6 HOURS PRN
Status: DISCONTINUED | OUTPATIENT
Start: 2018-12-01 | End: 2018-12-02 | Stop reason: HOSPADM

## 2018-12-01 RX ORDER — METOCLOPRAMIDE HYDROCHLORIDE 5 MG/ML
10 INJECTION INTRAMUSCULAR; INTRAVENOUS ONCE AS NEEDED
Status: DISCONTINUED | OUTPATIENT
Start: 2018-12-01 | End: 2018-12-01 | Stop reason: HOSPADM

## 2018-12-01 RX ORDER — PROPOFOL 10 MG/ML
INJECTION, EMULSION INTRAVENOUS AS NEEDED
Status: DISCONTINUED | OUTPATIENT
Start: 2018-12-01 | End: 2018-12-01 | Stop reason: SURG

## 2018-12-01 RX ORDER — NEOSTIGMINE METHYLSULFATE 1 MG/ML
INJECTION INTRAVENOUS AS NEEDED
Status: DISCONTINUED | OUTPATIENT
Start: 2018-12-01 | End: 2018-12-01 | Stop reason: SURG

## 2018-12-01 RX ORDER — FENTANYL CITRATE 50 UG/ML
INJECTION, SOLUTION INTRAMUSCULAR; INTRAVENOUS AS NEEDED
Status: DISCONTINUED | OUTPATIENT
Start: 2018-12-01 | End: 2018-12-01 | Stop reason: SURG

## 2018-12-01 RX ADMIN — ONDANSETRON 4 MG: 2 INJECTION INTRAMUSCULAR; INTRAVENOUS at 17:29

## 2018-12-01 RX ADMIN — ALUMINUM HYDROXIDE, MAGNESIUM HYDROXIDE, AND SIMETHICONE 30 ML: 200; 200; 20 SUSPENSION ORAL at 08:29

## 2018-12-01 RX ADMIN — PROPOFOL 200 MG: 10 INJECTION, EMULSION INTRAVENOUS at 16:40

## 2018-12-01 RX ADMIN — FENTANYL CITRATE 100 MCG: 50 INJECTION, SOLUTION INTRAMUSCULAR; INTRAVENOUS at 16:27

## 2018-12-01 RX ADMIN — FENTANYL CITRATE 100 MCG: 50 INJECTION, SOLUTION INTRAMUSCULAR; INTRAVENOUS at 17:00

## 2018-12-01 RX ADMIN — ROCURONIUM BROMIDE 30 MG: 10 SOLUTION INTRAVENOUS at 16:41

## 2018-12-01 RX ADMIN — GLYCOPYRROLATE 0.6 MG: 0.2 INJECTION, SOLUTION INTRAMUSCULAR; INTRAVENOUS at 17:36

## 2018-12-01 RX ADMIN — MORPHINE SULFATE 4 MG: 4 INJECTION, SOLUTION INTRAMUSCULAR; INTRAVENOUS at 20:23

## 2018-12-01 RX ADMIN — ONDANSETRON 4 MG: 4 TABLET, ORALLY DISINTEGRATING ORAL at 08:59

## 2018-12-01 RX ADMIN — MIDAZOLAM HYDROCHLORIDE 1 MG: 1 INJECTION, SOLUTION INTRAMUSCULAR; INTRAVENOUS at 16:11

## 2018-12-01 RX ADMIN — SODIUM CHLORIDE, SODIUM LACTATE, POTASSIUM CHLORIDE, AND CALCIUM CHLORIDE 75 ML/HR: .6; .31; .03; .02 INJECTION, SOLUTION INTRAVENOUS at 20:17

## 2018-12-01 RX ADMIN — FENTANYL CITRATE 25 MCG: 50 INJECTION, SOLUTION INTRAMUSCULAR; INTRAVENOUS at 18:20

## 2018-12-01 RX ADMIN — LORAZEPAM 1 MG: 1 TABLET ORAL at 11:40

## 2018-12-01 RX ADMIN — FAMOTIDINE 20 MG: 20 TABLET ORAL at 20:23

## 2018-12-01 RX ADMIN — NEOSTIGMINE METHYLSULFATE 3 MG: 1 INJECTION INTRAVENOUS at 17:35

## 2018-12-01 RX ADMIN — SODIUM CHLORIDE, SODIUM LACTATE, POTASSIUM CHLORIDE, AND CALCIUM CHLORIDE: .6; .31; .03; .02 INJECTION, SOLUTION INTRAVENOUS at 16:09

## 2018-12-01 RX ADMIN — CEFAZOLIN SODIUM 1000 MG: 1 SOLUTION INTRAVENOUS at 16:50

## 2018-12-01 RX ADMIN — MIDAZOLAM HYDROCHLORIDE 2 MG: 1 INJECTION, SOLUTION INTRAMUSCULAR; INTRAVENOUS at 17:00

## 2018-12-01 RX ADMIN — MIDAZOLAM HYDROCHLORIDE 1 MG: 1 INJECTION, SOLUTION INTRAMUSCULAR; INTRAVENOUS at 16:27

## 2018-12-01 RX ADMIN — FENTANYL CITRATE 25 MCG: 50 INJECTION, SOLUTION INTRAMUSCULAR; INTRAVENOUS at 18:01

## 2018-12-01 RX ADMIN — RISPERIDONE 1 MG: 1 TABLET, ORALLY DISINTEGRATING ORAL at 08:19

## 2018-12-01 NOTE — ANESTHESIA POSTPROCEDURE EVALUATION
Post-Op Assessment Note      CV Status:  Stable    Mental Status:  Alert and awake    Hydration Status:  Euvolemic    PONV Controlled:  Controlled    Airway Patency:  Patent    Post Op Vitals Reviewed: Yes          Staff: Anesthesiologist           BP (!) 175/89 (12/01/18 1752)    Temp 98 °F (36 7 °C) (12/01/18 1752)    Pulse 100 (12/01/18 1752)   Resp 14 (12/01/18 1752)    SpO2

## 2018-12-01 NOTE — NURSING NOTE
Pt discharged to surgical unit as needed for care  Positive mood, understanding of need for medical procedure

## 2018-12-01 NOTE — PROGRESS NOTES
Patient reports PRN Ativan effective  Denies AH and nausea  Currently sitting in dayroom sipping water  Says she has not vomited since meeting with the Dr Malka Grayson

## 2018-12-01 NOTE — PROGRESS NOTES
Pt reports nausea that is worse after eating, received PRN Mylanta and reports it was effective  Pt reports feeling depressed and anxious, reports thoughts to harm herself by vomiting  Pt reports she hears "a little voice that says to throw up," reports she hears voice inside her head and that it sounds like her own voice  Pt verbally contracts for safety  Pt completed ultrasound at 1600, went to meals and group but was otherwise seclusive to room  Blunted affect, cooperative

## 2018-12-01 NOTE — CONSULTS
Consultation - General Surgery   Diamond Rosa 19 y o  female MRN: [id number]  Unit/Bed#: [facility] 254-01 Encounter: [id number]    Assessment/Plan     Assessment:  The patient is a pleasant 78-year-old female inpatient of the behavioral health unit who has a past medical history significant for bipolar disorder, asthma and GERD who reports 72 hour history of anorexia nausea and vomiting as well as pain localized to the right upper quadrant of the abdomen  Following her admission her evaluation has included history and physical examination as well as serum blood work demonstrating a leukocytosis and transaminase elevations  A stat ultrasound of the right upper quadrant confirm the presence of cholelithiasis and biliary sludge  Plan:  After examination of the patient review of the chart the patient is deemed an appropriate candidate for laparoscopic cholecystectomy with intraoperative cholangiogram     The technical details laparoscopic cholecystectomy with intraoperative cholangiogram were explained as were the risks related to anesthesia, bleeding, infection, postoperative bile leak and bile duct injury necessitating additional surgical interventions  All questions answered to the satisfaction of the patient and informed consent obtained to proceed  History of Present Illness     HPI:  Diamond Rosa is a 19 y o  female who presents with signs and symptoms of acute calculous cholecystitis for which definitive treatment by laparoscopic cholecystectomy is now indicated  Inpatient consult to Acute Care Surgery  Consult performed by: Shelbie Boudreaux ordered by: Brandon Slaughter          Review of Systems   Constitutional: Positive for activity change and appetite change  Negative for chills and fever  HENT: Negative  Eyes: Negative  Respiratory: Negative  Patient reports a history of asthma  Cardiovascular: Negative      Gastrointestinal: Positive for abdominal pain, nausea and vomiting  Negative for diarrhea  The patient reports a history of GERD  She is status post EGD within the past week which revealed a small hiatal hernia  It also demonstrated mild gastritis  Endocrine: Negative  Genitourinary: Negative  Musculoskeletal: Negative  Skin: Negative  Allergic/Immunologic: Negative  Neurological: Negative  Hematological: Negative  Psychiatric/Behavioral:        The patient has a history of bipolar disorder  Historical Information   Past Medical History:   Diagnosis Date    ADHD     Anxiety     Asthma     Class 2 obesity due to excess calories without serious comorbidity with body mass index (BMI) of 35 0 to 35 9 in adult 11/3/2018    Depression     Gallstones     GERD (gastroesophageal reflux disease)     Hyperlipidemia     Psychiatric illness     Scoliosis      Past Surgical History:   Procedure Laterality Date    EYE MUSCLE SURGERY      [place] ESOPHAGOGASTRODUODENOSCOPY TRANSORAL DIAGNOSTIC N/A 11/27/2018    Procedure: ESOPHAGOGASTRODUODENOSCOPY (EGD) with bx;  Surgeon: Fior Braxton MD;  Location: [facility];   Service: General     Social History   History   Alcohol Use No     History   Drug Use No     History   Smoking Status    Never Smoker   Smokeless Tobacco    Never Used     Comment: no passive smoke exposure     Family History: non-contributory    Meds/Allergies   all current active meds have been reviewed  No Known Allergies    Objective   First Vitals:   Blood Pressure: 135/60 (11/30/18 0943)  Pulse: 80 (11/30/18 0943)  Temperature: (!) 97 1 °F (36 2 °C) (11/30/18 0943)  Temp Source: Tympanic (11/30/18 0943)  Respirations: 20 (11/30/18 0943)  Height: 4' 10" (147 3 cm) (11/30/18 0943)  Weight - Scale: 70 9 kg (156 lb 6 4 oz) (11/30/18 0943)    Current Vitals:   Blood Pressure: 141/75 (12/01/18 0751)  Pulse: (!) 106 (12/01/18 0751)  Temperature: (!) 96 8 °F (36 °C) (12/01/18 0751)  Temp Source: Tympanic (12/01/18 7464)  Respirations: 16 (12/01/18 0751)  Height: 4' 10" (147 3 cm) (11/30/18 0943)  Weight - Scale: 65 6 kg (144 lb 9 6 oz) (12/01/18 0835)    No intake or output data in the 24 hours ending 12/01/18 1436    Invasive Devices          No matching active lines, drains, or airways          Physical Exam   Constitutional: She is oriented to person, place, and time  She appears well-developed and well-nourished  Patient is a well-nourished well-developed female  She has a child-like affect  She does appear to be a reliable historian and competent to make decisions regarding her own health care  HENT:   Head: Normocephalic and atraumatic  Eyes: Pupils are equal, round, and reactive to light  Conjunctivae are normal    Neck: Normal range of motion  Neck supple  Cardiovascular: Normal rate and regular rhythm  Pulmonary/Chest: Effort normal and breath sounds normal    Abdominal: Soft  Bowel sounds are normal    Her abdomen is soft, rotund  Focally tender to palpation limited to the right upper quadrant  No true guarding rebound or peritoneal signs  No masses noted no hernias appreciated  Musculoskeletal: Normal range of motion  Neurological: She is alert and oriented to person, place, and time  Skin: Skin is warm and dry  Psychiatric: Her behavior is normal  Judgment and thought content normal        Lab Results: I have personally reviewed pertinent lab results  Imaging: I have personally reviewed pertinent reports  EKG, Pathology, and Other Studies: I have personally reviewed pertinent reports  Counseling / Coordination of Care  Total floor / unit time spent today 35 minutes  Greater than 50% of total time was spent with the patient and / or family counseling and / or coordination of care  A description of the counseling / coordination of care: 25

## 2018-12-01 NOTE — PROGRESS NOTES
Patient began shift stating she threw up after eating Qatari toast for breakfast  Pt requested Mylanta rather than Zofran but did come back for Zofran which she reported being effective  I then found out that patient didn't go down for breakfast at all  She refused to go to breakfast after taking medication  Small amount of yellow foamy bile/mucous in the toilet  Patient reports CAH telling her not to eat and to throw up  Says the voices are demanding  Reports pain in RUQ  Unsure if she feels safe on the unit  She is not suicidal but not eating and vomiting is how she is harming herself  Says she induces vomit  Provided patient with water

## 2018-12-01 NOTE — PROGRESS NOTES
Progress Note - Boaz 19 y o  female MRN: [id number]  Unit/Bed#: [facility] 254-01 Encounter: [id number]    Subjective:    Per nursing, patient reports nausea is worse after eating, receiving Mylanta prn which has been effective  Patient reports feeling depressed and anxious, thoughts to harm herself by vomiting  Per patient, patient reports that she is hearing thoughts to go throw up  Patient reports that her stomach has been bothering her, reports feeling nauseous a lot of the time  She reports that it started about 2 days ago  She denies anything causing it  She reports feeling fine until about 5 days ago when she started vomiting frequently  She denies taking any Ativan  Patient reports her mood has been "alright "  She reports not eating much for breakfast this morning  Patient reports that she has been having trouble staying asleep, waking up a lot during the night  Patient denies anxiety or worries  Patient denies anger or irritability  Patient reports feeling sad lately  Patient reports that she lives in [facility], spends days helping out with chores around the house  Behavior over the last 24 hours:  unchanged  Medication side effects: No  ROS: nausea and vomiting    Objective:    Temp:  [96 8 °F (36 °C)-99 4 °F (37 4 °C)] 96 8 °F (36 °C)  HR:  [] 106  Resp:  [15-18] 16  BP: (124-141)/(66-75) 141/75    Mental Status Evaluation:  Appearance:  Sitting uncomfortably in chair, unkempt, cooperative, childish in behavior   Behavior:  No tics, tremors, or behaviors observed   Speech:  Normal rate, rhythm, and volume   Mood:  "Alright"   Affect:  blunted   Thought Process:  Linear and goal directed   Associations intact associations   Thought Content:  No passive or active suicidal or homicidal ideation, intent, or plan     Perceptual Disturbances: Endorses AH to vomit, does not appear internally preoccupied   Sensorium:  Oriented to person, place, time, and situation Memory:  recent and remote memory grossly intact   Consciousness:  alert and awake   Attention: attention span and concentration were age appropriate   Insight:  poor   Judgment: impaired   Gait/Station: normal gait/station   Motor Activity: no abnormal movements       Labs: I have personally reviewed all pertinent laboratory/tests results  Labs in last 72 hours:   Recent Labs      12/01/18   0659   WBC  13 44*   RBC  4 60   HGB  11 7   HCT  37 3   PLT  312   RDW  14 8   NEUTROABS  8 46*   SODIUM  143   K  3 6   CL  105   CO2  24   BUN  11   CREATININE  0 81   GLUC  81   GLUF  81   CALCIUM  8 8   AST  131*   ALT  559*   ALKPHOS  95   TP  6 9   ALB  3 9   TBILI  0 80       Progress Toward Goals: Progressing    Recommended Treatment: Continue with group therapy, milieu therapy and occupational therapy  Risks, benefits and possible side effects of Medications:   Risks, benefits, and possible side effects of medications explained to patient and patient verbalizes understanding  Medications: all current active meds have been reviewed      Current Facility-Administered Medications:  acetaminophen 650 mg Oral Q6H PRN Mendy Zavaleta MD   aluminum-magnesium hydroxide-simethicone 30 mL Oral Q4H PRN Mendy Zavaleta MD   benztropine 1 mg Intramuscular Q6H PRN Mendy Zavaleta MD   benztropine 1 mg Oral Q6H PRN Mendy Zavaleta MD   gabapentin 600 mg Oral HS Jb Canales   haloperidol 5 mg Oral Q6H PRN Mendy Zavaleta MD   haloperidol lactate 5 mg Intramuscular Q6H PRN Mendy Zavaleta MD   LORazepam 2 mg Intramuscular Q6H PRN Mendy Zavaleta MD   LORazepam 1 mg Oral Q6H PRN Mendy Zavaleta MD   magnesium hydroxide 30 mL Oral Daily PRN Mendy Zavaleta MD   ondansetron 4 mg Oral Q6H PRN Donna Morin PA-C   risperiDONE 1 mg Oral BID Jb Canales   traZODone 50 mg Oral HS PRN Mendy Zavaleta MD       Assessment/Plan   Principal Problem:    Mood disorder [facility])  Active Problems:    ADHD (attention deficit hyperactivity disorder)    Class 2 obesity due to excess calories without serious comorbidity with body mass index (BMI) of 35 0 to 35 9 in adult    Abnormal AST and ALT    20 y/o Female with mood d/o, intellectual disability, GI distress- continues to have worsening GI symptoms with multiple episodes of yellowish liquid emesis today, reports auditory hallucinations telling her to vomit but does not seem internally preoccupied, reports "voices are in my head" likely more patient's own thoughts given cognitive limitations  Plan:  -Adivsed nursing to use Ativan as needed for nausea or anxiety   -Will consult GI for further management of worsening symptoms, would consider surgery consult if recommended by GI or internal medicine for possible need for cholecystectomy   -Continue rest of medication regimen

## 2018-12-01 NOTE — DISCHARGE SUMMARY
Discharge Summary - Boaz 19 y o  female MRN: [id number]  Unit/Bed#: -01 Encounter: [id number]     Admission Date:   Admission Orders     Ordered        11/30/18 0932  DISCHARGE READMIT Admit Patient to [address] [place] [address] Unit (use with Discharge Readmit Navigator in Dani Ibarra 3568 Discharge Readmit scenario including from any IP unit or different campus ED to [facility])  Nurse to release order when pt  arrives to [facility] Unit  Once                   Discharge Date: 12/1/2018    Attending Psychiatrist: Carin Osborne    Reason for Admission/HPI:   History of Present Illness   Per Initial Psych Assessment by Dr Tania Todd on 11/30/18:  "Patient is a 19 y o  female presents on 12 with recent increase in command auditory hallucination telling her to not eat meals and is asking her to induce vomiting  Patient had EGD performed 2 days ago  According to family patient started gagging herself and is inducing vomiting  Patient is not eating food and/or water for last 2 days  In the emergency room patient expressed depression and suicidal ideation and was noted to be crying frequently  Patient with reported history of bipolar disorder & intellectual disability is stable on combination of lurasidone and gabapentin  Patient is not compliant with medication for 2 days  Patient express difficulty swallowing medication at this time  We discussed the option of using ODT formulations of medication  Risperidone and Zyprexa were discussed in detail  Patient is concerned about excessive weight gain from Zyprexa compared to risperidone  Patient agreed with risperidone 1 mg b i d  with slow titration  Patient is on lurasidone and agreed with plan of discontinuing lurasidone and monitoring for mood destabilization after discontinuation  Patient is complaining of nausea but no other physical complaint  Patient did remained cooperative during entire evaluation    Patient later verbalized that she is hearing many voices telling her to not eat  She describes hearing voices all the time and agreed with risperidone b i d  dosing at this time  According to family patient has attempted to elope in last 2 days from home  This behavior is consistent with her past to inpatient psychiatric hospitalization  Last admission was 2 years ago  Patient does report feeling safe on the unit and is consenting for safety on the unit "    Hospital Course:   Patient was admitted to [facility] on 11/30/18  On admission, she was started on Risperdal 1 mg bid for auditory hallucinations and continued on Gabapentin 600 mg qhs for anxiety  On 12/1/18, provider met with patient  She continued to express concerns about nausea and vomitting, having 4 episodes of emesis  She reports not eating dinner last night or breakfast this morning  On evaluation, patient had an observed episode of emesis with a bilious, liquid emesis  Labwork showed continued elevations of WBC, elevated LFTs  Abdominal ultrasound on 11/30/18 showed "shadowing gallbladder calculi along with sludge "  Given worsening GI symptoms, gastroenterology recommended general surgery consult on 12/1/18 for possible cholescystecomy  Per surgery recommendations, patient will have an urgent cholecystectomy given continued worsening of symptoms  Patient was discharged to surgery service on 12/1/18  Mental Status Evaluation:  Appearance:  Sitting uncomfortably in chair, unkempt, cooperative, childish in behavior   Behavior:  No tics, tremors, or behaviors observed   Speech:  Normal rate, rhythm, and volume   Mood:  "Alright"   Affect:  blunted   Thought Process:  Linear and goal directed   Associations intact associations   Thought Content:  No passive or active suicidal or homicidal ideation, intent, or plan     Perceptual Disturbances: Endorses AH to vomit, does not appear internally preoccupied   Sensorium:  Oriented to person, place, time, and situation   Memory: recent and remote memory grossly intact   Consciousness:  alert and awake   Attention: attention span and concentration were age appropriate   Insight:  poor   Judgment: impaired   Gait/Station: normal gait/station   Motor Activity: no abnormal movements       Discharge Diagnosis:   1  Unspecified mood disorder, 2  Intellectual disability- unclear severity, 3  ADHD    Resolved Problems  Date Reviewed: 12/1/2018    None              Discharge Medications:  See after visit summary for reconciled discharge medications provided to patient and family  Discharge instructions/Information to patient and family:   See after visit summary for information provided to patient and family  Provisions for Follow-Up Care:  See after visit summary for information related to follow-up care and any pertinent home health orders  Discharge Statement   I spent 30 minutes discharging the patient  This time was spent on the day of discharge  I had direct contact with the patient on the day of discharge  Additional documentation is required if more than 30 minutes were spent on discharge

## 2018-12-01 NOTE — PROGRESS NOTES
Progress Note - GI   Ever Roxanne 19 y o  female MRN: [id number]  Unit/Bed#: [facility] 254-01 Encounter: [id number]      ASSESSMENT:  Nausea and abnormal LFTs - LFTs normal in 7/2018, transaminitis noted in 10/2018, now doubled  and   UTOX negative  US showing gallstones  EGD 11/28/18 w gastritis/biopsies negative  Differential diagnosis includes biliary colic given the clinical picture of intermittent nausea and bump in LFTs, but also on the differential is other etiologies for abnormal LFTs including medication induced (mult psych meds often causes elev LFTs), autoimmune, metabolic, and viral       PLAN:  - Abd US neg for acute charanjit but shadowing gallstones and sludge  - Discussed with Surgery - for Lap Charanjit  - Monitor LFTs post op  - Full work up for abnormal LFTs including above pending: iron panel, salicylate level normal   - Medication Review recommended given worsening LFTs    No chief complaint on file        SUBJECTIVE/HPI  RUQ abd pain, Nausea, poor PO intake    /75 (BP Location: Right arm)   Pulse (!) 106   Temp (!) 96 8 °F (36 °C) (Tympanic)   Resp 16   Ht 4' 10" (1 473 m)   Wt 65 6 kg (144 lb 9 6 oz)   LMP 11/13/2018 (Exact Date)   BMI 30 22 kg/m²       PHYSICALEXAM    General appearance: alert, appears stated age and cooperative, flat affect  HEENT: Normocephalic, w/o obvious abnormality, atraumatic, PERRL, EOM's intact  Throat: lips, mucosa, and tongue normal; teeth and gums normal  Neck: no adenopathy, no JVD, supple, symmetrical, trachea midline  Lungs: clear to auscultation bilaterally, No wheeze/rales  Heart: regular rate and rhythm, S1, S2 normal, no murmur  Abdomen: soft, non-tender; bowel sounds normal; no masses,  no organomegaly  Extremities: extremities normal, atraumatic, no cyanosis or edema  Skin: Skin color, texture, turgor normal  No rashes or lesions  Neurologic: Grossly normal      Lab Results   Component Value Date    CALCIUM 8 8 12/01/2018    K 3 6 12/01/2018    CO2 24 12/01/2018     12/01/2018    BUN 11 12/01/2018    CREATININE 0 81 12/01/2018     Lab Results   Component Value Date    WBC 13 44 (H) 12/01/2018    HGB 11 7 12/01/2018    HCT 37 3 12/01/2018    MCV 81 (L) 12/01/2018     12/01/2018     Lab Results   Component Value Date     (H) 12/01/2018     (H) 12/01/2018    ALKPHOS 95 12/01/2018     No results found for: INR  No components found for: AMYLJKJJJASE  Lab Results   Component Value Date    LIPASE 30 10/31/2018     Lab Results   Component Value Date    IRON 58 12/01/2018    TIBC 306 12/01/2018    FERRITIN 140 12/01/2018    FERRITIN 142 12/01/2018           Hardeep Sales MD

## 2018-12-01 NOTE — ANESTHESIA PREPROCEDURE EVALUATION
Review of Systems/Medical History          Cardiovascular  Hyperlipidemia,    Pulmonary  Asthma ,        GI/Hepatic    GERD well controlled,             Endo/Other     GYN       Hematology   Musculoskeletal  Scoliosis lumbar scoliosis, thoracic scoliosis and lumbar kyphosis,   Comment: BMI 30      Neurology   Psychology   Anxiety, Depression ,              Physical Exam    Airway    Mallampati score: III  TM Distance: <3 FB       Dental       Cardiovascular  Rhythm: regular, Rate: normal, Murmur,     Pulmonary  Breath sounds clear to auscultation,     Other Findings        Anesthesia Plan  ASA Score- 3 Emergent    Anesthesia Type- general with ASA Monitors  Additional Monitors:   Airway Plan:         Plan Factors-    Induction- intravenous  Postoperative Plan- Plan for postoperative opioid use  Informed Consent- Anesthetic plan and risks discussed with patient

## 2018-12-02 ENCOUNTER — TELEPHONE (OUTPATIENT)
Dept: OTHER | Facility: OTHER | Age: 18
End: 2018-12-02

## 2018-12-02 VITALS
DIASTOLIC BLOOD PRESSURE: 69 MMHG | SYSTOLIC BLOOD PRESSURE: 129 MMHG | OXYGEN SATURATION: 96 % | WEIGHT: 158.73 LBS | RESPIRATION RATE: 16 BRPM | HEART RATE: 76 BPM | HEIGHT: 59 IN | TEMPERATURE: 98.7 F | BODY MASS INDEX: 32 KG/M2

## 2018-12-02 PROBLEM — K80.12 CALCULUS OF GALLBLADDER WITH ACUTE AND CHRONIC CHOLECYSTITIS WITHOUT OBSTRUCTION: Status: RESOLVED | Noted: 2018-12-01 | Resolved: 2018-12-02

## 2018-12-02 PROBLEM — K80.20 CALCULUS OF GALLBLADDER WITHOUT CHOLECYSTITIS WITHOUT OBSTRUCTION: Status: RESOLVED | Noted: 2018-11-03 | Resolved: 2018-12-02

## 2018-12-02 LAB
ALBUMIN SERPL BCP-MCNC: 3.3 G/DL (ref 3.5–5)
ALP SERPL-CCNC: 88 U/L (ref 46–116)
ALT SERPL W P-5'-P-CCNC: 403 U/L (ref 12–78)
ANION GAP SERPL CALCULATED.3IONS-SCNC: 11 MMOL/L (ref 4–13)
AST SERPL W P-5'-P-CCNC: 91 U/L (ref 5–45)
BASOPHILS # BLD AUTO: 0.05 THOUSANDS/ΜL (ref 0–0.1)
BASOPHILS NFR BLD AUTO: 1 % (ref 0–1)
BILIRUB SERPL-MCNC: 0.7 MG/DL (ref 0.2–1)
BUN SERPL-MCNC: 11 MG/DL (ref 5–25)
CALCIUM SERPL-MCNC: 8.3 MG/DL (ref 8.3–10.1)
CERULOPLASMIN SERPL-MCNC: 45.4 MG/DL (ref 19–39)
CHLORIDE SERPL-SCNC: 105 MMOL/L (ref 100–108)
CO2 SERPL-SCNC: 26 MMOL/L (ref 21–32)
CREAT SERPL-MCNC: 0.82 MG/DL (ref 0.6–1.3)
EOSINOPHIL # BLD AUTO: 0.07 THOUSAND/ΜL (ref 0–0.61)
EOSINOPHIL NFR BLD AUTO: 1 % (ref 0–6)
ERYTHROCYTE [DISTWIDTH] IN BLOOD BY AUTOMATED COUNT: 14.9 % (ref 11.6–15.1)
GFR SERPL CREATININE-BSD FRML MDRD: 103 ML/MIN/1.73SQ M
GLUCOSE SERPL-MCNC: 82 MG/DL (ref 65–140)
HCT VFR BLD AUTO: 34.9 % (ref 34.8–46.1)
HGB BLD-MCNC: 10.8 G/DL (ref 11.5–15.4)
IMM GRANULOCYTES # BLD AUTO: 0.04 THOUSAND/UL (ref 0–0.2)
IMM GRANULOCYTES NFR BLD AUTO: 0 % (ref 0–2)
LYMPHOCYTES # BLD AUTO: 2.94 THOUSANDS/ΜL (ref 0.6–4.47)
LYMPHOCYTES NFR BLD AUTO: 28 % (ref 14–44)
MCH RBC QN AUTO: 25.4 PG (ref 26.8–34.3)
MCHC RBC AUTO-ENTMCNC: 30.9 G/DL (ref 31.4–37.4)
MCV RBC AUTO: 82 FL (ref 82–98)
MONOCYTES # BLD AUTO: 1 THOUSAND/ΜL (ref 0.17–1.22)
MONOCYTES NFR BLD AUTO: 10 % (ref 4–12)
NEUTROPHILS # BLD AUTO: 6.28 THOUSANDS/ΜL (ref 1.85–7.62)
NEUTS SEG NFR BLD AUTO: 60 % (ref 43–75)
NRBC BLD AUTO-RTO: 0 /100 WBCS
PLATELET # BLD AUTO: 266 THOUSANDS/UL (ref 149–390)
PMV BLD AUTO: 11.3 FL (ref 8.9–12.7)
POTASSIUM SERPL-SCNC: 3.1 MMOL/L (ref 3.5–5.3)
PROT SERPL-MCNC: 6.4 G/DL (ref 6.4–8.2)
RBC # BLD AUTO: 4.25 MILLION/UL (ref 3.81–5.12)
SODIUM SERPL-SCNC: 142 MMOL/L (ref 136–145)
WBC # BLD AUTO: 10.38 THOUSAND/UL (ref 4.31–10.16)

## 2018-12-02 PROCEDURE — 80053 COMPREHEN METABOLIC PANEL: CPT | Performed by: PHYSICIAN ASSISTANT

## 2018-12-02 PROCEDURE — 99253 IP/OBS CNSLTJ NEW/EST LOW 45: CPT | Performed by: STUDENT IN AN ORGANIZED HEALTH CARE EDUCATION/TRAINING PROGRAM

## 2018-12-02 PROCEDURE — 85025 COMPLETE CBC W/AUTO DIFF WBC: CPT | Performed by: PHYSICIAN ASSISTANT

## 2018-12-02 RX ORDER — RISPERIDONE 1 MG/1
1 TABLET, ORALLY DISINTEGRATING ORAL 2 TIMES DAILY
Qty: 60 TABLET | Refills: 0 | Status: SHIPPED | OUTPATIENT
Start: 2018-12-02 | End: 2018-12-24 | Stop reason: HOSPADM

## 2018-12-02 RX ORDER — GABAPENTIN 600 MG/1
600 TABLET ORAL
Qty: 30 TABLET | Refills: 0 | Status: SHIPPED | OUTPATIENT
Start: 2018-12-02 | End: 2018-12-24 | Stop reason: HOSPADM

## 2018-12-02 RX ADMIN — FAMOTIDINE 20 MG: 20 TABLET ORAL at 08:58

## 2018-12-02 RX ADMIN — OXYCODONE HYDROCHLORIDE AND ACETAMINOPHEN 2 TABLET: 5; 325 TABLET ORAL at 06:33

## 2018-12-02 RX ADMIN — HEPARIN SODIUM 5000 UNITS: 5000 INJECTION INTRAVENOUS; SUBCUTANEOUS at 06:32

## 2018-12-02 RX ADMIN — FAMOTIDINE 20 MG: 20 TABLET ORAL at 17:21

## 2018-12-02 RX ADMIN — HEPARIN SODIUM 5000 UNITS: 5000 INJECTION INTRAVENOUS; SUBCUTANEOUS at 13:12

## 2018-12-02 NOTE — TELEPHONE ENCOUNTER
Anne-Marie Varela a Nurse from [facility] called in asking to speak with Dr Chantale Vega in regards to discharging this patient  Dr Chantale Vega paged to Anne-Marie Varela the nurse

## 2018-12-03 ENCOUNTER — TELEPHONE (OUTPATIENT)
Dept: FAMILY MEDICINE CLINIC | Facility: CLINIC | Age: 18
End: 2018-12-03

## 2018-12-03 ENCOUNTER — HOSPITAL ENCOUNTER (EMERGENCY)
Facility: HOSPITAL | Age: 18
Discharge: HOME | End: 2018-12-03
Attending: EMERGENCY MEDICINE | Admitting: EMERGENCY MEDICINE
Payer: COMMERCIAL

## 2018-12-03 VITALS
TEMPERATURE: 98.2 F | HEART RATE: 91 BPM | DIASTOLIC BLOOD PRESSURE: 72 MMHG | OXYGEN SATURATION: 98 % | BODY MASS INDEX: 32.28 KG/M2 | SYSTOLIC BLOOD PRESSURE: 148 MMHG | WEIGHT: 159.83 LBS | RESPIRATION RATE: 18 BRPM

## 2018-12-03 DIAGNOSIS — R33.9 URINARY RETENTION: Primary | ICD-10-CM

## 2018-12-03 LAB
ACTIN IGG SERPL-ACNC: 3 UNITS (ref 0–19)
AMORPH PHOS CRY URNS QL MICRO: NORMAL /HPF
BACTERIA UR QL AUTO: NORMAL /HPF
BILIRUB UR QL STRIP: NEGATIVE
CLARITY UR: ABNORMAL
COLOR UR: ABNORMAL
EBV EA IGG SER-ACNC: <9 U/ML (ref 0–8.9)
EBV NA IGG SER IA-ACNC: 106 U/ML (ref 0–17.9)
EBV PATRN SPEC IB-IMP: ABNORMAL
EBV VCA IGG SER IA-ACNC: <18 U/ML (ref 0–17.9)
EBV VCA IGM SER IA-ACNC: <36 U/ML (ref 0–35.9)
ENDOMYSIUM IGA SER QL: NEGATIVE
GLIADIN PEPTIDE IGA SER-ACNC: 2 UNITS (ref 0–19)
GLIADIN PEPTIDE IGG SER-ACNC: 2 UNITS (ref 0–19)
GLUCOSE UR STRIP-MCNC: NEGATIVE MG/DL
HAV IGM SER QL: NORMAL
HBV CORE IGM SER QL: NORMAL
HBV SURFACE AG SER QL: NORMAL
HCV AB SER QL: NORMAL
HGB UR QL STRIP.AUTO: NEGATIVE
IGA SERPL-MCNC: 57 MG/DL (ref 87–352)
KETONES UR STRIP-MCNC: ABNORMAL MG/DL
LEUKOCYTE ESTERASE UR QL STRIP: NEGATIVE
MITOCHONDRIA M2 IGG SER-ACNC: 2.1 UNITS (ref 0–20)
NITRITE UR QL STRIP: NEGATIVE
NON-SQ EPI CELLS URNS QL MICRO: NORMAL /HPF
PH UR STRIP.AUTO: 7 [PH] (ref 4.5–8)
PROT UR STRIP-MCNC: ABNORMAL MG/DL
RBC #/AREA URNS AUTO: NORMAL /HPF
RYE IGE QN: NEGATIVE
SP GR UR STRIP.AUTO: 1.01 (ref 1–1.04)
TTG IGA SER-ACNC: <2 U/ML (ref 0–3)
TTG IGG SER-ACNC: <2 U/ML (ref 0–5)
UROBILINOGEN UA: NEGATIVE MG/DL
WBC #/AREA URNS AUTO: NORMAL /HPF

## 2018-12-03 PROCEDURE — 99283 EMERGENCY DEPT VISIT LOW MDM: CPT

## 2018-12-03 PROCEDURE — 81001 URINALYSIS AUTO W/SCOPE: CPT | Performed by: EMERGENCY MEDICINE

## 2018-12-03 PROCEDURE — 81003 URINALYSIS AUTO W/O SCOPE: CPT | Performed by: EMERGENCY MEDICINE

## 2018-12-03 NOTE — ED PROVIDER NOTES
History  Chief Complaint   Patient presents with    Urinary Retention     Patient had gallbladder out 2 days ago and states she has not urinated since then  Patient seen at [facility]      61-year-old female presents with complaint of urinary retention  She was recently admitted for depression and suicidality  At that time it was noted that she had elevated liver function tests which were trended during her hospitalization  Toward the end of her hospitalization she had her gallbladder removed  She has been taking Percocets since that time reports that she has had minimal urine output over the past day or so  On arrival a bladder scan was performed showing over 500 cc of urine being present in the bladder  She has pelvic discomfort described as a pressure pain  She denies any other acute issues or concerns at this point time  Urinary Frequency   Quality:  Decreased frequency  Onset quality:  Gradual  Timing:  Constant  Progression:  Worsening  Chronicity:  New  Relieved by:  Nothing  Worsened by:  Nothing  Ineffective treatments:  None tried  Associated symptoms: abdominal pain (Pelvic discomfort)    Associated symptoms: no chest pain, no congestion, no cough, no diarrhea, no ear pain, no fatigue, no fever, no headaches, no myalgias, no nausea, no rash, no rhinorrhea, no shortness of breath, no sore throat, no vomiting and no wheezing        Prior to Admission Medications   Prescriptions Last Dose Informant Patient Reported? Taking?    albuterol (VENTOLIN HFA) 90 mcg/act inhaler  Self Yes No   Sig: Inhale 2 puffs every 6 (six) hours as needed     clindamycin (CLINDAGEL) 1 % gel  Self Yes No   Sig: every 12 (twelve) hours as needed     famotidine (PEPCID) 20 mg tablet  Self No No   Sig: Take 1 tablet (20 mg total) by mouth daily   gabapentin (NEURONTIN) 600 MG tablet   No No   Sig: Take 1 tablet (600 mg total) by mouth daily at bedtime   levonorgestrel-ethinyl estradiol (AVIANE,ALESSE,LESSINA) 0 1-20 MG-MCG per tablet  Self No No   Sig: Take 1 tablet by mouth daily   risperiDONE (RisperDAL M-TABS) 1 mg dispersible tablet   No No   Sig: Take 1 tablet (1 mg total) by mouth 2 (two) times a day      Facility-Administered Medications: None       Past Medical History:   Diagnosis Date    ADHD     Anxiety     Asthma     Class 2 obesity due to excess calories without serious comorbidity with body mass index (BMI) of 35 0 to 35 9 in adult 11/3/2018    Depression     Gallstones     GERD (gastroesophageal reflux disease)     Heart murmur 1/30/2015    Hyperlipidemia     Psychiatric illness     Scoliosis        Past Surgical History:   Procedure Laterality Date    CHOLANGIOGRAM N/A 12/1/2018    Procedure: CHOLANGIOGRAM;  Surgeon: Claudette Brock, MD;  Location:  MAIN OR;  Service: General    CHOLECYSTECTOMY      CHOLECYSTECTOMY LAPAROSCOPIC N/A 12/1/2018    Procedure: CHOLECYSTECTOMY LAPAROSCOPIC;  Surgeon: Claudette Brock, MD;  Location:  MAIN OR;  Service: General    EYE MUSCLE SURGERY      LA ESOPHAGOGASTRODUODENOSCOPY TRANSORAL DIAGNOSTIC N/A 11/27/2018    Procedure: ESOPHAGOGASTRODUODENOSCOPY (EGD) with bx;  Surgeon: Liliana Gutierres MD;  Location: [place] [facility]; Service: General       History reviewed  No pertinent family history  I have reviewed and agree with the history as documented  Social History   Substance Use Topics    Smoking status: Never Smoker    Smokeless tobacco: Never Used      Comment: no passive smoke exposure    Alcohol use No        Review of Systems   Constitutional: Negative for appetite change, chills, fatigue and fever  HENT: Negative for congestion, ear pain, postnasal drip, rhinorrhea, sinus pain, sore throat and trouble swallowing  Eyes: Negative for redness and itching  Respiratory: Negative for cough, chest tightness, shortness of breath and wheezing  Cardiovascular: Negative for chest pain and leg swelling     Gastrointestinal: Positive for abdominal pain (Pelvic discomfort)  Negative for constipation, diarrhea, nausea and vomiting  Endocrine: Negative  Genitourinary: Positive for decreased urine volume and frequency (Decreased frequency)  Negative for difficulty urinating, dysuria and urgency  Musculoskeletal: Negative for back pain and myalgias  Skin: Negative for rash  Allergic/Immunologic: Negative  Neurological: Negative for dizziness, numbness and headaches  Hematological: Negative  Psychiatric/Behavioral: Negative  Physical Exam  Physical Exam   Constitutional: She is oriented to person, place, and time  She appears well-developed and well-nourished  HENT:   Head: Normocephalic and atraumatic  Nose: Nose normal    Mouth/Throat: Oropharynx is clear and moist    Eyes: Pupils are equal, round, and reactive to light  Conjunctivae and EOM are normal    Neck: Normal range of motion  Neck supple  Cardiovascular: Normal rate, regular rhythm and normal heart sounds  Pulmonary/Chest: Effort normal and breath sounds normal  No respiratory distress  She has no wheezes  Abdominal: Soft  Bowel sounds are normal  There is tenderness (Suprapubic tenderness)  There is no guarding  Musculoskeletal: She exhibits no edema, tenderness or deformity  Neurological: She is alert and oriented to person, place, and time  Skin: Skin is warm and dry  Capillary refill takes less than 2 seconds  No rash noted  Psychiatric: She has a normal mood and affect  Her behavior is normal    Nursing note and vitals reviewed        Vital Signs  ED Triage Vitals [12/03/18 1143]   Temperature Pulse Respirations Blood Pressure SpO2   98 2 °F (36 8 °C) 91 18 148/72 98 %      Temp Source Heart Rate Source Patient Position - Orthostatic VS BP Location FiO2 (%)   Tympanic Monitor Sitting Left arm --      Pain Score       Worst Possible Pain           Vitals:    12/03/18 1143   BP: 148/72   Pulse: 91   Patient Position - Orthostatic VS: Sitting       Visual Acuity      ED Medications  Medications - No data to display    Diagnostic Studies  Results Reviewed     Procedure Component Value Units Date/Time    UA w Reflex to Microscopic w Reflex to Culture [[id number]]     Lab Status:  No result Specimen:  Urine                  No orders to display              Procedures  Procedures       Phone Contacts  ED Phone Contact    ED Course                               MDM  Number of Diagnoses or Management Options  Diagnosis management comments: 77-year-old female presents with complaint of urinary tension  She recently had her gallbladder removed has been taking Percocet since that time  She has suprapubic tenderness and bladder scan showed greater than 500 cc of urine being present  Zhou was placed with over 750 cc of urine draining thus far  Discussed expected clinical course along with need to have fully removed in 1-2 days time  Also discussed starting to wean her off of Percocets as tolerated  Her mother reports a history urinary retention similar to this and is aware of the expected clinical course  CritCare Time    Disposition  Final diagnoses:   Urinary retention     Time reflects when diagnosis was documented in both MDM as applicable and the Disposition within this note     Time User Action Codes Description Comment    12/3/2018 12:31 PM Deborah Chavarria Add [R33 9] Urinary retention       ED Disposition     ED Disposition Condition Comment    Discharge  Olga Salmon discharge to home/self care  Condition at discharge: Good        Follow-up Information     Follow up With Specialties Details Why Contact Info       You should have the Zhou catheter removed in 1-2 days time as we discussed  Patient's Medications   Discharge Prescriptions    No medications on file     No discharge procedures on file      ED Provider  Electronically Signed by           Hershel Hamman, DO  12/03/18 7086

## 2018-12-03 NOTE — DISCHARGE INSTRUCTIONS
Acute Urinary Retention in Women   WHAT YOU NEED TO KNOW:   Acute urinary retention (AUR) is when your bladder is full, but you cannot urinate  This condition happens suddenly, gets worse quickly, and lasts a short time  DISCHARGE INSTRUCTIONS:   Medicines:   · Antibiotics  help treat or prevent a bacterial infection  · Take your medicine as directed  Contact your healthcare provider if you think your medicine is not helping or if you have side effects  Tell him if you are allergic to any medicine  Keep a list of the medicines, vitamins, and herbs you take  Include the amounts, and when and why you take them  Bring the list or the pill bottles to follow-up visits  Carry your medicine list with you in case of an emergency  Zhou catheter care: You may need a Zhou catheter while you are at home  Healthcare providers will give you a smaller leg bag to collect urine  Keep the bag below your waist  This will prevent urine from flowing back into your bladder and causing an infection or other problems  Also, keep the tube free of kinks so the urine will drain properly  Do not pull on the catheter  This can cause pain and bleeding, and may cause the catheter to come out  Ask your healthcare provider for more information on Zhou catheter care  Follow up with your healthcare provider as directed:  Write down your questions so you remember to ask them during your visits  Contact your healthcare provider if:   · You have a fever  · You have pain when you urinate  · You see blood in your urine  · You have problems with your catheter  · You have questions or concerns about your condition or care  Return to the emergency department if:   · You have severe abdominal pain  · You are breathing faster than usual     · Your heartbeat is faster than usual     · Your face, hands, feet, or ankles are swollen    © 2017 [employer] Information is for End User's use only and may not be sold, redistributed or otherwise used for commercial purposes  All illustrations and images included in CareNotes® are the copyrighted property of [employer]  or [employer]  The above information is an  only  It is not intended as medical advice for individual conditions or treatments  Talk to your doctor, nurse or pharmacist before following any medical regimen to see if it is safe and effective for you

## 2018-12-04 ENCOUNTER — TRANSITIONAL CARE MANAGEMENT (OUTPATIENT)
Dept: FAMILY MEDICINE CLINIC | Facility: CLINIC | Age: 18
End: 2018-12-04

## 2018-12-05 ENCOUNTER — HOSPITAL ENCOUNTER (EMERGENCY)
Facility: HOSPITAL | Age: 18
Discharge: HOME | End: 2018-12-05
Attending: EMERGENCY MEDICINE | Admitting: EMERGENCY MEDICINE
Payer: COMMERCIAL

## 2018-12-05 VITALS
RESPIRATION RATE: 16 BRPM | HEART RATE: 95 BPM | BODY MASS INDEX: 32.95 KG/M2 | SYSTOLIC BLOOD PRESSURE: 164 MMHG | OXYGEN SATURATION: 97 % | TEMPERATURE: 97 F | WEIGHT: 157 LBS | HEIGHT: 58 IN | DIASTOLIC BLOOD PRESSURE: 80 MMHG

## 2018-12-05 DIAGNOSIS — Z46.6 ENCOUNTER FOR FOLEY CATHETER REMOVAL: Primary | ICD-10-CM

## 2018-12-05 PROCEDURE — 99282 EMERGENCY DEPT VISIT SF MDM: CPT

## 2018-12-05 NOTE — ED PROVIDER NOTES
History  Chief Complaint   Patient presents with    Urinary Catheter Problem     I am here to have urinary catheter removed  It was placed 2 days ago, for urinary retention  I have had this problem before  Patient is a 57-year-old female who is here to have her Zhou removed after being inserted 2 days ago for urinary retention  Patient denies abdominal pain no fevers sweats chills no nausea vomiting diarrhea  Is complaining of some mild irritation of the Zhou being in place  No back pain no medicines taken  Otherwise doing well  Positive output from the Zhou            Prior to Admission Medications   Prescriptions Last Dose Informant Patient Reported? Taking?    albuterol (VENTOLIN HFA) 90 mcg/act inhaler  Self Yes No   Sig: Inhale 2 puffs every 6 (six) hours as needed     clindamycin (CLINDAGEL) 1 % gel  Self Yes No   Sig: every 12 (twelve) hours as needed     famotidine (PEPCID) 20 mg tablet  Self No No   Sig: Take 1 tablet (20 mg total) by mouth daily   gabapentin (NEURONTIN) 600 MG tablet   No No   Sig: Take 1 tablet (600 mg total) by mouth daily at bedtime   levonorgestrel-ethinyl estradiol (AVIANE,ALESSE,LESSINA) 0 1-20 MG-MCG per tablet  Self No No   Sig: Take 1 tablet by mouth daily   risperiDONE (RisperDAL M-TABS) 1 mg dispersible tablet   No No   Sig: Take 1 tablet (1 mg total) by mouth 2 (two) times a day      Facility-Administered Medications: None       Past Medical History:   Diagnosis Date    ADHD     Anxiety     Asthma     Class 2 obesity due to excess calories without serious comorbidity with body mass index (BMI) of 35 0 to 35 9 in adult 11/3/2018    Depression     Gallstones     GERD (gastroesophageal reflux disease)     Heart murmur 1/30/2015    Hyperlipidemia     Psychiatric illness     Scoliosis        Past Surgical History:   Procedure Laterality Date    CHOLANGIOGRAM N/A 12/1/2018    Procedure: CHOLANGIOGRAM;  Surgeon: Esthela Vasquez MD;  Location:  MAIN OR; Service: General    CHOLECYSTECTOMY      CHOLECYSTECTOMY LAPAROSCOPIC N/A 12/1/2018    Procedure: CHOLECYSTECTOMY LAPAROSCOPIC;  Surgeon: Kaiden Johnson MD;  Location:  MAIN OR;  Service: General    EYE MUSCLE SURGERY      [place] ESOPHAGOGASTRODUODENOSCOPY TRANSORAL DIAGNOSTIC N/A 11/27/2018    Procedure: ESOPHAGOGASTRODUODENOSCOPY (EGD) with bx;  Surgeon: Abel Roy MD;  [facility]; Service: General       History reviewed  No pertinent family history  I have reviewed and agree with the history as documented  Social History   Substance Use Topics    Smoking status: Never Smoker    Smokeless tobacco: Never Used      Comment: no passive smoke exposure    Alcohol use No        Review of Systems   Constitutional: Negative  Negative for activity change and appetite change  HENT: Negative  Eyes: Negative  Respiratory: Negative  Cardiovascular: Negative  Negative for chest pain  Gastrointestinal: Negative  Negative for abdominal pain, diarrhea, nausea and vomiting  Endocrine: Negative  Genitourinary: Positive for urgency  Musculoskeletal: Negative  Skin: Negative  Allergic/Immunologic: Negative  Neurological: Negative  Hematological: Negative  Psychiatric/Behavioral: Negative  All other systems reviewed and are negative  Physical Exam  Physical Exam   Constitutional: She is oriented to person, place, and time  She appears well-developed and well-nourished  HENT:   Head: Normocephalic  Neck: Normal range of motion  Neck supple  Cardiovascular: Normal rate, regular rhythm, normal heart sounds and intact distal pulses  Pulmonary/Chest: Effort normal and breath sounds normal    Abdominal: Soft  Bowel sounds are normal    Genitourinary:   Genitourinary Comments: Full in place positive urinary output  Musculoskeletal: Normal range of motion  Neurological: She is alert and oriented to person, place, and time  Skin: Skin is warm and dry  Capillary refill takes less than 2 seconds  Psychiatric: She has a normal mood and affect  Her behavior is normal    Nursing note and vitals reviewed  Vital Signs  ED Triage Vitals [12/05/18 0838]   Temperature Pulse Respirations Blood Pressure SpO2   (!) 97 °F (36 1 °C) 95 16 164/80 97 %      Temp Source Heart Rate Source Patient Position - Orthostatic VS BP Location FiO2 (%)   Tymp Core Monitor Sitting Left arm --      Pain Score       No Pain           Vitals:    12/05/18 0838   BP: 164/80   Pulse: 95   Patient Position - Orthostatic VS: Sitting       Visual Acuity      ED Medications  Medications - No data to display    Diagnostic Studies  Results Reviewed     None                 No orders to display              Procedures  Procedures       Phone Contacts  ED Phone Contact    ED Course                               MDM  Number of Diagnoses or Management Options  Diagnosis management comments: Patient is a 42-year-old female here for urinary retention status post Vines insertion 2 days ago  Now wants vines out  Will discharge and can follow up with her PCP  Amount and/or Complexity of Data Reviewed  Review and summarize past medical records: yes      CritCare Time    Disposition  Final diagnoses:   Encounter for Vines catheter removal     Time reflects when diagnosis was documented in both MDM as applicable and the Disposition within this note     Time User Action Codes Description Comment    12/5/2018  8:51 AM Anne Junior Add [Z46 6] Encounter for Vines catheter removal       ED Disposition     ED Disposition Condition Comment    Discharge  Hubertus Hook discharge to home/self care  Condition at discharge: Stable        Follow-up Information     Follow up With Specialties Details Why [address] [facility]   [address]  [phone]            Patient's Medications   Discharge Prescriptions    No medications on file No discharge procedures on file      ED Provider  Electronically Signed by           Roger Clay MD  12/05/18 9560

## 2018-12-13 ENCOUNTER — HOSPITAL ENCOUNTER (EMERGENCY)
Facility: HOSPITAL | Age: 18
Discharge: HOME | End: 2018-12-13
Attending: EMERGENCY MEDICINE | Admitting: EMERGENCY MEDICINE
Payer: COMMERCIAL

## 2018-12-13 ENCOUNTER — APPOINTMENT (EMERGENCY)
Dept: RADIOLOGY | Facility: HOSPITAL | Age: 18
End: 2018-12-13
Payer: COMMERCIAL

## 2018-12-13 VITALS
SYSTOLIC BLOOD PRESSURE: 153 MMHG | OXYGEN SATURATION: 100 % | TEMPERATURE: 96.2 F | WEIGHT: 151.56 LBS | DIASTOLIC BLOOD PRESSURE: 73 MMHG | BODY MASS INDEX: 31.68 KG/M2 | HEART RATE: 63 BPM | RESPIRATION RATE: 20 BRPM

## 2018-12-13 DIAGNOSIS — J06.9 URI (UPPER RESPIRATORY INFECTION): Primary | ICD-10-CM

## 2018-12-13 DIAGNOSIS — R11.10 VOMITING: ICD-10-CM

## 2018-12-13 PROCEDURE — 99283 EMERGENCY DEPT VISIT LOW MDM: CPT

## 2018-12-13 PROCEDURE — 71046 X-RAY EXAM CHEST 2 VIEWS: CPT

## 2018-12-13 RX ORDER — ONDANSETRON 4 MG/1
4 TABLET, ORALLY DISINTEGRATING ORAL ONCE
Status: COMPLETED | OUTPATIENT
Start: 2018-12-13 | End: 2018-12-13

## 2018-12-13 RX ORDER — BENZONATATE 100 MG/1
100 CAPSULE ORAL EVERY 8 HOURS
Qty: 21 CAPSULE | Refills: 0 | Status: SHIPPED | OUTPATIENT
Start: 2018-12-13 | End: 2018-12-24 | Stop reason: HOSPADM

## 2018-12-13 RX ORDER — ONDANSETRON 4 MG/1
4 TABLET, ORALLY DISINTEGRATING ORAL EVERY 6 HOURS PRN
Qty: 20 TABLET | Refills: 0 | Status: SHIPPED | OUTPATIENT
Start: 2018-12-13 | End: 2018-12-24 | Stop reason: HOSPADM

## 2018-12-13 RX ADMIN — ONDANSETRON 4 MG: 4 TABLET, ORALLY DISINTEGRATING ORAL at 11:07

## 2018-12-13 NOTE — DISCHARGE INSTRUCTIONS
Acute Nausea and Vomiting, Ambulatory Care   GENERAL INFORMATION:   Acute nausea and vomiting  starts suddenly, gets worse quickly, and lasts a short time  Nausea and vomiting may be caused by pregnancy, alcohol, infection, or medicines  Common related symptoms include the following:   · Fever    · Abdominal swelling    · Pain, tenderness, or a lump in the abdomen    · Splashing sounds heard in your stomach when you move  Seek immediate care for the following symptoms:   · Blood in your vomit or bowel movements    · Sudden, severe pain in your chest and upper abdomen after hard vomiting    · Dizziness, dry mouth, and thirst    · Urinating very little or not at all    · Muscle weakness, leg cramps, and trouble breathing    · A heart beat that is faster than normal    · Vomiting for more than 48 hours  Treatment for acute nausea and vomiting  may include medicines to calm your stomach and stop the vomiting  You may need IV fluids if you are dehydrated  Manage your nausea and vomiting:   · Drink liquids as directed to prevent dehydration  Ask how much liquid to drink each day and which liquids are best for you  You may need to drink an oral rehydration solution (ORS)  ORS contains water, salts, and sugar that are needed to replace the lost body fluids  Ask what kind of ORS to use, how much to drink, and where to get it  · Eat smaller meals, more often  Eat small amounts of food every 2 to 3 hours, even if you are not hungry  Food in your stomach may help decrease your nausea  · Avoid stress  Find ways to relax and manage your stress  Headaches due to stress may cause nausea and vomiting  Get more rest and sleep  Follow up with your healthcare provider as directed:  Write down your questions so you remember to ask them during your visits  CARE AGREEMENT:   You have the right to help plan your care  Learn about your health condition and how it may be treated   Discuss treatment options with your caregivers to decide what care you want to receive  You always have the right to refuse treatment  The above information is an  only  It is not intended as medical advice for individual conditions or treatments  Talk to your doctor, nurse or pharmacist before following any medical regimen to see if it is safe and effective for you  © 2014 [address] is for End User's use only and may not be sold, redistributed or otherwise used for commercial purposes  All illustrations and images included in CareNotes® are the copyrighted property of [employer]  or Curt Grace  Upper Respiratory Infection, Ambulatory Care   GENERAL INFORMATION:   An upper respiratory infection  is also called a common cold  It can affect your nose, throat, ears, and sinuses  Common symptoms include the following:   · Runny or stuffy nose    · Sneezing and coughing    · Sore throat or hoarseness    · Red, watery, and sore eyes    · Tiredness or restlessness    · Chills and fever    · Headache, body aches, or sore muscles  Seek immediate care for the following symptoms:   · Headaches or a stiff neck    · Bright lights hurt your eyes    · Chest pain or trouble breathing  Treatment for an upper respiratory infection  may include any of the following:  · Decongestants  help decrease nasal congestion and improve your breathing  Do not use decongestant sprays for more than a few days  · Cough suppressants  help decrease coughing  Ask your healthcare provider which type of cough medicine is best for you  Some cough medicines need a doctor's order  · NSAIDs  help decrease swelling and pain or fever  This medicine is available with or without a doctor's order  NSAIDs can cause stomach bleeding or kidney problems in certain people  If you take blood thinner medicine, always ask your healthcare provider if NSAIDs are safe for you  Always read the medicine label and follow directions    Care for an upper respiratory infection:   · Rest  until your fever is gone or you feel better  · Drink liquids as directed to prevent dehydration  You may need to drink 8 to 10 cups of liquid each day  Good liquids to drink include water, ginger ale, tea, or fruit juices  · Gargle  with warm salt water to help your sore throat feel better  Mix ¼ teaspoon salt with 1 cup warm water  You may also suck on hard candy or throat lozenges  · Saline nasal drops  help loosen your nasal congestion  They can be bought without a doctor's order  · Take a warm bath or shower  to help decrease body aches and help you breathe easier  · Use a cool-mist humidifier  to increase air moisture and make it easier for you to breathe  Prevent the spread of germs:   · Avoid others for the first 2 to 3 days of your cold  Germs are easily spread during this time  · Do not share food, drinks,  towels, or personal items with others  · Wash your hands often  Use soap and water  Wash your hands after you use the bathroom, change a child's diapers, or sneeze  Wash your hands before you prepare or eat food  Cover your mouth and nose with a tissue when you sneeze or cough  Follow up with your healthcare provider as directed:  Write down your questions so you remember to ask them during your visits  CARE AGREEMENT:   You have the right to help plan your care  Learn about your health condition and how it may be treated  Discuss treatment options with your caregivers to decide what care you want to receive  You always have the right to refuse treatment  The above information is an  only  It is not intended as medical advice for individual conditions or treatments  Talk to your doctor, nurse or pharmacist before following any medical regimen to see if it is safe and effective for you    © 2014 [address] is for End User's use only and may not be sold, redistributed or otherwise used for commercial purposes  All illustrations and images included in CareNotes® are the copyrighted property of A [employer] A M [employer]  or Curt Grace

## 2018-12-13 NOTE — ED PROVIDER NOTES
History  Chief Complaint   Patient presents with    Cold Like Symptoms     Cough and congestion for past few days;denies fevers States throat is a bit sore  78-year-old female presents with URI type symptoms over the past day or two  She has had a mild cough with runny nose and mild upset stomach with vomiting on occasion  Her brother was recently evaluated here and passed away  He was diagnosed with the flu and became septic  The patient's mother is concerned about this as a possibility and is requesting a chest x-ray be performed  The patient was also recently admitted for depression and suicidality and during that admission required a cholecystectomy  She denies acute abdominal complaints other than mild nausea with a couple episodes of vomiting  URI   Presenting symptoms: congestion, cough and rhinorrhea    Presenting symptoms: no fatigue, no fever and no sore throat    Severity:  Mild  Onset quality:  Gradual  Timing:  Constant  Progression:  Waxing and waning  Relieved by:  Nothing  Worsened by:  Nothing  Ineffective treatments:  None tried  Associated symptoms: no arthralgias, no headaches, no myalgias, no neck pain, no sinus pain, no sneezing, no swollen glands and no wheezing        Prior to Admission Medications   Prescriptions Last Dose Informant Patient Reported? Taking?    albuterol (VENTOLIN HFA) 90 mcg/act inhaler  Self Yes No   Sig: Inhale 2 puffs every 6 (six) hours as needed     clindamycin (CLINDAGEL) 1 % gel  Self Yes No   Sig: every 12 (twelve) hours as needed     famotidine (PEPCID) 20 mg tablet  Self No No   Sig: Take 1 tablet (20 mg total) by mouth daily   gabapentin (NEURONTIN) 600 MG tablet   No No   Sig: Take 1 tablet (600 mg total) by mouth daily at bedtime   levonorgestrel-ethinyl estradiol (AVIANE,ALESSE,LESSINA) 0 1-20 MG-MCG per tablet  Self No No   Sig: Take 1 tablet by mouth daily   risperiDONE (RisperDAL M-TABS) 1 mg dispersible tablet   No No   Sig: Take 1 tablet (1 mg total) by mouth 2 (two) times a day      Facility-Administered Medications: None       Past Medical History:   Diagnosis Date    ADHD     Anxiety     Asthma     Class 2 obesity due to excess calories without serious comorbidity with body mass index (BMI) of 35 0 to 35 9 in adult 11/3/2018    Depression     Gallstones     GERD (gastroesophageal reflux disease)     Heart murmur 1/30/2015    Hyperlipidemia     Psychiatric illness     Scoliosis        Past Surgical History:   Procedure Laterality Date    CHOLANGIOGRAM N/A 12/1/2018    Procedure: CHOLANGIOGRAM;  Surgeon: Carolina Uriostegui MD;  Location:  MAIN OR;  Service: General    CHOLECYSTECTOMY     [address] [place] [address] N/A 12/1/2018    Procedure: CHOLECYSTECTOMY LAPAROSCOPIC;  Surgeon: Carolina Uriostegui MD;  Location:  MAIN OR;  Service: General    EYE MUSCLE SURGERY      [place] ESOPHAGOGASTRODUODENOSCOPY TRANSORAL DIAGNOSTIC N/A 11/27/2018    Procedure: ESOPHAGOGASTRODUODENOSCOPY (EGD) with bx;  Surgeon: Charlotte Calvillo MD;  Location: [facility]; Service: General       History reviewed  No pertinent family history  I have reviewed and agree with the history as documented  Social History   Substance Use Topics    Smoking status: Never Smoker    Smokeless tobacco: Never Used      Comment: no passive smoke exposure    Alcohol use No        Review of Systems   Constitutional: Negative for appetite change, chills, fatigue and fever  HENT: Positive for congestion and rhinorrhea  Negative for postnasal drip, sinus pain, sneezing, sore throat and trouble swallowing  Eyes: Negative for redness and itching  Respiratory: Positive for cough  Negative for chest tightness, shortness of breath and wheezing  Cardiovascular: Negative for chest pain and leg swelling  Gastrointestinal: Positive for nausea and vomiting (a couple times)  Negative for abdominal pain, constipation and diarrhea  Endocrine: Negative      Genitourinary: Negative for difficulty urinating and dysuria  Musculoskeletal: Negative for arthralgias, back pain, myalgias and neck pain  Skin: Negative for rash  Allergic/Immunologic: Negative  Neurological: Negative for dizziness, numbness and headaches  Hematological: Negative  Psychiatric/Behavioral: Negative  Physical Exam  Physical Exam   Constitutional: She is oriented to person, place, and time  She appears well-developed and well-nourished  HENT:   Head: Normocephalic and atraumatic  Nose: Mucosal edema and rhinorrhea present  Mouth/Throat: Uvula is midline, oropharynx is clear and moist and mucous membranes are normal    Eyes: Pupils are equal, round, and reactive to light  Conjunctivae and EOM are normal    Neck: Normal range of motion  Neck supple  Cardiovascular: Normal rate, regular rhythm and normal heart sounds  Pulmonary/Chest: Effort normal and breath sounds normal  No respiratory distress  She has no wheezes  Abdominal: Soft  Bowel sounds are normal  She exhibits no distension and no mass  There is no tenderness  There is no guarding  Musculoskeletal: She exhibits no edema, tenderness or deformity  Neurological: She is alert and oriented to person, place, and time  Skin: Skin is warm and dry  Capillary refill takes less than 2 seconds  No rash noted  Psychiatric: She has a normal mood and affect  Her behavior is normal    Nursing note and vitals reviewed        Vital Signs  ED Triage Vitals [12/13/18 0939]   Temperature Pulse Respirations Blood Pressure SpO2   (!) 96 2 °F (35 7 °C) (!) 106 18 160/88 98 %      Temp Source Heart Rate Source Patient Position - Orthostatic VS BP Location FiO2 (%)   Tymp Core Monitor Lying Left arm --      Pain Score       2           Vitals:    12/13/18 0939 12/13/18 1104   BP: 160/88 153/73   Pulse: (!) 106 63   Patient Position - Orthostatic VS: Lying        Visual Acuity      ED Medications  Medications   ondansetron (ZOFRAN-ODT) dispersible tablet 4 mg (not administered)       Diagnostic Studies  Results Reviewed     None                 XR chest 2 views   ED Interpretation by Bhupendra Fuentes DO (12/13 1014)   No acute findings      Final Result by Nate Doe DO (12/13 1043)      No acute cardiopulmonary disease  Workstation performed: [id number]                    Procedures  Procedures       Phone Contacts  ED Phone Contact    ED Course                               MDM  Number of Diagnoses or Management Options  URI (upper respiratory infection):   Vomiting:   Diagnosis management comments: 59-year-old female presents with symptoms of a UR I  Her brother recently passed away from complications of the flu  Patient and family were concerned about this as a possibility  She is already on Tamiflu at this point time in on exam appears in no distress, well hydrated, and nontoxic  She has stable vital signs and a chest x-ray was unremarkable for any acute findings  She was given dose of Zofran here and will be given a prescription for the same  She was instructed on supportive care measures along with the importance of close outpatient follow-up and reasons to return to the ER  Amount and/or Complexity of Data Reviewed  Tests in the radiology section of CPT®: ordered and reviewed  Independent visualization of images, tracings, or specimens: yes      CritCare Time    Disposition  Final diagnoses:   URI (upper respiratory infection)   Vomiting     Time reflects when diagnosis was documented in both MDM as applicable and the Disposition within this note     Time User Action Codes Description Comment    12/13/2018  9:52 AM Girard Homans Add [J06 9] URI (upper respiratory infection)     12/13/2018  9:54 AM Girard Homans Add [R11 10] Vomiting       ED Disposition     ED Disposition Condition Comment    Discharge  Melanie Hewitt discharge to home/self care      Condition at discharge: Good        Follow-up Information Follow up With Specialties Details Why Alex Smith MD [facility] Medicine Call  [address] [facility] Emergency Department Emergency Medicine  If symptoms worsen [address] [address] [phone]          Patient's Medications   Discharge Prescriptions    BENZONATATE (TESSALON PERLES) 100 MG CAPSULE    Take 1 capsule (100 mg total) by mouth every 8 (eight) hours       Start Date: 12/13/2018End Date: --       Order Dose: 100 mg       Quantity: 21 capsule    Refills: 0    ONDANSETRON (ZOFRAN-ODT) 4 MG DISINTEGRATING TABLET    Take 1 tablet (4 mg total) by mouth every 6 (six) hours as needed for nausea or vomiting       Start Date: 12/13/2018End Date: --       Order Dose: 4 mg       Quantity: 20 tablet    Refills: 0     No discharge procedures on file      ED Provider  Electronically Signed by           Brayan Connell,   12/13/18 3056

## 2018-12-17 ENCOUNTER — HOSPITAL ENCOUNTER (EMERGENCY)
Facility: HOSPITAL | Age: 18
End: 2018-12-17
Attending: EMERGENCY MEDICINE | Admitting: EMERGENCY MEDICINE
Payer: COMMERCIAL

## 2018-12-17 ENCOUNTER — HOSPITAL ENCOUNTER (INPATIENT)
Facility: HOSPITAL | Age: 18
LOS: 7 days | Discharge: HOME | DRG: 751 | End: 2018-12-24
Attending: PSYCHIATRY & NEUROLOGY | Admitting: PSYCHIATRY & NEUROLOGY
Payer: COMMERCIAL

## 2018-12-17 VITALS
TEMPERATURE: 97.5 F | WEIGHT: 148.15 LBS | OXYGEN SATURATION: 98 % | HEART RATE: 94 BPM | DIASTOLIC BLOOD PRESSURE: 86 MMHG | HEIGHT: 58 IN | RESPIRATION RATE: 16 BRPM | SYSTOLIC BLOOD PRESSURE: 133 MMHG | BODY MASS INDEX: 31.1 KG/M2

## 2018-12-17 DIAGNOSIS — F33.3 SEVERE EPISODE OF RECURRENT MAJOR DEPRESSIVE DISORDER, WITH PSYCHOTIC FEATURES (HCC): Primary | ICD-10-CM

## 2018-12-17 DIAGNOSIS — F32.A DEPRESSION: Primary | ICD-10-CM

## 2018-12-17 DIAGNOSIS — M41.20 SCOLIOSIS (AND KYPHOSCOLIOSIS), IDIOPATHIC: ICD-10-CM

## 2018-12-17 PROBLEM — E66.9 OBESITY, CLASS I, BMI 30-34.9: Status: ACTIVE | Noted: 2018-12-17

## 2018-12-17 PROBLEM — E66.811 OBESITY, CLASS I, BMI 30-34.9: Status: ACTIVE | Noted: 2018-12-17

## 2018-12-17 PROBLEM — R74.01 TRANSAMINITIS: Status: ACTIVE | Noted: 2018-12-17

## 2018-12-17 LAB
ALBUMIN SERPL BCP-MCNC: 4.6 G/DL (ref 3–5.2)
ALP SERPL-CCNC: 91 U/L (ref 43–122)
ALT SERPL W P-5'-P-CCNC: 106 U/L (ref 9–52)
AMPHETAMINES SERPL QL SCN: NEGATIVE
ANION GAP SERPL CALCULATED.3IONS-SCNC: 12 MMOL/L (ref 5–14)
ANISOCYTOSIS BLD QL SMEAR: PRESENT
APAP SERPL-MCNC: <10 UG/ML (ref 10–30)
AST SERPL W P-5'-P-CCNC: 32 U/L (ref 14–36)
BACTERIA UR QL AUTO: ABNORMAL /HPF
BARBITURATES UR QL: NEGATIVE
BASOPHILS # BLD AUTO: 0.1 THOUSANDS/ΜL (ref 0–0.1)
BASOPHILS NFR BLD AUTO: 1 % (ref 0–1)
BENZODIAZ UR QL: NEGATIVE
BILIRUB SERPL-MCNC: 0.7 MG/DL
BILIRUB UR QL STRIP: ABNORMAL
BUN SERPL-MCNC: 10 MG/DL (ref 5–25)
CALCIUM SERPL-MCNC: 10 MG/DL (ref 8.4–10.2)
CHLORIDE SERPL-SCNC: 104 MMOL/L (ref 97–108)
CLARITY UR: CLEAR
CO2 SERPL-SCNC: 25 MMOL/L (ref 22–30)
COCAINE UR QL: NEGATIVE
COLOR UR: ABNORMAL
CREAT SERPL-MCNC: 0.73 MG/DL (ref 0.6–1.2)
EOSINOPHIL # BLD AUTO: 0 THOUSAND/ΜL (ref 0–0.4)
EOSINOPHIL NFR BLD AUTO: 0 % (ref 0–6)
ERYTHROCYTE [DISTWIDTH] IN BLOOD BY AUTOMATED COUNT: 15.3 %
ETHANOL SERPL-MCNC: <10 MG/DL (ref 0–10)
EXT PREG TEST URINE: NEGATIVE
GFR SERPL CREATININE-BSD FRML MDRD: 119 ML/MIN/1.73SQ M
GLUCOSE SERPL-MCNC: 121 MG/DL (ref 70–99)
GLUCOSE UR STRIP-MCNC: NEGATIVE MG/DL
HCT VFR BLD AUTO: 41.6 % (ref 36–46)
HGB BLD-MCNC: 13.5 G/DL (ref 12–16)
HGB UR QL STRIP.AUTO: NEGATIVE
KETONES UR STRIP-MCNC: ABNORMAL MG/DL
LEUKOCYTE ESTERASE UR QL STRIP: 25
LYMPHOCYTES # BLD AUTO: 1.2 THOUSANDS/ΜL (ref 0.5–4)
LYMPHOCYTES NFR BLD AUTO: 10 % (ref 25–45)
MCH RBC QN AUTO: 25.6 PG (ref 26–34)
MCHC RBC AUTO-ENTMCNC: 32.5 G/DL (ref 31–36)
MCV RBC AUTO: 79 FL (ref 80–100)
METHADONE UR QL: NEGATIVE
MICROCYTES BLD QL AUTO: PRESENT
MONOCYTES # BLD AUTO: 0.8 THOUSAND/ΜL (ref 0.2–0.9)
MONOCYTES NFR BLD AUTO: 7 % (ref 1–10)
NEUTROPHILS # BLD AUTO: 10.1 THOUSANDS/ΜL (ref 1.8–7.8)
NEUTS SEG NFR BLD AUTO: 83 % (ref 45–65)
NITRITE UR QL STRIP: NEGATIVE
NON-SQ EPI CELLS URNS QL MICRO: ABNORMAL /HPF
OPIATES UR QL SCN: NEGATIVE
PCP UR QL: NEGATIVE
PH UR STRIP.AUTO: 6 [PH] (ref 4.5–8)
PLATELET # BLD AUTO: 282 THOUSANDS/UL (ref 150–450)
PLATELET BLD QL SMEAR: ADEQUATE
PMV BLD AUTO: 9.6 FL (ref 8.9–12.7)
POTASSIUM SERPL-SCNC: 3.5 MMOL/L (ref 3.6–5)
PROT SERPL-MCNC: 7.6 G/DL (ref 5.9–8.4)
PROT UR STRIP-MCNC: ABNORMAL MG/DL
RBC # BLD AUTO: 5.27 MILLION/UL (ref 4–5.2)
RBC #/AREA URNS AUTO: ABNORMAL /HPF
RBC MORPH BLD: NORMAL
SALICYLATES SERPL-MCNC: <1 MG/DL (ref 10–30)
SODIUM SERPL-SCNC: 141 MMOL/L (ref 137–147)
SP GR UR STRIP.AUTO: 1.02 (ref 1–1.04)
THC UR QL: NEGATIVE
UROBILINOGEN UA: 4 MG/DL
WBC # BLD AUTO: 12.3 THOUSAND/UL (ref 4.5–11)
WBC #/AREA URNS AUTO: ABNORMAL /HPF

## 2018-12-17 PROCEDURE — 81003 URINALYSIS AUTO W/O SCOPE: CPT | Performed by: EMERGENCY MEDICINE

## 2018-12-17 PROCEDURE — 80053 COMPREHEN METABOLIC PANEL: CPT | Performed by: EMERGENCY MEDICINE

## 2018-12-17 PROCEDURE — 80329 ANALGESICS NON-OPIOID 1 OR 2: CPT | Performed by: EMERGENCY MEDICINE

## 2018-12-17 PROCEDURE — 81025 URINE PREGNANCY TEST: CPT | Performed by: EMERGENCY MEDICINE

## 2018-12-17 PROCEDURE — 99285 EMERGENCY DEPT VISIT HI MDM: CPT

## 2018-12-17 PROCEDURE — 36415 COLL VENOUS BLD VENIPUNCTURE: CPT | Performed by: EMERGENCY MEDICINE

## 2018-12-17 PROCEDURE — 81001 URINALYSIS AUTO W/SCOPE: CPT | Performed by: EMERGENCY MEDICINE

## 2018-12-17 PROCEDURE — 99253 IP/OBS CNSLTJ NEW/EST LOW 45: CPT | Performed by: INTERNAL MEDICINE

## 2018-12-17 PROCEDURE — 85025 COMPLETE CBC W/AUTO DIFF WBC: CPT | Performed by: EMERGENCY MEDICINE

## 2018-12-17 PROCEDURE — 80307 DRUG TEST PRSMV CHEM ANLYZR: CPT | Performed by: EMERGENCY MEDICINE

## 2018-12-17 PROCEDURE — 80320 DRUG SCREEN QUANTALCOHOLS: CPT | Performed by: EMERGENCY MEDICINE

## 2018-12-17 RX ORDER — IBUPROFEN 400 MG/1
400 TABLET ORAL EVERY 6 HOURS PRN
Status: CANCELLED | OUTPATIENT
Start: 2018-12-17

## 2018-12-17 RX ORDER — LANOLIN ALCOHOL/MO/W.PET/CERES
6 CREAM (GRAM) TOPICAL
Status: DISCONTINUED | OUTPATIENT
Start: 2018-12-17 | End: 2018-12-24 | Stop reason: HOSPADM

## 2018-12-17 RX ORDER — BENZTROPINE MESYLATE 1 MG/1
1 TABLET ORAL EVERY 4 HOURS PRN
Status: DISCONTINUED | OUTPATIENT
Start: 2018-12-17 | End: 2018-12-24 | Stop reason: HOSPADM

## 2018-12-17 RX ORDER — HYDROXYZINE 50 MG/1
50 TABLET, FILM COATED ORAL EVERY 4 HOURS PRN
Status: DISCONTINUED | OUTPATIENT
Start: 2018-12-17 | End: 2018-12-23

## 2018-12-17 RX ORDER — TRAZODONE HYDROCHLORIDE 100 MG/1
50 TABLET ORAL
Status: CANCELLED | OUTPATIENT
Start: 2018-12-17

## 2018-12-17 RX ORDER — FAMOTIDINE 20 MG/1
20 TABLET, FILM COATED ORAL 2 TIMES DAILY
Status: DISCONTINUED | OUTPATIENT
Start: 2018-12-17 | End: 2018-12-24 | Stop reason: HOSPADM

## 2018-12-17 RX ORDER — DIPHENHYDRAMINE HYDROCHLORIDE 50 MG/ML
50 INJECTION INTRAMUSCULAR; INTRAVENOUS EVERY 4 HOURS PRN
Status: DISCONTINUED | OUTPATIENT
Start: 2018-12-17 | End: 2018-12-24 | Stop reason: HOSPADM

## 2018-12-17 RX ORDER — GABAPENTIN 300 MG/1
600 CAPSULE ORAL
Status: DISCONTINUED | OUTPATIENT
Start: 2018-12-17 | End: 2018-12-18

## 2018-12-17 RX ORDER — HYDROXYZINE HYDROCHLORIDE 25 MG/1
50 TABLET, FILM COATED ORAL EVERY 4 HOURS PRN
Status: CANCELLED | OUTPATIENT
Start: 2018-12-17

## 2018-12-17 RX ORDER — OLANZAPINE 5 MG/1
5 TABLET ORAL EVERY 4 HOURS PRN
Status: DISCONTINUED | OUTPATIENT
Start: 2018-12-17 | End: 2018-12-24 | Stop reason: HOSPADM

## 2018-12-17 RX ORDER — LORAZEPAM 2 MG/ML
INJECTION INTRAMUSCULAR
Status: DISCONTINUED
Start: 2018-12-17 | End: 2018-12-17 | Stop reason: HOSPADM

## 2018-12-17 RX ORDER — ONDANSETRON 4 MG/1
4 TABLET, ORALLY DISINTEGRATING ORAL EVERY 6 HOURS PRN
Status: DISCONTINUED | OUTPATIENT
Start: 2018-12-17 | End: 2018-12-24 | Stop reason: HOSPADM

## 2018-12-17 RX ORDER — ACETAMINOPHEN 325 MG/1
975 TABLET ORAL EVERY 6 HOURS PRN
Status: CANCELLED | OUTPATIENT
Start: 2018-12-17

## 2018-12-17 RX ORDER — LANOLIN ALCOHOL/MO/W.PET/CERES
6 CREAM (GRAM) TOPICAL
Status: CANCELLED | OUTPATIENT
Start: 2018-12-17

## 2018-12-17 RX ORDER — ACETAMINOPHEN 325 MG/1
975 TABLET ORAL EVERY 6 HOURS PRN
Status: DISCONTINUED | OUTPATIENT
Start: 2018-12-17 | End: 2018-12-24 | Stop reason: HOSPADM

## 2018-12-17 RX ORDER — OLANZAPINE 5 MG/1
5 TABLET ORAL EVERY 4 HOURS PRN
Status: CANCELLED | OUTPATIENT
Start: 2018-12-17

## 2018-12-17 RX ORDER — LORAZEPAM 0.5 MG/1
1 TABLET ORAL ONCE
Status: COMPLETED | OUTPATIENT
Start: 2018-12-17 | End: 2018-12-17

## 2018-12-17 RX ORDER — BENZTROPINE MESYLATE 0.5 MG/1
1 TABLET ORAL EVERY 4 HOURS PRN
Status: CANCELLED | OUTPATIENT
Start: 2018-12-17

## 2018-12-17 RX ORDER — IBUPROFEN 400 MG/1
400 TABLET ORAL EVERY 6 HOURS PRN
Status: DISCONTINUED | OUTPATIENT
Start: 2018-12-17 | End: 2018-12-24 | Stop reason: HOSPADM

## 2018-12-17 RX ORDER — ACETAMINOPHEN 325 MG/1
650 TABLET ORAL EVERY 6 HOURS PRN
Status: DISCONTINUED | OUTPATIENT
Start: 2018-12-17 | End: 2018-12-24 | Stop reason: HOSPADM

## 2018-12-17 RX ORDER — DIPHENHYDRAMINE HYDROCHLORIDE 50 MG/ML
50 INJECTION INTRAMUSCULAR; INTRAVENOUS EVERY 4 HOURS PRN
Status: CANCELLED | OUTPATIENT
Start: 2018-12-17

## 2018-12-17 RX ORDER — TRAZODONE HYDROCHLORIDE 50 MG/1
50 TABLET ORAL
Status: DISCONTINUED | OUTPATIENT
Start: 2018-12-17 | End: 2018-12-22

## 2018-12-17 RX ORDER — OLANZAPINE 10 MG/1
5 INJECTION, POWDER, LYOPHILIZED, FOR SOLUTION INTRAMUSCULAR EVERY 4 HOURS PRN
Status: CANCELLED | OUTPATIENT
Start: 2018-12-17

## 2018-12-17 RX ORDER — ONDANSETRON 4 MG/1
4 TABLET, ORALLY DISINTEGRATING ORAL ONCE
Status: COMPLETED | OUTPATIENT
Start: 2018-12-17 | End: 2018-12-17

## 2018-12-17 RX ORDER — ALBUTEROL SULFATE 90 UG/1
2 AEROSOL, METERED RESPIRATORY (INHALATION) EVERY 6 HOURS PRN
Status: DISCONTINUED | OUTPATIENT
Start: 2018-12-17 | End: 2018-12-24 | Stop reason: HOSPADM

## 2018-12-17 RX ORDER — ACETAMINOPHEN 325 MG/1
650 TABLET ORAL EVERY 6 HOURS PRN
Status: CANCELLED | OUTPATIENT
Start: 2018-12-17

## 2018-12-17 RX ORDER — OLANZAPINE 10 MG/1
5 INJECTION, POWDER, LYOPHILIZED, FOR SOLUTION INTRAMUSCULAR EVERY 4 HOURS PRN
Status: DISCONTINUED | OUTPATIENT
Start: 2018-12-17 | End: 2018-12-24 | Stop reason: HOSPADM

## 2018-12-17 RX ADMIN — GABAPENTIN 300 MG: 300 CAPSULE ORAL at 21:39

## 2018-12-17 RX ADMIN — ONDANSETRON 4 MG: 4 TABLET, ORALLY DISINTEGRATING ORAL at 08:39

## 2018-12-17 RX ADMIN — FAMOTIDINE 20 MG: 20 TABLET ORAL at 18:14

## 2018-12-17 RX ADMIN — LORAZEPAM 1 MG: 0.5 TABLET ORAL at 12:40

## 2018-12-17 RX ADMIN — OLANZAPINE 5 MG: 5 TABLET, FILM COATED ORAL at 17:21

## 2018-12-17 NOTE — ED NOTES
Insurance Authorization:   Phone call placed to [employer]   Phone number: [phone]  Spoke to Rosemary Rich   2 days approved  Level of care: *[facility] mental health  Review on **Tues 12/18  Authorization # **      To be given on admission

## 2018-12-17 NOTE — ASSESSMENT & PLAN NOTE
Transaminitis  Patient recently admitted at [facility] initially for psychiatric reasons but underwent laparoscopic cholecystectomy  Transaminitis continues to improve    Noted preliminary autoimmune workup negative

## 2018-12-17 NOTE — ED NOTES
Pt does have a hx of prior suicide attempts, (wrap cord around her neck prior to [facility] 2 yrs ago), past hx of vomiting

## 2018-12-17 NOTE — ED NOTES
When asked if pt could give us a urine sample pt states she does not want to drink anything       Raghav Sanders  12/17/18 8524

## 2018-12-17 NOTE — CONSULTS
Consult- Adam Smith 2000, 19 y o  female MRN: [id number]    Unit/Bed#: [facility] 377-02 Encounter: [id number]    Primary Care Provider: Mattie Michel MD   Date and time admitted to hospital: 12/17/2018  4:54 PM      Inpatient consult for Medical Clearance for [facility] patient  Consult performed by: Maricarmen Ríos ordered by: Erika Jimenez      ASSESSMENT AND PLAN  * Mood disorder [facility])   Assessment & Plan    Mood disorder  Patient depressed historically maintained on citalopram but has not been taking  Is requesting gabapentin q h s  600 mg as scheduled  Further management deferred to psychiatric team     Obesity, Class I, BMI 30-34 9   Assessment & Plan    Body mass index is 31 35 kg/m²  Would benefit from weight loss     Transaminitis   Assessment & Plan    Transaminitis  Patient recently admitted at [facility] initially for psychiatric reasons but underwent laparoscopic cholecystectomy  Transaminitis continues to improve  Noted preliminary autoimmune workup negative     GERD (gastroesophageal reflux disease)   Assessment & Plan    Restart famotidine     Asthma   Assessment & Plan    Asthma without exacerbation  Albuterol p r n  Thank you for this consultation, we will follow along with you during this hospitalization  _____________________________________________________________________________    HPI: Adam Smith is a 19y o  year old female who presented for psychiatric evaluation  The patient has been feeling depressed and suicidal   Was recently hospitalized at [facility] detail on for similar but transferred out due to need for emergent cholecystectomy  She has been admitted to inpatient psychiatry and internal medicine consult ordered for evaluation  Patient denies any chest pain or shortness of breath at time of examination  Does have some nausea in the setting of underlying GERD      ALLERGIES  No Known Allergies  HOME MEDICATIONS  Prior to Admission Medications   Prescriptions Last Dose Informant Patient Reported? Taking?    albuterol (VENTOLIN HFA) 90 mcg/act inhaler  Self Yes No   Sig: Inhale 2 puffs every 6 (six) hours as needed     benzonatate (TESSALON PERLES) 100 mg capsule   No No   Sig: Take 1 capsule (100 mg total) by mouth every 8 (eight) hours   clindamycin (CLINDAGEL) 1 % gel  Self Yes No   Sig: every 12 (twelve) hours as needed     famotidine (PEPCID) 20 mg tablet  Self No No   Sig: Take 1 tablet (20 mg total) by mouth daily   gabapentin (NEURONTIN) 600 MG tablet   No No   Sig: Take 1 tablet (600 mg total) by mouth daily at bedtime   levonorgestrel-ethinyl estradiol (AVIANE,ALESSE,LESSINA) 0 1-20 MG-MCG per tablet  Self No No   Sig: Take 1 tablet by mouth daily   ondansetron (ZOFRAN-ODT) 4 mg disintegrating tablet   No No   Sig: Take 1 tablet (4 mg total) by mouth every 6 (six) hours as needed for nausea or vomiting   risperiDONE (RisperDAL M-TABS) 1 mg dispersible tablet   No No   Sig: Take 1 tablet (1 mg total) by mouth 2 (two) times a day      Facility-Administered Medications: None     CURRENT MEDICATIONS  Current Facility-Administered Medications   Medication Dose Route Frequency Provider Last Rate Last Dose    acetaminophen (TYLENOL) tablet 650 mg  650 mg Oral Q6H PRN Olga Reno, PA-C        acetaminophen (TYLENOL) tablet 975 mg  975 mg Oral Q6H PRN Olga Reno PA-C        benztropine (COGENTIN) tablet 1 mg  1 mg Oral Q4H PRN Olgajennifer Reno, PA-C        diphenhydrAMINE (BENADRYL) injection 50 mg  50 mg Intramuscular Q4H PRN Olga Rowdy, PA-C        hydrOXYzine HCL (ATARAX) tablet 50 mg  50 mg Oral Q4H PRN Olga Rowdy, PA-C        ibuprofen (MOTRIN) tablet 400 mg  400 mg Oral Q6H PRN Olga Rowdy, PA-C        melatonin tablet 6 mg  6 mg Oral HS PRN Olga Rowdy, PA-C        OLANZapine (ZyPREXA) IM injection 5 mg  5 mg Intramuscular Q4H PRN Olga Horns, PA-C        OLANZapine (ZyPREXA) tablet 5 mg  5 mg Oral Q4H PRN Caffie Sails, PA-C   5 mg at 12/17/18 1721    traZODone (DESYREL) tablet 50 mg  50 mg Oral HS PRN Caffie Sails, PA-C         PAST HISTORY  Past Medical History:   Diagnosis Date    ADHD     Anxiety     Asthma     Class 2 obesity due to excess calories without serious comorbidity with body mass index (BMI) of 35 0 to 35 9 in adult 11/3/2018    Depression     Gallstones     GERD (gastroesophageal reflux disease)     Heart murmur 1/30/2015    Hyperlipidemia     Scoliosis      Past Surgical History:   Procedure Laterality Date    CHOLANGIOGRAM N/A 12/1/2018    Procedure: CHOLANGIOGRAM;  Surgeon: Esthela Vasquez MD;  Location:  MAIN OR;  Service: General    CHOLECYSTECTOMY      CHOLECYSTECTOMY LAPAROSCOPIC N/A 12/1/2018    Procedure: CHOLECYSTECTOMY LAPAROSCOPIC;  Surgeon: Esthela Vasquez MD;  Location:  MAIN OR;  Service: General    EYE MUSCLE SURGERY      [place] ESOPHAGOGASTRODUODENOSCOPY TRANSORAL DIAGNOSTIC N/A 11/27/2018    Procedure: ESOPHAGOGASTRODUODENOSCOPY (EGD) with bx;  Surgeon: Dionicio Hernandes MD;  Location: [facility]; Service: General     SOCIAL HISTORY  Social History     Social History    Marital status: Single     Spouse name: N/A    Number of children: N/A    Years of education: N/A     Occupational History    Not on file  Social History Main Topics    Smoking status: Never Smoker    Smokeless tobacco: Never Used      Comment: no passive smoke exposure    Alcohol use No    Drug use: No    Sexual activity: No     Other Topics Concern    Not on file     Social History Narrative    No narrative on file     FAMILY HISTORY  History reviewed  No pertinent family history      REVIEW OF SYSTEMS  History obtained from chart review and the patient  General ROS: negative for - chills or fever  Psychological ROS: positive for - depression  Ophthalmic ROS: negative for - blurry vision  Respiratory ROS: negative for - cough or shortness of breath  Cardiovascular ROS: negative for - chest pain  Gastrointestinal ROS: positive for - nausea without vomiting  Genito-Urinary ROS: negative for - dysuria  Musculoskeletal ROS: negative for - joint stiffness or joint swelling  Neurological ROS: negative for - seizures  Otherwise, all other 12 point review of systems normal     OBJECTIVE  Temp:  [97 5 °F (36 4 °C)-98 1 °F (36 7 °C)] 98 1 °F (36 7 °C)  HR:  [91-94] 93  Resp:  [16-18] 18  BP: (120-149)/(81-95) 120/81    PHYSICAL EXAM  General appearance: alert, appears stated age and cooperative  Skin: Skin color, texture, turgor normal  No rashes or lesions  Head: Normocephalic, without obvious abnormality, atraumatic  Eyes: conjunctivae/corneas clear  PERRL, EOM's intact  Lungs: clear to auscultation bilaterally  Heart: regular rate and rhythm  Abdomen: Soft nontender positive bowel sounds  Back: range of motion normal  Extremities: extremities normal, atraumatic, no cyanosis or edema  Neurologic: Grossly normal    Lab Results: I have personally reviewed pertinent reports        Labs:    Results from last 7 days  Lab Units 12/17/18  0630   WBC Thousand/uL 12 30*   HEMOGLOBIN g/dL 13 5   HEMATOCRIT % 41 6   MCV fL 79*   PLATELETS Thousands/uL 282             Results from last 7 days  Lab Units 12/17/18  0631   SODIUM mmol/L 141   POTASSIUM mmol/L 3 5*   CHLORIDE mmol/L 104   CO2 mmol/L 25   ANION GAP mmol/L 12   BUN mg/dL 10   CREATININE mg/dL 0 73   CALCIUM mg/dL 10 0   ALBUMIN g/dL 4 6   TOTAL BILIRUBIN mg/dL 0 70   ALK PHOS U/L 91   ALT U/L 106*   AST U/L 32   EGFR ml/min/1 73sq m 119   GLUCOSE RANDOM mg/dL 121*       Results from last 7 days  Lab Units 12/17/18  1442   COLOR UA  Brown*   CLARITY UA  Clear   SPEC GRAV UA  1 025   PH UA  6 0   LEUKOCYTES UA  25 0*   NITRITE UA  Negative   GLUCOSE UA mg/dl Negative   KETONES UA mg/dl >=150 (3+)*   BILIRUBIN UA  1 mg/dL*   BLOOD UA  Negative        Results from last 7 days  Lab Units 12/17/18  1442   RBC UA /hpf 0-1*   WBC UA /hpf 0-1*   EPITHELIAL CELLS WET PREP /hpf Occasional   BACTERIA UA /hpf Moderate*        Results from last 7 days  Lab Units 12/17/18  1442   AMPH/METH  Negative   BARBITURATE UR  Negative   BENZODIAZEPINE UR  Negative   COCAINE UR  Negative   METHADONE URINE  Negative   OPIATE UR  Negative   PCP UR  Negative   THC UR  Negative       Imaging: I have personally reviewed pertinent films in PACS  Xr Chest 2 Views  Result Date: 12/13/2018  Impression: No acute cardiopulmonary disease  Workstation performed: [id number]     Total Time for Visit, including Counseling / Coordination of Care: 35 mins  Greater than 50% of this total time spent on direct patient counseling and coordination of care  ** Please Note: This note has been constructed using a voice recognition system   **

## 2018-12-17 NOTE — ED NOTES
Insurance authorization with [facility] for 2 days review on 12/18    Spoke to [employer] # [id number]

## 2018-12-17 NOTE — PROGRESS NOTES
Admission Note: 19year old female admitted to [address] [place] [address] Dr unit from the ED as a 201 commitment status due to an increase in depression and SI without a plan  Pt was pleasant and cooperative during admission process  Pt stopped taking medications after her younger brother  in the ED due to the flu  Pt would stop eating and make herself throw up  Pt states that she wants to join her brother  Pt currently reports having a 4/4 anxiety and 10/10 depression, was given Zyprexa 5 mg  Pt denies any current SI, HI, or VH  Pt has AH telling her to vomit  No Known Allergies  UDS negative  Will continue to monitor

## 2018-12-17 NOTE — ED NOTES
Patient is accepted at  1200  Patient is accepted by Dwanye GUZMÁN covering for Dr Maira Nogueira for the [address] behavioral health     Patient may go to the floor at 1600      Nurse report is to be called to [phone] prior to patient transfer

## 2018-12-17 NOTE — ASSESSMENT & PLAN NOTE
Mood disorder  Patient depressed historically maintained on citalopram but has not been taking  Is requesting gabapentin q h s  600 mg as scheduled    Further management deferred to psychiatric team

## 2018-12-17 NOTE — ED PROVIDER NOTES
History  Chief Complaint   Patient presents with    Psychiatric Evaluation     20 y/o female here for Crisis evaluation - patient depressed and is feeling suicidal   Patient was recently admitted to [facility] for MDD and SI; was transferred out in 48 hours due to need for emergent cholecystectomy and, subsequently, lost bed at the facility  Her brother was recently seen here in the ED and  from influenza/sepsis  Since that time, patient has been refusing to eat/drink and/or take meds  She has stated aloud "I'll do what I want to do!!!"  Patient has routine follow up weekly with her therapist   She states that she hears voices telling her to make herself vomit  She denies alcohol or drug use/abuse  Patient is noncompliant with her medications  Parents state that they cannot find her medications  Prior to Admission Medications   Prescriptions Last Dose Informant Patient Reported? Taking?    albuterol (VENTOLIN HFA) 90 mcg/act inhaler  Self Yes No   Sig: Inhale 2 puffs every 6 (six) hours as needed     benzonatate (TESSALON PERLES) 100 mg capsule   No No   Sig: Take 1 capsule (100 mg total) by mouth every 8 (eight) hours   clindamycin (CLINDAGEL) 1 % gel  Self Yes No   Sig: every 12 (twelve) hours as needed     famotidine (PEPCID) 20 mg tablet  Self No No   Sig: Take 1 tablet (20 mg total) by mouth daily   gabapentin (NEURONTIN) 600 MG tablet   No No   Sig: Take 1 tablet (600 mg total) by mouth daily at bedtime   levonorgestrel-ethinyl estradiol (AVIANE,ALESSE,LESSINA) 0 1-20 MG-MCG per tablet  Self No No   Sig: Take 1 tablet by mouth daily   ondansetron (ZOFRAN-ODT) 4 mg disintegrating tablet   No No   Sig: Take 1 tablet (4 mg total) by mouth every 6 (six) hours as needed for nausea or vomiting   risperiDONE (RisperDAL M-TABS) 1 mg dispersible tablet   No No   Sig: Take 1 tablet (1 mg total) by mouth 2 (two) times a day      Facility-Administered Medications: None       Past Medical History: Diagnosis Date    ADHD     Anxiety     Asthma     Class 2 obesity due to excess calories without serious comorbidity with body mass index (BMI) of 35 0 to 35 9 in adult 11/3/2018    Depression     Gallstones     GERD (gastroesophageal reflux disease)     Heart murmur 1/30/2015    Hyperlipidemia     Scoliosis        Past Surgical History:   Procedure Laterality Date    CHOLANGIOGRAM N/A 12/1/2018    Procedure: CHOLANGIOGRAM;  Surgeon: Carolina Uriostegui MD;  Location:  MAIN OR;  Service: General    CHOLECYSTECTOMY      CHOLECYSTECTOMY LAPAROSCOPIC N/A 12/1/2018    Procedure: CHOLECYSTECTOMY LAPAROSCOPIC;  Surgeon: Carolina Uriostegui MD;  Location:  MAIN OR;  Service: General    EYE MUSCLE SURGERY      [place] ESOPHAGOGASTRODUODENOSCOPY TRANSORAL DIAGNOSTIC N/A 11/27/2018    Procedure: ESOPHAGOGASTRODUODENOSCOPY (EGD) with bx;  Surgeon: Teofilo Rodriguez MD;  Location: [facility]; Service: General       History reviewed  No pertinent family history  I have reviewed and agree with the history as documented  Social History   Substance Use Topics    Smoking status: Never Smoker    Smokeless tobacco: Never Used      Comment: no passive smoke exposure    Alcohol use No        Review of Systems   Psychiatric/Behavioral: Positive for behavioral problems, sleep disturbance and suicidal ideas  The patient is nervous/anxious  All other systems reviewed and are negative  Physical Exam  Physical Exam   Constitutional: She is oriented to person, place, and time  She appears well-developed and well-nourished  HENT:   Head: Normocephalic and atraumatic  Eyes: Pupils are equal, round, and reactive to light  EOM are normal    Neck: Normal range of motion  Neck supple  No tracheal deviation present  No thyromegaly present  Cardiovascular: Normal rate and regular rhythm  Pulmonary/Chest: Effort normal and breath sounds normal    Abdominal: Soft   Bowel sounds are normal    Musculoskeletal: Normal range of motion  Neurological: She is alert and oriented to person, place, and time  She displays normal reflexes  No cranial nerve deficit or sensory deficit  Coordination normal    Skin: Skin is warm  Capillary refill takes less than 2 seconds  Psychiatric: Her speech is delayed  She is withdrawn  Thought content is paranoid  She exhibits a depressed mood  Vitals reviewed  Vital Signs  ED Triage Vitals [12/17/18 0541]   Temperature Pulse Respirations Blood Pressure SpO2   97 5 °F (36 4 °C) 91 18 149/95 94 %      Temp Source Heart Rate Source Patient Position - Orthostatic VS BP Location FiO2 (%)   Tympanic Monitor Sitting Left arm --      Pain Score       --           Vitals:    12/17/18 0541 12/17/18 1442   BP: 149/95 133/86   Pulse: 91 94   Patient Position - Orthostatic VS: Sitting Sitting       Visual Acuity      ED Medications  Medications   ondansetron (ZOFRAN-ODT) dispersible tablet 4 mg (4 mg Oral Given 12/17/18 0839)   LORazepam (ATIVAN) tablet 1 mg (1 mg Oral Given 12/17/18 1240)       Diagnostic Studies  Results Reviewed     Procedure Component Value Units Date/Time    Rapid drug screen, urine [[id number]]  (Normal) Collected:  12/17/18 1442    Lab Status:  Final result Specimen:  Urine from Urine, Clean Catch Updated:  12/17/18 1520     Amph/Meth UR Negative     Barbiturate Ur Negative     Benzodiazepine Urine Negative     Cocaine Urine Negative     Methadone Urine Negative     Opiate Urine Negative     PCP Ur Negative     THC Urine Negative    Narrative:         FOR MEDICAL PURPOSES ONLY  IF CONFIRMATION NEEDED PLEASE CONTACT THE LAB WITHIN 5 DAYS      Drug Screen Cutoff Levels:  AMPHETAMINE/METHAMPHETAMINES  1000 ng/mL  BARBITURATES     200 ng/mL  BENZODIAZEPINES     200 ng/mL  COCAINE      300 ng/mL  METHADONE      300 ng/mL  OPIATES      300 ng/mL  PHENCYCLIDINE     25 ng/mL  THC       50 ng/mL    Urine Microscopic [[id number]]  (Abnormal) Collected:  12/17/18 1442    Lab Status:  Final result Specimen:  Urine from Urine, Clean Catch Updated:  12/17/18 1515     RBC, UA 0-1 (A) /hpf      WBC, UA 0-1 (A) /hpf      Epithelial Cells Occasional /hpf      Bacteria, UA Moderate (A) /hpf     UA w Reflex to Microscopic w Reflex to Culture [[id number]]  (Abnormal) Collected:  12/17/18 1442    Lab Status:  Final result Specimen:  Urine from Urine, Clean Catch Updated:  12/17/18 1508     Color, UA Brown (A)     Clarity, UA Clear     Specific Gravity, UA 1 025     pH, UA 6 0     Leukocytes, UA 25 0 (A)     Nitrite, UA Negative     Protein, UA 30 (1+) (A) mg/dl      Glucose, UA Negative mg/dl      Ketones, UA >=150 (3+) (A) mg/dl      Bilirubin, UA 1 mg/dL (A)     Blood, UA Negative     UROBILINOGEN UA 4 0 (A) mg/dL     POCT pregnancy, urine [[id number]]  (Normal) Resulted:  12/17/18 1445    Lab Status:  Final result Updated:  12/17/18 1445     EXT PREG TEST UR (Ref: Negative) Negative    Salicylate level [[id number]]  (Abnormal) Collected:  12/17/18 0630    Lab Status:  Final result Specimen:  Blood from Arm, Right Updated:  15/40/19 9409     Salicylate Lvl <8 1 (L) mg/dL     Acetaminophen level [[id number]]  (Abnormal) Collected:  12/17/18 0630    Lab Status:  Final result Specimen:  Blood from Arm, Right Updated:  12/17/18 0657     Acetaminophen Level <10 (L) ug/mL     Comprehensive metabolic panel [[id number]]  (Abnormal) Collected:  12/17/18 0631    Lab Status:  Final result Specimen:  Blood from Arm, Right Updated:  12/17/18 0657     Sodium 141 mmol/L      Potassium 3 5 (L) mmol/L      Chloride 104 mmol/L      CO2 25 mmol/L      ANION GAP 12 mmol/L      BUN 10 mg/dL      Creatinine 0 73 mg/dL      Glucose 121 (H) mg/dL      Calcium 10 0 mg/dL      AST 32 U/L       (H) U/L      Alkaline Phosphatase 91 U/L      Total Protein 7 6 g/dL      Albumin 4 6 g/dL      Total Bilirubin 0 70 mg/dL      eGFR 119 ml/min/1 73sq m     Narrative:         National Kidney Disease Education Program recommendations are as follows:  GFR calculation is accurate only with a steady state creatinine  Chronic Kidney disease less than 60 ml/min/1 73 sq  meters  Kidney failure less than 15 ml/min/1 73 sq  meters  Ethanol [[id number]]  (Normal) Collected:  12/17/18 0630    Lab Status:  Final result Specimen:  Blood from Arm, Right Updated:  12/17/18 0656     Ethanol Lvl <10 mg/dL     CBC and differential [[id number]]  (Abnormal) Collected:  12/17/18 0630    Lab Status:  Final result Specimen:  Blood from Arm, Right Updated:  12/17/18 0644     WBC 12 30 (H) Thousand/uL      RBC 5 27 (H) Million/uL      Hemoglobin 13 5 g/dL      Hematocrit 41 6 %      MCV 79 (L) fL      MCH 25 6 (L) pg      MCHC 32 5 g/dL      RDW 15 3 (H) %      MPV 9 6 fL      Platelets 407 Thousands/uL      Neutrophils Relative 83 (H) %      Lymphocytes Relative 10 (L) %      Monocytes Relative 7 %      Eosinophils Relative 0 %      Basophils Relative 1 %      Neutrophils Absolute 10 10 (H) Thousands/µL      Lymphocytes Absolute 1 20 Thousands/µL      Monocytes Absolute 0 80 Thousand/µL      Eosinophils Absolute 0 00 Thousand/µL      Basophils Absolute 0 10 Thousands/µL                  No orders to display              Procedures  Procedures       Phone Contacts  ED Phone Contact    ED Course  ED Course as of Dec 22 2219   Mon Dec 17, 2018   4609 Patient care signed out to CARMEN De Jesus  - pending medical clearance and Crisis evaluation  MDM  Number of Diagnoses or Management Options  Depression:   Diagnosis management comments: Patient care signed out to CARMEN De Jesus  - pending Crisis evaluation        CritCare Time    Disposition  Final diagnoses:   Depression     Time reflects when diagnosis was documented in both MDM as applicable and the Disposition within this note     Time User Action Codes Description Comment    12/17/2018  7:40 AM Elliot Ramires Add [F32 9] Depression     12/17/2018 12:16 PM Nabil Tomlin Add [M41 20] Scoliosis (and kyphoscoliosis), idiopathic     12/17/2018 12:16 PM Brando Pierre Modify [M41 20] Scoliosis (and kyphoscoliosis), idiopathic       ED Disposition     ED Disposition Condition Comment    Transfer to [place] [id number] should be transferred out to behavioral health unit and has been medically cleared  MD Documentation      Most Recent Value   Sending MD Dr Kaylyn Gonzalez up With Specialties Details Why [address], MD [facility]  As needed [address]  [address]            Discharge Medication List as of 12/17/2018  4:41 PM      CONTINUE these medications which have NOT CHANGED    Details   albuterol (VENTOLIN HFA) 90 mcg/act inhaler Inhale 2 puffs every 6 (six) hours as needed  , Starting Wed 11/19/2014, Historical Med      benzonatate (TESSALON PERLES) 100 mg capsule Take 1 capsule (100 mg total) by mouth every 8 (eight) hours, Starting Thu 12/13/2018, Print      clindamycin (CLINDAGEL) 1 % gel every 12 (twelve) hours as needed  , Starting Wed 5/30/2018, Historical Med      famotidine (PEPCID) 20 mg tablet Take 1 tablet (20 mg total) by mouth daily, Starting Fri 11/2/2018, Print      gabapentin (NEURONTIN) 600 MG tablet Take 1 tablet (600 mg total) by mouth daily at bedtime, Starting Sun 12/2/2018, Normal      levonorgestrel-ethinyl estradiol (AVIANE,ALESSE,LESSINA) 0 1-20 MG-MCG per tablet Take 1 tablet by mouth daily, Starting Tue 9/18/2018, Normal      ondansetron (ZOFRAN-ODT) 4 mg disintegrating tablet Take 1 tablet (4 mg total) by mouth every 6 (six) hours as needed for nausea or vomiting, Starting Thu 12/13/2018, Print      risperiDONE (RisperDAL M-TABS) 1 mg dispersible tablet Take 1 tablet (1 mg total) by mouth 2 (two) times a day, Starting Sun 12/2/2018, Normal           No discharge procedures on file      ED Provider  Electronically Signed by           Carlos Eduardo Colindres, DO  12/22/18 2218

## 2018-12-17 NOTE — ED NOTES
Pt straight cath, pt tolerated well   Urine specimen obtained and sent to lab for analysis     Corine Barajas  12/17/18 4193

## 2018-12-17 NOTE — PLAN OF CARE
Problem: Ineffective Coping  Goal: Cooperates with admission process  Interventions:   - Complete admission process  Outcome: Progressing      Problem: Depression  Goal: Treatment Goal: Demonstrate behavioral control of depressive symptoms, verbalize feelings of improved mood/affect, and adopt new coping skills prior to discharge  Outcome: Progressing

## 2018-12-17 NOTE — ED NOTES
Family remains present at bedside - mother is laying in bed with patient  Glass door is closed with curtain open       Alayna Santos RN  12/17/18 9635

## 2018-12-17 NOTE — ED NOTES
Patient is a 19 yr old female, brought to the ED by her parents  She has been non compliant with her psychiatric meds for 7 days  She reports that she threw them away (her father has been looking for them and can't find them anywhere)  Her brother  2 weeks ago, and they were very close spent most of the day together  She wants to "join him" and is refusing to eat or drink for the last week (confirmed by parents)  She presents as cooperative, very sad with poor eye contact  She keeps trying to make her self vomit  She is quiet but will answer questions  She continues to feel lost after the death of her brother, spends the  in her room alone  She is not caring for herself, will not interact with parents  She is in agreement with inpt admission and signed a 201      Pt was recently hospitalized in [address] in 18  She left the next day due to needing gallbladder surgery  She did not return to psych at that time  She has 2 prior inpatient admissions as a teen - [facility]   She is currently followed by a psychiatrist at [facility]  Parents are in agreement with admission  201 signed       Jason NEGRO

## 2018-12-18 PROBLEM — F33.3 SEVERE EPISODE OF RECURRENT MAJOR DEPRESSIVE DISORDER, WITH PSYCHOTIC FEATURES (HCC): Status: ACTIVE | Noted: 2018-12-18

## 2018-12-18 RX ORDER — ARIPIPRAZOLE 2 MG/1
1 TABLET ORAL DAILY
Status: DISCONTINUED | OUTPATIENT
Start: 2018-12-18 | End: 2018-12-20

## 2018-12-18 RX ORDER — VENLAFAXINE HYDROCHLORIDE 37.5 MG/1
37.5 CAPSULE, EXTENDED RELEASE ORAL DAILY
Status: DISCONTINUED | OUTPATIENT
Start: 2018-12-18 | End: 2018-12-20

## 2018-12-18 RX ORDER — GABAPENTIN 300 MG/1
300 CAPSULE ORAL
Status: DISCONTINUED | OUTPATIENT
Start: 2018-12-18 | End: 2018-12-22

## 2018-12-18 RX ADMIN — TRAZODONE HYDROCHLORIDE 50 MG: 50 TABLET ORAL at 21:13

## 2018-12-18 RX ADMIN — VENLAFAXINE HYDROCHLORIDE 37.5 MG: 37.5 CAPSULE, EXTENDED RELEASE ORAL at 11:58

## 2018-12-18 RX ADMIN — FAMOTIDINE 20 MG: 20 TABLET ORAL at 17:28

## 2018-12-18 RX ADMIN — GABAPENTIN 300 MG: 300 CAPSULE ORAL at 21:13

## 2018-12-18 RX ADMIN — FAMOTIDINE 20 MG: 20 TABLET ORAL at 08:26

## 2018-12-18 RX ADMIN — ARIPIPRAZOLE 1 MG: 2 TABLET ORAL at 11:58

## 2018-12-18 NOTE — TREATMENT PLAN
TREATMENT PLAN REVIEW - [facility] [facility] 19 y o  2000 female MRN: [id number]    [address] Room / Bed: [facility] 379/[facility] [address] Encounter: [id number]          Admit Date/Time:  12/17/2018  4:54 PM    Treatment Team: Attending Provider: Millie Mayorga MD; Consulting Physician: Davida Lala DO; Registered Nurse: Shannan Amaral RN; Patient Care Assistant: Jose Antonio Koroma;  Patient Care Assistant: Mercedez Quesada; Care Manager: Nely Richards    Diagnosis: Principal Problem:    Severe episode of recurrent major depressive disorder, with psychotic features ([facility] )  Active Problems:    Asthma    GERD (gastroesophageal reflux disease)    Transaminitis    Obesity, Class I, BMI 30-34 9      Patient Strengths/Assets: patient is on a voluntary commitment, supportive family/friends    Patient Barriers/Limitations: difficulty adapting, noncompliant with medication, noncompliant with treatment, poor self-care    Short Term Goals: decrease in depressive symptoms, decrease in anxiety symptoms, decrease in psychotic symptoms, decrease in suicidal thoughts, decrease in self abusive behaviors, ability to stay safe on the unit, ability to stay free of restraints, improvement in ability to express basic needs, improvement in insight, improvement in self care, sleep improvement, improvement in appetite, acceptance of need for psychiatric treatment, acceptance of psychiatric medications    Long Term Goals: improvement in depression, stabilization of mood, free of suicidal thoughts, free of homicidal thoughts, no self abusive behavior, resolution of psychotic symptoms, improved insight, able to express basic needs, acceptance of need for psychiatric medications, acceptance of need for psychiatric treatment, acceptance of need for psychiatric follow up after discharge, acceptance of psychiatric diagnosis, adequate self care, adequate sleep, adequate appetite, adequate oral intake    Progress Towards Goals: starting psychiatric medications as prescribed    Recommended Treatment: medication management, patient medication education, group therapy, milieu therapy, continued Behavioral Health psychiatric evaluation/assessment process    Treatment Frequency: daily medication monitoring, group and milieu therapy daily, monitoring through interdisciplinary rounds, monitoring through weekly patient care conferences    Expected Discharge Date:  5-7 days    Discharge Plan: referral for outpatient medication management with a psychiatrist, referral for outpatient psychotherapy, referrals as indicated, return to previous living arrangement    Treatment Plan Created/Updated By: Donita Stock PA-C

## 2018-12-18 NOTE — PROGRESS NOTES
Patient is compliant with chauhan routine  Patient is quiet  And stays to herself   There was a order put in today that she is not to have any visitors until further notice

## 2018-12-18 NOTE — DISCHARGE INSTR - OTHER ORDERS
If you are experiencing a mental health emergency, you may call the [address] [place] [address] 24 hours a day, 7 days per week at [phone]  In Critical access hospital, call [phone]  When you need someone to listen, the [facility] is available for 16 hours a day, 7 days a week, from the time of 7-10am and 2pm-2am   It is not available from the hours of 2am-6am and 10am-2pm  A representative can be reached at [phone]

## 2018-12-18 NOTE — PROGRESS NOTES
Pt attended problem solving group  Pt blunt affect and hopeless  Pt able to self reflect and discuss MH needs  Pt self reported that she is dealing with grief due to the recent death of her brother  Pt mentioned her stressors of family members questioning her decisions of seeking help and admission  Pt recognized that her medications needed adjustment and working on coping skills  Group focused on crisis prevention plan, identifying triggers, problem solving strategies, coping strategies and local resources  Pt identified that the holidays are stressful and pt had strong memories of her brother during [place] and anticipates that it will be difficult without him  Pt able to acknowledge her feelings, strengths and ability to reach out  Continue to provide therapeutic group support

## 2018-12-18 NOTE — SOCIAL WORK
Pt cooperative during initial biopsychosocial assessment  Pt's mood depressed and affect blunted  Pt's thought process/content appropriate/organized  Pt's speech soft, eye contact intermittent and appearance disheveled  Pt reports coming to the hospital because of increased depression and thoughts of suicide  Pt reports voices telling her to throw up  Pt explained she has not been taking her medications for the last week because of her depression  Pt reports not eating as well  Pt explained her depression was triggered by the death of her younger brother two weeks ago  Pt denies D/A  Pt denies TOB use  Pt denied family hx of D/A  Pt denies legal hx  Pt denied hx of abuse  Pt reports psychosocial loss of her younger brother (24 y o )  Pt reports her brother passed away from pneumonia and influenza in [address] 2 weeks ago  Pt reports her and her brother were very close and was her only sibling  Pt identified this as a trigger  Pt reports hx of IP psych hospitalizations  Pt reports her most recent was in [place] in Nov 2018  Pt reports she needed to leave due to having surgery on her gallbladder  Pt reports they had given up her psych bed right after she left for surgery  Pt reports she did not feel she was ready to leave tx  Pt reports hx of teen IP psych hospitalizations at [address] and [address] [facility]  Pt reports she does not have a hx of SA which is conflictual with previous chart documents that reports hx of SA by putting cord around neck  Pt reports hx of self harm by making herself throw up  Pt reports she was IP as a teen at age 10  Pt reports she began psych meds at age 11 due to "stress"  Pt reports she would like to try new meds as her meds she was taking previously were not working for pt  Pt denies family hx of mental health  Pt reports outpatient [facility] health services [facility]  Pt receives therapy and psychiatry  Pt not interested in an ICM       Pt is currently single, never , with no children  Pt lives with her parents at the same residence her whole life  Pt receives social security of about $600  Pt reports her mother is her repayee, Pt reports her family provides transportation  Pt has a positive relationship with both parents  Pt is a high school grad  Pt signed RAQUEL for her mother Asif Reed) and Jj

## 2018-12-18 NOTE — PROGRESS NOTES
Pt has been visible and social on the unit  Pt went to groups  Pt refused 300 mg of her Gabapentin stating that she only takes 300 mg and not 600 mg  Pt reports a 3/4 anxiety and 4/10 depression  Will continue to monitor

## 2018-12-18 NOTE — PROGRESS NOTES
Psychiatry Progress Note    Subjective: Interval History     Patient is a 56-year-old female with history major depressive disorder who was admitted to the [address] [place] [address] on a 201 status due to increased depression and suicidality  The report indicates that the patient had not been compliant with her psychotropic medications for approximately a week following the death of her younger brother 2 weeks ago  Patient and her brother spent significant amount of time together and now she has not been eating or drinking in an attempt to join him  As result patient was seen medically stable was then transferred for inpatient psychiatric care  Patient was alert and oriented x4 during the assessment  Patient reports that she has a long history of depression  Patient reports that her previous psychotropic medications had not been controlling her depressive symptoms and then her brother  approximately 2 weeks ago due to fluid complications  Patient reports that her brother is 2 years younger than her and that they were the best of friends  Patient reports that they spent most of the day together  Patient reports that since his passing she has not been wanting to eat or drink as she would like to be  with him  Patient denies any plan to harm herself in the hospital at this time  Patient denies any history of suicide attempts  Patient reports that with worsening of her depression she has been experiencing auditory hallucinations that been telling her to throw up  Patient reports that as result she does at times tried to make her vomit due to the auditory hallucinations  Patient denies that the auditory hallucinations tell her to kill herself or to harm others  Patient denies any visual hallucinations  Patient denies any homicidal ideations  Patient denies any manic behaviors at this time    Patient reports that she knows her mother receives treatment for depression however questions if she has a bipolar diagnosis as well  Patient reports that her now  brother was treated for depression and ADHD  Patient denies any known family history of completion of suicide  Patient denies any history of abuse  Patient denies any ownership or access to guns or weapons  Patient denies any illicit drug or alcohol use  Patient's UDS was negative at the time of admission  Patient reports that she is receiving outpatient psychiatric services through Dr Maureen Ruiz is office  Patient reports that her family has a limited support as they are currently going through other on processing grieving and were reluctant to have her come to the hospital for inpatient psychiatric treatment  Of note patient reports that she has trialed in field multiple SSRI medications      Behavior over the last 24 hours:  unchanged  Sleep: insomnia  Appetite: poor  Medication side effects: No  ROS: no complaints    Current medications:    Current Facility-Administered Medications:     acetaminophen (TYLENOL) tablet 650 mg, 650 mg, Oral, Q6H PRN, CHLOE Bruner-ROBERT    acetaminophen (TYLENOL) tablet 975 mg, 975 mg, Oral, Q6H PRN, CHLOE Bruner-ROBERT    albuterol (PROVENTIL HFA,VENTOLIN HFA) inhaler 2 puff, 2 puff, Inhalation, Q6H PRN, Miles Beauchamp DO    ARIPiprazole (ABILIFY) tablet 1 mg, 1 mg, Oral, Daily, CHLOE Bruner-ROBERT    benztropine (COGENTIN) tablet 1 mg, 1 mg, Oral, Q4H PRN, CHLOE Bruner-ROBERT    diphenhydrAMINE (BENADRYL) injection 50 mg, 50 mg, Intramuscular, Q4H PRN, CHLOE Bruner-ROBERT    famotidine (PEPCID) tablet 20 mg, 20 mg, Oral, BID, Miles Beauchamp DO, 20 mg at 18 8001    gabapentin (NEURONTIN) capsule 300 mg, 300 mg, Oral, HS, CHLOE Bruner-C    hydrOXYzine HCL (ATARAX) tablet 50 mg, 50 mg, Oral, Q4H PRN, CHLOE Bruner-ROBERT    ibuprofen (MOTRIN) tablet 400 mg, 400 mg, Oral, Q6H PRN, CHLOE Bruner-C    melatonin tablet 6 mg, 6 mg, Oral, HS PRN, Wilver Rafael DAYAN Reid    OLANZapine (ZyPREXA) IM injection 5 mg, 5 mg, Intramuscular, Q4H PRN, Brenda Rose PA-C    OLANZapine (ZyPREXA) tablet 5 mg, 5 mg, Oral, Q4H PRN, Brenda Rose PA-C, 5 mg at 12/17/18 1721    ondansetron (ZOFRAN-ODT) dispersible tablet 4 mg, 4 mg, Oral, Q6H PRN, Timmy Magic, DO    traZODone (DESYREL) tablet 50 mg, 50 mg, Oral, HS PRN, Brenda Rose PA-C    venlafaxine (EFFEXOR-XR) 24 hr capsule 37 5 mg, 37 5 mg, Oral, Daily, Brenda Rose PA-C    Current Problem List:    Patient Active Problem List   Diagnosis    Acne vulgaris    ADHD (attention deficit hyperactivity disorder)    Asthma    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    Mixed hyperlipidemia    Scoliosis (and kyphoscoliosis), idiopathic    Class 2 obesity due to excess calories without serious comorbidity with body mass index (BMI) of 35 0 to 35 9 in adult    Drop in hemoglobin    Abnormal AST and ALT    Reflux esophagitis    Mood disorder (HCC)    Heart murmur    Intellectual disability    GERD (gastroesophageal reflux disease)    Depression    Transaminitis    Obesity, Class I, BMI 30-34 9    Severe episode of recurrent major depressive disorder, with psychotic features ([facility] )       Problem list reviewed 12/18/18     Objective:     Vital Signs:  Vitals:    12/17/18 1654 12/18/18 0730 12/18/18 0731   BP: 120/81 129/70 109/65   BP Location: Left arm Left arm Left arm   Pulse: 93 98 (!) 108   Resp: 18 16    Temp: 98 1 °F (36 7 °C) (!) 78 2 °F (25 7 °C)    TempSrc: Temporal Temporal    SpO2: 98%     Weight: 68 kg (150 lb)     Height: 4' 10" (1 473 m)           Appearance:  age appropriate, casually dressed and disheveled   Behavior:  normal   Speech:  normal volume   Mood:  depressed   Affect:  flat   Thought Process:  normal   Thought Content:  normal   Perceptual Disturbances:  Auditory hallucinations with commands to throw up   Risk Potential: Suicidal Ideations without plan   Sensorium:  person, place, situation and time   Cognition:  intact   Consciousness:  alert and awake    Attention: attention span and concentration were age appropriate   Intellect: average   Insight:  limited   Judgment: limited      Motor Activity: no abnormal movements       I/O Past 24 hours:  No intake/output data recorded  No intake/output data recorded  Labs:  Reviewed 12/18/18    Progress Toward Goals:  unchanged    Assessment / Plan:     Severe episode of recurrent major depressive disorder, with psychotic features ([facility] )    Recommended Treatment:      Medication changes:  1) trial Effexor XR 37 5 mg daily in conjunction with Abilify 1 mg daily  Patient will continue be monitored on suicide precautions  Non-pharmacological treatments  1) Continue with group therapy, milieu therapy and occupational therapy  Safety  1) Safety/communication plan established targeting dynamic risk factors above  2) Risks, benefits, and possible side effects of medications explained to patient and patient verbalizes understanding  Counseling / Coordination of Care    Total floor / unit time spent today 20 minutes  Greater than 50% of total time was spent with the patient and / or family counseling and / or coordination of care  A description of the counseling / coordination of care  Patient's Rights, confidentiality and exceptions to confidentiality, use of automated medical record, [facility] staff access to medical record, and consent to treatment reviewed      Miguelina Trent PA-C

## 2018-12-18 NOTE — PROGRESS NOTES
12/18/18 1271   Activity/Group Checklist   Group Other (Comment)  (Art Therapy Process Group / Visual Introduction)   Attendance Attended   Attendance Duration (min) Greater than 60   Interactions Interacted appropriately   Affect/Mood Blunted/flat; Appropriate   Goals Achieved Able to listen to others; Able to engage in interactions  (Able to engage art materials and directive)     Patient process was focused and appeared involved, though as a distraction  Artwork reflected need for contained, familiar, distraction

## 2018-12-18 NOTE — PLAN OF CARE
Depression     Treatment Goal: Demonstrate behavioral control of depressive symptoms, verbalize feelings of improved mood/affect, and adopt new coping skills prior to discharge Not [address] Discharge to home or other facility with appropriate resources Not Progressing        Ineffective Coping     Cooperates with admission process Not Progressing        Nutrition/Hydration-ADULT     Nutrient/Hydration intake appropriate for improving, restoring or maintaining nutritional needs Not Progressing

## 2018-12-18 NOTE — PROGRESS NOTES
Patient appeared to be sleeping throughout the shift and was observed on q 15 minute rounds  No issues were noted, and no PRNs were given

## 2018-12-18 NOTE — H&P
Initial Psychiatric Evaluation    Medical Record Number: [id number]  Encounter: [id number]      History:     Yasir Lau is an 19 y o , female, admitted to the psychiatric unit under a 201 status to Dr Joseph Brock' service with the chief complaint of  depression and feeling suicidal   Patient's younger brother  of from medical causes 2 weeks ago, they were very close, and since then she has sees Martha Certain he did hear it she stopped eating take taking care of herself thought about suicide to starve herself to death in order to join him she also reports hearing voices that tell her to Bernie Joaquin up    She is followed by at [facility], sees Dr Neal Triplett, and has been on medication in the past which she says was not effective; the diagnosis has been major depression  And she wonders if she may have bipolar disorder    There is a family history with her mother having depression and as well as the  brother along with a diagnosis of ADHD      Past Medical History:   Diagnosis Date    ADHD     Anxiety     Asthma     Class 2 obesity due to excess calories without serious comorbidity with body mass index (BMI) of 35 0 to 35 9 in adult 11/3/2018    Depression     Gallstones     GERD (gastroesophageal reflux disease)     Heart murmur 2015    Hyperlipidemia     Scoliosis        Past surgical history:  Past Surgical History:   Procedure Laterality Date    CHOLANGIOGRAM N/A 2018    Procedure: CHOLANGIOGRAM;  Surgeon: Daylin Inman MD;  Location:  MAIN OR;  Service: General    CHOLECYSTECTOMY     Norah Williamsones N/A 2018    Procedure: CHOLECYSTECTOMY LAPAROSCOPIC;  Surgeon: Daylin Inman MD;  Location:  MAIN OR;  Service: General    EYE MUSCLE SURGERY      MA ESOPHAGOGASTRODUODENOSCOPY TRANSORAL DIAGNOSTIC N/A 2018    Procedure: ESOPHAGOGASTRODUODENOSCOPY (EGD) with bx;  Surgeon: William Antonio MD;  Location: [facility];   Service: General Family history:  History reviewed  No pertinent family history  Current medications:    Current Facility-Administered Medications:     acetaminophen (TYLENOL) tablet 650 mg, 650 mg, Oral, Q6H PRN, Ingris Valverde PA-C    acetaminophen (TYLENOL) tablet 975 mg, 975 mg, Oral, Q6H PRN, Ingris Valverde PA-C    albuterol (PROVENTIL HFA,VENTOLIN HFA) inhaler 2 puff, 2 puff, Inhalation, Q6H PRN, Miles Beauchamp DO    ARIPiprazole (ABILIFY) tablet 1 mg, 1 mg, Oral, Daily, Ingris Valverde PA-C    benztropine (COGENTIN) tablet 1 mg, 1 mg, Oral, Q4H PRN, Ingris Valverde PA-C    diphenhydrAMINE (BENADRYL) injection 50 mg, 50 mg, Intramuscular, Q4H PRN, Ingris Valverde PA-C    famotidine (PEPCID) tablet 20 mg, 20 mg, Oral, BID, Miles Beauchamp DO, 20 mg at 12/18/18 1993    gabapentin (NEURONTIN) capsule 300 mg, 300 mg, Oral, HS, Ingris Valverde PA-C    hydrOXYzine HCL (ATARAX) tablet 50 mg, 50 mg, Oral, Q4H PRN, Ingris Valverde PA-C    ibuprofen (MOTRIN) tablet 400 mg, 400 mg, Oral, Q6H PRN, Ingris Valverde PA-C    melatonin tablet 6 mg, 6 mg, Oral, HS PRN, Ingris Valverde PA-C    OLANZapine (ZyPREXA) IM injection 5 mg, 5 mg, Intramuscular, Q4H PRN, Ingris Valverde PA-C    OLANZapine (ZyPREXA) tablet 5 mg, 5 mg, Oral, Q4H PRN, Ingris Valverde PA-C, 5 mg at 12/17/18 1721    ondansetron (ZOFRAN-ODT) dispersible tablet 4 mg, 4 mg, Oral, Q6H PRN, Coreen Saldaña DO    traZODone (DESYREL) tablet 50 mg, 50 mg, Oral, HS PRN, Ingris Valverde PA-C    venlafaxine (EFFEXOR-XR) 24 hr capsule 37 5 mg, 37 5 mg, Oral, Daily, Ingris Valverde PA-C      Allergies:  No Known Allergies    Social History:  Social History     Social History    Marital status: Single     Spouse name: N/A    Number of children: N/A    Years of education: N/A     Occupational History    Not on file       Social History Main Topics    Smoking status: Never Smoker    Smokeless tobacco: Never Used Comment: no passive smoke exposure    Alcohol use No    Drug use: No    Sexual activity: No     Other Topics Concern    Not on file     Social History Narrative    No narrative on file         Physical Examination:     Vital Signs:  Vitals:    12/17/18 1654 12/18/18 0730 12/18/18 0731   BP: 120/81 129/70 109/65   BP Location: Left arm Left arm Left arm   Pulse: 93 98 (!) 108   Resp: 18 16    Temp: 98 1 °F (36 7 °C) (!) 78 2 °F (25 7 °C)    TempSrc: Temporal Temporal    SpO2: 98%     Weight: 68 kg (150 lb)     Height: 4' 10" (1 473 m)           Appearance:  age appropriate and casually dressed   Behavior:  normal   Speech:  normal volume   Mood:  depressed   Affect:  constricted   Thought Process:  normal   Thought Content:  normal   Perceptual Disturbances: Auditory hallucinations with commands To throw up   Risk Potential: Suicidal Ideations without plan   Sensorium:  person, place, situation and time   Cognition:  intact   Consciousness:  alert and awake    Attention: attention span and concentration were age appropriate   Intellect: average   Insight:  poor   Judgment: poor      Motor Activity: no abnormal movements           Diagnostic Studies:     Recent Labs:  Results Reviewed     None          I/O Past 24 hours:  No intake/output data recorded  No intake/output data recorded  Impression / Plan:     Severe episode of recurrent major depressive disorder, with psychotic features ([address] )    Severe grief reaction    Recommended Treatment:      Medications  1) patient will be started on an SSRI an antipsychotic to treat her depression with psychotic features  Her visitors will be restricted because in the past she has gotten drugs from visitors and overdosed    Non-pharmacological treatments  1) Continue with group therapy, milieu therapy and occupational therapy      2) Medical will be consulted to help manage comorbid conditions    Safety  1) Safety/communication plan established targeting dynamic risk factors above  Counseling / Coordination of Care    Total floor / unit time spent today 50 minutes  Greater than 50% of total time was spent with the patient and / or family counseling and / or coordination of care  A description of the counseling / coordination of care  Patient's Rights, confidentiality and exceptions to confidentiality, use of automated medical record, [facility] staff access to medical record, and consent to treatment reviewed        Zan Huddleston MD

## 2018-12-19 RX ADMIN — VENLAFAXINE HYDROCHLORIDE 37.5 MG: 37.5 CAPSULE, EXTENDED RELEASE ORAL at 08:18

## 2018-12-19 RX ADMIN — FAMOTIDINE 20 MG: 20 TABLET ORAL at 17:23

## 2018-12-19 RX ADMIN — ARIPIPRAZOLE 1 MG: 2 TABLET ORAL at 08:17

## 2018-12-19 RX ADMIN — FAMOTIDINE 20 MG: 20 TABLET ORAL at 08:18

## 2018-12-19 RX ADMIN — GABAPENTIN 300 MG: 300 CAPSULE ORAL at 21:31

## 2018-12-19 NOTE — SOCIAL WORK
Worker called pt's mother  Mother concerned about pt's well-being  Worker explained pt has made improvements since admission  Worker explained tentative discharge date set for Friday if pt is psychiatrically stable as pt interested in being home for [place]   Mother hopes pt remains in tx until pt is certain as mother does not want pt to relapse and return to the hospital

## 2018-12-19 NOTE — PROGRESS NOTES
Pt attended discharge group  Blunt affect and constricted presentation  Pt observed mainly and did not offer a concerns or questions  Encouraged pt to advocate and communicate needs  Eye contact minimal   At end of group pt started to color with pencils and noted that it calmed her  Continue to encourage group participation and therapeutic group support

## 2018-12-19 NOTE — PLAN OF CARE
Depression     Treatment Goal: Demonstrate behavioral control of depressive symptoms, verbalize feelings of improved mood/affect, and adopt new coping skills prior to discharge Progressing        DEPRESSION     Will be euthymic at discharge [address] Discharge to home or other facility with appropriate resources Progressing        Ineffective Coping     Cooperates with admission process Progressing        Nutrition/Hydration-ADULT     Nutrient/Hydration intake appropriate for improving, restoring or maintaining nutritional needs Progressing        SELF CARE DEFICIT     Return ADL status to a safe level of function Progressing

## 2018-12-19 NOTE — PROGRESS NOTES
12/19/18 1000   Activity/Group Checklist   Group Other (Comment)  (Surviving Trauma / Education, Open Discussion)   Attendance Attended   Attendance Duration (min) Greater than 60   Interactions Interacted appropriately   Affect/Mood Appropriate  (Improved affect)   Goals Achieved Identified distorted thoughts/beliefs; Able to listen to others; Able to engage in interactions; Able to reflect/comment on own behavior;Able to manage/cope with feelings;Verbalized increased hopefulness; Able to recieve feedback; Able to give feedback to another;Able to experience relief/decrease in symptoms

## 2018-12-19 NOTE — PLAN OF CARE
Problem: DISCHARGE PLANNING  Goal: Discharge to home or other facility with appropriate resources  INTERVENTIONS:  - Identify barriers to discharge w/patient and caregiver  - Deny AH, SI and depression  Symptoms will resolve or diminish upon discharge  - Comply with meds, attend 75% of group therapy, identify positive coping skills  - Arrange for needed discharge resources and transportation as appropriate  - Identify discharge learning needs (meds, outpatient mental health services)  - Arrange for interpretive services to assist at discharge as needed  - Refer to Case Management Department for coordinating discharge planning if the patient needs post-hospital services based on physician/advanced practitioner order or complex needs related to functional status, cognitive ability, or social support system    Outcome: Progressing  Worker contacted pt's mother  Mother concerned for pt's well being  Worker informed mother pt has shown improvement since admission  Worker explained pt would like to be home for Christmas so there is a tentative discharge date set for Friday if pt is psychiatrically stable  Mother understands  Mother hopes pt waits until she feels certain as mother would not want her daughter to relapse

## 2018-12-19 NOTE — PROGRESS NOTES
Psychiatry Progress Note    Subjective: Interval History     Patient has been visible on the unit  Patient socializing with a fellow female peer  Patient expressing that she is glad that she came to the hospital to get help as she tolerated the initiation of Effexor and Abilify yesterday with no adverse effects  Patient reports that she is thankful that the new medication was effective in helping to decrease her auditory hallucinations as she reports she is no longer hearing voices telling her to throw up  Patient reports that she was able to eat off her meals yesterday and was with no episodes of forced emesis  Patient reports that she continues to have ongoing depression and continues to grieve the death of her brother  Patient however reporting that she is having less thoughts about suicide and although she misses him does not have any thoughts of wanting to join him and   Patient continues to deny any plan to harm herself in the hospital at this time  Patient denies any homicidal ideations  Patient with no visual hallucinations  Patient slept well last evening      Behavior over the last 24 hours:  improved  Sleep: normal  Appetite: normal  Medication side effects: No  ROS: no complaints    Current medications:    Current Facility-Administered Medications:     acetaminophen (TYLENOL) tablet 650 mg, 650 mg, Oral, Q6H PRN, Sam Rojas PA-C    acetaminophen (TYLENOL) tablet 975 mg, 975 mg, Oral, Q6H PRN, Sam Rojas PA-C    albuterol (PROVENTIL HFA,VENTOLIN HFA) inhaler 2 puff, 2 puff, Inhalation, Q6H PRN, Miles Beauchamp DO    ARIPiprazole (ABILIFY) tablet 1 mg, 1 mg, Oral, Daily, Sam Rojas PA-C, 1 mg at 12/19/18 0817    benztropine (COGENTIN) tablet 1 mg, 1 mg, Oral, Q4H PRN, Sam Rojas PA-C    diphenhydrAMINE (BENADRYL) injection 50 mg, 50 mg, Intramuscular, Q4H PRN, Sam Rojas PA-C    famotidine (PEPCID) tablet 20 mg, 20 mg, Oral, BID, Anika Castaneda DO, 20 mg at 12/19/18 0818    gabapentin (NEURONTIN) capsule 300 mg, 300 mg, Oral, HS, [facility], PA-C, 300 mg at 12/18/18 2113    hydrOXYzine HCL (ATARAX) tablet 50 mg, 50 mg, Oral, Q4H PRN, [facility], PA-C    ibuprofen (MOTRIN) tablet 400 mg, 400 mg, Oral, Q6H PRN, [facility], PA-    melatonin tablet 6 mg, 6 mg, Oral, HS PRN, [facility], PA-C    OLANZapine (ZyPREXA) IM injection 5 mg, 5 mg, Intramuscular, Q4H PRN, [facility], PA-C    OLANZapine (ZyPREXA) tablet 5 mg, 5 mg, Oral, Q4H PRN, [facility], 5 mg at 12/17/18 1721    ondansetron (ZOFRAN-ODT) dispersible tablet 4 mg, 4 mg, Oral, Q6H PRN, Christine Muhammad DO    traZODone (DESYREL) tablet 50 mg, 50 mg, Oral, HS PRN, [facility], PA-C, 50 mg at 12/18/18 2113    venlafaxine (EFFEXOR-XR) 24 hr capsule 37 5 mg, 37 5 mg, Oral, Daily, [facility], 37 5 mg at 12/19/18 7748    Current Problem List:    Patient Active Problem List   Diagnosis    Acne vulgaris    ADHD (attention deficit hyperactivity disorder)    Asthma    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    Mixed hyperlipidemia    Scoliosis (and kyphoscoliosis), idiopathic    Class 2 obesity due to excess calories without serious comorbidity with body mass index (BMI) of 35 0 to 35 9 in adult    Drop in hemoglobin    Abnormal AST and ALT    Reflux esophagitis    Mood disorder (HCC)    Heart murmur    Intellectual disability    GERD (gastroesophageal reflux disease)    Depression    Transaminitis    Obesity, Class I, BMI 30-34 9    Severe episode of recurrent major depressive disorder, with psychotic features ([facility] )       Problem list reviewed 12/19/18     Objective:     Vital Signs:  Vitals:    12/18/18 0731 12/18/18 1608 12/19/18 0700 12/19/18 0701   BP: 109/65 134/80 116/78 117/80   BP Location: Left arm Left arm Left arm Left arm   Pulse: (!) 108 58 89 98   Resp:  14 16    Temp: 97 6 °F (36 4 °C) 98 7 °F (37 1 °C)    TempSrc:  Temporal     SpO2:       Weight:       Height:             Appearance:  age appropriate, casually dressed and disheveled   Behavior:  normal   Speech:  normal volume   Mood:  depressed   Affect:  blunted   Thought Process:  normal   Thought Content:  normal   Perceptual Disturbances: None   Risk Potential: none   Sensorium:  person, place, situation and time   Cognition:  intact   Consciousness:  alert and awake    Attention: attention span and concentration were age appropriate   Intellect: average   Insight:  limited   Judgment: limited      Motor Activity: no abnormal movements       I/O Past 24 hours:  No intake/output data recorded  No intake/output data recorded  Labs:  Reviewed 12/19/18    Progress Toward Goals:  Mild improvement; decreased psychosis    Assessment / Plan:     Severe episode of recurrent major depressive disorder, with psychotic features ([facility] [address] )    Recommended Treatment:      Medication changes:  1) continue suicide precautions  Continue to monitor with initiation of Effexor and Abilify  Non-pharmacological treatments  1) Continue with group therapy, milieu therapy and occupational therapy  Safety  1) Safety/communication plan established targeting dynamic risk factors above  2) Risks, benefits, and possible side effects of medications explained to patient and patient verbalizes understanding  Counseling / Coordination of Care    Total floor / unit time spent today 20 minutes  Greater than 50% of total time was spent with the patient and / or family counseling and / or coordination of care  A description of the counseling / coordination of care  Patient's Rights, confidentiality and exceptions to confidentiality, use of automated medical record, [facility] staff access to medical record, and consent to treatment reviewed      Victor Hugo Monaco PA-C

## 2018-12-19 NOTE — PROGRESS NOTES
Clinical Pharmacy Note: Medication Education Group     Intervention:  Open Forum    Length of Group: 10 min     Methods/resources used: verbal discussion     Topics Discussed: Medication adherence, side effect management, nonpharmacologic strategies, medication effectiveness and indications      Time spent in group: Presented late to group since she was in another group     Patient Specific concerns: Char Dietrich presented to group today dressed in street clothing and appeared somewhat disheveled and alert and oriented to person, place and time  Char Dietrich seemed content and engaged   Patient's affect was mainly pleasant and quiet and she listened attentively and asked a few questions  Patient was respectful of others throughout and interacted appropriately with peers  Char Dietrich did have specific concerns about the class, indication, and side effect profile of aripiprazole  Group discussed that it is an antipsychotic used for various reasons  Writer counseled group on the lower risk of weight gain but increased risk of restlessness (akathisia) with aripiprazole      Patient understood counseling: yes     Electronically signed by: Scarlet Issa, Pharmacist

## 2018-12-19 NOTE — NURSING NOTE
Received patient at 1900  Patient was cooperative and social with peers during the evening  Patient presented with a blunted affect and disheveled appearance  Patient reported 4/4 anxiety and 5/10 depression  Patient denied SI/HI and pain  Patient was compliant with medication  Patient administered PRN Trazadone 50 mg for sleep at 2113  Patient has appeared to be sleeping for the past couple of hours  Will continue to monitor closely on assault and suicide precautions

## 2018-12-19 NOTE — PROGRESS NOTES
12/19/18 3955   Activity/Group Checklist   Group Other (Comment)  (Art Therapy Process Group / Open Choice, Discussion)   Attendance Attended   Attendance Duration (min) Greater than 60   Interactions Interacted appropriately  (Improved)   Affect/Mood Appropriate;Blunted/flat  (Improved; increased range)   Goals Achieved Identified feelings; Discussed coping strategies; Able to listen to others; Able to engage in interactions; Able to reflect/comment on own behavior;Able to manage/cope with feelings; Able to give feedback to another;Able to recieve feedback  (Able to engage art materials; ongoing artwork)

## 2018-12-20 RX ORDER — VENLAFAXINE HYDROCHLORIDE 75 MG/1
75 CAPSULE, EXTENDED RELEASE ORAL DAILY
Status: DISCONTINUED | OUTPATIENT
Start: 2018-12-20 | End: 2018-12-23

## 2018-12-20 RX ORDER — ARIPIPRAZOLE 2 MG/1
2 TABLET ORAL DAILY
Status: DISCONTINUED | OUTPATIENT
Start: 2018-12-20 | End: 2018-12-24 | Stop reason: HOSPADM

## 2018-12-20 RX ADMIN — IBUPROFEN 400 MG: 400 TABLET ORAL at 08:31

## 2018-12-20 RX ADMIN — FAMOTIDINE 20 MG: 20 TABLET ORAL at 08:29

## 2018-12-20 RX ADMIN — FAMOTIDINE 20 MG: 20 TABLET ORAL at 17:19

## 2018-12-20 RX ADMIN — VENLAFAXINE HYDROCHLORIDE 75 MG: 75 CAPSULE, EXTENDED RELEASE ORAL at 08:29

## 2018-12-20 RX ADMIN — GABAPENTIN 300 MG: 300 CAPSULE ORAL at 21:13

## 2018-12-20 RX ADMIN — HYDROXYZINE HYDROCHLORIDE 50 MG: 50 TABLET, FILM COATED ORAL at 20:40

## 2018-12-20 RX ADMIN — ARIPIPRAZOLE 2 MG: 2 TABLET ORAL at 08:29

## 2018-12-20 NOTE — PROGRESS NOTES
12/20/18 3845   Activity/Group Checklist   Group Other (Comment)  (Art Therapy Process Group / Open Choice, Discussion)   Attendance Attended   Attendance Duration (min) Greater than 60   Interactions Interacted appropriately   Affect/Mood Appropriate   Goals Achieved Identified feelings; Discussed coping strategies; Discussed discharge plans; Increased hopefulness; Identified distorted thoughts/beliefs; Able to listen to others; Able to engage in interactions; Able to reflect/comment on own behavior;Able to manage/cope with feelings;Verbalized increased hopefulness; Able to self-disclose; Able to recieve feedback; Able to give feedback to another;Able to experience relief/decrease in symptoms  (Able to engage art materials)     Patient demonstrated improved social skills and interaction with others  She is openly talking about her experience regarding recent loss of sibling and focusing on maintaining parental bond and support system  Able to demonstrate insight regarding her increased communication and ability to express herself more openly  She appears to be allowing her own natural grieving process to surface in a manner that suits her and feels correct

## 2018-12-20 NOTE — PROGRESS NOTES
Pt attended recovery principles group  Pt mainly observed when prompted but respectful of peers and the process  Continue to provide therepeutic group support

## 2018-12-20 NOTE — NURSING NOTE
Patient has appeared to sleep through the night without interruption  Will continue to monitor closely on suicide precautions

## 2018-12-20 NOTE — PROGRESS NOTES
Pt attended stress management group  Pt able to engage and self reflect  Pt expressive about her grief and memories of her brother  Pt mentioned that she and her brother were on the autistic spectrum  Pt able to monitor her anxiety levels and mentioned they were at a manageable level  Pt identified coping skills she utilized but the themes were all related to her brother  Pt not able to identify own strengths independent of her brother  Blunt affect and limited eye contact  Pt recognized her MH needs and to continue her medication management needs and needs to able to advocate for self  Pt optimistic about a possible d/c beginning of next week  Continue to provide thereuitc group support

## 2018-12-20 NOTE — PLAN OF CARE
Problem: DEPRESSION  Goal: Will be euthymic at discharge  INTERVENTIONS: JERILYN Cavazos  - Encourage patient to attend and participate in art therapy  - Support patient taking ownership of thoughts/behavior contributing to depression   - Explore concepts of hope, grounding and healthy expression through discussion and exercising properties of art materials and process  Outcome: Completed Date Met: 12/20/18      Problem: SELF CARE DEFICIT  Goal: Return ADL status to a safe level of function  INTERVENTIONS: JERILYN Cavazos  - Encourage to attend and participate in art therapy   - Provide means incorporate new coping strategies and self care  - Explore concepts of alternative perspective, awareness  and healthy expression through discussion and exercising properties of art materials and process       Outcome: Completed Date Met: 12/20/18

## 2018-12-20 NOTE — PROGRESS NOTES
Pt continues to be very pleasant and cooperative  Pt is med/meal compliant  Pt is visible and social on the unit  Pt seems to be improving and has been more open with talking about her brothers death  Pt reports a 5/10 depression and 4/4 anxiety, but denies any SI, HI, AH, or VH  Pt reports a 5/10 back pain and was given Motrin  Will continue to monitor

## 2018-12-20 NOTE — NURSING NOTE
Received patient at 1900  Patient was cooperative and pleasant during the evening  Patient walked around and socialized with peers at the beginning of the evening but appeared to sleep early  Patient had to be awakened for night time medications but was compliant  Patient denied anxiety and reported only "a little" depression  Patient denied SI  Patient has appeared to be sleeping for past several hours  Will continue to monitor closely on suicide precautions

## 2018-12-20 NOTE — PROGRESS NOTES
Psychiatry Progress Note    Subjective: Interval History     Patient still with disheveled appearance  Patient expressing her content that she is no longer hearing any auditory hallucinations telling her to throw up  Patient reports that she has been able to eat all of her meals and is not heard any voices or had any desire to vomit  Patient reports that she feels her depression has continued to improve as her suicidality has resolved  Patient is still having difficulty coping with the death of her brother however no longer having any thoughts or desire to harm herself to be with him in the after life  Patient reports that she continues to tolerate her medications with no adverse effects  Patient sleeping well  Patient has been visible on the unit  Patient expressing she is looking for to her parents coming to visit today      Behavior over the last 24 hours:  improved  Sleep: normal  Appetite: normal  Medication side effects: No  ROS: no complaints    Current medications:    Current Facility-Administered Medications:     acetaminophen (TYLENOL) tablet 650 mg, 650 mg, Oral, Q6H PRN, Edwinna Course, PA-C    acetaminophen (TYLENOL) tablet 975 mg, 975 mg, Oral, Q6H PRN, Edwinna Course, PA-C    albuterol (PROVENTIL HFA,VENTOLIN HFA) inhaler 2 puff, 2 puff, Inhalation, Q6H PRN, Miles Beauchamp DO    ARIPiprazole (ABILIFY) tablet 2 mg, 2 mg, Oral, Daily, Edwinna Course, PA-C, 2 mg at 12/20/18 4516    benztropine (COGENTIN) tablet 1 mg, 1 mg, Oral, Q4H PRN, Edwinna Course, PA-C    diphenhydrAMINE (BENADRYL) injection 50 mg, 50 mg, Intramuscular, Q4H PRN, Edwinna Course, PA-C    famotidine (PEPCID) tablet 20 mg, 20 mg, Oral, BID, Miles Beauchamp DO, 20 mg at 12/20/18 8899    gabapentin (NEURONTIN) capsule 300 mg, 300 mg, Oral, HS, Edwinna Course, PA-C, 300 mg at 12/19/18 2131    hydrOXYzine HCL (ATARAX) tablet 50 mg, 50 mg, Oral, Q4H PRN, Edwinna Course, PA-C    ibuprofen (MOTRIN) tablet 400 mg, 400 mg, Oral, Q6H PRN, Arlina Rising, PA-C, 400 mg at 12/20/18 0831    melatonin tablet 6 mg, 6 mg, Oral, HS PRN, Arlina Rising, PA-C    OLANZapine (ZyPREXA) IM injection 5 mg, 5 mg, Intramuscular, Q4H PRN, Arlina Rising, PA-C    OLANZapine (ZyPREXA) tablet 5 mg, 5 mg, Oral, Q4H PRN, Arlina Rising, PA-C, 5 mg at 12/17/18 1721    ondansetron (ZOFRAN-ODT) dispersible tablet 4 mg, 4 mg, Oral, Q6H PRN, Dianna Recinos DO    traZODone (DESYREL) tablet 50 mg, 50 mg, Oral, HS PRN, Arlina Rising, PA-C, 50 mg at 12/18/18 2113    venlafaxine (EFFEXOR-XR) 24 hr capsule 75 mg, 75 mg, Oral, Daily, Arlina Rising, PA-C, 75 mg at 12/20/18 5688    Current Problem List:    Patient Active Problem List   Diagnosis    Acne vulgaris    ADHD (attention deficit hyperactivity disorder)    Asthma    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    Mixed hyperlipidemia    Scoliosis (and kyphoscoliosis), idiopathic    Class 2 obesity due to excess calories without serious comorbidity with body mass index (BMI) of 35 0 to 35 9 in adult    Drop in hemoglobin    Abnormal AST and ALT    Reflux esophagitis    Mood disorder (HCC)    Heart murmur    Intellectual disability    GERD (gastroesophageal reflux disease)    Depression    Transaminitis    Obesity, Class I, BMI 30-34 9    [facility]       Problem list reviewed 12/20/18     Objective:     Vital Signs:  Vitals:    12/19/18 0701 12/19/18 1600 12/20/18 0700 12/20/18 0702   BP: 117/80 124/85 124/86 139/89   BP Location: Left arm Left arm Left arm Left arm   Pulse: 98 80 87 98   Resp:  18 16    Temp:  98 °F (36 7 °C) 97 6 °F (36 4 °C)    TempSrc:  Temporal Temporal    SpO2:       Weight:       Height:             Appearance:  age appropriate, casually dressed and disheveled   Behavior:  normal   Speech:  normal volume   Mood: constricted   Affect:  constricted   Thought Process:  normal   Thought Content:  normal   Perceptual Disturbances: None   Risk Potential: none   Sensorium:  person, place, situation and time   Cognition:  intact   Consciousness:  alert and awake    Attention: attention span and concentration were age appropriate   Intellect: average   Insight:  limited   Judgment: limited      Motor Activity: no abnormal movements       I/O Past 24 hours:  No intake/output data recorded  No intake/output data recorded  Labs:  Reviewed 12/20/18    Progress Toward Goals:  Mild improvement; decreased psychosis    Assessment / Plan:     Severe episode of recurrent major depressive disorder, with psychotic features ([facility] [address] )    Recommended Treatment:      Medication changes:  1) continue suicide precautions  Increase Effexor XR 75 mg daily and Abilify 2 mg daily    Non-pharmacological treatments  1) Continue with group therapy, milieu therapy and occupational therapy  Safety  1) Safety/communication plan established targeting dynamic risk factors above  2) Risks, benefits, and possible side effects of medications explained to patient and patient verbalizes understanding  Counseling / Coordination of Care    Total floor / unit time spent today 20 minutes  Greater than 50% of total time was spent with the patient and / or family counseling and / or coordination of care  A description of the counseling / coordination of care  Patient's Rights, confidentiality and exceptions to confidentiality, use of automated medical record, [facility] staff access to medical record, and consent to treatment reviewed      Janina Donnelly PA-C

## 2018-12-21 RX ADMIN — FAMOTIDINE 20 MG: 20 TABLET ORAL at 17:11

## 2018-12-21 RX ADMIN — ACETAMINOPHEN 650 MG: 325 TABLET ORAL at 12:48

## 2018-12-21 RX ADMIN — IBUPROFEN 400 MG: 400 TABLET ORAL at 17:13

## 2018-12-21 RX ADMIN — VENLAFAXINE HYDROCHLORIDE 75 MG: 75 CAPSULE, EXTENDED RELEASE ORAL at 08:13

## 2018-12-21 RX ADMIN — GABAPENTIN 300 MG: 300 CAPSULE ORAL at 21:20

## 2018-12-21 RX ADMIN — ARIPIPRAZOLE 2 MG: 2 TABLET ORAL at 08:13

## 2018-12-21 RX ADMIN — FAMOTIDINE 20 MG: 20 TABLET ORAL at 08:13

## 2018-12-21 RX ADMIN — TRAZODONE HYDROCHLORIDE 50 MG: 50 TABLET ORAL at 21:20

## 2018-12-21 NOTE — PROGRESS NOTES
Pt attended positive coping skills group  Pt pleasant and able to self reflect with confidence  Pt does however relate all of her experiences/coping skills about her brother (grief) she is experiencing  Pt unable to differentiate or provide healthy coping mechanisms for herself individually  Pt needed redirection with another pt in focusing on each other own personal [address] recovery needs  Pt expressed optimism about d/c before holidays  Continue to provide therepeutic group support

## 2018-12-21 NOTE — PLAN OF CARE
Depression     Treatment Goal: Demonstrate behavioral control of depressive symptoms, verbalize feelings of improved mood/affect, and adopt new coping skills prior to discharge Progressing        Ineffective Coping     Cooperates with admission process Progressing        Patient is progressing toward goals  She is planning her discharge for Monday based on her responses to treatment

## 2018-12-21 NOTE — PLAN OF CARE
Problem: DISCHARGE PLANNING  Goal: Discharge to home or other facility with appropriate resources  INTERVENTIONS:  - Identify barriers to discharge w/patient and caregiver  - Deny AH, SI and depression  Symptoms will resolve or diminish upon discharge  - Comply with meds, attend 75% of group therapy, identify positive coping skills  - Arrange for needed discharge resources and transportation as appropriate  - Identify discharge learning needs (meds, outpatient mental health services)  - Arrange for interpretive services to assist at discharge as needed  - Refer to Case Management Department for coordinating discharge planning if the patient needs post-hospital services based on physician/advanced practitioner order or complex needs related to functional status, cognitive ability, or social support system    Outcome: Progressing  Worker scheduled follow up at [place]   Pt has therapy appt on 12/31 at 3PM and appt with NP on 1/2 at 5:30PM

## 2018-12-21 NOTE — PROGRESS NOTES
Psychiatry Progress Note    Subjective: Interval History     Patients hygiene improving  Patient continues to show and report improvement  Patient reports that she has not had any exacerbation of her auditory hallucinations  Patient reports that they continue to be controlled with Abilify  Patient reports that she tolerated the increase of Abilify and Effexor yesterday with no adverse effects  Patient reports that her depression and anxiety are improving each day  Patient reports that she has been able to think about her brother and speak about him without wanting to harm herself or want to die  Patient speaking about how she needed beaded necklace yesterday during art therapy and gave it to her mother during visiting  Patient reports that she had a great visit with her mom last evening and found doing beading jewelry to be therapeutic and asked her mom to purchase some materials for her to continue to do such at home  Patient denying any homicidal ideations or visual hallucinations  Patient with good sleep and appetite      Behavior over the last 24 hours:  improved  Sleep: normal  Appetite: normal  Medication side effects: No  ROS: no complaints    Current medications:    Current Facility-Administered Medications:     acetaminophen (TYLENOL) tablet 650 mg, 650 mg, Oral, Q6H PRN, CHLOE Banuelos-ROBERT    acetaminophen (TYLENOL) tablet 975 mg, 975 mg, Oral, Q6H PRN, CHLOE Banuelos-C    albuterol (PROVENTIL HFA,VENTOLIN HFA) inhaler 2 puff, 2 puff, Inhalation, Q6H PRN, Miles Beauchamp DO    ARIPiprazole (ABILIFY) tablet 2 mg, 2 mg, Oral, Daily, Frank Lang PA-C, 2 mg at 12/20/18 1569    benztropine (COGENTIN) tablet 1 mg, 1 mg, Oral, Q4H PRN, Frank Lang PA-C    diphenhydrAMINE (BENADRYL) injection 50 mg, 50 mg, Intramuscular, Q4H PRN, Frank Lang PA-C    famotidine (PEPCID) tablet 20 mg, 20 mg, Oral, BID, Miles Beauchamp DO, 20 mg at 12/20/18 1719    gabapentin (NEURONTIN) capsule 300 mg, 300 mg, Oral, HS, Luvenia Mustache, PA-C, 300 mg at 12/20/18 2113    hydrOXYzine HCL (ATARAX) tablet 50 mg, 50 mg, Oral, Q4H PRN, Luvenia Mustache, PA-C, 50 mg at 12/20/18 2040    ibuprofen (MOTRIN) tablet 400 mg, 400 mg, Oral, Q6H PRN, Luvenia Mustache, PA-C, 400 mg at 12/20/18 0831    melatonin tablet 6 mg, 6 mg, Oral, HS PRN, Luvenia Mustache, PA-C    OLANZapine (ZyPREXA) IM injection 5 mg, 5 mg, Intramuscular, Q4H PRN, Luvenia Mustache, PA-C    OLANZapine (ZyPREXA) tablet 5 mg, 5 mg, Oral, Q4H PRN, Luvenia Mustache, PA-C, 5 mg at 12/17/18 1721    ondansetron (ZOFRAN-ODT) dispersible tablet 4 mg, 4 mg, Oral, Q6H PRN, Lige Ingrid, DO    traZODone (DESYREL) tablet 50 mg, 50 mg, Oral, HS PRN, Luvenia Mustache, PA-C, 50 mg at 12/18/18 2113    venlafaxine (EFFEXOR-XR) 24 hr capsule 75 mg, 75 mg, Oral, Daily, Luvenia Mustache, PA-C, 75 mg at 12/20/18 0332    Current Problem List:    Patient Active Problem List   Diagnosis    Acne vulgaris    ADHD (attention deficit hyperactivity disorder)    Asthma    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    Mixed hyperlipidemia    Scoliosis (and kyphoscoliosis), idiopathic    Class 2 obesity due to excess calories without serious comorbidity with body mass index (BMI) of 35 0 to 35 9 in adult    Drop in hemoglobin    Abnormal AST and ALT    Reflux esophagitis    Mood disorder (HCC)    Heart murmur    Intellectual disability    GERD (gastroesophageal reflux disease)    Depression    Transaminitis    Obesity, Class I, BMI 30-34 9    [facility]       Problem list reviewed 12/21/18     Objective:     Vital Signs:  Vitals:    12/19/18 1600 12/20/18 0700 12/20/18 0702 12/20/18 1645   BP: 124/85 124/86 139/89 133/90   BP Location: Left arm Left arm Left arm Left arm   Pulse: 80 87 98 80   Resp: 18 16  16   Temp: 98 °F (36 7 °C) 97 6 °F (36 4 °C)  97 8 °F (36 6 °C)   TempSrc: Temporal Temporal  Temporal   SpO2:       Weight:       Height:             Appearance:  age appropriate, casually dressed and disheveled   Behavior:  normal   Speech:  normal volume   Mood:  normal   Affect:  constricted   Thought Process:  normal   Thought Content:  normal   Perceptual Disturbances: None   Risk Potential: none   Sensorium:  person, place, situation and time   Cognition:  intact   Consciousness:  alert and awake    Attention: attention span and concentration were age appropriate   Intellect: average   Insight:  limited   Judgment: limited      Motor Activity: no abnormal movements       I/O Past 24 hours:  No intake/output data recorded  No intake/output data recorded  Labs:  Reviewed 12/21/18    Progress Toward Goals:  Improving    Assessment / Plan:     Severe episode of recurrent major depressive disorder, with psychotic features ([facility] )    Recommended Treatment:      Medication changes:  1) discontinue suicide precautions  Continue to monitor on increased Effexor and Abilify dose things  Non-pharmacological treatments  1) Continue with group therapy, milieu therapy and occupational therapy  Safety  1) Safety/communication plan established targeting dynamic risk factors above  2) Risks, benefits, and possible side effects of medications explained to patient and patient verbalizes understanding  Counseling / Coordination of Care    Total floor / unit time spent today 20 minutes  Greater than 50% of total time was spent with the patient and / or family counseling and / or coordination of care  A description of the counseling / coordination of care  Patient's Rights, confidentiality and exceptions to confidentiality, use of automated medical record, [facility] Kwadwo Cowan staff access to medical record, and consent to treatment reviewed      Miguel Gunn PA-C

## 2018-12-21 NOTE — PLAN OF CARE
Problem: DISCHARGE PLANNING  Goal: Discharge to home or other facility with appropriate resources  INTERVENTIONS:  - Identify barriers to discharge w/patient and caregiver  - Deny AH, SI and depression  Symptoms will resolve or diminish upon discharge  - Comply with meds, attend 75% of group therapy, identify positive coping skills  - Arrange for needed discharge resources and transportation as appropriate  - Identify discharge learning needs (meds, outpatient mental health services)  - Arrange for interpretive services to assist at discharge as needed  - Refer to Case Management Department for coordinating discharge planning if the patient needs post-hospital services based on physician/advanced practitioner order or complex needs related to functional status, cognitive ability, or social support system    Outcome: Progressing  Worker included Keith 1 December Newsletter in pt's discharge packet

## 2018-12-21 NOTE — NURSING NOTE
Maya Salvador is a 89 and SP1  Patient talked about the death of her brother,who came down with a respiratory illness on a Monday and  on a Wednesday  He was 16years old  Patient said she could not cope with his death,and wanted to die herself  She knew she needed help  Said,"I am Autistic myself " At  she requested and was given Atarax 50 mg po prn for increased anxiety  She is visible,social and interacts well with peers  Mother and grandparents came to visit tonight

## 2018-12-21 NOTE — PROGRESS NOTES
Patient has been visible and actively interacting with peers  She has attended groups and participated  She has been med compliant  She presents pleasant and cooperative  Staff will continue to monitor for her safety and well being

## 2018-12-21 NOTE — SOCIAL WORK
[phone] December 2018 Newsletter included in pt's discharge packet in order to provide pt with opportunities for bereavement services

## 2018-12-21 NOTE — PROGRESS NOTES
12/21/18 6380   Activity/Group Checklist   Group Other (Comment)  (Art Therapy Process Group / Open Choice, Discussion)   Attendance Attended   Attendance Duration (min) Greater than 60   Interactions Interacted appropriately   Affect/Mood Appropriate   Goals Achieved Able to listen to others; Able to engage in interactions; Able to reflect/comment on own behavior;Able to manage/cope with feelings; Able to recieve feedback;Verbalized increased hopefulness; Able to give feedback to another  (Able to engage art materials)

## 2018-12-21 NOTE — SOCIAL WORK
Worker scheduled follow up appointment at [facility] with therapist on 12/31 at [address] [place] [address] and NP appt on 1/2 at 5:30PM

## 2018-12-21 NOTE — PROGRESS NOTES
Progress Note - Behavioral Health     Les Ours 19 y o  female MRN: [id number]  Unit/Bed#: -01 Encounter: [id number]    Les Ours was seen for continuing care and reviewed with treatment team   Pt with h/o MDD with psychotic features of AH telling her to restrict food and to vomit and noncompliance to psychiatric medications at home, which were triggered by bereavement over the death of her brother 2 weeks prior to admission  Notable that on admission, visitation was restricted due to Pt having a h/o OD on drugs someone brought her from the outside  She has been on assault precautions  She presently reports her anxiety is "Running a little high" but cannot identify any specific trigger  She is dealing with her grief much better and otherwise feels "Fantastic" and just about ready to be discharged  She presently denies depression, SI, HI, anger or psychotic Sxs  Floor team relays that Pt    Discharge is planned for Monday 12/24/2018 provided Pt remains stable    [facility] has evaluated Pt and she is s/p lap cholecystectomy during initial psychiatric admission 11/30/2018 in [facility]  LFT are trending toward normal   I reviewed the discharge note by Dr Sarika Steiner has Wt concerns with Zyprexa and Lurasidone had not been covering her Sxs  She had been discharged on Risperidone, but was switched to Aripiprazole here  Gabapentin 600mg qhs was being continued by Dr Annalisa Villela for anxiety  The disordered eating Sxs seem to be a function of her psychotic Sxs and suicidal ideations, but NOT judged to be due to a formal DSM defined eating disorder  Floor team at [facility] relayed that Pt has been cooperative and calm as well as medication compliant  Technician did wash her clothes and attested that Pt showered, but she does not groom  Pt refused the 600mg qhs dose and was only willing to take a 300mg qhs dose    (Pt presently states the 600mg dose did not help more )  She has been more visible in the milieu, attending some groups with participation at times  Pt sees Dr Libertad Meraz through [facility] in the outpatient psychiatric setting and intends to f/u   has arranged for bereavement support after point of future discharge  Sleep:Not very good, but Trazodone helped somewhat  Appetite: Good   Medication side effects: None per Pt    ROS: No complaints per Pt    Labs/: Reviewed    Mental Status Evaluation:  Appearance:  Dressed, Good eye contact, disheveled   Behavior:  Calm, cooperative, pleasant   Speech:  Clear, normal rate and volume   Mood:  Anxious   Affect:  Constricted   Thought Process:  Organized, Goal directed, more hopeful   Associations: Intact associations   Thought Content:  No verbalized delusions   Perceptual Disturbances: Pt denies any hallucinations or paranoia and does not appear to be responding to internal stimuli   Risk Potential: Pt presently denies SI or HI    Sensorium:  Self, Place, Day of the week, Month, Year   Memory:  short term memory grossly intact   Consciousness:  alert, awake   Attention: Good   Insight:  Fair   Judgment: Fair   Gait/Station: Normal gait/station   Motor Activity: No abnormal movements       Vitals:    12/21/18 0721 12/21/18 1541 12/22/18 0800 12/22/18 0801   BP: 120/84 135/96 137/93 143/91   BP Location: Left arm Left arm Left arm Left arm   Pulse: (!) 113 93 (!) 110 (!) 113   Resp:  16 16    Temp:  98 3 °F (36 8 °C) (!) 97 °F (36 1 °C)    TempSrc:  Temporal Temporal    SpO2:       Weight:       Height:           No visits with results within 1 Day(s) from this visit     Latest known visit with results is:   Admission on 12/17/2018, Discharged on 12/17/2018   Component Date Value    WBC 12/17/2018 12 30*    RBC 12/17/2018 5 27*    Hemoglobin 12/17/2018 13 5     Hematocrit 12/17/2018 41 6     MCV 12/17/2018 79*    MCH 12/17/2018 25 6*    MCHC 12/17/2018 32 5     RDW 12/17/2018 15 3*    MPV 12/17/2018 9 6     Platelets 10/30/4230 282     Neutrophils Relative 12/17/2018 83*    Lymphocytes Relative 12/17/2018 10*    Monocytes Relative 12/17/2018 7     Eosinophils Relative 12/17/2018 0     Basophils Relative 12/17/2018 1     Neutrophils Absolute 12/17/2018 10 10*    Lymphocytes Absolute 12/17/2018 1 20     Monocytes Absolute 12/17/2018 0 80     Eosinophils Absolute 12/17/2018 0 00     Basophils Absolute 12/17/2018 0 10     Sodium 12/17/2018 141     Potassium 12/17/2018 3 5*    Chloride 12/17/2018 104     CO2 12/17/2018 25     ANION GAP 12/17/2018 12     BUN 12/17/2018 10     Creatinine 12/17/2018 0 73     Glucose 12/17/2018 121*    Calcium 12/17/2018 10 0     AST 12/17/2018 32     ALT 12/17/2018 106*    Alkaline Phosphatase 12/17/2018 91     Total Protein 12/17/2018 7 6     Albumin 12/17/2018 4 6     Total Bilirubin 12/17/2018 0 70     eGFR 12/17/2018 119     Amph/Meth UR 12/17/2018 Negative     Barbiturate Ur 12/17/2018 Negative     Benzodiazepine Urine 12/17/2018 Negative     Cocaine Urine 12/17/2018 Negative     Methadone Urine 12/17/2018 Negative     Opiate Urine 12/17/2018 Negative     PCP Ur 12/17/2018 Negative     THC Urine 12/17/2018 Negative     EXT PREG TEST UR (Ref: N* 12/17/2018 Negative     Ethanol Lvl 12/17/2018 <10     Acetaminophen Level 43/68/1160 <85*    Salicylate Lvl 70/51/7854 <1 0*    RBC Morphology 12/17/2018 abnormal     Anisocytosis 12/17/2018 Present     Microcytes 12/17/2018 Present     Platelet Estimate 11/05/1158 Adequate     Color, UA 12/17/2018 Brown*    Clarity, UA 12/17/2018 Clear     Specific Gravity, UA 12/17/2018 1 025     pH, UA 12/17/2018 6 0     Leukocytes, UA 12/17/2018 25 0*    Nitrite, UA 12/17/2018 Negative     Protein, UA 12/17/2018 30 (1+)*    Glucose, UA 12/17/2018 Negative     Ketones, UA 12/17/2018 >=150 (3+)*    Bilirubin, UA 12/17/2018 1 mg/dL*    Blood, UA 12/17/2018 Negative     UROBILINOGEN UA 12/17/2018 4 0*    RBC, UA 12/17/2018 0-1*    WBC, UA 12/17/2018 0-1*    Epithelial Cells 12/17/2018 Occasional     Bacteria, UA 12/17/2018 Moderate*        Progress Toward Goals:  Based on today's interview and review of prior Sxs, Pt, though still grieving the death of her brother, has gradually improved  She is interested in medication adjustment for anxiety and I will increase Gabapentin to 300mg bid  Monitor BP  Repeat CMP--for transaminitis and slight hypokalemia, CBC for leukocystosis    Assessment/Plan   Principal Problem:    Severe episode of recurrent major depressive disorder, with psychotic features ([facility] )  Active Problems:    Asthma    GERD (gastroesophageal reflux disease)    Transaminitis    Obesity, Class I, BMI 30-34 9      Recommended Treatment: Continue with pharmacotherapy, group therapy, milieu therapy and occupational therapy    The patient will be maintained on the following medications:    Current Facility-Administered Medications:  acetaminophen 650 mg Oral Q6H PRN Sherald Shelling, PA-C   acetaminophen 975 mg Oral Q6H PRN Sherald Shelling, PA-C   albuterol 2 puff Inhalation Q6H PRN Maralyn [place]er, DO   ARIPiprazole 2 mg Oral Daily Sherald Shelling, PA-C   benztropine 1 mg Oral Q4H PRN Sherald Shelling, PA-C   diphenhydrAMINE 50 mg Intramuscular Q4H PRN Sherald Shelling, PA-C   famotidine 20 mg Oral BID Miles Beauchamp, DO   gabapentin 300 mg Oral BID Leeanna Corrente, PA-C   hydrOXYzine HCL 50 mg Oral Q4H PRN Sherald Shelling, PA-C   ibuprofen 400 mg Oral Q6H PRN Sherald Shelling, PA-C   melatonin 6 mg Oral HS PRN Sherald Shelling, PA-C   OLANZapine 5 mg Intramuscular Q4H PRN Sherald Shelling, PA-C   OLANZapine 5 mg Oral Q4H PRN Sherald Shelling, PA-C   ondansetron 4 mg Oral Q6H PRN Maralyn [place]er, DO   traZODone 75 mg Oral HS PRN Leeanna Corrente, PA-C   venlafaxine 75 mg Oral Daily Sherald Shelling, PA-C       Risks, benefits and possible side effects of Medications:   Risks, benefits, and possible side effects of medications explained to patient and patient verbalizes understanding and Risks of medications explained if female patient   Patient verbalizes understanding and agrees to notify her doctor if she becomes pregnant

## 2018-12-21 NOTE — PROGRESS NOTES
12/21/18 1000   Activity/Group Checklist   Group (recovery group)   Attendance Attended   Attendance Duration (min) 46-60   Interactions Interacted appropriately   Affect/Mood Appropriate   Goals Achieved Able to self-disclose; Able to recieve feedback;Verbalized increased hopefulness; Able to manage/cope with feelings; Able to engage in interactions   Recovery group was on the holidays which led to grief loss issues  She continues to process the loss of her brother  She presents as if she is the parent in her family not her parents  She appears to be developing a codependency with another patient

## 2018-12-22 PROCEDURE — 99232 SBSQ HOSP IP/OBS MODERATE 35: CPT | Performed by: PHYSICIAN ASSISTANT

## 2018-12-22 RX ORDER — TRAZODONE HYDROCHLORIDE 50 MG/1
75 TABLET ORAL
Status: DISCONTINUED | OUTPATIENT
Start: 2018-12-22 | End: 2018-12-24 | Stop reason: HOSPADM

## 2018-12-22 RX ORDER — GABAPENTIN 300 MG/1
300 CAPSULE ORAL 2 TIMES DAILY
Status: DISCONTINUED | OUTPATIENT
Start: 2018-12-22 | End: 2018-12-23

## 2018-12-22 RX ADMIN — GABAPENTIN 300 MG: 300 CAPSULE ORAL at 09:45

## 2018-12-22 RX ADMIN — HYDROXYZINE HYDROCHLORIDE 50 MG: 50 TABLET, FILM COATED ORAL at 14:21

## 2018-12-22 RX ADMIN — TRAZODONE HYDROCHLORIDE 75 MG: 50 TABLET ORAL at 21:50

## 2018-12-22 RX ADMIN — FAMOTIDINE 20 MG: 20 TABLET ORAL at 08:24

## 2018-12-22 RX ADMIN — ARIPIPRAZOLE 2 MG: 2 TABLET ORAL at 08:24

## 2018-12-22 RX ADMIN — FAMOTIDINE 20 MG: 20 TABLET ORAL at 18:33

## 2018-12-22 RX ADMIN — VENLAFAXINE HYDROCHLORIDE 75 MG: 75 CAPSULE, EXTENDED RELEASE ORAL at 08:24

## 2018-12-22 RX ADMIN — GABAPENTIN 300 MG: 300 CAPSULE ORAL at 18:23

## 2018-12-22 RX ADMIN — IBUPROFEN 400 MG: 400 TABLET ORAL at 12:11

## 2018-12-22 RX ADMIN — OLANZAPINE 5 MG: 5 TABLET, FILM COATED ORAL at 15:59

## 2018-12-22 NOTE — PROGRESS NOTES
12/22/18 0946   Activity/Group Checklist   Group Other (Comment)  (Art Therapy Process Group / Quotes, Open discussion)   Attendance Attended   Attendance Duration (min) Greater than 60   Interactions Interacted appropriately   Affect/Mood Appropriate   Goals Achieved Able to listen to others; Able to engage in interactions; Able to reflect/comment on own behavior;Able to recieve feedback; Able to give feedback to another  (Able to engage art materials)

## 2018-12-22 NOTE — PLAN OF CARE
Depression     Treatment Goal: Demonstrate behavioral control of depressive symptoms, verbalize feelings of improved mood/affect, and adopt new coping skills prior to discharge [facility] Early Discharge to home or other facility with appropriate resources Progressing        Ineffective Coping     Cooperates with admission process Progressing        Nutrition/Hydration-ADULT     Nutrient/Hydration intake appropriate for improving, restoring or maintaining nutritional needs Progressing

## 2018-12-22 NOTE — PROGRESS NOTES
Patient is compliant with medicine and chauhan routine  She is doing  Better  Then in days past she is more verbal and more social  Attends more groups    And is less depressed

## 2018-12-22 NOTE — PROGRESS NOTES
Progress Note - Behavioral Health     Hernan Grimes 19 y o  female MRN: [id number]  Unit/Bed#: [facility] 379-01 Encounter: [id number]    Hernan Grimes was seen for continuing care and reviewed with treatment team   Pt reports "Really high anxiety, but no depression "  She could not identify what was specifically triggering her anxiety, but that the Hydroxyzine helped very well during those times  She would like to have a more steady anxiety medication rather than just prn  She is still grieving her brother of course, but states she had a cathartic cry yesterday which expelled a lot of built up "Emotions", and she felt "Good" afterwards  Pt presently denies depression SI, HI, or psychotic Sxs  It is noted by myself and floor team that she appears a bit more depressed than she is stating  Floor team also relays that Pt remains calm, cooperative and medication compliant  She was tearful at times yesterday and received some relief after talking with a    She has been more visible and social in the milieu and attending groups with engagement in the process      Sleep:Good  Appetite: Good   Medication side effects: None per Pt    ROS: No complaints per Pt    Labs/Tests: Reviewed    Mental Status Evaluation:  Appearance:  Dressed, clean but poorly groomed   Behavior:  Calm, cooperative, pleasant   Speech:  Clear, normal rate and volume   Mood:  Anxious, appears depressed   Affect:  Constricted   Thought Process:  Organized, Goal directed, still more hopeful   Associations: Intact associations   Thought Content:  No verbalized delusions   Perceptual Disturbances: Pt denies any hallucinations or paranoia and does not appear to be responding to internal stimuli   Risk Potential: Pt presently denies SI or HI    Sensorium:  Self, Place, Day of the week, Month, Year   Memory:  short term memory grossly intact   Consciousness:  alert, awake   Attention: Good   Insight:  Fair   Judgment: Fair   Gait/Station: Normal gait/station   Motor Activity: No abnormal movements     Vitals:    12/22/18 0801 12/22/18 1500 12/23/18 0800 12/23/18 0802   BP: 143/91 120/90 124/81 134/69   BP Location: Left arm Left arm Left arm Left arm   Pulse: (!) 113 104 87 97   Resp:  20 16    Temp:  (!) 97 4 °F (36 3 °C) (!) 97 °F (36 1 °C)    TempSrc:  Temporal Temporal    SpO2:   98%    Weight:   67 6 kg (149 lb)    Height:           Admission on 12/17/2018   Component Date Value    Sodium 12/23/2018 139     Potassium 12/23/2018 4 0     Chloride 12/23/2018 101     CO2 12/23/2018 29     ANION GAP 12/23/2018 9     BUN 12/23/2018 7     Creatinine 12/23/2018 0 79     Glucose 12/23/2018 92     Calcium 12/23/2018 9 7     AST 12/23/2018 67*    ALT 12/23/2018 138*    Alkaline Phosphatase 12/23/2018 91     Total Protein 12/23/2018 6 8     Albumin 12/23/2018 4 2     Total Bilirubin 12/23/2018 1 00     eGFR 12/23/2018 108     WBC 12/23/2018 6 70     RBC 12/23/2018 5 43*    Hemoglobin 12/23/2018 13 8     Hematocrit 12/23/2018 43 6     MCV 12/23/2018 80     MCH 12/23/2018 25 4*    MCHC 12/23/2018 31 6     RDW 12/23/2018 15 7*    MPV 12/23/2018 9 8     Platelets 78/73/9802 241     Neutrophils Relative 12/23/2018 42*    Lymphocytes Relative 12/23/2018 47*    Monocytes Relative 12/23/2018 9     Eosinophils Relative 12/23/2018 2     Basophils Relative 12/23/2018 1     Neutrophils Absolute 12/23/2018 2 80     Lymphocytes Absolute 12/23/2018 3 10     Monocytes Absolute 12/23/2018 0 60     Eosinophils Absolute 12/23/2018 0 20     Basophils Absolute 12/23/2018 0 00        Progress Toward Goals: Gradually improving  I will make Hydroxyzine a steady dosed medication at 50mg tid for anxiety  Reduce Gabapentin back to 300mg qhs for anxiety since the daytime dose did not make a difference    Increase Venlafaxine ER to 150mg qd for mood and overall anxiety with plan to hopefully be able to reduce Hydroxyzine in the future as the SNRI does it's job    Repeat LFT due to worsening transaminitis again--noted to have a +EBV nuclear AgAbin early 12/2018    Assessment/Plan   Principal Problem:    Severe episode of recurrent major depressive disorder, with psychotic features ([address] )  Active Problems:    Asthma    GERD (gastroesophageal reflux disease)    Transaminitis    Obesity, Class I, BMI 30-34 9      Recommended Treatment: Continue with pharmacotherapy, group therapy, milieu therapy and occupational therapy  The patient will be maintained on the following medications:    Current Facility-Administered Medications:  acetaminophen 650 mg Oral Q6H PRN Darnell Banana, PA-C   acetaminophen 975 mg Oral Q6H PRN Darnell Banana, PA-C   albuterol 2 puff Inhalation Q6H PRN Eben Kick, DO   ARIPiprazole 2 mg Oral Daily Darnell Banana, PA-C   benztropine 1 mg Oral Q4H PRN Darnell Banana, PA-C   diphenhydrAMINE 50 mg Intramuscular Q4H PRN Darnell Banana, PA-C   famotidine 20 mg Oral BID Miles Beauchamp DO   gabapentin 300 mg Oral HS Leeanna Oneill PA-C   hydrOXYzine HCL 50 mg Oral TID Leeanna Oneill PA-C   ibuprofen 400 mg Oral Q6H PRN Darnell Banana, PA-ROBERT   melatonin 6 mg Oral HS PRN Darnell Banana, PA-ROBERT   OLANZapine 5 mg Intramuscular Q4H PRN Darnell Banana, PA-ROBERT   OLANZapine 5 mg Oral Q4H PRN Darnell Banana, PA-C   ondansetron 4 mg Oral Q6H PRN Eben Kick, DO   traZODone 75 mg Oral HS PRN Leeanna Oneill PA-C   [START ON 12/24/2018] venlafaxine 150 mg Oral Daily Leeanna Oneill PA-C       Risks, benefits and possible side effects of Medications:   Risks, benefits, and possible side effects of medications explained to patient and patient verbalizes understanding and Risks of medications explained if female patient   Patient verbalizes understanding and agrees to notify her doctor if she becomes pregnant

## 2018-12-22 NOTE — PLAN OF CARE
Depression     Treatment Goal: Demonstrate behavioral control of depressive symptoms, verbalize feelings of improved mood/affect, and adopt new coping skills prior to discharge [address] Discharge to home or other facility with appropriate resources Progressing        Ineffective Coping     Cooperates with admission process Progressing        Nutrition/Hydration-ADULT     Nutrient/Hydration intake appropriate for improving, restoring or maintaining nutritional needs Progressing

## 2018-12-22 NOTE — PROGRESS NOTES
12/22/18 1400   Activity/Group Checklist   Group Other (Comment)  (Art Therapy /  OPEN STUDIO)   Attendance Attended   Attendance Duration (min) Greater than 60   Interactions Interacted appropriately   Affect/Mood Appropriate   Goals Achieved Able to listen to others; Able to engage in interactions  (Attention-seeking)

## 2018-12-23 LAB
ALBUMIN SERPL BCP-MCNC: 4.2 G/DL (ref 3–5.2)
ALP SERPL-CCNC: 91 U/L (ref 43–122)
ALT SERPL W P-5'-P-CCNC: 138 U/L (ref 9–52)
ANION GAP SERPL CALCULATED.3IONS-SCNC: 9 MMOL/L (ref 5–14)
AST SERPL W P-5'-P-CCNC: 67 U/L (ref 14–36)
BASOPHILS # BLD AUTO: 0 THOUSANDS/ΜL (ref 0–0.1)
BASOPHILS NFR BLD AUTO: 1 % (ref 0–1)
BILIRUB SERPL-MCNC: 1 MG/DL
BUN SERPL-MCNC: 7 MG/DL (ref 5–25)
CALCIUM SERPL-MCNC: 9.7 MG/DL (ref 8.4–10.2)
CHLORIDE SERPL-SCNC: 101 MMOL/L (ref 97–108)
CO2 SERPL-SCNC: 29 MMOL/L (ref 22–30)
CREAT SERPL-MCNC: 0.79 MG/DL (ref 0.6–1.2)
EOSINOPHIL # BLD AUTO: 0.2 THOUSAND/ΜL (ref 0–0.4)
EOSINOPHIL NFR BLD AUTO: 2 % (ref 0–6)
ERYTHROCYTE [DISTWIDTH] IN BLOOD BY AUTOMATED COUNT: 15.7 %
GFR SERPL CREATININE-BSD FRML MDRD: 108 ML/MIN/1.73SQ M
GLUCOSE SERPL-MCNC: 92 MG/DL (ref 70–99)
HCT VFR BLD AUTO: 43.6 % (ref 36–46)
HGB BLD-MCNC: 13.8 G/DL (ref 12–16)
LYMPHOCYTES # BLD AUTO: 3.1 THOUSANDS/ΜL (ref 0.5–4)
LYMPHOCYTES NFR BLD AUTO: 47 % (ref 25–45)
MCH RBC QN AUTO: 25.4 PG (ref 26–34)
MCHC RBC AUTO-ENTMCNC: 31.6 G/DL (ref 31–36)
MCV RBC AUTO: 80 FL (ref 80–100)
MONOCYTES # BLD AUTO: 0.6 THOUSAND/ΜL (ref 0.2–0.9)
MONOCYTES NFR BLD AUTO: 9 % (ref 1–10)
NEUTROPHILS # BLD AUTO: 2.8 THOUSANDS/ΜL (ref 1.8–7.8)
NEUTS SEG NFR BLD AUTO: 42 % (ref 45–65)
PLATELET # BLD AUTO: 241 THOUSANDS/UL (ref 150–450)
PMV BLD AUTO: 9.8 FL (ref 8.9–12.7)
POTASSIUM SERPL-SCNC: 4 MMOL/L (ref 3.6–5)
PROT SERPL-MCNC: 6.8 G/DL (ref 5.9–8.4)
RBC # BLD AUTO: 5.43 MILLION/UL (ref 4–5.2)
SODIUM SERPL-SCNC: 139 MMOL/L (ref 137–147)
WBC # BLD AUTO: 6.7 THOUSAND/UL (ref 4.5–11)

## 2018-12-23 PROCEDURE — 85025 COMPLETE CBC W/AUTO DIFF WBC: CPT | Performed by: PHYSICIAN ASSISTANT

## 2018-12-23 PROCEDURE — 80053 COMPREHEN METABOLIC PANEL: CPT | Performed by: PHYSICIAN ASSISTANT

## 2018-12-23 PROCEDURE — 99231 SBSQ HOSP IP/OBS SF/LOW 25: CPT | Performed by: PHYSICIAN ASSISTANT

## 2018-12-23 RX ORDER — GABAPENTIN 300 MG/1
300 CAPSULE ORAL
Status: DISCONTINUED | OUTPATIENT
Start: 2018-12-23 | End: 2018-12-24 | Stop reason: HOSPADM

## 2018-12-23 RX ORDER — VENLAFAXINE HYDROCHLORIDE 150 MG/1
150 CAPSULE, EXTENDED RELEASE ORAL DAILY
Status: DISCONTINUED | OUTPATIENT
Start: 2018-12-24 | End: 2018-12-24 | Stop reason: HOSPADM

## 2018-12-23 RX ORDER — HYDROXYZINE 50 MG/1
50 TABLET, FILM COATED ORAL 3 TIMES DAILY
Status: DISCONTINUED | OUTPATIENT
Start: 2018-12-23 | End: 2018-12-24 | Stop reason: HOSPADM

## 2018-12-23 RX ADMIN — FAMOTIDINE 20 MG: 20 TABLET ORAL at 08:09

## 2018-12-23 RX ADMIN — VENLAFAXINE HYDROCHLORIDE 75 MG: 75 CAPSULE, EXTENDED RELEASE ORAL at 08:10

## 2018-12-23 RX ADMIN — HYDROXYZINE HYDROCHLORIDE 50 MG: 50 TABLET, FILM COATED ORAL at 21:22

## 2018-12-23 RX ADMIN — TRAZODONE HYDROCHLORIDE 75 MG: 50 TABLET ORAL at 21:22

## 2018-12-23 RX ADMIN — HYDROXYZINE HYDROCHLORIDE 50 MG: 50 TABLET, FILM COATED ORAL at 16:09

## 2018-12-23 RX ADMIN — GABAPENTIN 300 MG: 300 CAPSULE ORAL at 08:09

## 2018-12-23 RX ADMIN — ARIPIPRAZOLE 2 MG: 2 TABLET ORAL at 08:09

## 2018-12-23 RX ADMIN — HYDROXYZINE HYDROCHLORIDE 50 MG: 50 TABLET, FILM COATED ORAL at 08:23

## 2018-12-23 RX ADMIN — GABAPENTIN 300 MG: 300 CAPSULE ORAL at 21:22

## 2018-12-23 RX ADMIN — FAMOTIDINE 20 MG: 20 TABLET ORAL at 17:54

## 2018-12-23 NOTE — PROGRESS NOTES
Patient is compliant with her medicine  Yesterday she was extremely teary-eyed stating she was thinking about her brother who she was very close too  She asked to talk to our  who did come up and talked to her  Later on in the day she was extremely happy  She stated that her  Mother was buying her a I phone  Patient does need re enforcement on her ad"l   She often walks around  With a extremely  Dirty shirt and needs to reminded to change it   Will continue to offer support and guidance when needed

## 2018-12-24 VITALS
DIASTOLIC BLOOD PRESSURE: 74 MMHG | BODY MASS INDEX: 31.28 KG/M2 | RESPIRATION RATE: 16 BRPM | HEIGHT: 58 IN | OXYGEN SATURATION: 98 % | TEMPERATURE: 97 F | WEIGHT: 149 LBS | HEART RATE: 104 BPM | SYSTOLIC BLOOD PRESSURE: 127 MMHG

## 2018-12-24 LAB
ALBUMIN SERPL BCP-MCNC: 3.6 G/DL (ref 3–5.2)
ALP SERPL-CCNC: 82 U/L (ref 43–122)
ALT SERPL W P-5'-P-CCNC: 81 U/L (ref 9–52)
AST SERPL W P-5'-P-CCNC: 99 U/L (ref 14–36)
BILIRUB DIRECT SERPL-MCNC: 0.8 MG/DL
BILIRUB SERPL-MCNC: 1.2 MG/DL
PROT SERPL-MCNC: 6.5 G/DL (ref 5.9–8.4)

## 2018-12-24 PROCEDURE — 80076 HEPATIC FUNCTION PANEL: CPT | Performed by: PSYCHIATRY & NEUROLOGY

## 2018-12-24 RX ORDER — VENLAFAXINE HYDROCHLORIDE 150 MG/1
150 CAPSULE, EXTENDED RELEASE ORAL DAILY
Qty: 15 CAPSULE | Refills: 1 | Status: SHIPPED | OUTPATIENT
Start: 2018-12-24 | End: 2019-05-21 | Stop reason: SDUPTHER

## 2018-12-24 RX ORDER — GABAPENTIN 300 MG/1
300 CAPSULE ORAL
Qty: 15 CAPSULE | Refills: 1 | Status: SHIPPED | OUTPATIENT
Start: 2018-12-24 | End: 2019-04-23

## 2018-12-24 RX ORDER — FAMOTIDINE 20 MG/1
20 TABLET, FILM COATED ORAL 2 TIMES DAILY
Qty: 30 TABLET | Refills: 1 | Status: SHIPPED | OUTPATIENT
Start: 2018-12-24 | End: 2019-01-14 | Stop reason: SDUPTHER

## 2018-12-24 RX ORDER — TRAZODONE HYDROCHLORIDE 150 MG/1
75 TABLET ORAL
Qty: 7 TABLET | Refills: 1 | Status: SHIPPED | OUTPATIENT
Start: 2018-12-24 | End: 2019-04-23

## 2018-12-24 RX ORDER — ARIPIPRAZOLE 2 MG/1
2 TABLET ORAL DAILY
Qty: 15 TABLET | Refills: 1 | Status: SHIPPED | OUTPATIENT
Start: 2018-12-24 | End: 2019-05-21 | Stop reason: SDUPTHER

## 2018-12-24 RX ORDER — HYDROXYZINE 50 MG/1
50 TABLET, FILM COATED ORAL 3 TIMES DAILY
Qty: 45 TABLET | Refills: 1 | Status: SHIPPED | OUTPATIENT
Start: 2018-12-24 | End: 2019-05-16

## 2018-12-24 RX ADMIN — FAMOTIDINE 20 MG: 20 TABLET ORAL at 08:26

## 2018-12-24 RX ADMIN — VENLAFAXINE HYDROCHLORIDE 150 MG: 150 CAPSULE, EXTENDED RELEASE ORAL at 08:25

## 2018-12-24 RX ADMIN — HYDROXYZINE HYDROCHLORIDE 50 MG: 50 TABLET, FILM COATED ORAL at 08:25

## 2018-12-24 RX ADMIN — ARIPIPRAZOLE 2 MG: 2 TABLET ORAL at 08:25

## 2018-12-24 NOTE — PROGRESS NOTES
12/24/18 1000   Activity/Group Checklist   Group (Recovery group)   Attendance Attended   Attendance Duration (min) 46-60   Interactions Interacted appropriately   Affect/Mood Appropriate   Goals Achieved Able to self-disclose; Able to manage/cope with feelings; Able to engage in interactions; Able to listen to others; Able to recieve feedback   Recovery group on [address]  Patient continues to grieve her brother but appears to be be focused on material things

## 2018-12-24 NOTE — NURSING NOTE
Writer educated patient on discharge instructions which included follow-up appts, medication dose schedule, bereavement support packet, crisis plan, suicide prevention hotline  Pt given copy of discharge instructions, prescription medications, belongings  Pt escorted to exit by [facility] and received by mother to be discharged home

## 2018-12-24 NOTE — PLAN OF CARE
Problem: DISCHARGE PLANNING  Goal: Discharge to home or other facility with appropriate resources  INTERVENTIONS:  - Identify barriers to discharge w/patient and caregiver  - Deny AH, SI and depression  Symptoms will resolve or diminish upon discharge  - Comply with meds, attend 75% of group therapy, identify positive coping skills  - Arrange for needed discharge resources and transportation as appropriate  - Identify discharge learning needs (meds, outpatient mental health services)  - Arrange for interpretive services to assist at discharge as needed  - Refer to Case Management Department for coordinating discharge planning if the patient needs post-hospital services based on physician/advanced practitioner order or complex needs related to functional status, cognitive ability, or social support system    Outcome: Completed Date Met: 12/24/18  Pt to be discharged today  Pt denies SI/HI, AH/VH  Pt oriented x3  Pt to return home  Pt will be transported by family  Pt to follow up with Valerie on 12/31 at 3PM and on 1/2 at 5:30PM with NP  Pt's meds filled at [facility]

## 2018-12-24 NOTE — PROGRESS NOTES
Due to EPIC not interfacing documenting the collection process in this patients lab draw; a new order was placed and a new label was obtained

## 2018-12-24 NOTE — DISCHARGE SUMMARY
Psychiatric Discharge Summary    Medical Record Number: [id number]  Encounter: [id number]    Discharge diagnosis:  Severe episode of recurrent major depressive disorder, with psychotic features (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 )    Heart murmur    Intellectual disability    GERD (gastroesophageal reflux disease)    Depression    Transaminitis    Obesity, Class I, BMI 30-34 9          HPI:     Patient is a 78-year-old female with history major depressive disorder who is admitted to the [address] [place] [address] on a 201 status due to increased depression and suicidal ideations  Patient experienced the loss of her brother due to flu complications approximately 2 weeks prior to her presentation to the emergency department  Patient was close with her brother and as result her to have a decline in her functioning following his death  Patient was not caring for herself and was not eating at home  Patient was expressing that she wanted to die to be with her brother  Patient was hearing auditory hallucinations telling her to harm herself by vomiting  Patient was seen medically stable was then transferred for inpatient psychiatric treatment  Brief Hospital Course: Following admission to the unit patient was with a blunted affect and depressed mood  Patient was withdrawn to herself  Patient was initiated on Effexor and Abilify to help control her depression, anxiety, and commanding auditory hallucinations  Patient tolerated the initiation of the medications increased dosing of Effexor XR 75 mg daily and Abilify 2 mg daily with no complications  Patient was starting to have improvement in her hygiene and her appetite  Patient was able to eat all of her meals without vomiting  Patient was expressing that she was no longer having any auditory hallucinations  Patient was reporting that her depression and anxiety were better controlled  Patient was no longer having any suicidal ideations  Patient was working through the process of grieving her brother's death  Patient was endorsing some ongoing anxiety as result in Atarax throughout the day was found to be effective  Patient was with good sleep  Patient was social on the unit  Patient was learning new coping skills including doing art work in beaded jewelry  Patient's affect was bright main  Patient was continuing to report and show improvement  On December 24, 2018 patient was evaluated and deemed appropriate for discharge on this date to continue her care on an outpatient basis  Patient was educated on her psychotropic medications are regards to medications in the uses as well as potential adverse effects  Patient verbalized her consent  Patient was educated on the importance of medication compliance following discharge  Patient was assessed at the time of discharge was seemed to be a low suicide risk  Patient was consenting that her parents were her protective factors  Patient consented that she had any exacerbation of her symptoms she would immediately contact Emergency Medical Services or return back to emergency department        Condition on discharge:     Improved    Medications Upon Discharge:       Current Facility-Administered Medications:       ARIPiprazole (ABILIFY) tablet 2 mg, 2 mg, Oral, Daily, Alicia Thomson PA-C, 2 mg at 12/23/18 0809    famotidine (PEPCID) tablet 20 mg, 20 mg, Oral, BID, Miles Beauchamp DO, 20 mg at 12/23/18 1754    gabapentin (NEURONTIN) capsule 300 mg, 300 mg, Oral, HS, Leeanna Oneill PA-C, 300 mg at 12/23/18 2122    hydrOXYzine HCL (ATARAX) tablet 50 mg, 50 mg, Oral, TID, Leeanna Oneill PA-C, 50 mg at 12/23/18 2122    traZODone (DESYREL) tablet 75 mg, 75 mg, Oral, HS PRN, Leeanna Oneill PA-C, 75 mg at 12/23/18 2122    venlafaxine (EFFEXOR-XR) 24 hr capsule 150 mg, 150 mg, Oral, Daily, DAYAN Patel PA-C

## 2018-12-24 NOTE — SOCIAL WORK
Pt to be discharged today  Pt denies SI/HI, AH/VH  Pt oriented x3  Pt to return home and will be picked up by family at noon  Pt to follow up with [facility] on 12/31 at 3PM and on 1/2 at 5:30PM with NP  Worker provided pt with [employer]  Pt's meds filled at [facility]  Pt completed discharge and safety crisis plan and signed discharge tx plan

## 2018-12-24 NOTE — PROGRESS NOTES
Pt pleasant upon approach and cooperative with care  Pt denies SI's, HI's, AH's, VH's  Pt reported her depression has improved and she is looking forward to going home  Pt med/meal compliant  Pt social with peers  Will continue to monitor and prepare for d/c today

## 2018-12-26 ENCOUNTER — TELEPHONE (OUTPATIENT)
Dept: OBGYN CLINIC | Facility: CLINIC | Age: 18
End: 2018-12-26

## 2018-12-27 ENCOUNTER — TRANSITIONAL CARE MANAGEMENT (OUTPATIENT)
Dept: FAMILY MEDICINE CLINIC | Facility: CLINIC | Age: 18
End: 2018-12-27

## 2019-01-14 DIAGNOSIS — F33.3 SEVERE EPISODE OF RECURRENT MAJOR DEPRESSIVE DISORDER, WITH PSYCHOTIC FEATURES (HCC): ICD-10-CM

## 2019-01-14 DIAGNOSIS — K21.00 REFLUX ESOPHAGITIS: Primary | ICD-10-CM

## 2019-01-14 RX ORDER — FAMOTIDINE 20 MG/1
20 TABLET, FILM COATED ORAL 2 TIMES DAILY
Qty: 60 TABLET | Refills: 0 | Status: SHIPPED | OUTPATIENT
Start: 2019-01-14 | End: 2019-01-14 | Stop reason: SDUPTHER

## 2019-01-14 RX ORDER — FAMOTIDINE 20 MG/1
20 TABLET, FILM COATED ORAL 2 TIMES DAILY
Qty: 60 TABLET | Refills: 0 | Status: SHIPPED | OUTPATIENT
Start: 2019-01-14 | End: 2019-02-27 | Stop reason: SDUPTHER

## 2019-02-04 ENCOUNTER — OFFICE VISIT (OUTPATIENT)
Dept: URGENT CARE | Age: 19
End: 2019-02-04
Payer: COMMERCIAL

## 2019-02-04 VITALS
OXYGEN SATURATION: 98 % | TEMPERATURE: 97.6 F | RESPIRATION RATE: 16 BRPM | HEART RATE: 93 BPM | DIASTOLIC BLOOD PRESSURE: 58 MMHG | BODY MASS INDEX: 34 KG/M2 | SYSTOLIC BLOOD PRESSURE: 110 MMHG | HEIGHT: 58 IN | WEIGHT: 162 LBS

## 2019-02-04 DIAGNOSIS — J06.9 ACUTE URI: Primary | ICD-10-CM

## 2019-02-04 PROCEDURE — 99283 EMERGENCY DEPT VISIT LOW MDM: CPT | Performed by: NURSE PRACTITIONER

## 2019-02-04 PROCEDURE — G0382 LEV 3 HOSP TYPE B ED VISIT: HCPCS | Performed by: NURSE PRACTITIONER

## 2019-02-04 PROCEDURE — 99203 OFFICE O/P NEW LOW 30 MIN: CPT | Performed by: NURSE PRACTITIONER

## 2019-02-04 NOTE — PATIENT INSTRUCTIONS
May alternate Tylenol and Ibuprofen as needed  Encourage fluids and rest    Saline nasal spray as needed  Humidify bedroom  Salt water gargles  Chloraseptic spray and lozenges as needed  Consider adding anti-histamines daily, ie  Claritin or Zyrtec  F/U with PCP if symptoms persist/worsen or go to nearest emergency department if any signs of distress  Cold Symptoms   WHAT YOU NEED TO KNOW:   A cold is an infection caused by a virus  The infection causes your upper respiratory system to become inflamed  Common symptoms of a cold include sneezing, dry throat, a stuffy nose, headache, watery eyes, and a cough  Your cough may be dry, or you may cough up mucus  You may also have muscle aches, joint pain, and tiredness  Rarely, you may have a fever  Most colds go away without treatment  DISCHARGE INSTRUCTIONS:   Return to the emergency department if:   · You have increased tiredness and weakness  · You are unable to eat  · Your heart is beating much faster than usual for you  · You see white spots in the back of your throat and your neck is swollen and sore to the touch  · You see pinpoint or larger reddish-purple dots on your skin  Contact your healthcare provider if:   · You have a fever higher than 102°F (38 9°C)  · You have new or worsening shortness of breath  · You have thick nasal drainage for more than 2 days  · Your symptoms do not improve or get worse within 5 days  · You have questions or concerns about your condition or care  Medicines: The following medicines may be suggested by your healthcare provider to decrease your cold symptoms  These medicines are available without a doctor's order  Ask which medicines to take and when to take them  Follow directions  · NSAIDs or acetaminophen  help to bring down a fever or decrease pain  · Decongestants  help decrease nasal stuffiness  · Antihistamines  help decrease sneezing and a runny nose       · Cough suppressants  help decrease how much you cough  · Expectorants  help loosen mucus so you can cough it up  · Take your medicine as directed  Contact your healthcare provider if you think your medicine is not helping or if you have side effects  Tell him of her if you are allergic to any medicine  Keep a list of the medicines, vitamins, and herbs you take  Include the amounts, and when and why you take them  Bring the list or the pill bottles to follow-up visits  Carry your medicine list with you in case of an emergency  Symptom relief: The following may help relieve cold symptoms, such as a dry throat and congestion:  · Gargle with mouthwash or warm salt water as directed  · Suck on throat lozenges or hard candy  · Use a cold or warm vaporizer or humidifier to ease your breathing  · Rest for at least 2 days and then as needed to decrease tiredness and weakness  · Use petroleum based jelly around your nostrils to decrease irritation from blowing your nose  Drink liquids:  Liquids will help thin and loosen thick mucus so you can cough it up  Liquids will also keep you hydrated  Ask your healthcare provider which liquids are best for you and how much to drink each day  Prevent the spread of germs: You can spread your cold germs to others for at least 3 days after your symptoms start  Wash your hands often  Do not share items, such as eating utensils  Cover your nose and mouth when you cough or sneeze using the crook of your elbow instead of your hands  Throw used tissues in the garbage  Do not smoke:  Smoking may worsen your symptoms and increase the length of time you feel sick  Talk with your healthcare provider if you need help to stop smoking  Follow up with your healthcare provider as directed:  Write down your questions so you remember to ask them during your visits     © 2017 [address] [employer] Information is for End User's use only and may not be sold, redistributed or otherwise used for commercial purposes  All illustrations and images included in CareNotes® are the copyrighted property of [employer]  or [employer]  The above information is an  only  It is not intended as medical advice for individual conditions or treatments  Talk to your doctor, nurse or pharmacist before following any medical regimen to see if it is safe and effective for you

## 2019-02-04 NOTE — PROGRESS NOTES
[facility]        NAME: Gavin Arnold is a 19 y o  female  : 2000    MRN: [id number]  DATE: 2019  TIME: 9:26 AM    Assessment and Plan   Acute URI [J06 9]  1  Acute URI           Patient Instructions     May alternate Tylenol and Ibuprofen as needed  Encourage fluids and rest    Saline nasal spray as needed  Humidify bedroom  Salt water gargles  Chloraseptic spray and lozenges as needed  Consider adding anti-histamines daily, ie  Claritin or Zyrtec  Follow up with PCP in 3-5 days  Proceed to  ER if symptoms worsen  Chief Complaint     Chief Complaint   Patient presents with    Cold Like Symptoms     Pt c/o sinus pain/pressure, b/l itchy ears, cough, and congestion for the past 3 days  Pt denies fever  History of Present Illness       Accompanied by mother  Patient presents with vomiting x 3 days, also with cough  Denies fevers, chills, bodyaches, N/V/D  Last episode of vomiting was 2 days ago  No history of asthma  Nonsmoker  No seasonal allergies  + flu vaccine this season  Has also been taking tylenol/sinus  Has ventolin inhaler though not using  Review of Systems   Review of Systems   Constitutional: Negative for chills and fever  HENT: Positive for congestion, ear pain, rhinorrhea and sore throat  Negative for sinus pain, sinus pressure and trouble swallowing  Respiratory: Positive for cough  Negative for chest tightness, shortness of breath and wheezing  Cardiovascular: Negative  Gastrointestinal: Negative  Musculoskeletal: Negative for myalgias  Skin: Negative for rash           Current Medications       Current Outpatient Prescriptions:     albuterol (VENTOLIN HFA) 90 mcg/act inhaler, Inhale 2 puffs every 6 (six) hours as needed  , Disp: , Rfl:     ARIPiprazole (ABILIFY) 2 mg tablet, Take 1 tablet (2 mg total) by mouth daily, Disp: 15 tablet, Rfl: 1    famotidine (PEPCID) 20 mg tablet, Take 1 tablet (20 mg total) by mouth 2 (two) times a day, Disp: 60 tablet, Rfl: 0    gabapentin (NEURONTIN) 300 mg capsule, Take 1 capsule (300 mg total) by mouth daily at bedtime, Disp: 15 capsule, Rfl: 1    hydrOXYzine HCL (ATARAX) 50 mg tablet, Take 1 tablet (50 mg total) by mouth 3 (three) times a day, Disp: 45 tablet, Rfl: 1    traZODone (DESYREL) 150 mg tablet, Take 0 5 tablets (75 mg total) by mouth daily at bedtime as needed for sleep, Disp: 7 tablet, Rfl: 1    venlafaxine (EFFEXOR-XR) 150 mg 24 hr capsule, Take 1 capsule (150 mg total) by mouth daily, Disp: 15 capsule, Rfl: 1    Current Allergies     Allergies as of 02/04/2019    (No Known Allergies)            The following portions of the patient's history were reviewed and updated as appropriate: allergies, current medications, past family history, past medical history, past social history, past surgical history and problem list      Past Medical History:   Diagnosis Date    ADHD     Anxiety     Asthma     Class 2 obesity due to excess calories without serious comorbidity with body mass index (BMI) of 35 0 to 35 9 in adult 11/3/2018    Depression     Gallstones     GERD (gastroesophageal reflux disease)     Heart murmur 1/30/2015    Hyperlipidemia     Scoliosis        Past Surgical History:   Procedure Laterality Date    CHOLANGIOGRAM N/A 12/1/2018    Procedure: CHOLANGIOGRAM;  Surgeon: Kaiden Johnson MD;  Location:  MAIN OR;  Service: General    CHOLECYSTECTOMY      CHOLECYSTECTOMY LAPAROSCOPIC N/A 12/1/2018    Procedure: CHOLECYSTECTOMY LAPAROSCOPIC;  Surgeon: Kaiden Johnson MD;  Location:  MAIN OR;  Service: General    EYE MUSCLE SURGERY      MN ESOPHAGOGASTRODUODENOSCOPY TRANSORAL DIAGNOSTIC N/A 11/27/2018    Procedure: ESOPHAGOGASTRODUODENOSCOPY (EGD) with bx;  Surgeon: Abel Roy MD;  Location: [facility]; Service: General       History reviewed  No pertinent family history  Medications have been verified          Objective   /58   Pulse 93 Temp 97 6 °F (36 4 °C)   Resp 16   Ht 4' 10" (1 473 m)   Wt 73 5 kg (162 lb)   SpO2 98%   BMI 33 86 kg/m²        Physical Exam     Physical Exam   Constitutional: She is oriented to person, place, and time  Vital signs are normal  She appears well-developed and well-nourished  She is cooperative  Non-toxic appearance  She does not have a sickly appearance  She appears ill  No distress  HENT:   Head: Normocephalic and atraumatic  Right Ear: Hearing, tympanic membrane, external ear and ear canal normal    Left Ear: Hearing, tympanic membrane, external ear and ear canal normal    Nose: Nose normal  No mucosal edema, rhinorrhea or sinus tenderness  Right sinus exhibits no maxillary sinus tenderness and no frontal sinus tenderness  Left sinus exhibits no maxillary sinus tenderness and no frontal sinus tenderness  Mouth/Throat: Uvula is midline, oropharynx is clear and moist and mucous membranes are normal  Normal dentition  No oropharyngeal exudate  Eyes: Pupils are equal, round, and reactive to light  Conjunctivae, EOM and lids are normal  Right eye exhibits no discharge  Left eye exhibits no discharge  Neck: Trachea normal and normal range of motion  No thyroid mass and no thyromegaly present  Cardiovascular: Normal rate, regular rhythm, S1 normal, S2 normal, normal heart sounds, intact distal pulses and normal pulses  Exam reveals no gallop and no friction rub  No murmur heard  Pulmonary/Chest: Effort normal and breath sounds normal  No respiratory distress  She has no wheezes  She has no rhonchi  She has no rales  She exhibits no tenderness  Abdominal: Normal appearance and bowel sounds are normal  There is no hepatosplenomegaly  There is no tenderness  There is no rigidity, no rebound, no guarding and negative Sanchez's sign  No hernia  Musculoskeletal: Normal range of motion  She exhibits no edema  Lymphadenopathy:     She has no cervical adenopathy     Neurological: She is alert and oriented to person, place, and time  Skin: Skin is warm and dry  No rash noted  She is not diaphoretic  Psychiatric: She has a normal mood and affect  Her behavior is normal  Thought content normal    Nursing note and vitals reviewed

## 2019-02-27 ENCOUNTER — OFFICE VISIT (OUTPATIENT)
Dept: FAMILY MEDICINE CLINIC | Facility: CLINIC | Age: 19
End: 2019-02-27
Payer: COMMERCIAL

## 2019-02-27 VITALS
HEIGHT: 59 IN | HEART RATE: 72 BPM | RESPIRATION RATE: 16 BRPM | WEIGHT: 160 LBS | SYSTOLIC BLOOD PRESSURE: 128 MMHG | TEMPERATURE: 98 F | OXYGEN SATURATION: 98 % | BODY MASS INDEX: 32.25 KG/M2 | DIASTOLIC BLOOD PRESSURE: 86 MMHG

## 2019-02-27 DIAGNOSIS — K21.00 REFLUX ESOPHAGITIS: ICD-10-CM

## 2019-02-27 DIAGNOSIS — Z00.00 HEALTHCARE MAINTENANCE: Primary | ICD-10-CM

## 2019-02-27 PROCEDURE — 99385 PREV VISIT NEW AGE 18-39: CPT | Performed by: FAMILY MEDICINE

## 2019-02-27 RX ORDER — FAMOTIDINE 20 MG/1
20 TABLET, FILM COATED ORAL 2 TIMES DAILY
Qty: 60 TABLET | Refills: 5 | Status: SHIPPED | OUTPATIENT
Start: 2019-02-27 | End: 2019-05-21 | Stop reason: SDUPTHER

## 2019-02-27 NOTE — PROGRESS NOTES
Assessment/Plan:     Diagnoses and all orders for this visit:    Healthcare maintenance  Comments: It was discussed about immunizations, diet, exercise and safety measures  Reflux esophagitis  Comments:  Was given refills for her famotidine  Orders:  -     famotidine (PEPCID) 20 mg tablet; Take 1 tablet (20 mg total) by mouth 2 (two) times a day          There are no Patient Instructions on file for this visit  Return in about 3 months (around 5/27/2019)  Subjective:      Patient ID: Sharon Lyles is a 19 y o  female  Chief Complaint   Patient presents with    Physical Exam       She is here today as a new patient to establish and for wellness exam   She also used refill for her famotidine  She followed up with psychiatrist for her mental health issues  She denies any complaint today  She has history of asthma but she grew out of it and she does not use inhalers anymore  The following portions of the patient's history were reviewed and updated as appropriate: allergies, current medications, past family history, past medical history, past social history, past surgical history and problem list     Review of Systems   Constitutional: Negative for chills and fever  HENT: Negative for trouble swallowing  Eyes: Negative for visual disturbance  Respiratory: Negative for cough and shortness of breath  Cardiovascular: Negative for chest pain, palpitations and leg swelling  Gastrointestinal: Negative for abdominal pain, constipation and diarrhea  Endocrine: Negative for cold intolerance and heat intolerance  Genitourinary: Negative for difficulty urinating and dysuria  Musculoskeletal: Negative for gait problem  Skin: Negative for rash  Neurological: Negative for dizziness, tremors, seizures and headaches  Hematological: Negative for adenopathy  Psychiatric/Behavioral: Negative for behavioral problems           Current Outpatient Medications   Medication Sig Dispense Refill  albuterol (VENTOLIN HFA) 90 mcg/act inhaler Inhale 2 puffs every 6 (six) hours as needed        ARIPiprazole (ABILIFY) 2 mg tablet Take 1 tablet (2 mg total) by mouth daily 15 tablet 1    famotidine (PEPCID) 20 mg tablet Take 1 tablet (20 mg total) by mouth 2 (two) times a day 60 tablet 5    gabapentin (NEURONTIN) 300 mg capsule Take 1 capsule (300 mg total) by mouth daily at bedtime 15 capsule 1    hydrOXYzine HCL (ATARAX) 50 mg tablet Take 1 tablet (50 mg total) by mouth 3 (three) times a day 45 tablet 1    traZODone (DESYREL) 150 mg tablet Take 0 5 tablets (75 mg total) by mouth daily at bedtime as needed for sleep 7 tablet 1    venlafaxine (EFFEXOR-XR) 150 mg 24 hr capsule Take 1 capsule (150 mg total) by mouth daily 15 capsule 1     No current facility-administered medications for this visit  Objective:    /86 (BP Location: Left arm, Patient Position: Sitting, Cuff Size: Standard)   Pulse 72   Temp 98 °F (36 7 °C) (Tympanic)   Resp 16   Ht 4' 10 5" (1 486 m)   Wt 72 6 kg (160 lb)   SpO2 98%   BMI 32 87 kg/m²        Physical Exam   Constitutional: She is oriented to person, place, and time  She appears well-developed and well-nourished  HENT:   Head: Normocephalic and atraumatic  Eyes: Pupils are equal, round, and reactive to light  EOM are normal    Neck: Normal range of motion  Neck supple  Cardiovascular: Normal rate, regular rhythm and normal heart sounds  Pulmonary/Chest: Effort normal and breath sounds normal    Abdominal: Soft  Bowel sounds are normal    Musculoskeletal: Normal range of motion  She exhibits no edema  Lymphadenopathy:     She has no cervical adenopathy  Neurological: She is alert and oriented to person, place, and time  No cranial nerve deficit  Skin: Skin is warm  Psychiatric: She has a normal mood and affect  Nursing note and vitals reviewed               Chico Willard MD

## 2019-03-14 ENCOUNTER — HOSPITAL ENCOUNTER (EMERGENCY)
Facility: HOSPITAL | Age: 19
Discharge: HOME | End: 2019-03-14
Attending: EMERGENCY MEDICINE
Payer: COMMERCIAL

## 2019-03-14 VITALS
BODY MASS INDEX: 33.32 KG/M2 | TEMPERATURE: 99.1 F | RESPIRATION RATE: 20 BRPM | WEIGHT: 158.73 LBS | SYSTOLIC BLOOD PRESSURE: 106 MMHG | HEIGHT: 58 IN | DIASTOLIC BLOOD PRESSURE: 66 MMHG | OXYGEN SATURATION: 100 % | HEART RATE: 78 BPM

## 2019-03-14 DIAGNOSIS — M54.10 RADICULOPATHY: ICD-10-CM

## 2019-03-14 DIAGNOSIS — M54.50 ACUTE LOW BACK PAIN: Primary | ICD-10-CM

## 2019-03-14 PROCEDURE — 96372 THER/PROPH/DIAG INJ SC/IM: CPT

## 2019-03-14 PROCEDURE — 99283 EMERGENCY DEPT VISIT LOW MDM: CPT

## 2019-03-14 RX ORDER — PREDNISONE 20 MG/1
60 TABLET ORAL ONCE
Status: COMPLETED | OUTPATIENT
Start: 2019-03-14 | End: 2019-03-14

## 2019-03-14 RX ORDER — PREDNISONE 20 MG/1
20 TABLET ORAL 3 TIMES DAILY
Qty: 12 TABLET | Refills: 0 | Status: SHIPPED | OUTPATIENT
Start: 2019-03-14 | End: 2019-03-18

## 2019-03-14 RX ORDER — NAPROXEN 500 MG/1
500 TABLET ORAL 2 TIMES DAILY WITH MEALS
Qty: 30 TABLET | Refills: 0 | Status: SHIPPED | OUTPATIENT
Start: 2019-03-14 | End: 2019-04-23

## 2019-03-14 RX ORDER — KETOROLAC TROMETHAMINE 30 MG/ML
15 INJECTION, SOLUTION INTRAMUSCULAR; INTRAVENOUS ONCE
Status: COMPLETED | OUTPATIENT
Start: 2019-03-14 | End: 2019-03-14

## 2019-03-14 RX ORDER — CHLORZOXAZONE 500 MG/1
500 TABLET ORAL 4 TIMES DAILY PRN
Qty: 10 TABLET | Refills: 0 | Status: SHIPPED | OUTPATIENT
Start: 2019-03-14 | End: 2019-04-23

## 2019-03-14 RX ADMIN — PREDNISONE 60 MG: 20 TABLET ORAL at 14:35

## 2019-03-14 RX ADMIN — KETOROLAC TROMETHAMINE 15 MG: 30 INJECTION, SOLUTION INTRAMUSCULAR; INTRAVENOUS at 14:35

## 2019-03-14 NOTE — ED PROVIDER NOTES
History  Chief Complaint   Patient presents with    Back Pain     pt c/o bilat lower back pain to legs  pt denies injury, pt states pain started last night  57-year-old female presenting for evaluation of lower back pain  Patient states that she had back pain starting last night  She reports lower back pain radiating around to anterior thigh  She denies taking anything for pain prior to arrival   She denies any numbness tingling or weakness of the lower extremities  No dysuria hematuria loss of bowel or bladder function or saddle anesthesia  She states that she has been lying in bed recently and has not had any increased activity or injury  No trauma or motor vehicle accident          Prior to Admission Medications   Prescriptions Last Dose Informant Patient Reported? Taking?    ARIPiprazole (ABILIFY) 2 mg tablet   No No   Sig: Take 1 tablet (2 mg total) by mouth daily   albuterol (VENTOLIN HFA) 90 mcg/act inhaler  Self Yes No   Sig: Inhale 2 puffs every 6 (six) hours as needed     famotidine (PEPCID) 20 mg tablet   No No   Sig: Take 1 tablet (20 mg total) by mouth 2 (two) times a day   gabapentin (NEURONTIN) 300 mg capsule   No No   Sig: Take 1 capsule (300 mg total) by mouth daily at bedtime   hydrOXYzine HCL (ATARAX) 50 mg tablet   No No   Sig: Take 1 tablet (50 mg total) by mouth 3 (three) times a day   traZODone (DESYREL) 150 mg tablet   No No   Sig: Take 0 5 tablets (75 mg total) by mouth daily at bedtime as needed for sleep   venlafaxine (EFFEXOR-XR) 150 mg 24 hr capsule   No No   Sig: Take 1 capsule (150 mg total) by mouth daily      Facility-Administered Medications: None       Past Medical History:   Diagnosis Date    ADHD     Anxiety     Asthma     Class 2 obesity due to excess calories without serious comorbidity with body mass index (BMI) of 35 0 to 35 9 in adult 11/3/2018    Depression     Gallstones     GERD (gastroesophageal reflux disease)     Heart murmur 1/30/2015    Hyperlipidemia     Scoliosis        Past Surgical History:   Procedure Laterality Date    CHOLANGIOGRAM N/A 12/1/2018    Procedure: CHOLANGIOGRAM;  Surgeon: Vianey Thurman MD;  Location:  MAIN OR;  Service: General    CHOLECYSTECTOMY      CHOLECYSTECTOMY LAPAROSCOPIC N/A 12/1/2018    Procedure: CHOLECYSTECTOMY LAPAROSCOPIC;  Surgeon: Vianey Thurman MD;  Location:  MAIN OR;  Service: General    EYE MUSCLE SURGERY      [place] ESOPHAGOGASTRODUODENOSCOPY TRANSORAL DIAGNOSTIC N/A 11/27/2018    Procedure: ESOPHAGOGASTRODUODENOSCOPY (EGD) with bx;  Surgeon: Veronique Wallis MD;  Location: [facility]; Service: General       Family History   Problem Relation Age of Onset    Pneumonia Brother      I have reviewed and agree with the history as documented  Social History     Tobacco Use    Smoking status: Never Smoker    Smokeless tobacco: Never Used    Tobacco comment: no passive smoke exposure   Substance Use Topics    Alcohol use: No    Drug use: No        Review of Systems   All other systems reviewed and are negative  Physical Exam  Physical Exam   Constitutional: She is oriented to person, place, and time  She appears well-developed and well-nourished  No distress  HENT:   Head: Normocephalic and atraumatic  Eyes: Conjunctivae are normal    EOM grossly intact   Neck: Normal range of motion  Neck supple  No JVD present  Cardiovascular: Normal rate  Pulmonary/Chest: Effort normal    Abdominal: Soft  Musculoskeletal: She exhibits no edema, tenderness or deformity  Arms:  FROM lower extremities bilaterally, visualized patient ambulating from triage to exam room, steady gait, cap refill brisk, strength and sensation intact throughout   Neurological: She is alert and oriented to person, place, and time  Skin: Skin is warm and dry  Capillary refill takes less than 2 seconds  Psychiatric: She has a normal mood and affect   Her behavior is normal    Nursing note and vitals reviewed  Vital Signs  ED Triage Vitals   Temperature Pulse Respirations Blood Pressure SpO2   03/14/19 1345 03/14/19 1345 03/14/19 1345 03/14/19 1345 03/14/19 1345   99 1 °F (37 3 °C) 78 20 106/66 100 %      Temp Source Heart Rate Source Patient Position - Orthostatic VS BP Location FiO2 (%)   03/14/19 1345 03/14/19 1345 03/14/19 1345 03/14/19 1345 --   Tympanic Monitor Sitting Right arm       Pain Score       03/14/19 1347       Worst Possible Pain           Vitals:    03/14/19 1345   BP: 106/66   Pulse: 78   Patient Position - Orthostatic VS: Sitting       qSOFA     Row Name 03/14/19 1345                Altered mental status GCS < 15  --        Respiratory Rate > / =70  0        Systolic BP < / =041  0        Q Sofa Score  0              Visual Acuity      ED Medications  Medications   ketorolac (TORADOL) injection 15 mg (15 mg Intramuscular Given 3/14/19 1435)   predniSONE tablet 60 mg (60 mg Oral Given 3/14/19 1435)       Diagnostic Studies  Results Reviewed     None                 No orders to display              Procedures  Procedures       Phone Contacts  ED Phone Contact    ED Course                               MDM  Number of Diagnoses or Management Options  Acute low back pain:   Radiculopathy:   Diagnosis management comments: 78-year-old female presenting for evaluation of atraumatic lower back pain, patient denies any injury or trauma, she has full range of motion of her lower extremities, pt had no midline tenderness so xrays were not ordered, no red flag symptoms, likely radiculopathy, will send home with anti-inflammatories along with muscle relaxer and short course of steroids, f/u with pcp and ortho outpatient    strict return to ED precautions discussed  Pt verbalizes understanding and agrees with plan  Pt is stable for discharge    Portions of the record may have been created with voice recognition software   Occasional wrong word or "sound a like" substitutions may have occurred due to the inherent limitations of voice recognition software  Read the chart carefully and recognize, using context, where substitutions have occurred  Disposition  Final diagnoses:   Acute low back pain   Radiculopathy     Time reflects when diagnosis was documented in both MDM as applicable and the Disposition within this note     Time User Action Codes Description Comment    3/14/2019  2:31 PM Judy Meckel Add [M54 5] Acute low back pain     3/14/2019  2:31 PM Judy Meckel Add [M54 10] Radiculopathy       ED Disposition     ED Disposition Condition Date/Time Comment    Discharge Stable u Mar 14, 2019  2:31 PM Velmareid Alice discharge to home/self care              Follow-up Information     Follow up With Specialties Details Why Άγιος Γεώργιος MD [facility] Call in 1 day  [facility]            Discharge Medication List as of 3/14/2019  2:32 PM      START taking these medications    Details   chlorzoxazone (PARAFON FORTE) 500 mg tablet Take 1 tablet (500 mg total) by mouth 4 (four) times a day as needed for muscle spasms, Starting Thu 3/14/2019, Print      naproxen (NAPROSYN) 500 mg tablet Take 1 tablet (500 mg total) by mouth 2 (two) times a day with meals, Starting Thu 3/14/2019, Print      predniSONE 20 mg tablet Take 1 tablet (20 mg total) by mouth 3 (three) times a day for 4 days, Starting Thu 3/14/2019, Until Mon 3/18/2019, Print         CONTINUE these medications which have NOT CHANGED    Details   albuterol (VENTOLIN HFA) 90 mcg/act inhaler Inhale 2 puffs every 6 (six) hours as needed  , Starting Wed 11/19/2014, Historical Med      ARIPiprazole (ABILIFY) 2 mg tablet Take 1 tablet (2 mg total) by mouth daily, Starting Mon 12/24/2018, Print      famotidine (PEPCID) 20 mg tablet Take 1 tablet (20 mg total) by mouth 2 (two) times a day, Starting Wed 2/27/2019, Normal      gabapentin (NEURONTIN) 300 mg capsule Take 1 capsule (300 mg total) by mouth daily at bedtime, Starting Mon 12/24/2018, Print      hydrOXYzine HCL (ATARAX) 50 mg tablet Take 1 tablet (50 mg total) by mouth 3 (three) times a day, Starting Mon 12/24/2018, Print      traZODone (DESYREL) 150 mg tablet Take 0 5 tablets (75 mg total) by mouth daily at bedtime as needed for sleep, Starting Mon 12/24/2018, Print      venlafaxine (EFFEXOR-XR) 150 mg 24 hr capsule Take 1 capsule (150 mg total) by mouth daily, Starting Mon 12/24/2018, Print           No discharge procedures on file      ED Provider  Electronically Signed by           Rupinder Parkinson PA-C  03/14/19 [id number]

## 2019-04-24 ENCOUNTER — TRANSCRIBE ORDERS (OUTPATIENT)
Dept: ADMINISTRATIVE | Facility: HOSPITAL | Age: 19
End: 2019-04-24

## 2019-04-24 ENCOUNTER — APPOINTMENT (OUTPATIENT)
Dept: LAB | Facility: HOSPITAL | Age: 19
End: 2019-04-24
Payer: COMMERCIAL

## 2019-04-24 DIAGNOSIS — Z79.899 ENCOUNTER FOR LONG-TERM (CURRENT) USE OF OTHER MEDICATIONS: Primary | ICD-10-CM

## 2019-04-24 DIAGNOSIS — Z79.899 ENCOUNTER FOR LONG-TERM (CURRENT) USE OF OTHER MEDICATIONS: ICD-10-CM

## 2019-04-24 LAB
25(OH)D3 SERPL-MCNC: 15.7 NG/ML (ref 30–100)
ALBUMIN SERPL BCP-MCNC: 4.2 G/DL (ref 3–5.2)
ALP SERPL-CCNC: 74 U/L (ref 43–122)
ALT SERPL W P-5'-P-CCNC: 11 U/L (ref 9–52)
ANION GAP SERPL CALCULATED.3IONS-SCNC: 10 MMOL/L (ref 5–14)
AST SERPL W P-5'-P-CCNC: 13 U/L (ref 14–36)
BASOPHILS # BLD AUTO: 0 THOUSANDS/ΜL (ref 0–0.1)
BASOPHILS NFR BLD AUTO: 0 % (ref 0–1)
BILIRUB SERPL-MCNC: 0.2 MG/DL
BUN SERPL-MCNC: 9 MG/DL (ref 5–25)
CALCIUM SERPL-MCNC: 9.6 MG/DL (ref 8.4–10.2)
CHLORIDE SERPL-SCNC: 101 MMOL/L (ref 97–108)
CHOLEST SERPL-MCNC: 226 MG/DL
CO2 SERPL-SCNC: 28 MMOL/L (ref 22–30)
CREAT SERPL-MCNC: 0.79 MG/DL (ref 0.6–1.2)
EOSINOPHIL # BLD AUTO: 0.1 THOUSAND/ΜL (ref 0–0.4)
EOSINOPHIL NFR BLD AUTO: 2 % (ref 0–6)
ERYTHROCYTE [DISTWIDTH] IN BLOOD BY AUTOMATED COUNT: 15.2 %
EST. AVERAGE GLUCOSE BLD GHB EST-MCNC: 114 MG/DL
GFR SERPL CREATININE-BSD FRML MDRD: 107 ML/MIN/1.73SQ M
GLUCOSE P FAST SERPL-MCNC: 95 MG/DL (ref 70–99)
HBA1C MFR BLD: 5.6 % (ref 4.2–6.3)
HCT VFR BLD AUTO: 38.6 % (ref 36–46)
HDLC SERPL-MCNC: 46 MG/DL (ref 40–59)
HGB BLD-MCNC: 12 G/DL (ref 12–16)
LDLC SERPL CALC-MCNC: 161 MG/DL
LYMPHOCYTES # BLD AUTO: 3 THOUSANDS/ΜL (ref 0.5–4)
LYMPHOCYTES NFR BLD AUTO: 37 % (ref 25–45)
MCH RBC QN AUTO: 24.6 PG (ref 26–34)
MCHC RBC AUTO-ENTMCNC: 31.1 G/DL (ref 31–36)
MCV RBC AUTO: 79 FL (ref 80–100)
MONOCYTES # BLD AUTO: 0.6 THOUSAND/ΜL (ref 0.2–0.9)
MONOCYTES NFR BLD AUTO: 7 % (ref 1–10)
NEUTROPHILS # BLD AUTO: 4.4 THOUSANDS/ΜL (ref 1.8–7.8)
NEUTS SEG NFR BLD AUTO: 55 % (ref 45–65)
NONHDLC SERPL-MCNC: 180 MG/DL
PLATELET # BLD AUTO: 344 THOUSANDS/UL (ref 150–450)
PLATELET BLD QL SMEAR: ADEQUATE
PMV BLD AUTO: 8.4 FL (ref 8.9–12.7)
POTASSIUM SERPL-SCNC: 3.9 MMOL/L (ref 3.6–5)
PROT SERPL-MCNC: 6.8 G/DL (ref 5.9–8.4)
RBC # BLD AUTO: 4.89 MILLION/UL (ref 4–5.2)
RBC MORPH BLD: NORMAL
SODIUM SERPL-SCNC: 139 MMOL/L (ref 137–147)
TRIGL SERPL-MCNC: 97 MG/DL
TSH SERPL DL<=0.05 MIU/L-ACNC: 1.67 UIU/ML (ref 0.47–4.68)
WBC # BLD AUTO: 8.1 THOUSAND/UL (ref 4.5–11)

## 2019-04-24 PROCEDURE — 80061 LIPID PANEL: CPT

## 2019-04-24 PROCEDURE — 84443 ASSAY THYROID STIM HORMONE: CPT

## 2019-04-24 PROCEDURE — 85025 COMPLETE CBC W/AUTO DIFF WBC: CPT

## 2019-04-24 PROCEDURE — 82306 VITAMIN D 25 HYDROXY: CPT

## 2019-04-24 PROCEDURE — 83036 HEMOGLOBIN GLYCOSYLATED A1C: CPT | Performed by: NURSE PRACTITIONER

## 2019-04-24 PROCEDURE — 36415 COLL VENOUS BLD VENIPUNCTURE: CPT | Performed by: NURSE PRACTITIONER

## 2019-04-24 PROCEDURE — 80053 COMPREHEN METABOLIC PANEL: CPT

## 2019-04-29 ENCOUNTER — ANNUAL EXAM (OUTPATIENT)
Dept: OBGYN CLINIC | Facility: CLINIC | Age: 19
End: 2019-04-29

## 2019-04-29 VITALS
WEIGHT: 165 LBS | SYSTOLIC BLOOD PRESSURE: 120 MMHG | DIASTOLIC BLOOD PRESSURE: 60 MMHG | BODY MASS INDEX: 34.63 KG/M2 | HEIGHT: 58 IN

## 2019-04-29 DIAGNOSIS — Z01.419 ENCOUNTER FOR ANNUAL ROUTINE GYNECOLOGICAL EXAMINATION: Primary | ICD-10-CM

## 2019-04-29 DIAGNOSIS — IMO0001 CONTRACEPTION: ICD-10-CM

## 2019-04-29 DIAGNOSIS — Z12.4 SCREENING FOR CERVICAL CANCER: ICD-10-CM

## 2019-04-29 PROCEDURE — 99395 PREV VISIT EST AGE 18-39: CPT | Performed by: FAMILY MEDICINE

## 2019-04-29 PROCEDURE — G0145 SCR C/V CYTO,THINLAYER,RESCR: HCPCS | Performed by: FAMILY MEDICINE

## 2019-04-29 RX ORDER — LEVONORGESTREL AND ETHINYL ESTRADIOL 0.1-0.02MG
KIT ORAL
COMMUNITY
Start: 2017-02-09 | End: 2019-04-29 | Stop reason: SDUPTHER

## 2019-04-29 RX ORDER — LEVONORGESTREL AND ETHINYL ESTRADIOL 0.1-0.02MG
1 KIT ORAL DAILY
Qty: 28 TABLET | Refills: 11 | Status: SHIPPED | OUTPATIENT
Start: 2019-04-29 | End: 2021-04-02

## 2019-05-02 LAB
LAB AP GYN PRIMARY INTERPRETATION: NORMAL
LAB AP LMP: NORMAL
Lab: NORMAL

## 2019-05-13 ENCOUNTER — OFFICE VISIT (OUTPATIENT)
Dept: URGENT CARE | Age: 19
End: 2019-05-13
Payer: COMMERCIAL

## 2019-05-13 VITALS
RESPIRATION RATE: 18 BRPM | HEART RATE: 76 BPM | DIASTOLIC BLOOD PRESSURE: 59 MMHG | WEIGHT: 164 LBS | HEIGHT: 58 IN | BODY MASS INDEX: 34.43 KG/M2 | TEMPERATURE: 97.3 F | SYSTOLIC BLOOD PRESSURE: 134 MMHG | OXYGEN SATURATION: 100 %

## 2019-05-13 DIAGNOSIS — R19.7 VOMITING AND DIARRHEA: Primary | ICD-10-CM

## 2019-05-13 DIAGNOSIS — R11.10 VOMITING AND DIARRHEA: Primary | ICD-10-CM

## 2019-05-13 PROCEDURE — 99213 OFFICE O/P EST LOW 20 MIN: CPT | Performed by: PHYSICIAN ASSISTANT

## 2019-05-13 PROCEDURE — G0382 LEV 3 HOSP TYPE B ED VISIT: HCPCS | Performed by: PHYSICIAN ASSISTANT

## 2019-05-13 PROCEDURE — 99283 EMERGENCY DEPT VISIT LOW MDM: CPT | Performed by: PHYSICIAN ASSISTANT

## 2019-05-13 RX ORDER — ALBUTEROL SULFATE 90 UG/1
AEROSOL, METERED RESPIRATORY (INHALATION)
COMMUNITY
Start: 2016-09-06 | End: 2019-05-16

## 2019-05-13 RX ORDER — GABAPENTIN 600 MG/1
600 TABLET ORAL EVERY EVENING
Refills: 0 | COMMUNITY
Start: 2019-04-24 | End: 2019-05-21 | Stop reason: SDUPTHER

## 2019-05-13 RX ORDER — ONDANSETRON 4 MG/1
4 TABLET, ORALLY DISINTEGRATING ORAL ONCE
Status: COMPLETED | OUTPATIENT
Start: 2019-05-13 | End: 2019-05-13

## 2019-05-13 RX ORDER — CLINDAMYCIN PHOSPHATE 10 MG/G
1 GEL TOPICAL EVERY 12 HOURS
COMMUNITY
Start: 2018-05-30 | End: 2022-08-08

## 2019-05-13 RX ORDER — ONDANSETRON 4 MG/1
4 TABLET, FILM COATED ORAL EVERY 8 HOURS PRN
Qty: 20 TABLET | Refills: 0 | Status: SHIPPED | OUTPATIENT
Start: 2019-05-13 | End: 2019-05-16

## 2019-05-13 RX ORDER — TRAZODONE HYDROCHLORIDE 100 MG/1
100 TABLET ORAL
Refills: 0 | COMMUNITY
Start: 2019-04-23 | End: 2019-05-21 | Stop reason: SDUPTHER

## 2019-05-13 RX ORDER — TRAZODONE HYDROCHLORIDE 100 MG/1
TABLET ORAL
COMMUNITY
Start: 2019-04-12 | End: 2019-05-16

## 2019-05-13 RX ORDER — HYDROXYZINE HYDROCHLORIDE 25 MG/1
TABLET, FILM COATED ORAL
Refills: 0 | COMMUNITY
Start: 2019-04-22 | End: 2019-05-21 | Stop reason: SDUPTHER

## 2019-05-13 RX ADMIN — ONDANSETRON 4 MG: 4 TABLET, ORALLY DISINTEGRATING ORAL at 09:50

## 2019-05-16 ENCOUNTER — HOSPITAL ENCOUNTER (EMERGENCY)
Facility: HOSPITAL | Age: 19
Discharge: HOME | End: 2019-05-16
Attending: EMERGENCY MEDICINE | Admitting: EMERGENCY MEDICINE
Payer: COMMERCIAL

## 2019-05-16 VITALS
OXYGEN SATURATION: 98 % | SYSTOLIC BLOOD PRESSURE: 100 MMHG | WEIGHT: 162.92 LBS | RESPIRATION RATE: 16 BRPM | HEART RATE: 56 BPM | TEMPERATURE: 98.2 F | BODY MASS INDEX: 34.05 KG/M2 | DIASTOLIC BLOOD PRESSURE: 52 MMHG

## 2019-05-16 DIAGNOSIS — R11.2 NAUSEA & VOMITING: Primary | ICD-10-CM

## 2019-05-16 DIAGNOSIS — R19.7 DIARRHEA: ICD-10-CM

## 2019-05-16 LAB
ALBUMIN SERPL BCP-MCNC: 4.1 G/DL (ref 3.5–5)
ALP SERPL-CCNC: 74 U/L (ref 46–116)
ALT SERPL W P-5'-P-CCNC: 23 U/L (ref 12–78)
ANION GAP SERPL CALCULATED.3IONS-SCNC: 15 MMOL/L (ref 4–13)
AST SERPL W P-5'-P-CCNC: 13 U/L (ref 5–45)
BACTERIA UR QL AUTO: ABNORMAL /HPF
BASOPHILS # BLD AUTO: 0.04 THOUSANDS/ΜL (ref 0–0.1)
BASOPHILS NFR BLD AUTO: 0 % (ref 0–1)
BILIRUB DIRECT SERPL-MCNC: 0.13 MG/DL (ref 0–0.2)
BILIRUB SERPL-MCNC: 0.41 MG/DL (ref 0.2–1)
BILIRUB UR QL STRIP: ABNORMAL
BUN SERPL-MCNC: 8 MG/DL (ref 5–25)
CALCIUM SERPL-MCNC: 9.5 MG/DL (ref 8.3–10.1)
CHLORIDE SERPL-SCNC: 104 MMOL/L (ref 100–108)
CLARITY UR: ABNORMAL
CO2 SERPL-SCNC: 22 MMOL/L (ref 21–32)
COLOR UR: YELLOW
CREAT SERPL-MCNC: 1.07 MG/DL (ref 0.6–1.3)
EOSINOPHIL # BLD AUTO: 0.01 THOUSAND/ΜL (ref 0–0.61)
EOSINOPHIL NFR BLD AUTO: 0 % (ref 0–6)
ERYTHROCYTE [DISTWIDTH] IN BLOOD BY AUTOMATED COUNT: 15.4 % (ref 11.6–15.1)
EXT PREG TEST URINE: NORMAL
GFR SERPL CREATININE-BSD FRML MDRD: 74 ML/MIN/1.73SQ M
GLUCOSE SERPL-MCNC: 102 MG/DL (ref 65–140)
GLUCOSE UR STRIP-MCNC: ABNORMAL MG/DL
HCT VFR BLD AUTO: 37.8 % (ref 34.8–46.1)
HGB BLD-MCNC: 11.7 G/DL (ref 11.5–15.4)
HGB UR QL STRIP.AUTO: NEGATIVE
HYALINE CASTS #/AREA URNS LPF: ABNORMAL /LPF
IMM GRANULOCYTES # BLD AUTO: 0.04 THOUSAND/UL (ref 0–0.2)
IMM GRANULOCYTES NFR BLD AUTO: 0 % (ref 0–2)
KETONES UR STRIP-MCNC: ABNORMAL MG/DL
LEUKOCYTE ESTERASE UR QL STRIP: NEGATIVE
LIPASE SERPL-CCNC: 142 U/L (ref 73–393)
LYMPHOCYTES # BLD AUTO: 1.43 THOUSANDS/ΜL (ref 0.6–4.47)
LYMPHOCYTES NFR BLD AUTO: 14 % (ref 14–44)
MAGNESIUM SERPL-MCNC: 2 MG/DL (ref 1.6–2.6)
MCH RBC QN AUTO: 25.2 PG (ref 26.8–34.3)
MCHC RBC AUTO-ENTMCNC: 31 G/DL (ref 31.4–37.4)
MCV RBC AUTO: 81 FL (ref 82–98)
MONOCYTES # BLD AUTO: 0.57 THOUSAND/ΜL (ref 0.17–1.22)
MONOCYTES NFR BLD AUTO: 6 % (ref 4–12)
MUCOUS THREADS UR QL AUTO: ABNORMAL
NEUTROPHILS # BLD AUTO: 8 THOUSANDS/ΜL (ref 1.85–7.62)
NEUTS SEG NFR BLD AUTO: 80 % (ref 43–75)
NITRITE UR QL STRIP: NEGATIVE
NON-SQ EPI CELLS URNS QL MICRO: ABNORMAL /HPF
NRBC BLD AUTO-RTO: 0 /100 WBCS
PH UR STRIP.AUTO: 6 [PH] (ref 4.5–8)
PLATELET # BLD AUTO: 326 THOUSANDS/UL (ref 149–390)
PMV BLD AUTO: 10.1 FL (ref 8.9–12.7)
POTASSIUM SERPL-SCNC: 3.7 MMOL/L (ref 3.5–5.3)
PROT SERPL-MCNC: 7.8 G/DL (ref 6.4–8.2)
PROT UR STRIP-MCNC: ABNORMAL MG/DL
RBC # BLD AUTO: 4.65 MILLION/UL (ref 3.81–5.12)
RBC #/AREA URNS AUTO: ABNORMAL /HPF
SODIUM SERPL-SCNC: 141 MMOL/L (ref 136–145)
SP GR UR STRIP.AUTO: >=1.03 (ref 1–1.03)
UROBILINOGEN UR QL STRIP.AUTO: 1 E.U./DL
WBC # BLD AUTO: 10.09 THOUSAND/UL (ref 4.31–10.16)
WBC #/AREA URNS AUTO: ABNORMAL /HPF

## 2019-05-16 PROCEDURE — 80048 BASIC METABOLIC PNL TOTAL CA: CPT | Performed by: EMERGENCY MEDICINE

## 2019-05-16 PROCEDURE — 99284 EMERGENCY DEPT VISIT MOD MDM: CPT | Performed by: EMERGENCY MEDICINE

## 2019-05-16 PROCEDURE — 81001 URINALYSIS AUTO W/SCOPE: CPT

## 2019-05-16 PROCEDURE — 80076 HEPATIC FUNCTION PANEL: CPT | Performed by: EMERGENCY MEDICINE

## 2019-05-16 PROCEDURE — 83735 ASSAY OF MAGNESIUM: CPT | Performed by: EMERGENCY MEDICINE

## 2019-05-16 PROCEDURE — 99283 EMERGENCY DEPT VISIT LOW MDM: CPT

## 2019-05-16 PROCEDURE — 36415 COLL VENOUS BLD VENIPUNCTURE: CPT | Performed by: EMERGENCY MEDICINE

## 2019-05-16 PROCEDURE — 96374 THER/PROPH/DIAG INJ IV PUSH: CPT

## 2019-05-16 PROCEDURE — 83690 ASSAY OF LIPASE: CPT | Performed by: EMERGENCY MEDICINE

## 2019-05-16 PROCEDURE — 85025 COMPLETE CBC W/AUTO DIFF WBC: CPT | Performed by: EMERGENCY MEDICINE

## 2019-05-16 PROCEDURE — 96361 HYDRATE IV INFUSION ADD-ON: CPT

## 2019-05-16 PROCEDURE — 81025 URINE PREGNANCY TEST: CPT | Performed by: EMERGENCY MEDICINE

## 2019-05-16 RX ORDER — METOCLOPRAMIDE HYDROCHLORIDE 5 MG/ML
10 INJECTION INTRAMUSCULAR; INTRAVENOUS ONCE
Status: COMPLETED | OUTPATIENT
Start: 2019-05-16 | End: 2019-05-16

## 2019-05-16 RX ORDER — DICYCLOMINE HCL 20 MG
20 TABLET ORAL 2 TIMES DAILY
Qty: 20 TABLET | Refills: 0 | Status: SHIPPED | OUTPATIENT
Start: 2019-05-16 | End: 2019-05-21 | Stop reason: SDUPTHER

## 2019-05-16 RX ORDER — METOCLOPRAMIDE 10 MG/1
10 TABLET ORAL EVERY 6 HOURS
Qty: 30 TABLET | Refills: 0 | Status: SHIPPED | OUTPATIENT
Start: 2019-05-16 | End: 2019-05-21 | Stop reason: SDUPTHER

## 2019-05-16 RX ORDER — DICYCLOMINE HCL 20 MG
20 TABLET ORAL ONCE
Status: COMPLETED | OUTPATIENT
Start: 2019-05-16 | End: 2019-05-16

## 2019-05-16 RX ADMIN — METOCLOPRAMIDE 10 MG: 5 INJECTION, SOLUTION INTRAMUSCULAR; INTRAVENOUS at 07:39

## 2019-05-16 RX ADMIN — DICYCLOMINE HYDROCHLORIDE 20 MG: 20 TABLET ORAL at 08:33

## 2019-05-16 RX ADMIN — SODIUM CHLORIDE 1000 ML: 0.9 INJECTION, SOLUTION INTRAVENOUS at 07:32

## 2019-05-21 ENCOUNTER — HOSPITAL ENCOUNTER (EMERGENCY)
Facility: HOSPITAL | Age: 19
Discharge: HOME | End: 2019-05-21
Attending: EMERGENCY MEDICINE | Admitting: EMERGENCY MEDICINE
Payer: COMMERCIAL

## 2019-05-21 ENCOUNTER — HOSPITAL ENCOUNTER (EMERGENCY)
Facility: HOSPITAL | Age: 19
End: 2019-05-21
Attending: EMERGENCY MEDICINE
Payer: COMMERCIAL

## 2019-05-21 VITALS
WEIGHT: 163.13 LBS | OXYGEN SATURATION: 99 % | BODY MASS INDEX: 34.09 KG/M2 | SYSTOLIC BLOOD PRESSURE: 116 MMHG | HEART RATE: 70 BPM | DIASTOLIC BLOOD PRESSURE: 68 MMHG | RESPIRATION RATE: 16 BRPM | TEMPERATURE: 98.8 F

## 2019-05-21 VITALS
TEMPERATURE: 97.5 F | DIASTOLIC BLOOD PRESSURE: 60 MMHG | HEART RATE: 56 BPM | OXYGEN SATURATION: 100 % | BODY MASS INDEX: 34.05 KG/M2 | SYSTOLIC BLOOD PRESSURE: 124 MMHG | RESPIRATION RATE: 16 BRPM | WEIGHT: 162.92 LBS

## 2019-05-21 DIAGNOSIS — F33.2 SEVERE EPISODE OF RECURRENT MAJOR DEPRESSIVE DISORDER, WITHOUT PSYCHOTIC FEATURES (HCC): ICD-10-CM

## 2019-05-21 DIAGNOSIS — R19.7 DIARRHEA: ICD-10-CM

## 2019-05-21 DIAGNOSIS — K21.00 REFLUX ESOPHAGITIS: ICD-10-CM

## 2019-05-21 DIAGNOSIS — F39 MOOD DISORDER (HCC): ICD-10-CM

## 2019-05-21 DIAGNOSIS — F33.3 SEVERE EPISODE OF RECURRENT MAJOR DEPRESSIVE DISORDER, WITH PSYCHOTIC FEATURES (HCC): ICD-10-CM

## 2019-05-21 DIAGNOSIS — R11.2 NAUSEA & VOMITING: ICD-10-CM

## 2019-05-21 DIAGNOSIS — M41.20 SCOLIOSIS (AND KYPHOSCOLIOSIS), IDIOPATHIC: Primary | ICD-10-CM

## 2019-05-21 DIAGNOSIS — R11.10 VOMITING: Primary | ICD-10-CM

## 2019-05-21 LAB
AMPHETAMINES SERPL QL SCN: NEGATIVE
BARBITURATES UR QL: NEGATIVE
BENZODIAZ UR QL: NEGATIVE
BILIRUB UR QL STRIP: NEGATIVE
CLARITY UR: CLEAR
COCAINE UR QL: NEGATIVE
COLOR UR: ABNORMAL
ETHANOL EXG-MCNC: 0 MG/DL
EXT PREG TEST URINE: NEGATIVE
GLUCOSE UR STRIP-MCNC: NEGATIVE MG/DL
HGB UR QL STRIP.AUTO: NEGATIVE
KETONES UR STRIP-MCNC: NEGATIVE MG/DL
LEUKOCYTE ESTERASE UR QL STRIP: NEGATIVE
METHADONE UR QL: NEGATIVE
NITRITE UR QL STRIP: NEGATIVE
OPIATES UR QL SCN: NEGATIVE
PCP UR QL: NEGATIVE
PH UR STRIP.AUTO: 6 [PH]
PROT UR STRIP-MCNC: NEGATIVE MG/DL
SP GR UR STRIP.AUTO: 1.02 (ref 1–1.04)
THC UR QL: NEGATIVE
UROBILINOGEN UA: NEGATIVE MG/DL

## 2019-05-21 PROCEDURE — 99285 EMERGENCY DEPT VISIT HI MDM: CPT

## 2019-05-21 PROCEDURE — 80307 DRUG TEST PRSMV CHEM ANLYZR: CPT | Performed by: EMERGENCY MEDICINE

## 2019-05-21 PROCEDURE — 99282 EMERGENCY DEPT VISIT SF MDM: CPT | Performed by: PHYSICIAN ASSISTANT

## 2019-05-21 PROCEDURE — 81025 URINE PREGNANCY TEST: CPT | Performed by: EMERGENCY MEDICINE

## 2019-05-21 PROCEDURE — 81003 URINALYSIS AUTO W/O SCOPE: CPT | Performed by: EMERGENCY MEDICINE

## 2019-05-21 PROCEDURE — 99284 EMERGENCY DEPT VISIT MOD MDM: CPT | Performed by: EMERGENCY MEDICINE

## 2019-05-21 PROCEDURE — 82075 ASSAY OF BREATH ETHANOL: CPT | Performed by: EMERGENCY MEDICINE

## 2019-05-21 PROCEDURE — 99284 EMERGENCY DEPT VISIT MOD MDM: CPT

## 2019-05-21 RX ORDER — TRAZODONE HYDROCHLORIDE 100 MG/1
100 TABLET ORAL ONCE
Status: COMPLETED | OUTPATIENT
Start: 2019-05-21 | End: 2019-05-21

## 2019-05-21 RX ORDER — LORAZEPAM 0.5 MG/1
1 TABLET ORAL ONCE
Status: COMPLETED | OUTPATIENT
Start: 2019-05-21 | End: 2019-05-21

## 2019-05-21 RX ORDER — FAMOTIDINE 20 MG/1
20 TABLET, FILM COATED ORAL 2 TIMES DAILY
Qty: 14 TABLET | Refills: 0 | Status: SHIPPED | OUTPATIENT
Start: 2019-05-21 | End: 2019-07-18 | Stop reason: SDUPTHER

## 2019-05-21 RX ORDER — VENLAFAXINE HYDROCHLORIDE 150 MG/1
150 CAPSULE, EXTENDED RELEASE ORAL DAILY
Qty: 7 CAPSULE | Refills: 0 | Status: SHIPPED | OUTPATIENT
Start: 2019-05-21 | End: 2021-04-02 | Stop reason: SDUPTHER

## 2019-05-21 RX ORDER — TRAZODONE HYDROCHLORIDE 100 MG/1
100 TABLET ORAL
Qty: 7 TABLET | Refills: 0 | Status: SHIPPED | OUTPATIENT
Start: 2019-05-21 | End: 2021-04-02

## 2019-05-21 RX ORDER — GABAPENTIN 600 MG/1
600 TABLET ORAL EVERY EVENING
Qty: 7 TABLET | Refills: 0 | Status: SHIPPED | OUTPATIENT
Start: 2019-05-21 | End: 2021-04-02 | Stop reason: SDUPTHER

## 2019-05-21 RX ORDER — DIPHENHYDRAMINE HCL 25 MG
50 TABLET ORAL ONCE
Status: COMPLETED | OUTPATIENT
Start: 2019-05-21 | End: 2019-05-21

## 2019-05-21 RX ORDER — GABAPENTIN 300 MG/1
600 CAPSULE ORAL ONCE
Status: COMPLETED | OUTPATIENT
Start: 2019-05-21 | End: 2019-05-21

## 2019-05-21 RX ORDER — ARIPIPRAZOLE 2 MG/1
2 TABLET ORAL DAILY
Qty: 7 TABLET | Refills: 0 | Status: SHIPPED | OUTPATIENT
Start: 2019-05-21 | End: 2021-04-02 | Stop reason: SDUPTHER

## 2019-05-21 RX ORDER — HYDROXYZINE HYDROCHLORIDE 25 MG/1
25 TABLET, FILM COATED ORAL EVERY 6 HOURS PRN
Qty: 28 TABLET | Refills: 0 | Status: SHIPPED | OUTPATIENT
Start: 2019-05-21 | End: 2021-04-02 | Stop reason: SDUPTHER

## 2019-05-21 RX ORDER — METOCLOPRAMIDE 10 MG/1
10 TABLET ORAL EVERY 6 HOURS
Qty: 28 TABLET | Refills: 0 | Status: SHIPPED | OUTPATIENT
Start: 2019-05-21 | End: 2019-05-30

## 2019-05-21 RX ORDER — DICYCLOMINE HCL 20 MG
20 TABLET ORAL 2 TIMES DAILY
Qty: 14 TABLET | Refills: 0 | Status: SHIPPED | OUTPATIENT
Start: 2019-05-21 | End: 2019-05-30

## 2019-05-21 RX ADMIN — TRAZODONE HYDROCHLORIDE 100 MG: 100 TABLET ORAL at 22:33

## 2019-05-21 RX ADMIN — GABAPENTIN 600 MG: 300 CAPSULE ORAL at 22:33

## 2019-05-21 RX ADMIN — DIPHENHYDRAMINE HCL 50 MG: 25 TABLET ORAL at 18:30

## 2019-05-21 RX ADMIN — LORAZEPAM 1 MG: 0.5 TABLET ORAL at 16:12

## 2019-05-30 ENCOUNTER — OFFICE VISIT (OUTPATIENT)
Dept: FAMILY MEDICINE CLINIC | Facility: CLINIC | Age: 19
End: 2019-05-30
Payer: COMMERCIAL

## 2019-05-30 VITALS
BODY MASS INDEX: 33.58 KG/M2 | HEART RATE: 87 BPM | OXYGEN SATURATION: 98 % | HEIGHT: 58 IN | TEMPERATURE: 98.9 F | SYSTOLIC BLOOD PRESSURE: 118 MMHG | DIASTOLIC BLOOD PRESSURE: 86 MMHG | RESPIRATION RATE: 16 BRPM | WEIGHT: 160 LBS

## 2019-05-30 DIAGNOSIS — E78.49 OTHER HYPERLIPIDEMIA: Primary | ICD-10-CM

## 2019-05-30 DIAGNOSIS — K21.9 GASTROESOPHAGEAL REFLUX DISEASE WITHOUT ESOPHAGITIS: ICD-10-CM

## 2019-05-30 DIAGNOSIS — F33.3 SEVERE EPISODE OF RECURRENT MAJOR DEPRESSIVE DISORDER, WITH PSYCHOTIC FEATURES (HCC): ICD-10-CM

## 2019-05-30 DIAGNOSIS — E55.9 VITAMIN D INSUFFICIENCY: ICD-10-CM

## 2019-05-30 PROCEDURE — 99214 OFFICE O/P EST MOD 30 MIN: CPT | Performed by: FAMILY MEDICINE

## 2019-05-30 PROCEDURE — 3008F BODY MASS INDEX DOCD: CPT | Performed by: FAMILY MEDICINE

## 2019-05-30 PROCEDURE — 1036F TOBACCO NON-USER: CPT | Performed by: FAMILY MEDICINE

## 2019-05-30 RX ORDER — ERGOCALCIFEROL 1.25 MG/1
50000 CAPSULE ORAL WEEKLY
Qty: 4 CAPSULE | Refills: 3 | Status: SHIPPED | OUTPATIENT
Start: 2019-05-30 | End: 2019-09-02 | Stop reason: SDUPTHER

## 2019-06-27 DIAGNOSIS — K21.00 REFLUX ESOPHAGITIS: ICD-10-CM

## 2019-06-28 RX ORDER — FAMOTIDINE 20 MG/1
TABLET, FILM COATED ORAL
Qty: 180 TABLET | Refills: 1 | Status: SHIPPED | OUTPATIENT
Start: 2019-06-28 | End: 2019-08-18 | Stop reason: SDUPTHER

## 2019-07-18 DIAGNOSIS — K21.00 REFLUX ESOPHAGITIS: ICD-10-CM

## 2019-07-18 RX ORDER — FAMOTIDINE 20 MG/1
20 TABLET, FILM COATED ORAL 2 TIMES DAILY
Qty: 28 TABLET | Refills: 1 | Status: SHIPPED | OUTPATIENT
Start: 2019-07-18

## 2019-08-18 DIAGNOSIS — K21.00 REFLUX ESOPHAGITIS: ICD-10-CM

## 2019-08-19 RX ORDER — FAMOTIDINE 20 MG/1
20 TABLET, FILM COATED ORAL 2 TIMES DAILY
Qty: 180 TABLET | Refills: 0 | Status: SHIPPED | OUTPATIENT
Start: 2019-08-19 | End: 2020-02-12

## 2019-09-02 DIAGNOSIS — E55.9 VITAMIN D INSUFFICIENCY: ICD-10-CM

## 2019-09-03 RX ORDER — ERGOCALCIFEROL 1.25 MG/1
CAPSULE ORAL
Qty: 4 CAPSULE | Refills: 3 | Status: SHIPPED | OUTPATIENT
Start: 2019-09-03 | End: 2019-12-20 | Stop reason: SDUPTHER

## 2019-10-17 ENCOUNTER — APPOINTMENT (EMERGENCY)
Dept: RADIOLOGY | Facility: HOSPITAL | Age: 19
End: 2019-10-17
Payer: COMMERCIAL

## 2019-10-17 ENCOUNTER — HOSPITAL ENCOUNTER (EMERGENCY)
Facility: HOSPITAL | Age: 19
Discharge: HOME | End: 2019-10-17
Attending: EMERGENCY MEDICINE
Payer: COMMERCIAL

## 2019-10-17 VITALS
BODY MASS INDEX: 36.58 KG/M2 | HEART RATE: 93 BPM | SYSTOLIC BLOOD PRESSURE: 152 MMHG | OXYGEN SATURATION: 99 % | DIASTOLIC BLOOD PRESSURE: 72 MMHG | WEIGHT: 175.04 LBS | TEMPERATURE: 97.8 F | RESPIRATION RATE: 18 BRPM

## 2019-10-17 DIAGNOSIS — S83.91XA RIGHT KNEE SPRAIN: Primary | ICD-10-CM

## 2019-10-17 PROCEDURE — 99284 EMERGENCY DEPT VISIT MOD MDM: CPT | Performed by: PHYSICIAN ASSISTANT

## 2019-10-17 PROCEDURE — 99283 EMERGENCY DEPT VISIT LOW MDM: CPT

## 2019-10-17 PROCEDURE — 73564 X-RAY EXAM KNEE 4 OR MORE: CPT

## 2019-10-17 RX ORDER — IBUPROFEN 600 MG/1
600 TABLET ORAL EVERY 6 HOURS PRN
Qty: 30 TABLET | Refills: 0 | Status: SHIPPED | OUTPATIENT
Start: 2019-10-17

## 2019-10-17 NOTE — ED PROVIDER NOTES
History  Chief Complaint   Patient presents with    Leg Injury     states that she injured the back of rt knee on Tuesday while standing up     78-year-old female presenting for evaluation of right knee pain  Patient reports 2 day history of right knee pain after going to the grocery store  Patient reports pain localized posterior right knee radiating toward the ankle  She reports taking Tylenol with only minimal relief at home  Pain is worse with ambulation  Denies any numbness tingling or weakness  No direct injury or trauma  No previous injury  Prior to Admission Medications   Prescriptions Last Dose Informant Patient Reported? Taking?    ARIPiprazole (ABILIFY) 2 mg tablet   No No   Sig: Take 1 tablet (2 mg total) by mouth daily   clindamycin (CLINDAGEL) 1 % gel   Yes No   Sig: Apply 1 application topically Every 12 hours    ergocalciferol (VITAMIN D2) 50,000 units   No No   Sig: TAKE ONE CAPSULE BY MOUTH ONE TIME PER WEEK   famotidine (PEPCID) 20 mg tablet   No No   Sig: Take 1 tablet (20 mg total) by mouth 2 (two) times a day   famotidine (PEPCID) 20 mg tablet   No No   Sig: Take 1 tablet (20 mg total) by mouth 2 (two) times a day   gabapentin (NEURONTIN) 600 MG tablet   No No   Sig: Take 1 tablet (600 mg total) by mouth every evening   hydrOXYzine HCL (ATARAX) 25 mg tablet   No No   Sig: Take 1 tablet (25 mg total) by mouth every 6 (six) hours as needed for itching   levonorgestrel-ethinyl estradiol (SRONYX) 0 1-20 MG-MCG per tablet   No No   Sig: Take 1 tablet by mouth daily   traZODone (DESYREL) 100 mg tablet   No No   Sig: Take 1 tablet (100 mg total) by mouth daily at bedtime   venlafaxine (EFFEXOR-XR) 150 mg 24 hr capsule   No No   Sig: Take 1 capsule (150 mg total) by mouth daily      Facility-Administered Medications: None       Past Medical History:   Diagnosis Date    ADHD     Anxiety     Asthma     Bipolar 1 disorder (HCC)     Class 2 obesity due to excess calories without serious comorbidity with body mass index (BMI) of 35 0 to 35 9 in adult 11/3/2018    Depression     Gallstones     GERD (gastroesophageal reflux disease)     Heart murmur 1/30/2015    Hyperlipidemia     Scoliosis        Past Surgical History:   Procedure Laterality Date    CHOLANGIOGRAM N/A 12/1/2018    Procedure: CHOLANGIOGRAM;  Surgeon: Lesley Stock MD;  Location:  MAIN OR;  Service: General    CHOLECYSTECTOMY      CHOLECYSTECTOMY LAPAROSCOPIC N/A 12/1/2018    Procedure: CHOLECYSTECTOMY LAPAROSCOPIC;  Surgeon: Lesley Stock MD;  [facility];  Service: General    EYE MUSCLE SURGERY      [place] ESOPHAGOGASTRODUODENOSCOPY TRANSORAL DIAGNOSTIC N/A 11/27/2018    Procedure: ESOPHAGOGASTRODUODENOSCOPY (EGD) with bx;  Surgeon: Thiago Valladares MD;  [facility]; Service: General       Family History   Problem Relation Age of Onset    Heart attack Mother     Depression Mother     Mental illness Mother     Coronary artery disease Mother     Hypertension Mother     Diabetes Mother     Hyperlipidemia Mother     Pneumonia Brother     Hypertension Maternal Grandmother     Stroke Maternal Grandmother     Diabetes Maternal Grandmother     Glaucoma Maternal Grandmother     Kidney disease Maternal Grandmother     Sarcoidosis Maternal Grandmother     Diabetes Maternal Grandfather     Hypertension Maternal Grandfather     Stroke Maternal Grandfather     Glaucoma Maternal Grandfather     Heart attack Maternal Grandfather      I have reviewed and agree with the history as documented  Social History     Tobacco Use    Smoking status: Never Smoker    Smokeless tobacco: Never Used    Tobacco comment: no passive smoke exposure   Substance Use Topics    Alcohol use: No    Drug use: No        Review of Systems   All other systems reviewed and are negative  Physical Exam  Physical Exam   Constitutional: She is oriented to person, place, and time   She appears well-developed and well-nourished  No distress  HENT:   Head: Normocephalic and atraumatic  Eyes: Conjunctivae are normal    EOM grossly intact   Neck: Normal range of motion  Neck supple  No JVD present  Cardiovascular: Normal rate  Pulmonary/Chest: Effort normal    Abdominal: Soft  Musculoskeletal:        Legs:  FROM, steady gait, cap refill brisk, strength and sensation grossly intact throughout   Neurological: She is alert and oriented to person, place, and time  Skin: Skin is warm and dry  Capillary refill takes less than 2 seconds  Psychiatric: She has a normal mood and affect  Her behavior is normal    Nursing note and vitals reviewed  Vital Signs  ED Triage Vitals [10/17/19 1204]   Temperature Pulse Respirations Blood Pressure SpO2   97 8 °F (36 6 °C) 93 18 152/72 99 %      Temp Source Heart Rate Source Patient Position - Orthostatic VS BP Location FiO2 (%)   Tympanic Monitor Sitting Left arm --      Pain Score       Worst Possible Pain           Vitals:    10/17/19 1204   BP: 152/72   Pulse: 93   Patient Position - Orthostatic VS: Sitting         Visual Acuity      ED Medications  Medications - No data to display    Diagnostic Studies  Results Reviewed     None                 XR knee 4+ vw right injury   ED Interpretation by Kobe Valdivia PA-C (10/17 1253)   NAD      Final Result by Colby Dillon MD (10/17 8093)      No acute osseous abnormality              Workstation performed: [id number]                    Procedures  Procedures       ED Course                               MDM  Number of Diagnoses or Management Options  Right knee sprain:   Diagnosis management comments: 75-year-old female presenting for evaluation of atraumatic right knee pain, patient has benign physical examination, x-ray of the right knee shows no acute osseous abnormality, likely contusion versus sprain, will apply Ace bandage, she is ambulating around the exam room without difficulty, she is distally neurovascularly intact, advised motrin and tylenol ice and elevation for symptom relief at home, f/u with pcp and ortho as an outpatient    All imaging discussed with patient, strict return to ED precautions discussed  Pt verbalizes understanding and agrees with plan  Pt is stable for discharge    Portions of the record may have been created with voice recognition software  Occasional wrong word or "sound a like" substitutions may have occurred due to the inherent limitations of voice recognition software  Read the chart carefully and recognize, using context, where substitutions have occurred  Disposition  Final diagnoses:   Right knee sprain     Time reflects when diagnosis was documented in both MDM as applicable and the Disposition within this note     Time User Action Codes Description Comment    10/17/2019  1:00 PM Melissa Salmon Add [[id number]] Right knee sprain       ED Disposition     ED Disposition Condition Date/Time Comment    Discharge Stable u Oct 17, 2019  1:00 PM Justice Meza discharge to home/self care              Follow-up Information     Follow up With Specialties Details Why Άγιος Γεώργιος MD [facility] Call in 1 day  [facility]            Discharge Medication List as of 10/17/2019  1:01 PM      START taking these medications    Details   ibuprofen (MOTRIN) 600 mg tablet Take 1 tablet (600 mg total) by mouth every 6 (six) hours as needed for mild pain, Starting Thu 10/17/2019, Print         CONTINUE these medications which have NOT CHANGED    Details   ARIPiprazole (ABILIFY) 2 mg tablet Take 1 tablet (2 mg total) by mouth daily, Starting Tue 5/21/2019, Print      clindamycin (CLINDAGEL) 1 % gel Apply 1 application topically Every 12 hours , Starting Wed 5/30/2018, Historical Med      ergocalciferol (VITAMIN D2) 50,000 units TAKE ONE CAPSULE BY MOUTH ONE TIME PER WEEK, Normal      !! famotidine (PEPCID) 20 mg tablet Take 1 tablet (20 mg total) by mouth 2 (two) times a day, Starting Thu 7/18/2019, Normal      !! famotidine (PEPCID) 20 mg tablet Take 1 tablet (20 mg total) by mouth 2 (two) times a day, Starting Mon 8/19/2019, Normal      gabapentin (NEURONTIN) 600 MG tablet Take 1 tablet (600 mg total) by mouth every evening, Starting Tue 5/21/2019, Print      hydrOXYzine HCL (ATARAX) 25 mg tablet Take 1 tablet (25 mg total) by mouth every 6 (six) hours as needed for itching, Starting Tue 5/21/2019, Print      levonorgestrel-ethinyl estradiol ([address]) 0 1-20 MG-MCG per tablet Take 1 tablet by mouth daily, Starting Mon 4/29/2019, Normal      traZODone (DESYREL) 100 mg tablet Take 1 tablet (100 mg total) by mouth daily at bedtime, Starting Tue 5/21/2019, Print      venlafaxine (EFFEXOR-XR) 150 mg 24 hr capsule Take 1 capsule (150 mg total) by mouth daily, Starting Tue 5/21/2019, Print       !! - Potential duplicate medications found  Please discuss with provider  No discharge procedures on file      ED Provider  Electronically Signed by           Radha Hutson PA-C  10/17/19 5170

## 2019-12-20 DIAGNOSIS — E55.9 VITAMIN D INSUFFICIENCY: ICD-10-CM

## 2019-12-20 RX ORDER — ERGOCALCIFEROL 1.25 MG/1
CAPSULE ORAL
Qty: 4 CAPSULE | Refills: 3 | Status: SHIPPED | OUTPATIENT
Start: 2019-12-20 | End: 2020-02-05

## 2019-12-29 ENCOUNTER — HOSPITAL ENCOUNTER (EMERGENCY)
Facility: HOSPITAL | Age: 19
Discharge: HOME | End: 2019-12-29
Attending: EMERGENCY MEDICINE
Payer: COMMERCIAL

## 2019-12-29 VITALS
OXYGEN SATURATION: 97 % | HEART RATE: 97 BPM | TEMPERATURE: 97.8 F | SYSTOLIC BLOOD PRESSURE: 133 MMHG | BODY MASS INDEX: 37.92 KG/M2 | WEIGHT: 181.44 LBS | DIASTOLIC BLOOD PRESSURE: 71 MMHG | RESPIRATION RATE: 20 BRPM

## 2019-12-29 DIAGNOSIS — J06.9 VIRAL URI WITH COUGH: Primary | ICD-10-CM

## 2019-12-29 PROCEDURE — 99284 EMERGENCY DEPT VISIT MOD MDM: CPT | Performed by: EMERGENCY MEDICINE

## 2019-12-29 PROCEDURE — 99283 EMERGENCY DEPT VISIT LOW MDM: CPT

## 2019-12-29 RX ORDER — FLUTICASONE PROPIONATE 50 MCG
1 SPRAY, SUSPENSION (ML) NASAL DAILY
Qty: 16 G | Refills: 0 | Status: SHIPPED | OUTPATIENT
Start: 2019-12-29 | End: 2021-04-02

## 2019-12-29 RX ORDER — BENZONATATE 100 MG/1
100 CAPSULE ORAL EVERY 8 HOURS
Qty: 21 CAPSULE | Refills: 0 | Status: SHIPPED | OUTPATIENT
Start: 2019-12-29 | End: 2021-04-02

## 2019-12-29 RX ORDER — GUAIFENESIN 600 MG
1200 TABLET, EXTENDED RELEASE 12 HR ORAL EVERY 12 HOURS SCHEDULED
Qty: 30 TABLET | Refills: 0 | Status: SHIPPED | OUTPATIENT
Start: 2019-12-29 | End: 2020-03-23 | Stop reason: SDUPTHER

## 2019-12-29 RX ORDER — ALBUTEROL SULFATE 90 UG/1
2 AEROSOL, METERED RESPIRATORY (INHALATION) EVERY 4 HOURS PRN
Qty: 1 INHALER | Refills: 0 | Status: SHIPPED | OUTPATIENT
Start: 2019-12-29 | End: 2022-07-15 | Stop reason: SDUPTHER

## 2019-12-29 NOTE — ED PROVIDER NOTES
History  Chief Complaint   Patient presents with    Cough     cough x 2 days     18-year-old female presents with complaint of URI symptoms  Over past couple days she has had runny nose, congestion, coughing  She has mild chest soreness from coughing and has had posttussive vomiting  She denies any other GI symptoms are known fevers  Her mother is had identical symptoms and they report that and uncle was sick prior to them and is likely the one who made them ill  No medications have been given  Cough   Cough characteristics:  Non-productive  Severity:  Moderate  Onset quality:  Gradual  Timing:  Constant  Progression:  Waxing and waning  Chronicity:  New  Relieved by:  Nothing  Worsened by:  Nothing  Ineffective treatments:  None tried  Associated symptoms: rhinorrhea, sore throat and wheezing    Associated symptoms: no chest pain, no chills, no diaphoresis, no ear fullness, no ear pain, no eye discharge, no fever, no headaches, no myalgias, no rash, no shortness of breath and no sinus congestion        Prior to Admission Medications   Prescriptions Last Dose Informant Patient Reported? Taking?    ARIPiprazole (ABILIFY) 2 mg tablet   No No   Sig: Take 1 tablet (2 mg total) by mouth daily   clindamycin (CLINDAGEL) 1 % gel Not Taking at Unknown time  Yes No   Sig: Apply 1 application topically Every 12 hours    ergocalciferol (VITAMIN D2) 50,000 units   No No   Sig: TAKE ONE CAPSULE BY MOUTH ONE TIME PER WEEK   famotidine (PEPCID) 20 mg tablet   No No   Sig: Take 1 tablet (20 mg total) by mouth 2 (two) times a day   famotidine (PEPCID) 20 mg tablet   No No   Sig: Take 1 tablet (20 mg total) by mouth 2 (two) times a day   gabapentin (NEURONTIN) 600 MG tablet   No No   Sig: Take 1 tablet (600 mg total) by mouth every evening   hydrOXYzine HCL (ATARAX) 25 mg tablet   No No   Sig: Take 1 tablet (25 mg total) by mouth every 6 (six) hours as needed for itching   ibuprofen (MOTRIN) 600 mg tablet   No No   Sig: Take 1 tablet (600 mg total) by mouth every 6 (six) hours as needed for mild pain   levonorgestrel-ethinyl estradiol (SRONYX) 0 1-20 MG-MCG per tablet   No No   Sig: Take 1 tablet by mouth daily   traZODone (DESYREL) 100 mg tablet Not Taking at Unknown time  No No   Sig: Take 1 tablet (100 mg total) by mouth daily at bedtime   Patient not taking: Reported on 12/29/2019   venlafaxine (EFFEXOR-XR) 150 mg 24 hr capsule   No No   Sig: Take 1 capsule (150 mg total) by mouth daily      Facility-Administered Medications: None       Past Medical History:   Diagnosis Date    ADHD     Anxiety     Asthma     [facility]     Class 2 obesity due to excess calories without serious comorbidity with body mass index (BMI) of 35 0 to 35 9 in adult 11/3/2018    Depression     Gallstones     GERD (gastroesophageal reflux disease)     Heart murmur 1/30/2015    Hyperlipidemia     Scoliosis        Past Surgical History:   Procedure Laterality Date    CHOLANGIOGRAM N/A 12/1/2018    Procedure: CHOLANGIOGRAM;  Surgeon: Kaye Cuenca MD;  Location:  MAIN OR;  Service: General    CHOLECYSTECTOMY     Giorgio Schulz N/A 12/1/2018    Procedure: CHOLECYSTECTOMY LAPAROSCOPIC;  Surgeon: Kaye Cuenca MD;  Location:  MAIN OR;  Service: General    EYE MUSCLE SURGERY      [place] ESOPHAGOGASTRODUODENOSCOPY TRANSORAL DIAGNOSTIC N/A 11/27/2018    Procedure: ESOPHAGOGASTRODUODENOSCOPY (EGD) with bx;  Surgeon: Anamaria Muniz MD;  [facility];   Service: General       Family History   Problem Relation Age of Onset    Heart attack Mother     Depression Mother     Mental illness Mother     Coronary artery disease Mother     Hypertension Mother     Diabetes Mother     Hyperlipidemia Mother     Pneumonia Brother     Hypertension Maternal Grandmother     Stroke Maternal Grandmother     Diabetes Maternal Grandmother     Glaucoma Maternal Grandmother     Kidney disease Maternal Grandmother     Sarcoidosis Maternal Grandmother     Diabetes Maternal Grandfather     Hypertension Maternal Grandfather     Stroke Maternal Grandfather     Glaucoma Maternal Grandfather     Heart attack Maternal Grandfather      I have reviewed and agree with the history as documented  Social History     Tobacco Use    Smoking status: Never Smoker    Smokeless tobacco: Never Used    Tobacco comment: no passive smoke exposure   Substance Use Topics    Alcohol use: No    Drug use: No        Review of Systems   Constitutional: Positive for fatigue  Negative for appetite change, chills, diaphoresis and fever  HENT: Positive for postnasal drip, rhinorrhea and sore throat  Negative for ear pain, sinus pain and trouble swallowing  Eyes: Negative for discharge, redness and itching  Respiratory: Positive for cough and wheezing  Negative for chest tightness and shortness of breath  Cardiovascular: Negative for chest pain and leg swelling  Gastrointestinal: Negative for abdominal pain, constipation, diarrhea, nausea and vomiting  Endocrine: Negative  Genitourinary: Negative for difficulty urinating and dysuria  Musculoskeletal: Negative for back pain and myalgias  Skin: Negative for rash  Allergic/Immunologic: Negative  Neurological: Negative for dizziness, numbness and headaches  Hematological: Negative  Psychiatric/Behavioral: Negative  Physical Exam  Physical Exam   Constitutional: She is oriented to person, place, and time  She appears well-developed and well-nourished  HENT:   Head: Normocephalic and atraumatic  Right Ear: Tympanic membrane normal    Left Ear: Tympanic membrane normal    Nose: Mucosal edema and rhinorrhea present  Mouth/Throat: Uvula is midline, oropharynx is clear and moist and mucous membranes are normal  No tonsillar exudate  Eyes: Pupils are equal, round, and reactive to light  Conjunctivae and EOM are normal    Neck: Normal range of motion  Neck supple  Cardiovascular: Normal rate, regular rhythm and normal heart sounds  Pulmonary/Chest: Effort normal and breath sounds normal  No respiratory distress  She has no wheezes  Abdominal: Soft  Bowel sounds are normal  There is no tenderness  There is no guarding  Musculoskeletal: She exhibits no edema, tenderness or deformity  Neurological: She is alert and oriented to person, place, and time  Skin: Skin is warm and dry  Capillary refill takes less than 2 seconds  No rash noted  Psychiatric: She has a normal mood and affect  Her behavior is normal    Nursing note and vitals reviewed  Vital Signs  ED Triage Vitals [12/29/19 1032]   Temperature Pulse Respirations Blood Pressure SpO2   97 8 °F (36 6 °C) 97 20 133/71 97 %      Temp Source Heart Rate Source Patient Position - Orthostatic VS BP Location FiO2 (%)   Tympanic Monitor Sitting Left arm --      Pain Score       --           Vitals:    12/29/19 1032   BP: 133/71   Pulse: 97   Patient Position - Orthostatic VS: Sitting         Visual Acuity      ED Medications  Medications - No data to display    Diagnostic Studies  Results Reviewed     None                 No orders to display              Procedures  Procedures         ED Course                               MDM      Disposition  Final diagnoses:   Viral URI with cough     Time reflects when diagnosis was documented in both MDM as applicable and the Disposition within this note     Time User Action Codes Description Comment    12/29/2019 10:38 AM Mario Enriquez Add [J06 [id number],  B97 89] Viral URI with cough       ED Disposition     ED Disposition Condition Date/Time Comment    Discharge Stable Sun Dec 29, 2019 10:38 AM Kerry Plunkett discharge to home/self care              Follow-up Information     Follow up With Specialties Details Why Contact Info    Aidee Loera MD Family Medicine   [address] [facility]  If symptoms worsen [address] [facility] [place] [id number] [phone]          Patient's Medications   Discharge Prescriptions    ALBUTEROL (PROVENTIL HFA,VENTOLIN HFA) 90 MCG/ACT INHALER    Inhale 2 puffs every 4 (four) hours as needed for wheezing       Start Date: 12/29/2019End Date: --       Order Dose: 2 puffs       Quantity: 1 Inhaler    Refills: 0    BENZONATATE (TESSALON PERLES) 100 MG CAPSULE    Take 1 capsule (100 mg total) by mouth every 8 (eight) hours       Start Date: 12/29/2019End Date: --       Order Dose: 100 mg       Quantity: 21 capsule    Refills: 0    FLUTICASONE (FLONASE) 50 MCG/ACT NASAL SPRAY    1 spray into each nostril daily       Start Date: 12/29/2019End Date: --       Order Dose: 1 spray       Quantity: 16 g    Refills: 0    GUAIFENESIN (MUCINEX) 600 MG 12 HR TABLET    Take 2 tablets (1,200 mg total) by mouth every 12 (twelve) hours       Start Date: 12/29/2019End Date: --       Order Dose: 1,200 mg       Quantity: 30 tablet    Refills: 0     No discharge procedures on file      ED Provider  Electronically Signed by           Yana Jerome DO  12/29/19 9740

## 2020-02-05 DIAGNOSIS — E55.9 VITAMIN D INSUFFICIENCY: ICD-10-CM

## 2020-02-05 RX ORDER — ERGOCALCIFEROL 1.25 MG/1
CAPSULE ORAL
Qty: 4 CAPSULE | Refills: 0 | Status: SHIPPED | OUTPATIENT
Start: 2020-02-05

## 2020-02-11 DIAGNOSIS — K21.00 REFLUX ESOPHAGITIS: ICD-10-CM

## 2020-02-12 RX ORDER — FAMOTIDINE 20 MG/1
TABLET, FILM COATED ORAL
Qty: 60 TABLET | Refills: 2 | Status: SHIPPED | OUTPATIENT
Start: 2020-02-12 | End: 2020-04-14 | Stop reason: SDUPTHER

## 2020-03-23 ENCOUNTER — TELEMEDICINE (OUTPATIENT)
Dept: FAMILY MEDICINE CLINIC | Facility: CLINIC | Age: 20
End: 2020-03-23
Payer: COMMERCIAL

## 2020-03-23 ENCOUNTER — TELEPHONE (OUTPATIENT)
Dept: FAMILY MEDICINE CLINIC | Facility: CLINIC | Age: 20
End: 2020-03-23

## 2020-03-23 DIAGNOSIS — J01.00 ACUTE NON-RECURRENT MAXILLARY SINUSITIS: Primary | ICD-10-CM

## 2020-03-23 PROCEDURE — G2012 BRIEF CHECK IN BY MD/QHP: HCPCS | Performed by: FAMILY MEDICINE

## 2020-03-23 RX ORDER — AZITHROMYCIN 250 MG/1
TABLET, FILM COATED ORAL
Qty: 6 TABLET | Refills: 0 | Status: SHIPPED | OUTPATIENT
Start: 2020-03-23 | End: 2020-03-27

## 2020-03-23 RX ORDER — GUAIFENESIN 600 MG
1200 TABLET, EXTENDED RELEASE 12 HR ORAL EVERY 12 HOURS SCHEDULED
Qty: 30 TABLET | Refills: 0 | Status: SHIPPED | OUTPATIENT
Start: 2020-03-23 | End: 2021-04-02

## 2020-03-23 NOTE — TELEPHONE ENCOUNTER
Called pt she stated yesterday she had a fever of 99 4 no fever today  Pt still has cough, runny nose and chest tightness  Pt has only took Aleve yesterday for her fever  She has not traveled anywhere or been around anyone that has been sick   Pt has no allergies to medications  I will speak with Dr Sharon Berg

## 2020-03-23 NOTE — PROGRESS NOTES
Virtual Brief Visit    Reason for visit is a complaint of upper respiratory symptoms including nasal congestion, postnasal drip and sinus pressure  She stated her temperature was 99 4° yesterday before she took any medication  Then she took Tylenol  Her temperature has been normal since then  She denies any shortness of breath  She denies any recent travel or contact with other people with risk of      Encounter provider Mitch Anguiaon MD    Provider located at [address] [place] [address] [facility] [address]  [address] [facility],[address]   [address]  [address] [place] [employer] [place]      Recent Visits  No visits were found meeting these conditions  Showing recent visits within past 7 days and meeting all other requirements     Today's Visits  Date Type Provider Dept   03/23/20 Telephone [email or web address]   03/23/20 Telephone Mitch Anguiano MD [facility]   Showing today's visits and meeting all other requirements     Future Appointments  Date Type Provider Dept   03/23/20 Telephone Bipin Pelletier [facility]   Showing future appointments within next 150 days and meeting all other requirements        Patient agrees to participate in a virtual check in via telephone or video visit instead of presenting to the office to address urgent/immediate medical needs  Patient is aware this is a billable service  After connecting through telephone, the patient was identified by name and date of birth  Rosana Ace was informed that this was a telemedicine visit and that the visit is being conducted through telephone which may not be secure and therefore might not be HIPAA-compliant  My office door was closed  No one else was in the room  She acknowledged consent and understanding of privacy and security of the virtual check-in visit    I informed the patient that I have reviewed her record in Epic and presented the opportunity for her to ask any questions regarding the visit today  The patient initiated communication and agreed to participate  Kwan Mendez is a 22 y o  female with complaint of upper respiratory symptoms including nasal congestion, postnasal drip and sinus pressure  She denies any fever or chills  She checked her temperature yesterday before she took Tylenol it was 99 4  She has been taking Tylenol and that seems to keep her temperature normal   She denies any recent travel or contact with people with risk of exposure to corona virus  Past Medical History:   Diagnosis Date    ADHD     Anxiety     Asthma     Bipolar 1 disorder ([facility] [address] )     Class 2 obesity due to excess calories without serious comorbidity with body mass index (BMI) of 35 0 to 35 9 in adult 11/3/2018    Depression     Gallstones     GERD (gastroesophageal reflux disease)     Heart murmur 1/30/2015    Hyperlipidemia     Scoliosis        Past Surgical History:   Procedure Laterality Date    CHOLANGIOGRAM N/A 12/1/2018    Procedure: CHOLANGIOGRAM;  Surgeon: Emperatriz Putnam MD;  Location:  MAIN OR;  Service: General    CHOLECYSTECTOMY      CHOLECYSTECTOMY LAPAROSCOPIC N/A 12/1/2018    Procedure: CHOLECYSTECTOMY LAPAROSCOPIC;  Surgeon: Emperatriz Putnam MD;  Location:  MAIN OR;  Service: General    EYE MUSCLE SURGERY      OH ESOPHAGOGASTRODUODENOSCOPY TRANSORAL DIAGNOSTIC N/A 11/27/2018    Procedure: ESOPHAGOGASTRODUODENOSCOPY (EGD) with bx;  Surgeon: Breana Baum MD;  Location: [facility];   Service: General       Current Outpatient Medications   Medication Sig Dispense Refill    albuterol (PROVENTIL HFA,VENTOLIN HFA) 90 mcg/act inhaler Inhale 2 puffs every 4 (four) hours as needed for wheezing 1 Inhaler 0    ARIPiprazole (ABILIFY) 2 mg tablet Take 1 tablet (2 mg total) by mouth daily 7 tablet 0    azithromycin (ZITHROMAX) 250 mg tablet Take 2 tablets today then 1 tablet daily x 4 days 6 tablet 0    benzonatate (TESSALON PERLES) 100 mg capsule Take 1 capsule (100 mg total) by mouth every 8 (eight) hours 21 capsule 0    clindamycin (CLINDAGEL) 1 % gel Apply 1 application topically Every 12 hours       ergocalciferol (VITAMIN D2) 50,000 units TAKE ONE CAPSULE BY MOUTH ONE TIME PER WEEK 4 capsule 0    famotidine (PEPCID) 20 mg tablet Take 1 tablet (20 mg total) by mouth 2 (two) times a day 28 tablet 1    famotidine (PEPCID) 20 mg tablet TAKE 1 TABLET BY MOUTH TWICE A DAY 60 tablet 2    fluticasone (FLONASE) 50 mcg/act nasal spray 1 spray into each nostril daily 16 g 0    gabapentin (NEURONTIN) 600 MG tablet Take 1 tablet (600 mg total) by mouth every evening 7 tablet 0    guaiFENesin (MUCINEX) 600 mg 12 hr tablet Take 2 tablets (1,200 mg total) by mouth every 12 (twelve) hours 30 tablet 0    hydrOXYzine HCL (ATARAX) 25 mg tablet Take 1 tablet (25 mg total) by mouth every 6 (six) hours as needed for itching 28 tablet 0    ibuprofen (MOTRIN) 600 mg tablet Take 1 tablet (600 mg total) by mouth every 6 (six) hours as needed for mild pain 30 tablet 0    levonorgestrel-ethinyl estradiol (SRONYX) 0 1-20 MG-MCG per tablet Take 1 tablet by mouth daily 28 tablet 11    traZODone (DESYREL) 100 mg tablet Take 1 tablet (100 mg total) by mouth daily at bedtime (Patient not taking: Reported on 12/29/2019) 7 tablet 0    venlafaxine (EFFEXOR-XR) 150 mg 24 hr capsule Take 1 capsule (150 mg total) by mouth daily 7 capsule 0     No current facility-administered medications for this visit  No Known Allergies    Assessment    Trini telephone assessment is Acute non recurrent sinus infection   Disposition:    Patient was told to stay home and prescriptions for Mucinex and Z-Byron sent to the pharmacy  Was explained to patient how to take the medications  She was also told to use Flonase nasal spray  Was explained to patient if symptoms worse or any fever to call me 1st and I will direct her next step management      I spent 16 minutes with the patient during this virtual check-in visit  BMI Counseling: There is no height or weight on file to calculate BMI  The BMI is above normal  Nutrition recommendations include reducing portion sizes, decreasing overall calorie intake and 3-5 servings of fruits/vegetables daily  Exercise recommendations include moderate aerobic physical activity for 150 minutes/week

## 2020-03-23 NOTE — TELEPHONE ENCOUNTER
Patient left a message stating she is not feeling well   "her head hurts, has coughing, throat hurts and had a fever yesterday"

## 2020-03-23 NOTE — ASSESSMENT & PLAN NOTE
Was given a prescription for Z-Byron and Mucinex  Encourage oral hydration use humidifier  Take Tylenol if needed  Use Flonase nasal spray  Call Back if symptoms worse

## 2020-04-14 ENCOUNTER — TELEMEDICINE (OUTPATIENT)
Dept: FAMILY MEDICINE CLINIC | Facility: CLINIC | Age: 20
End: 2020-04-14
Payer: COMMERCIAL

## 2020-04-14 VITALS
HEIGHT: 58 IN | WEIGHT: 150 LBS | SYSTOLIC BLOOD PRESSURE: 120 MMHG | BODY MASS INDEX: 31.49 KG/M2 | TEMPERATURE: 98 F | DIASTOLIC BLOOD PRESSURE: 70 MMHG

## 2020-04-14 DIAGNOSIS — R11.2 NON-INTRACTABLE VOMITING WITH NAUSEA, UNSPECIFIED VOMITING TYPE: ICD-10-CM

## 2020-04-14 DIAGNOSIS — R19.7 DIARRHEA, UNSPECIFIED TYPE: ICD-10-CM

## 2020-04-14 DIAGNOSIS — J01.00 ACUTE NON-RECURRENT MAXILLARY SINUSITIS: Primary | ICD-10-CM

## 2020-04-14 PROBLEM — R11.10 NON-INTRACTABLE VOMITING: Status: ACTIVE | Noted: 2020-04-14

## 2020-04-14 PROCEDURE — 3008F BODY MASS INDEX DOCD: CPT | Performed by: FAMILY MEDICINE

## 2020-04-14 PROCEDURE — 99213 OFFICE O/P EST LOW 20 MIN: CPT | Performed by: FAMILY MEDICINE

## 2020-04-14 RX ORDER — AZITHROMYCIN 250 MG/1
TABLET, FILM COATED ORAL
Qty: 6 TABLET | Refills: 0 | Status: SHIPPED | OUTPATIENT
Start: 2020-04-14 | End: 2020-04-18

## 2020-04-14 RX ORDER — ONDANSETRON 4 MG/1
4 TABLET, FILM COATED ORAL EVERY 8 HOURS PRN
Qty: 20 TABLET | Refills: 0 | Status: SHIPPED | OUTPATIENT
Start: 2020-04-14

## 2020-08-04 ENCOUNTER — TELEPHONE (OUTPATIENT)
Dept: OBGYN CLINIC | Facility: CLINIC | Age: 20
End: 2020-08-04

## 2020-10-26 ENCOUNTER — TRANSCRIBE ORDERS (OUTPATIENT)
Dept: ADMINISTRATIVE | Facility: HOSPITAL | Age: 20
End: 2020-10-26

## 2020-10-26 ENCOUNTER — LAB (OUTPATIENT)
Dept: LAB | Age: 20
End: 2020-10-26
Payer: COMMERCIAL

## 2020-10-26 DIAGNOSIS — Z79.899 ENCOUNTER FOR LONG-TERM (CURRENT) USE OF OTHER MEDICATIONS: ICD-10-CM

## 2020-10-26 DIAGNOSIS — Z79.899 ENCOUNTER FOR LONG-TERM (CURRENT) USE OF OTHER MEDICATIONS: Primary | ICD-10-CM

## 2020-10-26 LAB
ALBUMIN SERPL BCP-MCNC: 3.9 G/DL (ref 3.5–5)
ALP SERPL-CCNC: 123 U/L (ref 46–116)
ALT SERPL W P-5'-P-CCNC: 64 U/L (ref 12–78)
ANION GAP SERPL CALCULATED.3IONS-SCNC: 6 MMOL/L (ref 4–13)
AST SERPL W P-5'-P-CCNC: 26 U/L (ref 5–45)
BASOPHILS # BLD AUTO: 0.06 THOUSANDS/ΜL (ref 0–0.1)
BASOPHILS NFR BLD AUTO: 1 % (ref 0–1)
BILIRUB SERPL-MCNC: 0.25 MG/DL (ref 0.2–1)
BUN SERPL-MCNC: 7 MG/DL (ref 5–25)
CALCIUM SERPL-MCNC: 8.6 MG/DL (ref 8.3–10.1)
CHLORIDE SERPL-SCNC: 106 MMOL/L (ref 100–108)
CHOLEST SERPL-MCNC: 203 MG/DL (ref 50–200)
CO2 SERPL-SCNC: 27 MMOL/L (ref 21–32)
CREAT SERPL-MCNC: 0.95 MG/DL (ref 0.6–1.3)
EOSINOPHIL # BLD AUTO: 0.27 THOUSAND/ΜL (ref 0–0.61)
EOSINOPHIL NFR BLD AUTO: 3 % (ref 0–6)
ERYTHROCYTE [DISTWIDTH] IN BLOOD BY AUTOMATED COUNT: 15.3 % (ref 11.6–15.1)
EST. AVERAGE GLUCOSE BLD GHB EST-MCNC: 105 MG/DL
GFR SERPL CREATININE-BSD FRML MDRD: 85 ML/MIN/1.73SQ M
GLUCOSE P FAST SERPL-MCNC: 96 MG/DL (ref 65–99)
HBA1C MFR BLD: 5.3 %
HCT VFR BLD AUTO: 41.3 % (ref 34.8–46.1)
HDLC SERPL-MCNC: 43 MG/DL
HGB BLD-MCNC: 12.5 G/DL (ref 11.5–15.4)
IMM GRANULOCYTES # BLD AUTO: 0.07 THOUSAND/UL (ref 0–0.2)
IMM GRANULOCYTES NFR BLD AUTO: 1 % (ref 0–2)
LDLC SERPL CALC-MCNC: 137 MG/DL (ref 0–100)
LYMPHOCYTES # BLD AUTO: 3.99 THOUSANDS/ΜL (ref 0.6–4.47)
LYMPHOCYTES NFR BLD AUTO: 37 % (ref 14–44)
MCH RBC QN AUTO: 25.1 PG (ref 26.8–34.3)
MCHC RBC AUTO-ENTMCNC: 30.3 G/DL (ref 31.4–37.4)
MCV RBC AUTO: 83 FL (ref 82–98)
MONOCYTES # BLD AUTO: 0.66 THOUSAND/ΜL (ref 0.17–1.22)
MONOCYTES NFR BLD AUTO: 6 % (ref 4–12)
NEUTROPHILS # BLD AUTO: 5.79 THOUSANDS/ΜL (ref 1.85–7.62)
NEUTS SEG NFR BLD AUTO: 52 % (ref 43–75)
NONHDLC SERPL-MCNC: 160 MG/DL
NRBC BLD AUTO-RTO: 0 /100 WBCS
PLATELET # BLD AUTO: 492 THOUSANDS/UL (ref 149–390)
PMV BLD AUTO: 10 FL (ref 8.9–12.7)
POTASSIUM SERPL-SCNC: 3.3 MMOL/L (ref 3.5–5.3)
PROT SERPL-MCNC: 7.2 G/DL (ref 6.4–8.2)
RBC # BLD AUTO: 4.99 MILLION/UL (ref 3.81–5.12)
SODIUM SERPL-SCNC: 139 MMOL/L (ref 136–145)
TRIGL SERPL-MCNC: 116 MG/DL
TSH SERPL DL<=0.05 MIU/L-ACNC: 1.65 UIU/ML (ref 0.36–3.74)
WBC # BLD AUTO: 10.84 THOUSAND/UL (ref 4.31–10.16)

## 2020-10-26 PROCEDURE — 80061 LIPID PANEL: CPT

## 2020-10-26 PROCEDURE — 36415 COLL VENOUS BLD VENIPUNCTURE: CPT

## 2020-10-26 PROCEDURE — 80053 COMPREHEN METABOLIC PANEL: CPT

## 2020-10-26 PROCEDURE — 83036 HEMOGLOBIN GLYCOSYLATED A1C: CPT

## 2020-10-26 PROCEDURE — 84443 ASSAY THYROID STIM HORMONE: CPT

## 2020-10-26 PROCEDURE — 85025 COMPLETE CBC W/AUTO DIFF WBC: CPT

## 2021-03-22 ENCOUNTER — OFFICE VISIT (OUTPATIENT)
Dept: OBGYN CLINIC | Facility: CLINIC | Age: 21
End: 2021-03-22

## 2021-03-22 VITALS
SYSTOLIC BLOOD PRESSURE: 107 MMHG | DIASTOLIC BLOOD PRESSURE: 69 MMHG | HEART RATE: 79 BPM | WEIGHT: 174 LBS | BODY MASS INDEX: 36.37 KG/M2

## 2021-03-22 DIAGNOSIS — Z30.013 ENCOUNTER FOR INITIAL PRESCRIPTION OF INJECTABLE CONTRACEPTIVE: Primary | ICD-10-CM

## 2021-03-22 PROCEDURE — 99213 OFFICE O/P EST LOW 20 MIN: CPT | Performed by: OBSTETRICS & GYNECOLOGY

## 2021-03-22 RX ORDER — MEDROXYPROGESTERONE ACETATE 150 MG/ML
150 INJECTION, SUSPENSION INTRAMUSCULAR
Qty: 1 ML | Refills: 3 | Status: SHIPPED | OUTPATIENT
Start: 2021-03-22 | End: 2022-01-24

## 2021-03-22 NOTE — PROGRESS NOTES
Assessment/Plan:     No problem-specific Assessment & Plan notes found for this encounter  Diagnoses and all orders for this visit:    Encounter for initial prescription of injectable contraceptive  -     medroxyPROGESTERone (DEPO-PROVERA) 150 mg/mL injection; Inject 1 mL (150 mg total) into a muscle every 3 (three) months      Discussed options for birth control/ menses management with patient including: OCP, Nexplanon, Mirena IUD, Depo-Provera  Pt opted to go with Depo-Provera  Script sent to pharmacy, to make appointment for injection of same  Informed patient that she may have some irregular spotting for the first few months  Pt verbalized understanding and offered no further questions/concerns  Gaby SIN RN         Subjective:      Patient ID: Winter Mars is a 23 y o  female who presents to the office with her mother for birth control counseling  Adelbert Bloch took OCP in the past, but has been off of them for the past 2 years  She is seeking medication to stop her menses  She states they last 3 days and are regular occurring every 28 days  She is not currently sexually active and denies any sexual encounters  Does not wish to be placed back on OCPs  Expresses interest in "arm implant"  HPI    The following portions of the patient's history were reviewed and updated as appropriate: allergies, current medications, past family history, past medical history, past social history, past surgical history and problem list     Review of Systems   Gastrointestinal: Negative for diarrhea and nausea  Genitourinary: Negative for menstrual problem, vaginal bleeding, vaginal discharge and vaginal pain  Objective:      /69   Pulse 79   Wt 78 9 kg (174 lb)   LMP 03/18/2021 (Exact Date)   BMI 36 37 kg/m²          Physical Exam  Vitals signs and nursing note reviewed  Constitutional:       General: She is not in acute distress  Appearance: She is obese  She is not ill-appearing  Cardiovascular:      Rate and Rhythm: Normal rate and regular rhythm  Pulses: Normal pulses  Heart sounds: Normal heart sounds  Pulmonary:      Effort: Pulmonary effort is normal  No respiratory distress  Breath sounds: Normal breath sounds  Neurological:      Mental Status: She is alert

## 2021-03-26 ENCOUNTER — CLINICAL SUPPORT (OUTPATIENT)
Dept: OBGYN CLINIC | Facility: CLINIC | Age: 21
End: 2021-03-26

## 2021-03-26 VITALS
HEART RATE: 76 BPM | DIASTOLIC BLOOD PRESSURE: 72 MMHG | SYSTOLIC BLOOD PRESSURE: 106 MMHG | BODY MASS INDEX: 36.49 KG/M2 | WEIGHT: 174.6 LBS

## 2021-03-26 DIAGNOSIS — Z30.42 ENCOUNTER FOR SURVEILLANCE OF INJECTABLE CONTRACEPTIVE: Primary | ICD-10-CM

## 2021-03-26 LAB — SL AMB POCT URINE HCG: NEGATIVE

## 2021-03-26 PROCEDURE — 81025 URINE PREGNANCY TEST: CPT

## 2021-03-26 PROCEDURE — 96372 THER/PROPH/DIAG INJ SC/IM: CPT

## 2021-03-26 RX ORDER — MEDROXYPROGESTERONE ACETATE 150 MG/ML
150 INJECTION, SUSPENSION INTRAMUSCULAR ONCE
Status: COMPLETED | OUTPATIENT
Start: 2021-03-26 | End: 2021-03-26

## 2021-03-26 RX ADMIN — MEDROXYPROGESTERONE ACETATE 150 MG: 150 INJECTION, SUSPENSION INTRAMUSCULAR at 11:16

## 2021-03-26 NOTE — PROGRESS NOTES
Depo-Provera         Patient provided box    Last Depo date: First injection   HCG: yes  o if applicable: negative   Given by: Bianca FRY   Site: Right Deltoid

## 2021-03-31 PROBLEM — E66.812 CLASS 2 OBESITY DUE TO EXCESS CALORIES WITHOUT SERIOUS COMORBIDITY WITH BODY MASS INDEX (BMI) OF 35.0 TO 35.9 IN ADULT: Status: RESOLVED | Noted: 2018-11-03 | Resolved: 2021-03-31

## 2021-03-31 PROBLEM — E66.09 CLASS 2 OBESITY DUE TO EXCESS CALORIES WITHOUT SERIOUS COMORBIDITY WITH BODY MASS INDEX (BMI) OF 35.0 TO 35.9 IN ADULT: Status: RESOLVED | Noted: 2018-11-03 | Resolved: 2021-03-31

## 2021-03-31 PROBLEM — E66.9 OBESITY, CLASS I, BMI 30-34.9: Status: RESOLVED | Noted: 2018-12-17 | Resolved: 2021-03-31

## 2021-03-31 PROBLEM — E66.811 OBESITY, CLASS I, BMI 30-34.9: Status: RESOLVED | Noted: 2018-12-17 | Resolved: 2021-03-31

## 2021-04-02 ENCOUNTER — OFFICE VISIT (OUTPATIENT)
Dept: FAMILY MEDICINE CLINIC | Facility: CLINIC | Age: 21
End: 2021-04-02
Payer: COMMERCIAL

## 2021-04-02 VITALS
BODY MASS INDEX: 36.94 KG/M2 | DIASTOLIC BLOOD PRESSURE: 68 MMHG | RESPIRATION RATE: 16 BRPM | TEMPERATURE: 97.9 F | WEIGHT: 176 LBS | HEIGHT: 58 IN | HEART RATE: 100 BPM | OXYGEN SATURATION: 100 % | SYSTOLIC BLOOD PRESSURE: 118 MMHG

## 2021-04-02 DIAGNOSIS — M41.20 SCOLIOSIS (AND KYPHOSCOLIOSIS), IDIOPATHIC: ICD-10-CM

## 2021-04-02 DIAGNOSIS — F33.3 SEVERE EPISODE OF RECURRENT MAJOR DEPRESSIVE DISORDER, WITH PSYCHOTIC FEATURES (HCC): Primary | ICD-10-CM

## 2021-04-02 DIAGNOSIS — F90.9 ATTENTION DEFICIT HYPERACTIVITY DISORDER (ADHD), UNSPECIFIED ADHD TYPE: ICD-10-CM

## 2021-04-02 DIAGNOSIS — F39 MOOD DISORDER (HCC): ICD-10-CM

## 2021-04-02 PROBLEM — R74.8 ABNORMAL AST AND ALT: Status: RESOLVED | Noted: 2018-11-03 | Resolved: 2021-04-02

## 2021-04-02 PROCEDURE — 3008F BODY MASS INDEX DOCD: CPT | Performed by: PHYSICIAN ASSISTANT

## 2021-04-02 PROCEDURE — 1036F TOBACCO NON-USER: CPT | Performed by: PHYSICIAN ASSISTANT

## 2021-04-02 PROCEDURE — 99214 OFFICE O/P EST MOD 30 MIN: CPT | Performed by: PHYSICIAN ASSISTANT

## 2021-04-02 RX ORDER — VENLAFAXINE HYDROCHLORIDE 150 MG/1
150 CAPSULE, EXTENDED RELEASE ORAL DAILY
Qty: 30 CAPSULE | Refills: 1 | Status: SHIPPED | OUTPATIENT
Start: 2021-04-02

## 2021-04-02 RX ORDER — GABAPENTIN 600 MG/1
600 TABLET ORAL EVERY EVENING
Qty: 30 TABLET | Refills: 1 | Status: SHIPPED | OUTPATIENT
Start: 2021-04-02

## 2021-04-02 RX ORDER — HYDROXYZINE HYDROCHLORIDE 25 MG/1
25 TABLET, FILM COATED ORAL EVERY 6 HOURS PRN
Qty: 30 TABLET | Refills: 1 | Status: SHIPPED | OUTPATIENT
Start: 2021-04-02 | End: 2021-06-07

## 2021-04-02 RX ORDER — ARIPIPRAZOLE 2 MG/1
2 TABLET ORAL DAILY
Qty: 30 TABLET | Refills: 1 | Status: SHIPPED | OUTPATIENT
Start: 2021-04-02 | End: 2021-10-19

## 2021-04-02 NOTE — PROGRESS NOTES
Assessment/Plan:     I did offer to put in a referral for  through [facility] but patient prefers to wait because she does like the counselor and the facility at [facility]   -I did advised to call if she does have any difficulty getting in over the next several months   -I did refill her medications in the meantime   -routine follow-up with Dr Scottie Monahan as advised otherwise    M*Modal software was used to dictate this note  It may contain errors with dictating incorrect words/spelling  Please contact provider directly for any questions  BMI Counseling: Body mass index is 36 78 kg/m²  The BMI is above normal  Nutrition recommendations include reducing portion sizes and decreasing overall calorie intake  Exercise recommendations include exercising 3-5 times per week  Diagnoses and all orders for this visit:    Severe episode of recurrent major depressive disorder, with psychotic features ([address] )  -     ARIPiprazole (ABILIFY) 2 mg tablet; Take 1 tablet (2 mg total) by mouth daily  -     venlafaxine (EFFEXOR-XR) 150 mg 24 hr capsule; Take 1 capsule (150 mg total) by mouth daily    Mood disorder (HCC)  -     gabapentin (NEURONTIN) 600 MG tablet; Take 1 tablet (600 mg total) by mouth every evening  -     hydrOXYzine HCL (ATARAX) 25 mg tablet; Take 1 tablet (25 mg total) by mouth every 6 (six) hours as needed for itching or anxiety    Attention deficit hyperactivity disorder (ADHD), unspecified ADHD type    BMI 36 0-36 9,adult    Scoliosis (and kyphoscoliosis), idiopathic          Subjective:      Patient ID: Nanette Aguiar is a 23 y o  female  Patient presents today with her mom because she needs a temporary supply of her psychiatric medications because the physician at [facility]  Currently left and there in the midst of getting another psychiatrist to take over his patients  She states that she is compliant with her medications  She is stable on her medications        The following portions of the patient's history were reviewed and updated as appropriate:   She  has a past medical history of ADHD, Anxiety, Asthma, Bipolar 1 disorder ([facility] [address] ), Class 2 obesity due to excess calories without serious comorbidity with body mass index (BMI) of 35 0 to 35 9 in adult (11/3/2018), Depression, Gallstones, GERD (gastroesophageal reflux disease), Heart murmur (1/30/2015), Hyperlipidemia, Migraine, and Scoliosis  She   Patient Active Problem List    Diagnosis Date Noted    BMI 36 0-36 9,adult 03/31/2021    Non-intractable vomiting 04/14/2020    Diarrhea 04/14/2020    Acute non-recurrent maxillary sinusitis 03/23/2020    Vitamin D insufficiency 05/30/2019    Encounter for annual routine gynecological examination 04/29/2019    Healthcare maintenance 02/27/2019    Severe episode of recurrent major depressive disorder, with [facility] features [facility] ) 12/18/2018    Transaminitis 12/17/2018    GERD (gastroesophageal reflux disease)     Intellectual disability 12/01/2018    Mood disorder ([facility] ) 11/30/2018    Reflux esophagitis 11/14/2018    Drop in hemoglobin 11/03/2018    Other hyperlipidemia 03/07/2017    Acne vulgaris 07/21/2016    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 11/17/2015    Scoliosis (and kyphoscoliosis), idiopathic 02/20/2015    Asthma 01/30/2015    Heart murmur 01/30/2015    ADHD (attention deficit hyperactivity disorder) 11/19/2014     She  has a past surgical history that includes Eye muscle surgery; pr esophagogastroduodenoscopy transoral diagnostic (N/A, 11/27/2018); Cholecystectomy; CHOLECYSTECTOMY LAPAROSCOPIC (N/A, 12/1/2018); and CHOLANGIOGRAM (N/A, 12/1/2018)    Her family history includes Coronary artery disease in her mother; Depression in her mother; Diabetes in her maternal grandfather, maternal grandmother, and mother; Glaucoma in her maternal grandfather and maternal grandmother; Heart attack in her maternal grandfather and mother; Hyperlipidemia in her mother; Hypertension in her maternal grandfather, maternal grandmother, and mother; Kidney disease in her maternal grandmother; Mental illness in her mother; Pneumonia in her brother; Sarcoidosis in her maternal grandmother; Stroke in her maternal grandfather and maternal grandmother  She  reports that she has never smoked  She has never used smokeless tobacco  She reports that she does not drink alcohol or use drugs  Current Outpatient Medications   Medication Sig Dispense Refill    albuterol (PROVENTIL HFA,VENTOLIN HFA) 90 mcg/act inhaler Inhale 2 puffs every 4 (four) hours as needed for wheezing 1 Inhaler 0    ARIPiprazole (ABILIFY) 2 mg tablet Take 1 tablet (2 mg total) by mouth daily 30 tablet 1    ergocalciferol (VITAMIN D2) 50,000 units TAKE ONE CAPSULE BY MOUTH ONE TIME PER WEEK 4 capsule 0    famotidine (PEPCID) 20 mg tablet Take 1 tablet (20 mg total) by mouth 2 (two) times a day 28 tablet 1    gabapentin (NEURONTIN) 600 MG tablet Take 1 tablet (600 mg total) by mouth every evening 30 tablet 1    hydrOXYzine HCL (ATARAX) 25 mg tablet Take 1 tablet (25 mg total) by mouth every 6 (six) hours as needed for itching or anxiety 30 tablet 1    ibuprofen (MOTRIN) 600 mg tablet Take 1 tablet (600 mg total) by mouth every 6 (six) hours as needed for mild pain 30 tablet 0    medroxyPROGESTERone (DEPO-PROVERA) 150 mg/mL injection Inject 1 mL (150 mg total) into a muscle every 3 (three) months 1 mL 3    ondansetron (ZOFRAN) 4 mg tablet Take 1 tablet (4 mg total) by mouth every 8 (eight) hours as needed for nausea or vomiting 20 tablet 0    venlafaxine (EFFEXOR-XR) 150 mg 24 hr capsule Take 1 capsule (150 mg total) by mouth daily 30 capsule 1    clindamycin (CLINDAGEL) 1 % gel Apply 1 application topically Every 12 hours        No current facility-administered medications for this visit        Current Outpatient Medications on File Prior to Visit   Medication Sig    albuterol (PROVENTIL HFA,VENTOLIN HFA) 90 mcg/act inhaler Inhale 2 puffs every 4 (four) hours as needed for wheezing    ergocalciferol (VITAMIN D2) 50,000 units TAKE ONE CAPSULE BY MOUTH ONE TIME PER WEEK    famotidine (PEPCID) 20 mg tablet Take 1 tablet (20 mg total) by mouth 2 (two) times a day    ibuprofen (MOTRIN) 600 mg tablet Take 1 tablet (600 mg total) by mouth every 6 (six) hours as needed for mild pain    medroxyPROGESTERone (DEPO-PROVERA) 150 mg/mL injection Inject 1 mL (150 mg total) into a muscle every 3 (three) months    ondansetron (ZOFRAN) 4 mg tablet Take 1 tablet (4 mg total) by mouth every 8 (eight) hours as needed for nausea or vomiting    [DISCONTINUED] ARIPiprazole (ABILIFY) 2 mg tablet Take 1 tablet (2 mg total) by mouth daily    [DISCONTINUED] gabapentin (NEURONTIN) 600 MG tablet Take 1 tablet (600 mg total) by mouth every evening    [DISCONTINUED] hydrOXYzine HCL (ATARAX) 25 mg tablet Take 1 tablet (25 mg total) by mouth every 6 (six) hours as needed for itching    [DISCONTINUED] venlafaxine (EFFEXOR-XR) 150 mg 24 hr capsule Take 1 capsule (150 mg total) by mouth daily    clindamycin (CLINDAGEL) 1 % gel Apply 1 application topically Every 12 hours     [DISCONTINUED] benzonatate (TESSALON PERLES) 100 mg capsule Take 1 capsule (100 mg total) by mouth every 8 (eight) hours (Patient not taking: Reported on 4/14/2020)    [DISCONTINUED] fluticasone (FLONASE) 50 mcg/act nasal spray 1 spray into each nostril daily (Patient not taking: Reported on 4/14/2020)    [DISCONTINUED] guaiFENesin (MUCINEX) 600 mg 12 hr tablet Take 2 tablets (1,200 mg total) by mouth every 12 (twelve) hours (Patient not taking: Reported on 4/14/2020)    [DISCONTINUED] levonorgestrel-ethinyl estradiol (SRONYX) 0 1-20 MG-MCG per tablet Take 1 tablet by mouth daily (Patient not taking: Reported on 3/22/2021)    [DISCONTINUED] traZODone (DESYREL) 100 mg tablet Take 1 tablet (100 mg total) by mouth daily at bedtime (Patient not taking: Reported on 12/29/2019)     No current facility-administered medications on file prior to visit  She has No Known Allergies       Review of Systems   Psychiatric/Behavioral:         As stated in HPI         Objective:      /68 (BP Location: Left arm, Patient Position: Sitting, Cuff Size: Large)   Pulse 100   Temp 97 9 °F (36 6 °C) (Tympanic)   Resp 16   Ht 4' 10" (1 473 m)   Wt 79 8 kg (176 lb)   LMP 03/18/2021 (Exact Date)   SpO2 100%   BMI 36 78 kg/m²          Physical Exam  Constitutional:       General: She is not in acute distress  Appearance: Normal appearance  She is well-developed  She is not ill-appearing, toxic-appearing or diaphoretic  HENT:      Head: Normocephalic and atraumatic  Right Ear: Tympanic membrane, ear canal and external ear normal       Left Ear: Tympanic membrane and external ear normal    Neck:      Musculoskeletal: Neck supple  Thyroid: No thyromegaly  Cardiovascular:      Rate and Rhythm: Normal rate and regular rhythm  Heart sounds: Normal heart sounds  No murmur  No friction rub  No gallop  Pulmonary:      Effort: Pulmonary effort is normal  No respiratory distress  Breath sounds: Normal breath sounds  No wheezing, rhonchi or rales  Abdominal:      General: Bowel sounds are normal       Palpations: Abdomen is soft  There is no mass  Tenderness: There is no abdominal tenderness  Musculoskeletal:         General: No deformity  Lymphadenopathy:      Cervical: No cervical adenopathy  Skin:     General: Skin is warm  Neurological:      General: No focal deficit present  Mental Status: She is alert  Psychiatric:         Mood and Affect: Mood normal          Behavior: Behavior normal          Thought Content:  Thought content normal          Judgment: Judgment normal

## 2021-04-04 ENCOUNTER — HOSPITAL ENCOUNTER (EMERGENCY)
Facility: HOSPITAL | Age: 21
Discharge: HOME | End: 2021-04-04
Attending: EMERGENCY MEDICINE | Admitting: EMERGENCY MEDICINE
Payer: COMMERCIAL

## 2021-04-04 VITALS
SYSTOLIC BLOOD PRESSURE: 122 MMHG | RESPIRATION RATE: 17 BRPM | HEART RATE: 72 BPM | DIASTOLIC BLOOD PRESSURE: 60 MMHG | TEMPERATURE: 98.7 F | OXYGEN SATURATION: 99 % | BODY MASS INDEX: 36.17 KG/M2 | WEIGHT: 173.06 LBS

## 2021-04-04 DIAGNOSIS — K29.70 GASTRITIS: Primary | ICD-10-CM

## 2021-04-04 DIAGNOSIS — R11.2 NAUSEA & VOMITING: ICD-10-CM

## 2021-04-04 LAB
ALBUMIN SERPL BCP-MCNC: 3.8 G/DL (ref 3.5–5)
ALP SERPL-CCNC: 88 U/L (ref 46–116)
ALT SERPL W P-5'-P-CCNC: 19 U/L (ref 12–78)
ANION GAP SERPL CALCULATED.3IONS-SCNC: 9 MMOL/L (ref 4–13)
AST SERPL W P-5'-P-CCNC: 13 U/L (ref 5–45)
BACTERIA UR QL AUTO: ABNORMAL /HPF
BASOPHILS # BLD AUTO: 0.03 THOUSANDS/ΜL (ref 0–0.1)
BASOPHILS NFR BLD AUTO: 0 % (ref 0–1)
BILIRUB SERPL-MCNC: 0.31 MG/DL (ref 0.2–1)
BILIRUB UR QL STRIP: NEGATIVE
BUN SERPL-MCNC: 13 MG/DL (ref 5–25)
CALCIUM SERPL-MCNC: 9.1 MG/DL (ref 8.3–10.1)
CHLORIDE SERPL-SCNC: 103 MMOL/L (ref 100–108)
CLARITY UR: CLEAR
CO2 SERPL-SCNC: 26 MMOL/L (ref 21–32)
COLOR UR: YELLOW
CREAT SERPL-MCNC: 0.99 MG/DL (ref 0.6–1.3)
EOSINOPHIL # BLD AUTO: 0.09 THOUSAND/ΜL (ref 0–0.61)
EOSINOPHIL NFR BLD AUTO: 1 % (ref 0–6)
ERYTHROCYTE [DISTWIDTH] IN BLOOD BY AUTOMATED COUNT: 14.4 % (ref 11.6–15.1)
EXT PREG TEST URINE: NORMAL
EXT. CONTROL ED NAV: NORMAL
FLUAV RNA RESP QL NAA+PROBE: NEGATIVE
FLUBV RNA RESP QL NAA+PROBE: NEGATIVE
GFR SERPL CREATININE-BSD FRML MDRD: 81 ML/MIN/1.73SQ M
GLUCOSE SERPL-MCNC: 109 MG/DL (ref 65–140)
GLUCOSE UR STRIP-MCNC: NEGATIVE MG/DL
HCT VFR BLD AUTO: 38.5 % (ref 34.8–46.1)
HGB BLD-MCNC: 12 G/DL (ref 11.5–15.4)
HGB UR QL STRIP.AUTO: NEGATIVE
IMM GRANULOCYTES # BLD AUTO: 0.04 THOUSAND/UL (ref 0–0.2)
IMM GRANULOCYTES NFR BLD AUTO: 0 % (ref 0–2)
KETONES UR STRIP-MCNC: NEGATIVE MG/DL
LEUKOCYTE ESTERASE UR QL STRIP: ABNORMAL
LIPASE SERPL-CCNC: 115 U/L (ref 73–393)
LYMPHOCYTES # BLD AUTO: 1.87 THOUSANDS/ΜL (ref 0.6–4.47)
LYMPHOCYTES NFR BLD AUTO: 18 % (ref 14–44)
MCH RBC QN AUTO: 25.1 PG (ref 26.8–34.3)
MCHC RBC AUTO-ENTMCNC: 31.2 G/DL (ref 31.4–37.4)
MCV RBC AUTO: 80 FL (ref 82–98)
MONOCYTES # BLD AUTO: 0.53 THOUSAND/ΜL (ref 0.17–1.22)
MONOCYTES NFR BLD AUTO: 5 % (ref 4–12)
NEUTROPHILS # BLD AUTO: 8.04 THOUSANDS/ΜL (ref 1.85–7.62)
NEUTS SEG NFR BLD AUTO: 76 % (ref 43–75)
NITRITE UR QL STRIP: NEGATIVE
NON-SQ EPI CELLS URNS QL MICRO: ABNORMAL /HPF
NRBC BLD AUTO-RTO: 0 /100 WBCS
PH UR STRIP.AUTO: 8.5 [PH] (ref 4.5–8)
PLATELET # BLD AUTO: 361 THOUSANDS/UL (ref 149–390)
PMV BLD AUTO: 9.9 FL (ref 8.9–12.7)
POTASSIUM SERPL-SCNC: 3.9 MMOL/L (ref 3.5–5.3)
PROT SERPL-MCNC: 7.4 G/DL (ref 6.4–8.2)
PROT UR STRIP-MCNC: NEGATIVE MG/DL
RBC # BLD AUTO: 4.79 MILLION/UL (ref 3.81–5.12)
RBC #/AREA URNS AUTO: ABNORMAL /HPF
RSV RNA RESP QL NAA+PROBE: NEGATIVE
SARS-COV-2 RNA RESP QL NAA+PROBE: NEGATIVE
SODIUM SERPL-SCNC: 138 MMOL/L (ref 136–145)
SP GR UR STRIP.AUTO: 1.02 (ref 1–1.03)
UROBILINOGEN UR QL STRIP.AUTO: 1 E.U./DL
WBC # BLD AUTO: 10.6 THOUSAND/UL (ref 4.31–10.16)
WBC #/AREA URNS AUTO: ABNORMAL /HPF

## 2021-04-04 PROCEDURE — 99284 EMERGENCY DEPT VISIT MOD MDM: CPT | Performed by: PHYSICIAN ASSISTANT

## 2021-04-04 PROCEDURE — 96361 HYDRATE IV INFUSION ADD-ON: CPT

## 2021-04-04 PROCEDURE — 80053 COMPREHEN METABOLIC PANEL: CPT | Performed by: PHYSICIAN ASSISTANT

## 2021-04-04 PROCEDURE — 83690 ASSAY OF LIPASE: CPT | Performed by: PHYSICIAN ASSISTANT

## 2021-04-04 PROCEDURE — 0241U HB NFCT DS VIR RESP RNA 4 TRGT: CPT | Performed by: PHYSICIAN ASSISTANT

## 2021-04-04 PROCEDURE — 81025 URINE PREGNANCY TEST: CPT | Performed by: PHYSICIAN ASSISTANT

## 2021-04-04 PROCEDURE — 99283 EMERGENCY DEPT VISIT LOW MDM: CPT

## 2021-04-04 PROCEDURE — 81001 URINALYSIS AUTO W/SCOPE: CPT

## 2021-04-04 PROCEDURE — 96375 TX/PRO/DX INJ NEW DRUG ADDON: CPT

## 2021-04-04 PROCEDURE — 96374 THER/PROPH/DIAG INJ IV PUSH: CPT

## 2021-04-04 PROCEDURE — 36415 COLL VENOUS BLD VENIPUNCTURE: CPT | Performed by: PHYSICIAN ASSISTANT

## 2021-04-04 PROCEDURE — 85025 COMPLETE CBC W/AUTO DIFF WBC: CPT | Performed by: PHYSICIAN ASSISTANT

## 2021-04-04 RX ORDER — IBUPROFEN 400 MG/1
400 TABLET ORAL EVERY 6 HOURS PRN
Qty: 30 TABLET | Refills: 0 | Status: SHIPPED | OUTPATIENT
Start: 2021-04-04

## 2021-04-04 RX ORDER — KETOROLAC TROMETHAMINE 30 MG/ML
15 INJECTION, SOLUTION INTRAMUSCULAR; INTRAVENOUS ONCE
Status: COMPLETED | OUTPATIENT
Start: 2021-04-04 | End: 2021-04-04

## 2021-04-04 RX ORDER — LIDOCAINE HYDROCHLORIDE 20 MG/ML
15 SOLUTION OROPHARYNGEAL ONCE
Status: COMPLETED | OUTPATIENT
Start: 2021-04-04 | End: 2021-04-04

## 2021-04-04 RX ORDER — ONDANSETRON 4 MG/1
4 TABLET, ORALLY DISINTEGRATING ORAL EVERY 6 HOURS PRN
Qty: 6 TABLET | Refills: 0 | Status: SHIPPED | OUTPATIENT
Start: 2021-04-04 | End: 2022-07-11 | Stop reason: SDUPTHER

## 2021-04-04 RX ORDER — ACETAMINOPHEN 500 MG
500 TABLET ORAL EVERY 6 HOURS PRN
Qty: 30 TABLET | Refills: 0 | Status: SHIPPED | OUTPATIENT
Start: 2021-04-04 | End: 2022-08-08

## 2021-04-04 RX ORDER — ACETAMINOPHEN 325 MG/1
975 TABLET ORAL ONCE
Status: COMPLETED | OUTPATIENT
Start: 2021-04-04 | End: 2021-04-04

## 2021-04-04 RX ORDER — FAMOTIDINE 20 MG/1
20 TABLET, FILM COATED ORAL 2 TIMES DAILY
Qty: 30 TABLET | Refills: 0 | Status: SHIPPED | OUTPATIENT
Start: 2021-04-04

## 2021-04-04 RX ORDER — ONDANSETRON 2 MG/ML
4 INJECTION INTRAMUSCULAR; INTRAVENOUS ONCE
Status: COMPLETED | OUTPATIENT
Start: 2021-04-04 | End: 2021-04-04

## 2021-04-04 RX ORDER — MAGNESIUM HYDROXIDE/ALUMINUM HYDROXICE/SIMETHICONE 120; 1200; 1200 MG/30ML; MG/30ML; MG/30ML
30 SUSPENSION ORAL ONCE
Status: COMPLETED | OUTPATIENT
Start: 2021-04-04 | End: 2021-04-04

## 2021-04-04 RX ADMIN — ACETAMINOPHEN 975 MG: 325 TABLET, FILM COATED ORAL at 14:05

## 2021-04-04 RX ADMIN — KETOROLAC TROMETHAMINE 15 MG: 30 INJECTION, SOLUTION INTRAMUSCULAR; INTRAVENOUS at 14:05

## 2021-04-04 RX ADMIN — SODIUM CHLORIDE 1000 ML: 0.9 INJECTION, SOLUTION INTRAVENOUS at 12:59

## 2021-04-04 RX ADMIN — ONDANSETRON 4 MG: 2 INJECTION INTRAMUSCULAR; INTRAVENOUS at 12:58

## 2021-04-04 RX ADMIN — ALUMINUM HYDROXIDE, MAGNESIUM HYDROXIDE, AND SIMETHICONE 30 ML: 200; 200; 20 SUSPENSION ORAL at 13:20

## 2021-04-04 RX ADMIN — LIDOCAINE HYDROCHLORIDE 15 ML: 20 SOLUTION ORAL; TOPICAL at 13:20

## 2021-04-04 NOTE — ED PROVIDER NOTES
History  Chief Complaint   Patient presents with    Fever - 9 weeks to 74 years     Pt reports fevers, chills,vomiting and abd pain x2 days  Pt reports unable to tolerate PO intake  Denies sick contacts  History provided by:  Patient and parent   used: No    Nausea  The primary symptoms include abdominal pain (Epigastric), nausea and vomiting  The illness began 2 days ago  The onset was sudden  The problem has been gradually improving  The illness is also significant for anorexia  The illness does not include chills or back pain  Vomiting  Severity:  Moderate  Timing:  Intermittent  Number of daily episodes:  Patient states she is unsure of the amount of times  Quality:  Stomach contents  Progression:  Improving  Chronicity:  New  Recent urination:  Normal  Relieved by:  Nothing  Worsened by:  Nothing  Ineffective treatments:  None tried  Associated symptoms: abdominal pain (Epigastric)    Associated symptoms: no chills    Risk factors: no sick contacts and no suspect food intake        Prior to Admission Medications   Prescriptions Last Dose Informant Patient Reported? Taking?    ARIPiprazole (ABILIFY) 2 mg tablet   No No   Sig: Take 1 tablet (2 mg total) by mouth daily   albuterol (PROVENTIL HFA,VENTOLIN HFA) 90 mcg/act inhaler   No No   Sig: Inhale 2 puffs every 4 (four) hours as needed for wheezing   clindamycin (CLINDAGEL) 1 % gel   Yes No   Sig: Apply 1 application topically Every 12 hours    ergocalciferol (VITAMIN D2) 50,000 units   No No   Sig: TAKE ONE CAPSULE BY MOUTH ONE TIME PER WEEK   famotidine (PEPCID) 20 mg tablet   No No   Sig: Take 1 tablet (20 mg total) by mouth 2 (two) times a day   gabapentin (NEURONTIN) 600 MG tablet   No No   Sig: Take 1 tablet (600 mg total) by mouth every evening   hydrOXYzine HCL (ATARAX) 25 mg tablet   No No   Sig: Take 1 tablet (25 mg total) by mouth every 6 (six) hours as needed for itching or anxiety   ibuprofen (MOTRIN) 600 mg tablet No No   Sig: Take 1 tablet (600 mg total) by mouth every 6 (six) hours as needed for mild pain   medroxyPROGESTERone (DEPO-PROVERA) 150 mg/mL injection   No No   Sig: Inject 1 mL (150 mg total) into a muscle every 3 (three) months   ondansetron (ZOFRAN) 4 mg tablet   No No   Sig: Take 1 tablet (4 mg total) by mouth every 8 (eight) hours as needed for nausea or vomiting   venlafaxine (EFFEXOR-XR) 150 mg 24 hr capsule   No No   Sig: Take 1 capsule (150 mg total) by mouth daily      Facility-Administered Medications: None       Past Medical History:   Diagnosis Date    ADHD     Anxiety     Asthma     [facility]     Class 2 obesity due to excess calories without serious comorbidity with body mass index (BMI) of 35 0 to 35 9 in adult 11/3/2018    Depression     Gallstones     GERD (gastroesophageal reflux disease)     Heart murmur 1/30/2015    Hyperlipidemia     Migraine     excedrin managed; 1 every 6 months    Scoliosis        Past Surgical History:   Procedure Laterality Date    CHOLANGIOGRAM N/A 12/1/2018    Procedure: CHOLANGIOGRAM;  Surgeon: Dylan Ng MD;  Location:  MAIN OR;  Service: General    CHOLECYSTECTOMY      CHOLECYSTECTOMY LAPAROSCOPIC N/A 12/1/2018    Procedure: CHOLECYSTECTOMY LAPAROSCOPIC;  Surgeon: Dylan Ng MD;  Location:  MAIN OR;  Service: General    EYE MUSCLE SURGERY      OK ESOPHAGOGASTRODUODENOSCOPY TRANSORAL DIAGNOSTIC N/A 11/27/2018    Procedure: ESOPHAGOGASTRODUODENOSCOPY (EGD) with bx;  Surgeon: Alejandra Deleon MD;  Location: [facility];   Service: General       Family History   Problem Relation Age of Onset    Heart attack Mother     Depression Mother     Mental illness Mother     Coronary artery disease Mother     Hypertension Mother     Diabetes Mother     Hyperlipidemia Mother     Pneumonia Brother     Hypertension Maternal Grandmother     Stroke Maternal Grandmother     Diabetes Maternal Grandmother     Glaucoma Maternal Grandmother     Kidney disease Maternal Grandmother     Sarcoidosis Maternal Grandmother     Diabetes Maternal Grandfather     Hypertension Maternal Grandfather     Stroke Maternal Grandfather     Glaucoma Maternal Grandfather     Heart attack Maternal Grandfather      I have reviewed and agree with the history as documented  E-Cigarette/Vaping    E-Cigarette Use Never User      E-Cigarette/Vaping Substances    Nicotine No     THC No     CBD No     Flavoring No      Social History     Tobacco Use    Smoking status: Never Smoker    Smokeless tobacco: Never Used    Tobacco comment: no passive smoke exposure   Substance Use Topics    Alcohol use: No    Drug use: No       Review of Systems   Constitutional: Negative for chills  Gastrointestinal: Positive for abdominal pain (Epigastric), anorexia, nausea and vomiting  Musculoskeletal: Negative for back pain  All other systems reviewed and are negative  Physical Exam  Physical Exam  Vitals signs and nursing note reviewed  Constitutional:       Appearance: Normal appearance  She is obese  HENT:      Head: Normocephalic and atraumatic  Right Ear: External ear normal       Left Ear: External ear normal       Nose: Nose normal       Mouth/Throat:      Pharynx: Oropharynx is clear  Eyes:      Conjunctiva/sclera: Conjunctivae normal    Neck:      Musculoskeletal: Normal range of motion and neck supple  Cardiovascular:      Rate and Rhythm: Normal rate  Pulmonary:      Effort: Pulmonary effort is normal    Abdominal:      Comments: Mild tenderness at the epigastrium  After GI cocktail significant improvement of discomfort  Obese abdomen soft  No guarding  Musculoskeletal: Normal range of motion  General: No tenderness  Skin:     General: Skin is warm and dry  Capillary Refill: Capillary refill takes less than 2 seconds  Neurological:      General: No focal deficit present  Mental Status: She is alert  Psychiatric:         Mood and Affect: Mood normal          Vital Signs  ED Triage Vitals   Temperature Pulse Respirations Blood Pressure SpO2   04/04/21 1234 04/04/21 1231 04/04/21 1231 04/04/21 1231 04/04/21 1231   99 2 °F (37 3 °C) 96 16 159/66 100 %      Temp Source Heart Rate Source Patient Position - Orthostatic VS BP Location FiO2 (%)   04/04/21 1234 04/04/21 1231 04/04/21 1231 04/04/21 1231 --   Oral Monitor Sitting Left arm       Pain Score       04/04/21 1231       3           Vitals:    04/04/21 1231 04/04/21 1439   BP: 159/66 122/60   Pulse: 96 72   Patient Position - Orthostatic VS: Sitting          Visual Acuity      ED Medications  Medications   sodium chloride 0 9 % bolus 1,000 mL (0 mL Intravenous Stopped 4/4/21 1400)   ondansetron (ZOFRAN) injection 4 mg (4 mg Intravenous Given 4/4/21 1258)   Lidocaine Viscous HCl (XYLOCAINE) 2 % mucosal solution 15 mL (15 mL Swish & Spit Given 4/4/21 1320)   aluminum-magnesium hydroxide-simethicone (MYLANTA) oral suspension 30 mL (30 mL Oral Given 4/4/21 1320)   ketorolac (TORADOL) injection 15 mg (15 mg Intravenous Given 4/4/21 1405)   acetaminophen (TYLENOL) tablet 975 mg (975 mg Oral Given 4/4/21 1405)       Diagnostic Studies  Results Reviewed     Procedure Component Value Units Date/Time    Urine Microscopic [[id number]]  (Abnormal) Collected: 04/04/21 1354    Lab Status: Final result Specimen: Urine, Clean Catch Updated: 04/04/21 1417     RBC, UA None Seen /hpf      WBC, UA 1-2 /hpf      Epithelial Cells Occasional /hpf      Bacteria, UA Occasional /hpf     POCT urinalysis dipstick [[id number]]  (Abnormal) Resulted: 04/04/21 1404    Lab Status: Final result Specimen: Urine Updated: 04/04/21 1404    POCT pregnancy, urine [[id number]]  (Normal) Resulted: 04/04/21 1400    Lab Status: Final result Updated: 04/04/21 1400     EXT PREG TEST UR (Ref: Negative) negatiave     Control valid    Urine Macroscopic, POC [[id number]]  (Abnormal) Collected: 04/04/21 1354    Lab Status: Final result Specimen: Urine Updated: 04/04/21 1355     Color, UA Yellow     Clarity, UA Clear     pH, UA 8 5     Leukocytes, UA Trace     Nitrite, UA Negative     Protein, UA Negative mg/dl      Glucose, UA Negative mg/dl      Ketones, UA Negative mg/dl      Urobilinogen, UA 1 0 E U /dl      Bilirubin, UA Negative     Blood, UA Negative     Specific Gravity, UA 1 020    Narrative:      CL[facility]ITEK RESULT    COVID19, Influenza A/B, RSV PCR, [facility] [[id number]]  (Normal) Collected: 04/04/21 1259    Lab Status: Final result Specimen: Nares from Nasopharyngeal Swab Updated: 04/04/21 1351     SARS-CoV-2 Negative     INFLUENZA A PCR Negative     INFLUENZA B PCR Negative     RSV PCR Negative    Narrative: This test has been authorized by FDA under an EUA (Emergency Use Assay) for use by authorized laboratories  Clinical caution and judgement should be used with the interpretation of these results with consideration of the clinical impression and other laboratory testing  Testing reported as "Positive" or "Negative" has been proven to be accurate according to standard laboratory validation requirements  All testing is performed with control materials showing appropriate reactivity at standard intervals      Comprehensive metabolic panel [[id number]] Collected: 04/04/21 1259    Lab Status: Final result Specimen: Blood from Arm, Right Updated: 04/04/21 1321     Sodium 138 mmol/L      Potassium 3 9 mmol/L      Chloride 103 mmol/L      CO2 26 mmol/L      ANION GAP 9 mmol/L      BUN 13 mg/dL      Creatinine 0 99 mg/dL      Glucose 109 mg/dL      Calcium 9 1 mg/dL      AST 13 U/L      ALT 19 U/L      Alkaline Phosphatase 88 U/L      Total Protein 7 4 g/dL      Albumin 3 8 g/dL      Total Bilirubin 0 31 mg/dL      eGFR 81 ml/min/1 73sq m     Narrative:      [place] guidelines for Chronic Kidney Disease (CKD):     Stage 1 with normal or high GFR (GFR > 90 mL/min/1 73 square meters)    Stage 2 Mild CKD (GFR = 60-89 mL/min/1 73 square meters)    Stage 3A Moderate CKD (GFR = 45-59 mL/min/1 73 square meters)    Stage 3B Moderate CKD (GFR = 30-44 mL/min/1 73 square meters)    Stage 4 Severe CKD (GFR = 15-29 mL/min/1 73 square meters)    Stage 5 End Stage CKD (GFR <15 mL/min/1 73 square meters)  Note: GFR calculation is accurate only with a steady state creatinine    Lipase [[id number]]  (Normal) Collected: 04/04/21 1259    Lab Status: Final result Specimen: Blood from Arm, Right Updated: 04/04/21 1321     Lipase 115 u/L     CBC and differential [[id number]]  (Abnormal) Collected: 04/04/21 1259    Lab Status: Final result Specimen: Blood from Arm, Right Updated: 04/04/21 1307     WBC 10 60 Thousand/uL      RBC 4 79 Million/uL      Hemoglobin 12 0 g/dL      Hematocrit 38 5 %      MCV 80 fL      MCH 25 1 pg      MCHC 31 2 g/dL      RDW 14 4 %      MPV 9 9 fL      Platelets 993 Thousands/uL      nRBC 0 /100 WBCs      Neutrophils Relative 76 %      Immat GRANS % 0 %      Lymphocytes Relative 18 %      Monocytes Relative 5 %      Eosinophils Relative 1 %      Basophils Relative 0 %      Neutrophils Absolute 8 04 Thousands/µL      Immature Grans Absolute 0 04 Thousand/uL      Lymphocytes Absolute 1 87 Thousands/µL      Monocytes Absolute 0 53 Thousand/µL      Eosinophils Absolute 0 09 Thousand/µL      Basophils Absolute 0 03 Thousands/µL                  No orders to display              Procedures  Procedures         ED Course                                           MDM  Number of Diagnoses or Management Options  Gastritis:   Nausea & vomiting:   Diagnosis management comments: 23-year-old female presents emergency department for nausea and vomiting starting approximately late Friday night  Patient states that Saturday night into [place] morning she had a few more bouts of vomiting  Patient does state that she feels a little bit better but still is persistently nauseous    Patient does admit to some epigastric discomfort which was relieved with GI cocktail  After basic intervention in the department patient admits to significant improvement of her overall condition  Mother also states the patient appears to be feeling better  At this time will treat conservatively  Educated patient on diagnosis and management we also reviewed in the room her labs as well as ER course  Patient educated on diagnosis and management patient educated on persistent worsening signs and symptoms and either follow-up with primary care and/or return to the emergency department  Patient was discharged in ambulatory improved condition  Amount and/or Complexity of Data Reviewed  Clinical lab tests: ordered and reviewed        Disposition  Final diagnoses:   Gastritis   Nausea & vomiting     Time reflects when diagnosis was documented in both MDM as applicable and the Disposition within this note     Time User Action Codes Description Comment    4/4/2021  3:30 PM Tova Means [K29 70] Gastritis     4/4/2021  3:31 PM Galileo Webb Add [R11 2] Nausea & vomiting       ED Disposition     ED Disposition Condition Date/Time Comment    Discharge Stable [place] Apr 4, 2021  3:30 PM Kiki Key discharge to home/self care              Follow-up Information     Follow up With Specialties Details Why Contact Info    Fredy Jian MD Family Medicine   [place] [id number]  [address] [place] [address]  [phone]            Discharge Medication List as of 4/4/2021  3:34 PM      START taking these medications    Details   acetaminophen (TYLENOL) 500 mg tablet Take 1 tablet (500 mg total) by mouth every 6 (six) hours as needed for mild pain or moderate pain, Starting Sun 4/4/2021, Normal      !! famotidine (PEPCID) 20 mg tablet Take 1 tablet (20 mg total) by mouth 2 (two) times a day, Starting Sun 4/4/2021, Normal      !! ibuprofen (MOTRIN) 400 mg tablet Take 1 tablet (400 mg total) by mouth every 6 (six) hours as needed for mild pain or moderate pain, Starting Sun 4/4/2021, Normal      ondansetron (ZOFRAN-ODT) 4 mg disintegrating tablet Take 1 tablet (4 mg total) by mouth every 6 (six) hours as needed for nausea or vomiting, Starting Sun 4/4/2021, Normal       !! - Potential duplicate medications found  Please discuss with provider  CONTINUE these medications which have NOT CHANGED    Details   albuterol (PROVENTIL HFA,VENTOLIN HFA) 90 mcg/act inhaler Inhale 2 puffs every 4 (four) hours as needed for wheezing, Starting Sun 12/29/2019, Print      ARIPiprazole (ABILIFY) 2 mg tablet Take 1 tablet (2 mg total) by mouth daily, Starting Fri 4/2/2021, Normal      clindamycin (CLINDAGEL) 1 % gel Apply 1 application topically Every 12 hours , Starting Wed 5/30/2018, Historical Med      ergocalciferol (VITAMIN D2) 50,000 units TAKE ONE CAPSULE BY MOUTH ONE TIME PER WEEK, Normal      !! famotidine (PEPCID) 20 mg tablet Take 1 tablet (20 mg total) by mouth 2 (two) times a day, Starting Thu 7/18/2019, Normal      gabapentin (NEURONTIN) 600 MG tablet Take 1 tablet (600 mg total) by mouth every evening, Starting Fri 4/2/2021, Normal      hydrOXYzine HCL (ATARAX) 25 mg tablet Take 1 tablet (25 mg total) by mouth every 6 (six) hours as needed for itching or anxiety, Starting Fri 4/2/2021, Normal      !! ibuprofen (MOTRIN) 600 mg tablet Take 1 tablet (600 mg total) by mouth every 6 (six) hours as needed for mild pain, Starting Thu 10/17/2019, Print      medroxyPROGESTERone (DEPO-PROVERA) 150 mg/mL injection Inject 1 mL (150 mg total) into a muscle every 3 (three) months, Starting Mon 3/22/2021, Normal      ondansetron (ZOFRAN) 4 mg tablet Take 1 tablet (4 mg total) by mouth every 8 (eight) hours as needed for nausea or vomiting, Starting Tue 4/14/2020, Normal      venlafaxine (EFFEXOR-XR) 150 mg 24 hr capsule Take 1 capsule (150 mg total) by mouth daily, Starting Fri 4/2/2021, Print       !! - Potential duplicate medications found  Please discuss with provider  No discharge procedures on file      PDMP Review     None          ED Provider  Electronically Signed by           Ashley Benites PA-C  04/05/21 0386

## 2021-04-22 DIAGNOSIS — F33.3 SEVERE EPISODE OF RECURRENT MAJOR DEPRESSIVE DISORDER, WITH PSYCHOTIC FEATURES (HCC): ICD-10-CM

## 2021-04-22 DIAGNOSIS — F39 MOOD DISORDER (HCC): ICD-10-CM

## 2021-04-22 RX ORDER — VENLAFAXINE HYDROCHLORIDE 150 MG/1
150 CAPSULE, EXTENDED RELEASE ORAL DAILY
Qty: 30 CAPSULE | Refills: 1 | Status: CANCELLED | OUTPATIENT
Start: 2021-04-22

## 2021-04-22 RX ORDER — HYDROXYZINE HYDROCHLORIDE 25 MG/1
25 TABLET, FILM COATED ORAL EVERY 6 HOURS PRN
Qty: 30 TABLET | Refills: 1 | Status: CANCELLED | OUTPATIENT
Start: 2021-04-22

## 2021-04-22 RX ORDER — ARIPIPRAZOLE 2 MG/1
2 TABLET ORAL DAILY
Qty: 30 TABLET | Refills: 1 | Status: CANCELLED | OUTPATIENT
Start: 2021-04-22

## 2021-04-22 NOTE — TELEPHONE ENCOUNTER
Pt called requesting refills on her   Effexor 150 mg daily  Abilify 2 mg at [facility]  Atarax 25 mg at [facility]  Pt last seen 4/2/21 and Ronny Villanueva did given her #30 with 1 refill    I lm for pt to call pharmacy for refill

## 2021-06-06 DIAGNOSIS — F39 MOOD DISORDER (HCC): ICD-10-CM

## 2021-06-07 RX ORDER — HYDROXYZINE HYDROCHLORIDE 25 MG/1
25 TABLET, FILM COATED ORAL EVERY 6 HOURS PRN
Qty: 30 TABLET | Refills: 1 | Status: SHIPPED | OUTPATIENT
Start: 2021-06-07

## 2021-06-15 ENCOUNTER — OFFICE VISIT (OUTPATIENT)
Dept: OBGYN CLINIC | Facility: CLINIC | Age: 21
End: 2021-06-15

## 2021-06-15 VITALS
SYSTOLIC BLOOD PRESSURE: 119 MMHG | HEART RATE: 85 BPM | BODY MASS INDEX: 34.49 KG/M2 | WEIGHT: 165 LBS | DIASTOLIC BLOOD PRESSURE: 85 MMHG

## 2021-06-15 DIAGNOSIS — Z31.9 PATIENT DESIRES PREGNANCY: Primary | ICD-10-CM

## 2021-06-15 DIAGNOSIS — N92.6 MISSED MENSES: ICD-10-CM

## 2021-06-15 LAB — SL AMB POCT URINE HCG: NEGATIVE

## 2021-06-15 PROCEDURE — 99213 OFFICE O/P EST LOW 20 MIN: CPT | Performed by: NURSE PRACTITIONER

## 2021-06-15 PROCEDURE — 81025 URINE PREGNANCY TEST: CPT | Performed by: NURSE PRACTITIONER

## 2021-06-15 PROCEDURE — T1015 CLINIC SERVICE: HCPCS | Performed by: NURSE PRACTITIONER

## 2021-06-15 NOTE — PATIENT INSTRUCTIONS
Call with positive pregnancy test  Schedule annual GYN exam    COVID-19 Instructions    If you are having any of the following:  Cough   Shortness of breath   Fever  If traveled within past 2 weeks internationally or to high risk [place] states  Or been in contact with someone that has     Please call either:   Your PCP office  [phone], option [phone]    They will screen you over the phone and direct you to the nearest appropriate testing location    DO NOT go to your PCP or OB office without calling first

## 2021-06-15 NOTE — PROGRESS NOTES
Assessment/Plan:         Diagnoses and all orders for this visit:    Patient desires pregnancy    Missed menses  -     POCT urine HCG      Plan  Take folic acid as directedCall with positive pregnancy test  Schedule annual GYN exam  Pt verbalized understanding of all discussed  Subjective:      Patient ID: Apoorva Hancock is a 19 y o  female  HPI  Pt presents with her mother for a pregnancy test  Pt states she took a pregnancy test at home but states she is sure she is pregnant  Pt states she has intercourse every 3 days, the last unprotected intercourse was yesterday  Pt states she does not want to continue Depo she would like to be pregnant     Explained UPT was negative  Explained she is just at the time when her next Depo would be due  Discussed safe and effective use of folic acid    The following portions of the patient's history were reviewed and updated as appropriate: allergies, current medications, past family history, past medical history, past social history, past surgical history and problem list     Review of Systems      Pertinent items are note in the HPI    Objective:      /85   Pulse 85   Wt 74 8 kg (165 lb)   BMI 34 49 kg/m²          Physical Exam  Vitals reviewed  Constitutional:       Appearance: Normal appearance  Eyes:      General:         Right eye: No discharge  Left eye: No discharge  Pulmonary:      Effort: Pulmonary effort is normal  No respiratory distress  Musculoskeletal:         General: Normal range of motion  Cervical back: Normal range of motion  Neurological:      Mental Status: She is alert and oriented to person, place, and time  Psychiatric:         Mood and Affect: Mood normal          Behavior: Behavior normal          Thought Content:  Thought content normal        Negative cough or SOB

## 2021-06-25 ENCOUNTER — HOSPITAL ENCOUNTER (EMERGENCY)
Facility: HOSPITAL | Age: 21
Discharge: HOME | End: 2021-06-25
Payer: COMMERCIAL

## 2021-06-25 VITALS
RESPIRATION RATE: 16 BRPM | HEART RATE: 68 BPM | BODY MASS INDEX: 37 KG/M2 | SYSTOLIC BLOOD PRESSURE: 112 MMHG | TEMPERATURE: 98.3 F | WEIGHT: 177.03 LBS | DIASTOLIC BLOOD PRESSURE: 64 MMHG | OXYGEN SATURATION: 100 %

## 2021-06-25 DIAGNOSIS — N39.0 UTI (URINARY TRACT INFECTION): ICD-10-CM

## 2021-06-25 DIAGNOSIS — R10.13 EPIGASTRIC ABDOMINAL PAIN: Primary | ICD-10-CM

## 2021-06-25 LAB
ALBUMIN SERPL BCP-MCNC: 3.4 G/DL (ref 3.5–5)
ALP SERPL-CCNC: 95 U/L (ref 46–116)
ALT SERPL W P-5'-P-CCNC: 17 U/L (ref 12–78)
ANION GAP SERPL CALCULATED.3IONS-SCNC: 5 MMOL/L (ref 4–13)
AST SERPL W P-5'-P-CCNC: 8 U/L (ref 5–45)
BACTERIA UR QL AUTO: ABNORMAL /HPF
BASOPHILS # BLD AUTO: 0.04 THOUSANDS/ΜL (ref 0–0.1)
BASOPHILS NFR BLD AUTO: 1 % (ref 0–1)
BILIRUB SERPL-MCNC: 0.13 MG/DL (ref 0.2–1)
BILIRUB UR QL STRIP: NEGATIVE
BUN SERPL-MCNC: 10 MG/DL (ref 5–25)
CALCIUM ALBUM COR SERPL-MCNC: 9 MG/DL (ref 8.3–10.1)
CALCIUM SERPL-MCNC: 8.5 MG/DL (ref 8.3–10.1)
CHLORIDE SERPL-SCNC: 106 MMOL/L (ref 100–108)
CLARITY UR: CLEAR
CO2 SERPL-SCNC: 28 MMOL/L (ref 21–32)
COLOR UR: YELLOW
CREAT SERPL-MCNC: 0.78 MG/DL (ref 0.6–1.3)
EOSINOPHIL # BLD AUTO: 0.27 THOUSAND/ΜL (ref 0–0.61)
EOSINOPHIL NFR BLD AUTO: 3 % (ref 0–6)
ERYTHROCYTE [DISTWIDTH] IN BLOOD BY AUTOMATED COUNT: 14.4 % (ref 11.6–15.1)
EXT PREG TEST URINE: NEGATIVE
EXT. CONTROL ED NAV: NORMAL
GFR SERPL CREATININE-BSD FRML MDRD: 107 ML/MIN/1.73SQ M
GLUCOSE SERPL-MCNC: 104 MG/DL (ref 65–140)
GLUCOSE UR STRIP-MCNC: NEGATIVE MG/DL
HCT VFR BLD AUTO: 35.2 % (ref 34.8–46.1)
HGB BLD-MCNC: 11 G/DL (ref 11.5–15.4)
HGB UR QL STRIP.AUTO: NEGATIVE
IMM GRANULOCYTES # BLD AUTO: 0.04 THOUSAND/UL (ref 0–0.2)
IMM GRANULOCYTES NFR BLD AUTO: 1 % (ref 0–2)
KETONES UR STRIP-MCNC: NEGATIVE MG/DL
LEUKOCYTE ESTERASE UR QL STRIP: ABNORMAL
LIPASE SERPL-CCNC: 93 U/L (ref 73–393)
LYMPHOCYTES # BLD AUTO: 2.2 THOUSANDS/ΜL (ref 0.6–4.47)
LYMPHOCYTES NFR BLD AUTO: 27 % (ref 14–44)
MCH RBC QN AUTO: 25.4 PG (ref 26.8–34.3)
MCHC RBC AUTO-ENTMCNC: 31.3 G/DL (ref 31.4–37.4)
MCV RBC AUTO: 81 FL (ref 82–98)
MONOCYTES # BLD AUTO: 0.69 THOUSAND/ΜL (ref 0.17–1.22)
MONOCYTES NFR BLD AUTO: 9 % (ref 4–12)
NEUTROPHILS # BLD AUTO: 4.86 THOUSANDS/ΜL (ref 1.85–7.62)
NEUTS SEG NFR BLD AUTO: 59 % (ref 43–75)
NITRITE UR QL STRIP: POSITIVE
NON-SQ EPI CELLS URNS QL MICRO: ABNORMAL /HPF
NRBC BLD AUTO-RTO: 0 /100 WBCS
PH UR STRIP.AUTO: 7 [PH] (ref 4.5–8)
PLATELET # BLD AUTO: 306 THOUSANDS/UL (ref 149–390)
PMV BLD AUTO: 9.7 FL (ref 8.9–12.7)
POTASSIUM SERPL-SCNC: 4.1 MMOL/L (ref 3.5–5.3)
PROT SERPL-MCNC: 6.7 G/DL (ref 6.4–8.2)
PROT UR STRIP-MCNC: NEGATIVE MG/DL
RBC # BLD AUTO: 4.33 MILLION/UL (ref 3.81–5.12)
RBC #/AREA URNS AUTO: ABNORMAL /HPF
SODIUM SERPL-SCNC: 139 MMOL/L (ref 136–145)
SP GR UR STRIP.AUTO: 1.02 (ref 1–1.03)
UROBILINOGEN UR QL STRIP.AUTO: 0.2 E.U./DL
WBC # BLD AUTO: 8.1 THOUSAND/UL (ref 4.31–10.16)
WBC #/AREA URNS AUTO: ABNORMAL /HPF

## 2021-06-25 PROCEDURE — 81001 URINALYSIS AUTO W/SCOPE: CPT

## 2021-06-25 PROCEDURE — 36415 COLL VENOUS BLD VENIPUNCTURE: CPT | Performed by: PHYSICIAN ASSISTANT

## 2021-06-25 PROCEDURE — 99284 EMERGENCY DEPT VISIT MOD MDM: CPT

## 2021-06-25 PROCEDURE — 96374 THER/PROPH/DIAG INJ IV PUSH: CPT

## 2021-06-25 PROCEDURE — 80053 COMPREHEN METABOLIC PANEL: CPT | Performed by: PHYSICIAN ASSISTANT

## 2021-06-25 PROCEDURE — 81025 URINE PREGNANCY TEST: CPT | Performed by: PHYSICIAN ASSISTANT

## 2021-06-25 PROCEDURE — 99284 EMERGENCY DEPT VISIT MOD MDM: CPT | Performed by: PHYSICIAN ASSISTANT

## 2021-06-25 PROCEDURE — 85025 COMPLETE CBC W/AUTO DIFF WBC: CPT | Performed by: PHYSICIAN ASSISTANT

## 2021-06-25 PROCEDURE — 96361 HYDRATE IV INFUSION ADD-ON: CPT

## 2021-06-25 PROCEDURE — 83690 ASSAY OF LIPASE: CPT | Performed by: PHYSICIAN ASSISTANT

## 2021-06-25 RX ORDER — SUCRALFATE ORAL 1 G/10ML
1 SUSPENSION ORAL 4 TIMES DAILY
Qty: 420 ML | Refills: 0 | Status: SHIPPED | OUTPATIENT
Start: 2021-06-25

## 2021-06-25 RX ORDER — ACETAMINOPHEN 325 MG/1
650 TABLET ORAL ONCE
Status: COMPLETED | OUTPATIENT
Start: 2021-06-25 | End: 2021-06-25

## 2021-06-25 RX ORDER — MAGNESIUM HYDROXIDE/ALUMINUM HYDROXICE/SIMETHICONE 120; 1200; 1200 MG/30ML; MG/30ML; MG/30ML
30 SUSPENSION ORAL ONCE
Status: COMPLETED | OUTPATIENT
Start: 2021-06-25 | End: 2021-06-25

## 2021-06-25 RX ORDER — LIDOCAINE HYDROCHLORIDE 20 MG/ML
15 SOLUTION OROPHARYNGEAL ONCE
Status: COMPLETED | OUTPATIENT
Start: 2021-06-25 | End: 2021-06-25

## 2021-06-25 RX ORDER — DICYCLOMINE HCL 20 MG
20 TABLET ORAL ONCE
Status: COMPLETED | OUTPATIENT
Start: 2021-06-25 | End: 2021-06-25

## 2021-06-25 RX ORDER — CEPHALEXIN 500 MG/1
500 CAPSULE ORAL 2 TIMES DAILY
Qty: 14 CAPSULE | Refills: 0 | Status: SHIPPED | OUTPATIENT
Start: 2021-06-25 | End: 2021-07-02

## 2021-06-25 RX ORDER — ONDANSETRON 2 MG/ML
4 INJECTION INTRAMUSCULAR; INTRAVENOUS ONCE
Status: COMPLETED | OUTPATIENT
Start: 2021-06-25 | End: 2021-06-25

## 2021-06-25 RX ADMIN — SODIUM CHLORIDE 1000 ML: 0.9 INJECTION, SOLUTION INTRAVENOUS at 06:54

## 2021-06-25 RX ADMIN — LIDOCAINE HYDROCHLORIDE 15 ML: 20 SOLUTION ORAL; TOPICAL at 06:54

## 2021-06-25 RX ADMIN — DICYCLOMINE HYDROCHLORIDE 20 MG: 20 TABLET ORAL at 06:54

## 2021-06-25 RX ADMIN — ONDANSETRON 4 MG: 2 INJECTION INTRAMUSCULAR; INTRAVENOUS at 06:54

## 2021-06-25 RX ADMIN — ACETAMINOPHEN 650 MG: 325 TABLET, FILM COATED ORAL at 06:54

## 2021-06-25 RX ADMIN — ALUMINUM HYDROXIDE, MAGNESIUM HYDROXIDE, AND SIMETHICONE 30 ML: 200; 200; 20 SUSPENSION ORAL at 06:54

## 2021-06-25 NOTE — DISCHARGE INSTRUCTIONS
DISCHARGE INSTRUCTIONS:    FOLLOW UP WITH YOUR PRIMARY CARE PROVIDER OR THE [address] [place] [address]  MAKE AN APPOINTMENT TO BE SEEN  TAKE MEDICATION AS PRESCRIBED  IF RASH, SHORTNESS OF BREATH OR TROUBLE SWALLOWING, STOP TAKING THE MEDICATION AND BE SEEN  REST AND DRINK PLENTY OF FLUIDS  IF SYMPTOMS WORSEN OR NEW SYMPTOMS ARISE, RETURN TO THE ER TO BE SEEN

## 2021-06-25 NOTE — ED PROVIDER NOTES
History  Chief Complaint   Patient presents with    Abdominal Pain     mid abd pain with nausea starting this am  denies urinary symptoms  21y  o female with PMH of ADHD, anxiety, asthma, bipolar, depression, gallstones, GERD, HLD, migraine and scoliosis presents to the ER for epigastric abdominal pain, nausea, headache and dizziness for 1 day  Patient denies taking any medication for symptoms  She describes her pain as sharp and non-radiating  Pain is constant  She denies fever, chills, URI symptoms, chest pain, dyspnea, vomiting, diarrhea, urinary symptoms, weakness or paresthesias  History provided by:  Patient   used: No        Prior to Admission Medications   Prescriptions Last Dose Informant Patient Reported? Taking?    ARIPiprazole (ABILIFY) 2 mg tablet 6/24/2021 at Unknown time  No Yes   Sig: Take 1 tablet (2 mg total) by mouth daily   acetaminophen (TYLENOL) 500 mg tablet More than a month at Unknown time  No No   Sig: Take 1 tablet (500 mg total) by mouth every 6 (six) hours as needed for mild pain or moderate pain   Patient not taking: Reported on 6/25/2021   albuterol (PROVENTIL HFA,VENTOLIN HFA) 90 mcg/act inhaler 6/25/2021 at Unknown time  No Yes   Sig: Inhale 2 puffs every 4 (four) hours as needed for wheezing   clindamycin (CLINDAGEL) 1 % gel Not Taking at Unknown time  Yes No   Sig: Apply 1 application topically Every 12 hours    Patient not taking: Reported on 6/25/2021   ergocalciferol (VITAMIN D2) 50,000 units Past Week at Unknown time  No Yes   Sig: TAKE ONE CAPSULE BY MOUTH ONE TIME PER WEEK   famotidine (PEPCID) 20 mg tablet 6/24/2021 at Unknown time  No Yes   Sig: Take 1 tablet (20 mg total) by mouth 2 (two) times a day   famotidine (PEPCID) 20 mg tablet 6/24/2021 at Unknown time  No Yes   Sig: Take 1 tablet (20 mg total) by mouth 2 (two) times a day   gabapentin (NEURONTIN) 600 MG tablet 6/24/2021 at Unknown time  No Yes   Sig: Take 1 tablet (600 mg total) by mouth every evening   hydrOXYzine HCL (ATARAX) 25 mg tablet 6/24/2021 at Unknown time  No Yes   Sig: TAKE 1 TABLET (25 MG TOTAL) BY MOUTH EVERY 6 (SIX) HOURS AS NEEDED FOR ITCHING OR ANXIETY   ibuprofen (MOTRIN) 400 mg tablet Not Taking at Unknown time  No No   Sig: Take 1 tablet (400 mg total) by mouth every 6 (six) hours as needed for mild pain or moderate pain   Patient not taking: Reported on 6/25/2021   ibuprofen (MOTRIN) 600 mg tablet Not Taking at Unknown time  No No   Sig: Take 1 tablet (600 mg total) by mouth every 6 (six) hours as needed for mild pain   Patient not taking: Reported on 6/25/2021   medroxyPROGESTERone (DEPO-PROVERA) 150 mg/mL injection More than a month at Unknown time  No No   Sig: Inject 1 mL (150 mg total) into a muscle every 3 (three) months   ondansetron (ZOFRAN) 4 mg tablet Not Taking at Unknown time  No No   Sig: Take 1 tablet (4 mg total) by mouth every 8 (eight) hours as needed for nausea or vomiting   Patient not taking: Reported on 6/25/2021   ondansetron (ZOFRAN-ODT) 4 mg disintegrating tablet Not Taking at Unknown time  No No   Sig: Take 1 tablet (4 mg total) by mouth every 6 (six) hours as needed for nausea or vomiting   Patient not taking: Reported on 6/25/2021   venlafaxine (EFFEXOR-XR) 150 mg 24 hr capsule 6/25/2021 at Unknown time  No Yes   Sig: Take 1 capsule (150 mg total) by mouth daily      Facility-Administered Medications: None       Past Medical History:   Diagnosis Date    ADHD     Anxiety     Asthma     Bipolar 1 disorder (HCC)     Class 2 obesity due to excess calories without serious comorbidity with body mass index (BMI) of 35 0 to 35 9 in adult 11/3/2018    Depression     Gallstones     GERD (gastroesophageal reflux disease)     Heart murmur 1/30/2015    Hyperlipidemia     Migraine     excedrin managed; 1 every 6 months    Scoliosis        Past Surgical History:   Procedure Laterality Date    CHOLANGIOGRAM N/A 12/1/2018    Procedure: CHOLANGIOGRAM; Surgeon: Clark Duane, MD;  Location:  MAIN OR;  Service: General    CHOLECYSTECTOMY      CHOLECYSTECTOMY LAPAROSCOPIC N/A 12/1/2018    Procedure: CHOLECYSTECTOMY LAPAROSCOPIC;  Surgeon: Clark Duane, MD;  Location: [facility];  Service: General    EYE MUSCLE SURGERY      DE ESOPHAGOGASTRODUODENOSCOPY TRANSORAL DIAGNOSTIC N/A 11/27/2018    Procedure: ESOPHAGOGASTRODUODENOSCOPY (EGD) with bx;  Surgeon: Ananda Vaughn MD;  Location: [facility]; Service: General       Family History   Problem Relation Age of Onset    Heart attack Mother     Depression Mother     Mental illness Mother     Coronary artery disease Mother     Hypertension Mother     Diabetes Mother     Hyperlipidemia Mother     Pneumonia Brother     Hypertension Maternal Grandmother     Stroke Maternal Grandmother     Diabetes Maternal Grandmother     Glaucoma Maternal Grandmother     Kidney disease Maternal Grandmother     Sarcoidosis Maternal Grandmother     Diabetes Maternal Grandfather     Hypertension Maternal Grandfather     Stroke Maternal Grandfather     Glaucoma Maternal Grandfather     Heart attack Maternal Grandfather      I have reviewed and agree with the history as documented  E-Cigarette/Vaping    E-Cigarette Use Never User      E-Cigarette/Vaping Substances    Nicotine No     THC No     CBD No     Flavoring No      Social History     Tobacco Use    Smoking status: Never Smoker    Smokeless tobacco: Never Used    Tobacco comment: no passive smoke exposure   Vaping Use    Vaping Use: Never used   Substance Use Topics    Alcohol use: No    Drug use: No       Review of Systems   Constitutional: Negative for activity change, appetite change, chills and fever  HENT: Negative for congestion, drooling, ear discharge, ear pain, facial swelling, rhinorrhea and sore throat  Eyes: Negative for redness  Respiratory: Negative for cough and shortness of breath      Cardiovascular: Negative for chest pain    Gastrointestinal: Positive for abdominal pain and nausea  Negative for diarrhea and vomiting  Genitourinary: Negative for dysuria, frequency, hematuria and urgency  Musculoskeletal: Negative for neck stiffness  Skin: Negative for rash  Allergic/Immunologic: Negative for food allergies  Neurological: Positive for dizziness and headaches  Negative for weakness and numbness  Physical Exam  Physical Exam  Vitals and nursing note reviewed  Constitutional:       General: She is not in acute distress  Appearance: She is not toxic-appearing  HENT:      Head: Normocephalic and atraumatic  Right Ear: Tympanic membrane, ear canal and external ear normal  No drainage, swelling or tenderness  No foreign body  No hemotympanum  Tympanic membrane is not erythematous  Left Ear: Tympanic membrane, ear canal and external ear normal  No drainage, swelling or tenderness  No foreign body  No hemotympanum  Tympanic membrane is not erythematous  Nose: Nose normal       Mouth/Throat:      Lips: Pink  No lesions  Mouth: Mucous membranes are moist       Pharynx: Uvula midline  No posterior oropharyngeal erythema or uvula swelling  Tonsils: No tonsillar exudate or tonsillar abscesses  Eyes:      Extraocular Movements: Extraocular movements intact  Conjunctiva/sclera: Conjunctivae normal       Pupils: Pupils are equal, round, and reactive to light  Neck:      Trachea: Phonation normal  No tracheal deviation  Cardiovascular:      Rate and Rhythm: Normal rate and regular rhythm  Heart sounds: Normal heart sounds, S1 normal and S2 normal  No murmur heard  No friction rub  No gallop  Pulmonary:      Effort: Pulmonary effort is normal  No respiratory distress  Breath sounds: Normal breath sounds  No decreased breath sounds, wheezing, rhonchi or rales  Chest:      Chest wall: No tenderness  Abdominal:      General: Bowel sounds are normal  There is no distension  Palpations: Abdomen is soft  Tenderness: There is abdominal tenderness in the epigastric area and left upper quadrant  There is no guarding or rebound  Musculoskeletal:         General: No deformity  Normal range of motion  Cervical back: Normal range of motion and neck supple  Thoracic back: Normal       Lumbar back: Normal    Skin:     General: Skin is warm and dry  Findings: No rash  Neurological:      Mental Status: She is alert  GCS: GCS eye subscore is 4  GCS verbal subscore is 5  GCS motor subscore is 6  Cranial Nerves: No cranial nerve deficit  Sensory: No sensory deficit  Motor: No abnormal muscle tone        Gait: Gait normal    Psychiatric:         Mood and Affect: Mood normal          Vital Signs  ED Triage Vitals [06/25/21 0604]   Temperature Pulse Respirations Blood Pressure SpO2   98 3 °F (36 8 °C) 91 18 131/72 98 %      Temp Source Heart Rate Source Patient Position - Orthostatic VS BP Location FiO2 (%)   Oral Monitor Lying Right arm --      Pain Score       8           Vitals:    06/25/21 0604 06/25/21 0745 06/25/21 0849   BP: 131/72 127/75 112/64   Pulse: 91 80 68   Patient Position - Orthostatic VS: Lying Sitting Lying         Visual Acuity  Visual Acuity      Most Recent Value   L Pupil Size (mm)  3   R Pupil Size (mm)  3          ED Medications  Medications   sodium chloride 0 9 % bolus 1,000 mL (0 mL Intravenous Stopped 6/25/21 0841)   aluminum-magnesium hydroxide-simethicone (MYLANTA) oral suspension 30 mL (30 mL Oral Given 6/25/21 0654)   Lidocaine Viscous HCl (XYLOCAINE) 2 % mucosal solution 15 mL (15 mL Swish & Spit Given 6/25/21 0654)   dicyclomine (BENTYL) tablet 20 mg (20 mg Oral Given 6/25/21 0654)   ondansetron (ZOFRAN) injection 4 mg (4 mg Intravenous Given 6/25/21 0654)   acetaminophen (TYLENOL) tablet 650 mg (650 mg Oral Given 6/25/21 0654)       Diagnostic Studies  Results Reviewed     Procedure Component Value Units Date/Time Urine Microscopic [[id number]]  (Abnormal) Collected: 06/25/21 0751    Lab Status: Final result Specimen: Urine, Clean Catch Updated: 06/25/21 0843     RBC, UA 0-1 /hpf      WBC, UA 2-4 /hpf      Epithelial Cells Occasional /hpf      Bacteria, UA Innumerable /hpf     POCT pregnancy, urine [[id number]]  (Normal) Resulted: 06/25/21 0754    Lab Status: Final result Updated: 06/25/21 0754     EXT PREG TEST UR (Ref: Negative) Negative     Control Valid    Urine Macroscopic, POC [[id number]]  (Abnormal) Collected: 06/25/21 0751    Lab Status: Final result Specimen: Urine Updated: 06/25/21 0753     Color, UA Yellow     Clarity, UA Clear     pH, UA 7 0     Leukocytes, UA Trace     Nitrite, UA Positive     Protein, UA Negative mg/dl      Glucose, UA Negative mg/dl      Ketones, UA Negative mg/dl      Urobilinogen, UA 0 2 E U /dl      Bilirubin, UA Negative     Blood, UA Negative     Specific Gravity, UA 1 020    Narrative:      CLINITEK RESULT    Comprehensive metabolic panel [[id number]]  (Abnormal) Collected: 06/25/21 0654    Lab Status: Final result Specimen: Blood from Arm, Right Updated: 06/25/21 0721     Sodium 139 mmol/L      Potassium 4 1 mmol/L      Chloride 106 mmol/L      CO2 28 mmol/L      ANION GAP 5 mmol/L      BUN 10 mg/dL      Creatinine 0 78 mg/dL      Glucose 104 mg/dL      Calcium 8 5 mg/dL      Corrected Calcium 9 0 mg/dL      AST 8 U/L      ALT 17 U/L      Alkaline Phosphatase 95 U/L      Total Protein 6 7 g/dL      Albumin 3 4 g/dL      Total Bilirubin 0 13 mg/dL      eGFR 107 ml/min/1 73sq m     Narrative:      Philip guidelines for Chronic Kidney Disease (CKD):     Stage 1 with normal or high GFR (GFR > 90 mL/min/1 73 square meters)    Stage 2 Mild CKD (GFR = 60-89 mL/min/1 73 square meters)    Stage 3A Moderate CKD (GFR = 45-59 mL/min/1 73 square meters)    Stage 3B Moderate CKD (GFR = 30-44 mL/min/1 73 square meters)    Stage 4 Severe CKD (GFR = 15-29 mL/min/1 73 square meters)    Stage 5 End Stage CKD (GFR <15 mL/min/1 73 square meters)  Note: GFR calculation is accurate only with a steady state creatinine    Lipase [[id number]]  (Normal) Collected: 06/25/21 0654    Lab Status: Final result Specimen: Blood from Arm, Right Updated: 06/25/21 0721     Lipase 93 u/L     CBC and differential [[id number]]  (Abnormal) Collected: 06/25/21 0654    Lab Status: Final result Specimen: Blood from Arm, Right Updated: 06/25/21 0701     WBC 8 10 Thousand/uL      RBC 4 33 Million/uL      Hemoglobin 11 0 g/dL      Hematocrit 35 2 %      MCV 81 fL      MCH 25 4 pg      MCHC 31 3 g/dL      RDW 14 4 %      MPV 9 7 fL      Platelets 217 Thousands/uL      nRBC 0 /100 WBCs      Neutrophils Relative 59 %      Immat GRANS % 1 %      Lymphocytes Relative 27 %      Monocytes Relative 9 %      Eosinophils Relative 3 %      Basophils Relative 1 %      Neutrophils Absolute 4 86 Thousands/µL      Immature Grans Absolute 0 04 Thousand/uL      Lymphocytes Absolute 2 20 Thousands/µL      Monocytes Absolute 0 69 Thousand/µL      Eosinophils Absolute 0 27 Thousand/µL      Basophils Absolute 0 04 Thousands/µL                  No orders to display              Procedures  Procedures         ED Course  ED Course as of Jun 25 0938   Fri Jun 25, 2021   0829 Patient reports improvement in all symptoms  Awaiting urine  SBIRT 20yo+      Most Recent Value   SBIRT (24 yo +)   In order to provide better care to our patients, we are screening all of our patients for alcohol and drug use  Would it be okay to ask you these screening questions? No Filed at: 06/25/2021 0612                    MDM  Number of Diagnoses or Management Options  Epigastric abdominal pain: new and requires workup  UTI (urinary tract infection): new and requires workup  Diagnosis management comments: DDX consists of but not limited to: gastritis, dehydration, pancreatitis    Will check labs   Will give fluids, Zofran, Bentyl, Mylanta and Viscous Lidocaine  0517 Patient reports improvement in all symptoms  Awaiting urine  Informed patient of lab findings  Patient reports resolution of symptoms  Will discharge  The management plan was discussed in detail with the patient at bedside and all questions were answered  Prior to discharge, we provided both verbal and written instructions  We discussed with the patient the signs and symptoms for which to return to the emergency department  All questions were answered and patient was comfortable with the plan of care and discharged to home  Instructed the patient to follow up with the primary care provider and/or specialist provided and their written instructions  The patient verbalized understanding of our discussion and plan of care, and agrees to return to the Emergency Department for concerns and progression of illness  At discharge, I instructed the patient to:  -follow up with pcp  -take Keflex and Carafate as prescribed  -rest and drink plenty of fluids  -return to the ER if symptoms worsened or new symptoms arose  Patient agreed to this plan and was stable at time of discharge  Amount and/or Complexity of Data Reviewed  Clinical lab tests: ordered and reviewed  Obtain history from someone other than the patient: yes    Patient Progress  Patient progress: stable      Disposition  Final diagnoses:   Epigastric abdominal pain   UTI (urinary tract infection)     Time reflects when diagnosis was documented in both MDM as applicable and the Disposition within this note     Time User Action Codes Description Comment    6/25/2021  8:46 AM Bettina ANDREW Add [R10 13] Epigastric abdominal pain     6/25/2021  8:46 AM Bettina ANDREW Add [N39 0] UTI (urinary tract infection)       ED Disposition     ED Disposition Condition Date/Time Comment    Discharge Stable Fri Jun 25, 2021  8:46 AM Kiersten Meza discharge to home/self care              Follow-up Information Follow up With Specialties Details Why Jamin Swift MD Family Medicine Schedule an appointment as soon as possible for a visit   [facility] [id number]  [phone]            Discharge Medication List as of 6/25/2021  8:47 AM      START taking these medications    Details   cephalexin (KEFLEX) 500 mg capsule Take 1 capsule (500 mg total) by mouth 2 (two) times a day for 7 days, Starting Fri 6/25/2021, Until Fri 7/2/2021, Normal      sucralfate (CARAFATE) 1 g/10 mL suspension Take 10 mL (1 g total) by mouth 4 (four) times a day, Starting Fri 6/25/2021, Normal         CONTINUE these medications which have NOT CHANGED    Details   acetaminophen (TYLENOL) 500 mg tablet Take 1 tablet (500 mg total) by mouth every 6 (six) hours as needed for mild pain or moderate pain, Starting Sun 4/4/2021, Normal      albuterol (PROVENTIL HFA,VENTOLIN HFA) 90 mcg/act inhaler Inhale 2 puffs every 4 (four) hours as needed for wheezing, Starting Sun 12/29/2019, Print      ARIPiprazole (ABILIFY) 2 mg tablet Take 1 tablet (2 mg total) by mouth daily, Starting Fri 4/2/2021, Normal      clindamycin (CLINDAGEL) 1 % gel Apply 1 application topically Every 12 hours , Starting Wed 5/30/2018, Historical Med      ergocalciferol (VITAMIN D2) 50,000 units TAKE ONE CAPSULE BY MOUTH ONE TIME PER WEEK, Normal      !! famotidine (PEPCID) 20 mg tablet Take 1 tablet (20 mg total) by mouth 2 (two) times a day, Starting u 7/18/2019, Normal      !! famotidine (PEPCID) 20 mg tablet Take 1 tablet (20 mg total) by mouth 2 (two) times a day, Starting Sun 4/4/2021, Normal      gabapentin (NEURONTIN) 600 MG tablet Take 1 tablet (600 mg total) by mouth every evening, Starting Fri 4/2/2021, Normal      hydrOXYzine HCL (ATARAX) 25 mg tablet TAKE 1 TABLET (25 MG TOTAL) BY MOUTH EVERY 6 (SIX) HOURS AS NEEDED FOR ITCHING OR ANXIETY, Starting Mon 6/7/2021, Normal      !! ibuprofen (MOTRIN) 400 mg tablet Take 1 tablet (400 mg total) by mouth every 6 (six) hours as needed for mild pain or moderate pain, Starting Sun 4/4/2021, Normal      !! ibuprofen (MOTRIN) 600 mg tablet Take 1 tablet (600 mg total) by mouth every 6 (six) hours as needed for mild pain, Starting Thu 10/17/2019, Print      medroxyPROGESTERone (DEPO-PROVERA) 150 mg/mL injection Inject 1 mL (150 mg total) into a muscle every 3 (three) months, Starting Mon 3/22/2021, Normal      ondansetron (ZOFRAN) 4 mg tablet Take 1 tablet (4 mg total) by mouth every 8 (eight) hours as needed for nausea or vomiting, Starting Tue 4/14/2020, Normal      ondansetron (ZOFRAN-ODT) 4 mg disintegrating tablet Take 1 tablet (4 mg total) by mouth every 6 (six) hours as needed for nausea or vomiting, Starting Sun 4/4/2021, Normal      venlafaxine (EFFEXOR-XR) 150 mg 24 hr capsule Take 1 capsule (150 mg total) by mouth daily, Starting Fri 4/2/2021, Print       !! - Potential duplicate medications found  Please discuss with provider  No discharge procedures on file      PDMP Review     None          ED Provider  Electronically Signed by           Sara Ornelas PA-C  06/25/21 4817

## 2021-07-28 ENCOUNTER — HOSPITAL ENCOUNTER (EMERGENCY)
Facility: HOSPITAL | Age: 21
Discharge: HOME | End: 2021-07-28
Attending: EMERGENCY MEDICINE
Payer: COMMERCIAL

## 2021-07-28 VITALS
TEMPERATURE: 98.1 F | HEART RATE: 76 BPM | DIASTOLIC BLOOD PRESSURE: 63 MMHG | OXYGEN SATURATION: 99 % | RESPIRATION RATE: 18 BRPM | SYSTOLIC BLOOD PRESSURE: 130 MMHG

## 2021-07-28 DIAGNOSIS — Z32.02 PREGNANCY EXAMINATION OR TEST, NEGATIVE RESULT: Primary | ICD-10-CM

## 2021-07-28 LAB
EXT PREG TEST URINE: NEGATIVE
EXT. CONTROL ED NAV: NORMAL

## 2021-07-28 PROCEDURE — 99281 EMR DPT VST MAYX REQ PHY/QHP: CPT | Performed by: PHYSICIAN ASSISTANT

## 2021-07-28 PROCEDURE — 81025 URINE PREGNANCY TEST: CPT | Performed by: PHYSICIAN ASSISTANT

## 2021-07-28 PROCEDURE — 99281 EMR DPT VST MAYX REQ PHY/QHP: CPT

## 2021-07-28 NOTE — ED PROVIDER NOTES
History  Chief Complaint   Patient presents with    Pregnancy Test     reports LMP 6/13, took home preg test and it was negative, reports no other complaints just here for test     24-year-old female presents to the ED for request of pregnancy test   Patient took a home pregnancy test that was negative several days ago  Patient denies any other symptoms at this time  Denies any abdominal pain, vaginal bleeding, vaginal discharge, nausea, vomiting, dysuria, diarrhea, and constipation  Patient reports that last menstrual cycle was on 06/13 and she has not had a menstrual cycle since  States prior to this her menstrual cycle came every 28 days  Patient previously on Depo shot 1 year ago for irregular menstrual cycles  Patient denies any changes in medication, stress, or physical activity  Patient reports that her and her significant other have been attempting to get pregnant since she had got off her Depo shot 1 year ago  History provided by:  Patient   used: No    Pregnancy Test  Associated symptoms: no abdominal pain, no chest pain, no congestion, no cough, no diarrhea, no fatigue, no fever, no headaches, no myalgias, no nausea, no rash, no rhinorrhea, no shortness of breath, no sore throat, no vomiting and no wheezing        Prior to Admission Medications   Prescriptions Last Dose Informant Patient Reported? Taking?    ARIPiprazole (ABILIFY) 2 mg tablet   No No   Sig: Take 1 tablet (2 mg total) by mouth daily   acetaminophen (TYLENOL) 500 mg tablet   No No   Sig: Take 1 tablet (500 mg total) by mouth every 6 (six) hours as needed for mild pain or moderate pain   Patient not taking: Reported on 6/25/2021   albuterol (PROVENTIL HFA,VENTOLIN HFA) 90 mcg/act inhaler   No No   Sig: Inhale 2 puffs every 4 (four) hours as needed for wheezing   clindamycin (CLINDAGEL) 1 % gel   Yes No   Sig: Apply 1 application topically Every 12 hours    Patient not taking: Reported on 6/25/2021 ergocalciferol (VITAMIN D2) 50,000 units   No No   Sig: TAKE ONE CAPSULE BY MOUTH ONE TIME PER WEEK   famotidine (PEPCID) 20 mg tablet   No No   Sig: Take 1 tablet (20 mg total) by mouth 2 (two) times a day   famotidine (PEPCID) 20 mg tablet   No No   Sig: Take 1 tablet (20 mg total) by mouth 2 (two) times a day   gabapentin (NEURONTIN) 600 MG tablet   No No   Sig: Take 1 tablet (600 mg total) by mouth every evening   hydrOXYzine HCL (ATARAX) 25 mg tablet   No No   Sig: TAKE 1 TABLET (25 MG TOTAL) BY MOUTH EVERY 6 (SIX) HOURS AS NEEDED FOR ITCHING OR ANXIETY   ibuprofen (MOTRIN) 400 mg tablet   No No   Sig: Take 1 tablet (400 mg total) by mouth every 6 (six) hours as needed for mild pain or moderate pain   Patient not taking: Reported on 6/25/2021   ibuprofen (MOTRIN) 600 mg tablet   No No   Sig: Take 1 tablet (600 mg total) by mouth every 6 (six) hours as needed for mild pain   Patient not taking: Reported on 6/25/2021   medroxyPROGESTERone (DEPO-PROVERA) 150 mg/mL injection   No No   Sig: Inject 1 mL (150 mg total) into a muscle every 3 (three) months   ondansetron (ZOFRAN) 4 mg tablet   No No   Sig: Take 1 tablet (4 mg total) by mouth every 8 (eight) hours as needed for nausea or vomiting   Patient not taking: Reported on 6/25/2021   ondansetron (ZOFRAN-ODT) 4 mg disintegrating tablet   No No   Sig: Take 1 tablet (4 mg total) by mouth every 6 (six) hours as needed for nausea or vomiting   Patient not taking: Reported on 6/25/2021   sucralfate (CARAFATE) 1 g/10 mL suspension   No No   Sig: Take 10 mL (1 g total) by mouth 4 (four) times a day   venlafaxine (EFFEXOR-XR) 150 mg 24 hr capsule   No No   Sig: Take 1 capsule (150 mg total) by mouth daily      Facility-Administered Medications: None       Past Medical History:   Diagnosis Date    ADHD     Anxiety     Asthma     Bipolar 1 disorder (HCC)     Class 2 obesity due to excess calories without serious comorbidity with body mass index (BMI) of 35 0 to 35 9 in adult 11/3/2018    Depression     Gallstones     GERD (gastroesophageal reflux disease)     Heart murmur 1/30/2015    Hyperlipidemia     Migraine     excedrin managed; 1 every 6 months    Scoliosis        Past Surgical History:   Procedure Laterality Date    CHOLANGIOGRAM N/A 12/1/2018    Procedure: CHOLANGIOGRAM;  Surgeon: Shonda Dewey MD;  Location: [facility];  Service: General    CHOLECYSTECTOMY      CHOLECYSTECTOMY LAPAROSCOPIC N/A 12/1/2018    Procedure: CHOLECYSTECTOMY LAPAROSCOPIC;  Surgeon: Shonda Dewey MD;  Location: [facility];  Service: General    EYE MUSCLE SURGERY      CA ESOPHAGOGASTRODUODENOSCOPY TRANSORAL DIAGNOSTIC N/A 11/27/2018    Procedure: ESOPHAGOGASTRODUODENOSCOPY (EGD) with bx;  Surgeon: Keon Peter MD;  Location: [facility]; Service: General       Family History   Problem Relation Age of Onset    Heart attack Mother     Depression Mother     Mental illness Mother     Coronary artery disease Mother     Hypertension Mother     Diabetes Mother     Hyperlipidemia Mother     Pneumonia Brother     Hypertension Maternal Grandmother     Stroke Maternal Grandmother     Diabetes Maternal Grandmother     Glaucoma Maternal Grandmother     Kidney disease Maternal Grandmother     Sarcoidosis Maternal Grandmother     Diabetes Maternal Grandfather     Hypertension Maternal Grandfather     Stroke Maternal Grandfather     Glaucoma Maternal Grandfather     Heart attack Maternal Grandfather      I have reviewed and agree with the history as documented      E-Cigarette/Vaping    E-Cigarette Use Never User      E-Cigarette/Vaping Substances    Nicotine No     THC No     CBD No     Flavoring No      Social History     Tobacco Use    Smoking status: Never Smoker    Smokeless tobacco: Never Used    Tobacco comment: no passive smoke exposure   Vaping Use    Vaping Use: Never used   Substance Use Topics    Alcohol use: No    Drug use: No       Review of Systems   Constitutional: Negative for chills, diaphoresis, fatigue and fever  HENT: Negative for congestion, rhinorrhea and sore throat  Eyes: Negative for photophobia and visual disturbance  Respiratory: Negative for cough, shortness of breath and wheezing  Cardiovascular: Negative for chest pain and palpitations  Gastrointestinal: Negative for abdominal pain, constipation, diarrhea, nausea and vomiting  Genitourinary: Positive for menstrual problem (missed menses)  Negative for difficulty urinating, dysuria, flank pain, frequency, hematuria, pelvic pain, urgency, vaginal bleeding, vaginal discharge and vaginal pain  Musculoskeletal: Negative for back pain, gait problem, myalgias and neck pain  Skin: Negative for color change, pallor, rash and wound  Allergic/Immunologic: Negative for immunocompromised state  Neurological: Negative for dizziness, weakness, light-headedness, numbness and headaches  Hematological: Negative for adenopathy  Does not bruise/bleed easily  Psychiatric/Behavioral: Negative for behavioral problems  Physical Exam  Physical Exam  Vitals and nursing note reviewed  Constitutional:       General: She is not in acute distress  Appearance: Normal appearance  She is well-developed  She is obese  She is not ill-appearing, toxic-appearing or diaphoretic  HENT:      Head: Normocephalic and atraumatic  Right Ear: Tympanic membrane, ear canal and external ear normal  There is no impacted cerumen  Left Ear: Tympanic membrane, ear canal and external ear normal  There is no impacted cerumen  Nose: Nose normal  No congestion or rhinorrhea  Mouth/Throat:      Mouth: Mucous membranes are moist       Pharynx: Oropharynx is clear  No oropharyngeal exudate or posterior oropharyngeal erythema  Eyes:      General: No scleral icterus  Right eye: No discharge  Left eye: No discharge        Extraocular Movements: Extraocular movements intact  Conjunctiva/sclera: Conjunctivae normal       Pupils: Pupils are equal, round, and reactive to light  Cardiovascular:      Rate and Rhythm: Normal rate and regular rhythm  Pulses: Normal pulses  Heart sounds: Normal heart sounds  No murmur heard  Pulmonary:      Effort: Pulmonary effort is normal  No respiratory distress  Breath sounds: Normal breath sounds  No wheezing  Abdominal:      General: Bowel sounds are normal  There is no distension  Palpations: Abdomen is soft  There is no mass  Tenderness: There is no abdominal tenderness  There is no right CVA tenderness, left CVA tenderness, guarding or rebound  Hernia: No hernia is present  Musculoskeletal:         General: No deformity or signs of injury  Normal range of motion  Cervical back: Normal range of motion  No rigidity  No muscular tenderness  Right lower leg: No edema  Left lower leg: No edema  Lymphadenopathy:      Cervical: No cervical adenopathy  Skin:     General: Skin is warm and dry  Capillary Refill: Capillary refill takes less than 2 seconds  Coloration: Skin is not jaundiced or pale  Neurological:      Mental Status: She is alert and oriented to person, place, and time  Sensory: No sensory deficit  Motor: No weakness        Gait: Gait normal    Psychiatric:         Mood and Affect: Mood normal          Behavior: Behavior normal          Vital Signs  ED Triage Vitals [07/28/21 1202]   Temperature Pulse Respirations Blood Pressure SpO2   98 1 °F (36 7 °C) 76 18 130/63 99 %      Temp src Heart Rate Source Patient Position - Orthostatic VS BP Location FiO2 (%)   -- Monitor -- -- --      Pain Score       No Pain           Vitals:    07/28/21 1202   BP: 130/63   Pulse: 76         Visual Acuity      ED Medications  Medications - No data to display    Diagnostic Studies  Results Reviewed     Procedure Component Value Units Date/Time    POCT pregnancy, urine [[id number]]  (Normal) Resulted: 07/28/21 1217    Lab Status: Final result Updated: 07/28/21 1217     EXT PREG TEST UR (Ref: Negative) negative     Control valid                 No orders to display              Procedures  Procedures         ED Course                                           MDM  Number of Diagnoses or Management Options  Pregnancy examination or test, negative result: new and requires workup  Diagnosis management comments: 57-year-old female presents to the ED for request of pregnancy test   Patient took a home pregnancy test that was negative several days ago  Patient denies any other symptoms at this time  On exam patient in no acute distress  Vital signs stable  No abdominal or pelvic pain  Urine HCG test in the ED negative  Patient became verbally upset when told the result and stated "the test is rigged; something is wrong with the system"  Patient requesting blood test   Advised patient that current evidence does not support serum hCG over urine HCG when the initial urine HCG is negative  Advised patient to follow-up with OBGYN for further evaluation of missed menstrual cycle  Patient does have a history of dysmenorrhea and irregular menses         Amount and/or Complexity of Data Reviewed  Clinical lab tests: ordered and reviewed  Review and summarize past medical records: yes  Discuss the patient with other providers: yes    Risk of Complications, Morbidity, and/or Mortality  Presenting problems: moderate  Diagnostic procedures: moderate  Management options: moderate    Patient Progress  Patient progress: stable      Disposition  Final diagnoses:   Pregnancy examination or test, negative result     Time reflects when diagnosis was documented in both MDM as applicable and the Disposition within this note     Time User Action Codes Description Comment    7/28/2021 12:19 PM Silvia Mcgarry Add [Z32 02] Pregnancy examination or test, negative result       ED Disposition     ED Disposition Condition Date/Time Comment    Discharge Stable  12:19 PM May Leaven discharge to home/self care  Follow-up Information     Follow up With Specialties Details Why [facility] Schedule an appointment as soon as possible for a visit   Nina Supriya [address]  [phone]            Patient's Medications   Discharge Prescriptions    No medications on file     No discharge procedures on file      PDMP Review     None          ED Provider  Electronically Signed by           Tabitha Willson PA-C  [phone]

## 2021-10-17 DIAGNOSIS — F33.3 SEVERE EPISODE OF RECURRENT MAJOR DEPRESSIVE DISORDER, WITH PSYCHOTIC FEATURES (HCC): ICD-10-CM

## 2021-10-19 RX ORDER — ARIPIPRAZOLE 2 MG/1
TABLET ORAL
Qty: 30 TABLET | Refills: 0 | Status: SHIPPED | OUTPATIENT
Start: 2021-10-19 | End: 2022-08-08

## 2021-12-01 ENCOUNTER — TELEPHONE (OUTPATIENT)
Dept: FAMILY MEDICINE CLINIC | Facility: CLINIC | Age: 21
End: 2021-12-01

## 2021-12-02 ENCOUNTER — OFFICE VISIT (OUTPATIENT)
Dept: URGENT CARE | Age: 21
End: 2021-12-02
Payer: COMMERCIAL

## 2021-12-02 VITALS
BODY MASS INDEX: 37.16 KG/M2 | HEIGHT: 58 IN | HEART RATE: 84 BPM | OXYGEN SATURATION: 100 % | WEIGHT: 177 LBS | TEMPERATURE: 99 F | RESPIRATION RATE: 20 BRPM

## 2021-12-02 DIAGNOSIS — J32.9 VIRAL SINUSITIS: ICD-10-CM

## 2021-12-02 DIAGNOSIS — B97.89 VIRAL SINUSITIS: ICD-10-CM

## 2021-12-02 DIAGNOSIS — Z11.59 SPECIAL SCREENING EXAMINATION FOR VIRAL DISEASE: Primary | ICD-10-CM

## 2021-12-02 PROCEDURE — 99213 OFFICE O/P EST LOW 20 MIN: CPT | Performed by: NURSE PRACTITIONER

## 2021-12-02 PROCEDURE — 0241U HB NFCT DS VIR RESP RNA 4 TRGT: CPT | Performed by: NURSE PRACTITIONER

## 2021-12-03 LAB
FLUAV RNA RESP QL NAA+PROBE: NEGATIVE
FLUBV RNA RESP QL NAA+PROBE: NEGATIVE
RSV RNA RESP QL NAA+PROBE: NEGATIVE
SARS-COV-2 RNA RESP QL NAA+PROBE: NEGATIVE

## 2022-01-24 DIAGNOSIS — Z30.013 ENCOUNTER FOR INITIAL PRESCRIPTION OF INJECTABLE CONTRACEPTIVE: ICD-10-CM

## 2022-01-24 RX ORDER — MEDROXYPROGESTERONE ACETATE 150 MG/ML
150 INJECTION, SUSPENSION INTRAMUSCULAR
Qty: 1 ML | Refills: 3 | Status: SHIPPED | OUTPATIENT
Start: 2022-01-24 | End: 2022-08-08

## 2022-03-04 ENCOUNTER — OFFICE VISIT (OUTPATIENT)
Dept: FAMILY MEDICINE CLINIC | Facility: CLINIC | Age: 22
End: 2022-03-04
Payer: COMMERCIAL

## 2022-03-04 VITALS
BODY MASS INDEX: 38.2 KG/M2 | WEIGHT: 182 LBS | HEART RATE: 74 BPM | TEMPERATURE: 98.3 F | HEIGHT: 58 IN | OXYGEN SATURATION: 98 % | RESPIRATION RATE: 16 BRPM | DIASTOLIC BLOOD PRESSURE: 60 MMHG | SYSTOLIC BLOOD PRESSURE: 96 MMHG

## 2022-03-04 DIAGNOSIS — K21.9 GASTROESOPHAGEAL REFLUX DISEASE WITHOUT ESOPHAGITIS: ICD-10-CM

## 2022-03-04 DIAGNOSIS — J01.00 ACUTE NON-RECURRENT MAXILLARY SINUSITIS: ICD-10-CM

## 2022-03-04 DIAGNOSIS — Z00.00 HEALTHCARE MAINTENANCE: Primary | ICD-10-CM

## 2022-03-04 PROCEDURE — 99395 PREV VISIT EST AGE 18-39: CPT | Performed by: NURSE PRACTITIONER

## 2022-03-04 RX ORDER — ZIPRASIDONE HYDROCHLORIDE 20 MG/1
20 CAPSULE ORAL
COMMUNITY
Start: 2022-02-19

## 2022-03-04 RX ORDER — AZITHROMYCIN 250 MG/1
TABLET, FILM COATED ORAL
Qty: 6 TABLET | Refills: 0 | Status: SHIPPED | OUTPATIENT
Start: 2022-03-04 | End: 2022-03-09

## 2022-03-04 RX ORDER — LEVONORGESTREL AND ETHINYL ESTRADIOL 0.15-0.03
KIT ORAL
COMMUNITY
Start: 2022-02-24 | End: 2022-08-08

## 2022-03-04 NOTE — PROGRESS NOTES
Assessment/Plan:    GERD (gastroesophageal reflux disease)  - Stable  - Continue Pepcid  - Will continue to monitor  Acute non-recurrent maxillary sinusitis  - Prescription sent for leandra  Advised of side effects   - Use Flonase nasal spray  - Increase oral hydration and use humidifier   - Contact office if symptoms do not improve  Healthcare maintenance  - Discussed immunizations, screenings, healthy diet, exercise, and safety measures  - She is up to date with her dental, vision, and GYN exams  Diagnoses and all orders for this visit:    Healthcare maintenance    Acute non-recurrent maxillary sinusitis  -     azithromycin (Zithromax) 250 mg tablet; Take 2 tablets (500 mg total) by mouth daily for 1 day, THEN 1 tablet (250 mg total) daily for 4 days  Gastroesophageal reflux disease without esophagitis    Other orders  -     ziprasidone (GEODON) 20 mg capsule; Take 20 mg by mouth daily at bedtime  -     Depue-28 0 15-30 MG-MCG per tablet        Subjective:      Patient ID: Neil Mortensen is a 19 y o  female  Patient presents today with complaints of headache, sore throat, cough, and nasal congestion  Her symptoms started this morning  She has taken nothing over the counter  She did take a home COVID test which was negative  She received both COVID vaccines as well as her booster  She denies any fever, chills, or shortness of breath  The following portions of the patient's history were reviewed and updated as appropriate: allergies, current medications, past family history, past medical history, past social history, past surgical history and problem list     Review of Systems   Constitutional: Negative for chills, fatigue and fever  HENT: Positive for congestion, ear pain, sinus pressure, sinus pain and sore throat  Negative for ear discharge and trouble swallowing  Eyes: Negative for visual disturbance  Respiratory: Positive for cough  Negative for shortness of breath  Cardiovascular: Negative for chest pain and palpitations  Gastrointestinal: Negative for abdominal pain and blood in stool  Endocrine: Negative for cold intolerance and heat intolerance  Genitourinary: Negative for difficulty urinating, dysuria and frequency  Musculoskeletal: Negative for arthralgias and gait problem  Skin: Negative for rash  Neurological: Positive for headaches  Negative for dizziness and syncope  Hematological: Negative for adenopathy  Psychiatric/Behavioral: Negative for behavioral problems  Objective:      BP 96/60 (BP Location: Left arm, Patient Position: Sitting, Cuff Size: Adult)   Pulse 74   Temp 98 3 °F (36 8 °C) (Tympanic)   Resp 16   Ht 4' 10" (1 473 m)   Wt 82 6 kg (182 lb)   SpO2 98%   BMI 38 04 kg/m²          Physical Exam  Vitals and nursing note reviewed  Constitutional:       General: She is not in acute distress  Appearance: Normal appearance  HENT:      Head: Normocephalic and atraumatic  Right Ear: Tympanic membrane, ear canal and external ear normal       Left Ear: Tympanic membrane, ear canal and external ear normal       Nose: Congestion present  Eyes:      Extraocular Movements: Extraocular movements intact  Conjunctiva/sclera: Conjunctivae normal       Pupils: Pupils are equal, round, and reactive to light  Cardiovascular:      Rate and Rhythm: Normal rate and regular rhythm  Heart sounds: Normal heart sounds  Pulmonary:      Effort: Pulmonary effort is normal       Breath sounds: Normal breath sounds  Abdominal:      General: Bowel sounds are normal       Palpations: Abdomen is soft  Musculoskeletal:         General: Normal range of motion  Cervical back: Normal range of motion  Right lower leg: No edema  Left lower leg: No edema  Lymphadenopathy:      Cervical: No cervical adenopathy  Skin:     General: Skin is warm and dry     Neurological:      Mental Status: She is alert and oriented to person, place, and time  Cranial Nerves: No cranial nerve deficit     Psychiatric:         Mood and Affect: Mood normal          Behavior: Behavior normal

## 2022-03-04 NOTE — ASSESSMENT & PLAN NOTE
- Discussed immunizations, screenings, healthy diet, exercise, and safety measures  - She is up to date with her dental, vision, and GYN exams

## 2022-03-30 ENCOUNTER — APPOINTMENT (OUTPATIENT)
Dept: LAB | Facility: HOSPITAL | Age: 22
End: 2022-03-30
Payer: COMMERCIAL

## 2022-03-30 DIAGNOSIS — Z79.899 ENCOUNTER FOR LONG-TERM (CURRENT) USE OF OTHER MEDICATIONS: ICD-10-CM

## 2022-03-30 LAB
CHOLEST SERPL-MCNC: 211 MG/DL
EST. AVERAGE GLUCOSE BLD GHB EST-MCNC: 111 MG/DL
HBA1C MFR BLD: 5.5 %
HDLC SERPL-MCNC: 44 MG/DL
LDLC SERPL CALC-MCNC: 147 MG/DL
NONHDLC SERPL-MCNC: 167 MG/DL
TRIGL SERPL-MCNC: 100 MG/DL
TSH SERPL DL<=0.05 MIU/L-ACNC: 1.97 UIU/ML (ref 0.47–4.68)

## 2022-03-30 PROCEDURE — 84443 ASSAY THYROID STIM HORMONE: CPT

## 2022-03-30 PROCEDURE — 80061 LIPID PANEL: CPT

## 2022-03-30 PROCEDURE — 36415 COLL VENOUS BLD VENIPUNCTURE: CPT

## 2022-03-30 PROCEDURE — 83036 HEMOGLOBIN GLYCOSYLATED A1C: CPT

## 2022-04-06 NOTE — ASSESSMENT & PLAN NOTE
- Prescription sent for leandra  Advised of side effects   - Use Flonase nasal spray  - Increase oral hydration and use humidifier   - Contact office if symptoms do not improve  0

## 2022-06-13 ENCOUNTER — APPOINTMENT (EMERGENCY)
Dept: CT IMAGING | Facility: HOSPITAL | Age: 22
End: 2022-06-13
Payer: COMMERCIAL

## 2022-06-13 ENCOUNTER — HOSPITAL ENCOUNTER (EMERGENCY)
Facility: HOSPITAL | Age: 22
Discharge: HOME | End: 2022-06-13
Attending: EMERGENCY MEDICINE | Admitting: EMERGENCY MEDICINE
Payer: COMMERCIAL

## 2022-06-13 VITALS
WEIGHT: 150 LBS | DIASTOLIC BLOOD PRESSURE: 65 MMHG | SYSTOLIC BLOOD PRESSURE: 155 MMHG | OXYGEN SATURATION: 100 % | HEIGHT: 58 IN | HEART RATE: 84 BPM | TEMPERATURE: 99.7 F | BODY MASS INDEX: 31.49 KG/M2 | RESPIRATION RATE: 18 BRPM

## 2022-06-13 DIAGNOSIS — N39.0 UTI (URINARY TRACT INFECTION): ICD-10-CM

## 2022-06-13 DIAGNOSIS — K52.9 GASTROENTERITIS: Primary | ICD-10-CM

## 2022-06-13 LAB
ALBUMIN SERPL BCP-MCNC: 4.5 G/DL (ref 3.5–5)
ALP SERPL-CCNC: 84 U/L (ref 34–104)
ALT SERPL W P-5'-P-CCNC: 22 U/L (ref 7–52)
ANION GAP SERPL CALCULATED.3IONS-SCNC: 13 MMOL/L (ref 4–13)
AST SERPL W P-5'-P-CCNC: 17 U/L (ref 13–39)
BACTERIA UR QL AUTO: ABNORMAL /HPF
BASOPHILS # BLD AUTO: 0.05 THOUSANDS/ΜL (ref 0–0.1)
BASOPHILS NFR BLD AUTO: 0 % (ref 0–1)
BILIRUB SERPL-MCNC: 0.49 MG/DL (ref 0.2–1)
BILIRUB UR QL STRIP: NEGATIVE
BUN SERPL-MCNC: 10 MG/DL (ref 5–25)
CALCIUM SERPL-MCNC: 9.8 MG/DL (ref 8.4–10.2)
CHLORIDE SERPL-SCNC: 102 MMOL/L (ref 96–108)
CLARITY UR: ABNORMAL
CO2 SERPL-SCNC: 23 MMOL/L (ref 21–32)
COLOR UR: YELLOW
CREAT SERPL-MCNC: 1.04 MG/DL (ref 0.6–1.3)
EOSINOPHIL # BLD AUTO: 0.12 THOUSAND/ΜL (ref 0–0.61)
EOSINOPHIL NFR BLD AUTO: 1 % (ref 0–6)
ERYTHROCYTE [DISTWIDTH] IN BLOOD BY AUTOMATED COUNT: 13.7 % (ref 11.6–15.1)
GFR SERPL CREATININE-BSD FRML MDRD: 75 ML/MIN/1.73SQ M
GLUCOSE SERPL-MCNC: 98 MG/DL (ref 65–140)
GLUCOSE UR STRIP-MCNC: NEGATIVE MG/DL
HCG SERPL QL: NEGATIVE
HCT VFR BLD AUTO: 36.7 % (ref 34.8–46.1)
HGB BLD-MCNC: 11.7 G/DL (ref 11.5–15.4)
HGB UR QL STRIP.AUTO: NEGATIVE
IMM GRANULOCYTES # BLD AUTO: 0.03 THOUSAND/UL (ref 0–0.2)
IMM GRANULOCYTES NFR BLD AUTO: 0 % (ref 0–2)
KETONES UR STRIP-MCNC: ABNORMAL MG/DL
LEUKOCYTE ESTERASE UR QL STRIP: ABNORMAL
LIPASE SERPL-CCNC: 25 U/L (ref 11–82)
LYMPHOCYTES # BLD AUTO: 2.15 THOUSANDS/ΜL (ref 0.6–4.47)
LYMPHOCYTES NFR BLD AUTO: 16 % (ref 14–44)
MCH RBC QN AUTO: 26.2 PG (ref 26.8–34.3)
MCHC RBC AUTO-ENTMCNC: 31.9 G/DL (ref 31.4–37.4)
MCV RBC AUTO: 82 FL (ref 82–98)
MONOCYTES # BLD AUTO: 0.7 THOUSAND/ΜL (ref 0.17–1.22)
MONOCYTES NFR BLD AUTO: 5 % (ref 4–12)
NEUTROPHILS # BLD AUTO: 10.81 THOUSANDS/ΜL (ref 1.85–7.62)
NEUTS SEG NFR BLD AUTO: 78 % (ref 43–75)
NITRITE UR QL STRIP: POSITIVE
NON-SQ EPI CELLS URNS QL MICRO: ABNORMAL /HPF
NRBC BLD AUTO-RTO: 0 /100 WBCS
PH UR STRIP.AUTO: 6.5 [PH]
PLATELET # BLD AUTO: 312 THOUSANDS/UL (ref 149–390)
PMV BLD AUTO: 10.1 FL (ref 8.9–12.7)
POTASSIUM SERPL-SCNC: 4.3 MMOL/L (ref 3.5–5.3)
PROT SERPL-MCNC: 7.3 G/DL (ref 6.4–8.4)
PROT UR STRIP-MCNC: NEGATIVE MG/DL
RBC # BLD AUTO: 4.46 MILLION/UL (ref 3.81–5.12)
RBC #/AREA URNS AUTO: ABNORMAL /HPF
SODIUM SERPL-SCNC: 138 MMOL/L (ref 135–147)
SP GR UR STRIP.AUTO: 1.02 (ref 1–1.03)
UROBILINOGEN UR QL STRIP.AUTO: 1 E.U./DL
WBC # BLD AUTO: 13.86 THOUSAND/UL (ref 4.31–10.16)
WBC #/AREA URNS AUTO: ABNORMAL /HPF

## 2022-06-13 PROCEDURE — G1004 CDSM NDSC: HCPCS

## 2022-06-13 PROCEDURE — 85025 COMPLETE CBC W/AUTO DIFF WBC: CPT | Performed by: EMERGENCY MEDICINE

## 2022-06-13 PROCEDURE — 99284 EMERGENCY DEPT VISIT MOD MDM: CPT | Performed by: EMERGENCY MEDICINE

## 2022-06-13 PROCEDURE — 96365 THER/PROPH/DIAG IV INF INIT: CPT

## 2022-06-13 PROCEDURE — 84703 CHORIONIC GONADOTROPIN ASSAY: CPT | Performed by: EMERGENCY MEDICINE

## 2022-06-13 PROCEDURE — 87077 CULTURE AEROBIC IDENTIFY: CPT | Performed by: EMERGENCY MEDICINE

## 2022-06-13 PROCEDURE — 83690 ASSAY OF LIPASE: CPT | Performed by: EMERGENCY MEDICINE

## 2022-06-13 PROCEDURE — 96375 TX/PRO/DX INJ NEW DRUG ADDON: CPT

## 2022-06-13 PROCEDURE — 80053 COMPREHEN METABOLIC PANEL: CPT | Performed by: EMERGENCY MEDICINE

## 2022-06-13 PROCEDURE — 87086 URINE CULTURE/COLONY COUNT: CPT | Performed by: EMERGENCY MEDICINE

## 2022-06-13 PROCEDURE — 74176 CT ABD & PELVIS W/O CONTRAST: CPT

## 2022-06-13 PROCEDURE — 87186 SC STD MICRODIL/AGAR DIL: CPT | Performed by: EMERGENCY MEDICINE

## 2022-06-13 PROCEDURE — 99284 EMERGENCY DEPT VISIT MOD MDM: CPT

## 2022-06-13 PROCEDURE — 96361 HYDRATE IV INFUSION ADD-ON: CPT

## 2022-06-13 PROCEDURE — 81001 URINALYSIS AUTO W/SCOPE: CPT | Performed by: EMERGENCY MEDICINE

## 2022-06-13 PROCEDURE — 36415 COLL VENOUS BLD VENIPUNCTURE: CPT | Performed by: EMERGENCY MEDICINE

## 2022-06-13 PROCEDURE — 96376 TX/PRO/DX INJ SAME DRUG ADON: CPT

## 2022-06-13 RX ORDER — ONDANSETRON 2 MG/ML
4 INJECTION INTRAMUSCULAR; INTRAVENOUS ONCE
Status: COMPLETED | OUTPATIENT
Start: 2022-06-13 | End: 2022-06-13

## 2022-06-13 RX ORDER — CEFTRIAXONE 1 G/50ML
1000 INJECTION, SOLUTION INTRAVENOUS ONCE
Status: COMPLETED | OUTPATIENT
Start: 2022-06-13 | End: 2022-06-13

## 2022-06-13 RX ORDER — NITROFURANTOIN 25; 75 MG/1; MG/1
100 CAPSULE ORAL 2 TIMES DAILY
Qty: 14 CAPSULE | Refills: 0 | Status: SHIPPED | OUTPATIENT
Start: 2022-06-13 | End: 2022-06-20

## 2022-06-13 RX ORDER — DIPHENHYDRAMINE HYDROCHLORIDE 50 MG/ML
25 INJECTION INTRAMUSCULAR; INTRAVENOUS ONCE
Status: COMPLETED | OUTPATIENT
Start: 2022-06-13 | End: 2022-06-13

## 2022-06-13 RX ORDER — METOCLOPRAMIDE HYDROCHLORIDE 5 MG/ML
5 INJECTION INTRAMUSCULAR; INTRAVENOUS ONCE
Status: COMPLETED | OUTPATIENT
Start: 2022-06-13 | End: 2022-06-13

## 2022-06-13 RX ORDER — FAMOTIDINE 10 MG/ML
20 INJECTION, SOLUTION INTRAVENOUS ONCE
Status: COMPLETED | OUTPATIENT
Start: 2022-06-13 | End: 2022-06-13

## 2022-06-13 RX ORDER — ONDANSETRON 4 MG/1
4 TABLET, ORALLY DISINTEGRATING ORAL EVERY 6 HOURS PRN
Qty: 20 TABLET | Refills: 0 | Status: SHIPPED | OUTPATIENT
Start: 2022-06-13

## 2022-06-13 RX ADMIN — DIPHENHYDRAMINE HYDROCHLORIDE 25 MG: 50 INJECTION, SOLUTION INTRAMUSCULAR; INTRAVENOUS at 02:21

## 2022-06-13 RX ADMIN — ONDANSETRON 4 MG: 2 INJECTION INTRAMUSCULAR; INTRAVENOUS at 05:07

## 2022-06-13 RX ADMIN — SODIUM CHLORIDE 1000 ML: 0.9 INJECTION, SOLUTION INTRAVENOUS at 01:01

## 2022-06-13 RX ADMIN — CEFTRIAXONE 1000 MG: 1 INJECTION, SOLUTION INTRAVENOUS at 03:35

## 2022-06-13 RX ADMIN — METOCLOPRAMIDE HYDROCHLORIDE 5 MG: 5 INJECTION INTRAMUSCULAR; INTRAVENOUS at 02:21

## 2022-06-13 RX ADMIN — ONDANSETRON 4 MG: 2 INJECTION INTRAMUSCULAR; INTRAVENOUS at 01:00

## 2022-06-13 RX ADMIN — FAMOTIDINE 20 MG: 10 INJECTION INTRAVENOUS at 01:00

## 2022-06-13 RX ADMIN — SODIUM CHLORIDE 1000 ML: 0.9 INJECTION, SOLUTION INTRAVENOUS at 04:00

## 2022-06-13 NOTE — ED PROVIDER NOTES
History  Chief Complaint   Patient presents with    Vomiting     Pt reports n/v/d and states her stomach is in "knots and cramps"; began around 210     23-YEAR-OLD FEMALE    PMH:   Depression, on Geodone  Anxiety, on hydroxazine      Chief complaint:   Abdominal pain   Vomiting      HPI:  Pt here for abdominal pain, n/v/diarrhea  Started 1 hour ago    Vomited 1 time at home, 3 times on the way here, 3 times in the wr  Vomit was non bloody, not bright red or black vomit    Abdominal pain is radiating diffusely    Denies back pain      Denies CP or pressure  No pleurisy  No pain in the jaw/neck/back or arm      ASSOCIATED SYMPTOMS:  URINARY  SYMPTOMS: THERE IS NO DYSURIA, NO HEMATURIA, NO FREQUENCY  DENIES FEVERS, reports CHILLS    Denies Sore throat  No cough or URI symptoms    Reports Loose stools, diarrhea - no black or bloody stools    No concerns for food poisoning  No recent travel  No recent abx      ALLEVIATING OR EXACERBATING FACTORS:    None      INTERVENTIONS: NONE      No other compolaints        History provided by:  Patient  Vomiting  Severity:  Moderate  Quality:  Stomach contents  Progression:  Unchanged  Chronicity:  New  Recent urination:  Normal  Relieved by:  Nothing  Worsened by:  Nothing  Associated symptoms: abdominal pain and diarrhea    Associated symptoms: no arthralgias, no chills, no cough, no fever, no headaches, no myalgias and no sore throat        Prior to Admission Medications   Prescriptions Last Dose Informant Patient Reported? Taking?    ARIPiprazole (ABILIFY) 2 mg tablet   No No   Sig: TAKE 1 TABLET BY MOUTH EVERY DAY   Patient not taking: Reported on 3/4/2022   Ecru-28 0 15-30 MG-MCG per tablet   Yes No   acetaminophen (TYLENOL) 500 mg tablet   No No   Sig: Take 1 tablet (500 mg total) by mouth every 6 (six) hours as needed for mild pain or moderate pain   Patient not taking: Reported on 6/25/2021   albuterol (PROVENTIL HFA,VENTOLIN HFA) 90 mcg/act inhaler   No No   Sig: Inhale 2 puffs every 4 (four) hours as needed for wheezing   clindamycin (CLINDAGEL) 1 % gel   Yes No   Sig: Apply 1 application topically Every 12 hours    Patient not taking: Reported on 6/25/2021   ergocalciferol (VITAMIN D2) 50,000 units   No No   Sig: TAKE ONE CAPSULE BY MOUTH ONE TIME PER WEEK   famotidine (PEPCID) 20 mg tablet   No No   Sig: Take 1 tablet (20 mg total) by mouth 2 (two) times a day   famotidine (PEPCID) 20 mg tablet   No No   Sig: Take 1 tablet (20 mg total) by mouth 2 (two) times a day   Patient not taking: Reported on 12/2/2021    gabapentin (NEURONTIN) 600 MG tablet   No No   Sig: Take 1 tablet (600 mg total) by mouth every evening   hydrOXYzine HCL (ATARAX) 25 mg tablet   No No   Sig: TAKE 1 TABLET (25 MG TOTAL) BY MOUTH EVERY 6 (SIX) HOURS AS NEEDED FOR ITCHING OR ANXIETY   ibuprofen (MOTRIN) 400 mg tablet   No No   Sig: Take 1 tablet (400 mg total) by mouth every 6 (six) hours as needed for mild pain or moderate pain   Patient not taking: Reported on 6/25/2021   ibuprofen (MOTRIN) 600 mg tablet   No No   Sig: Take 1 tablet (600 mg total) by mouth every 6 (six) hours as needed for mild pain   Patient not taking: Reported on 6/25/2021   medroxyPROGESTERone (DEPO-PROVERA) 150 mg/mL injection   No No   Sig: INJECT 1 ML (150 MG TOTAL) INTO A MUSCLE EVERY 3 (THREE) MONTHS   Patient not taking: Reported on 3/4/2022    ondansetron (ZOFRAN) 4 mg tablet   No No   Sig: Take 1 tablet (4 mg total) by mouth every 8 (eight) hours as needed for nausea or vomiting   Patient not taking: Reported on 6/25/2021   ondansetron (ZOFRAN-ODT) 4 mg disintegrating tablet   No No   Sig: Take 1 tablet (4 mg total) by mouth every 6 (six) hours as needed for nausea or vomiting   Patient not taking: Reported on 6/25/2021   sucralfate (CARAFATE) 1 g/10 mL suspension   No No   Sig: Take 10 mL (1 g total) by mouth 4 (four) times a day   Patient not taking: Reported on 12/2/2021    venlafaxine (EFFEXOR-XR) 150 mg 24 hr capsule No No   Sig: Take 1 capsule (150 mg total) by mouth daily   Patient not taking: Reported on 12/2/2021    ziprasidone (GEODON) 20 mg capsule   Yes No   Sig: Take 20 mg by mouth daily at bedtime      Facility-Administered Medications: None       Past Medical History:   Diagnosis Date    ADHD     Anxiety     Asthma     Bipolar 1 disorder ([facility] [address] )     Class 2 obesity due to excess calories without serious comorbidity with body mass index (BMI) of 35 0 to 35 9 in adult 11/3/2018    Depression     Gallstones     GERD (gastroesophageal reflux disease)     Heart murmur 1/30/2015    Hyperlipidemia     Migraine     excedrin managed; 1 every 6 months    Scoliosis        Past Surgical History:   Procedure Laterality Date    CHOLANGIOGRAM N/A 12/1/2018    Procedure: CHOLANGIOGRAM;  Surgeon: Choco Upton MD;  Location: [facility];  Service: General    CHOLECYSTECTOMY      CHOLECYSTECTOMY LAPAROSCOPIC N/A 12/1/2018    Procedure: CHOLECYSTECTOMY LAPAROSCOPIC;  Surgeon: Choco Upton MD;  Location:  MAIN OR;  Service: General    EYE MUSCLE SURGERY      GA ESOPHAGOGASTRODUODENOSCOPY TRANSORAL DIAGNOSTIC N/A 11/27/2018    Procedure: ESOPHAGOGASTRODUODENOSCOPY (EGD) with bx;  Surgeon: Mark Samuels MD;  Location: [facility];   Service: General       Family History   Problem Relation Age of Onset    Heart attack Mother     Depression Mother     Mental illness Mother     Coronary artery disease Mother     Hypertension Mother     Diabetes Mother     Hyperlipidemia Mother     Pneumonia Brother     Hypertension Maternal Grandmother     Stroke Maternal Grandmother     Diabetes Maternal Grandmother     Glaucoma Maternal Grandmother     Kidney disease Maternal Grandmother     Sarcoidosis Maternal Grandmother     Diabetes Maternal Grandfather     Hypertension Maternal Grandfather     Stroke Maternal Grandfather     Glaucoma Maternal Grandfather     Heart attack Maternal Grandfather      I have reviewed and agree with the history as documented  E-Cigarette/Vaping    E-Cigarette Use Never User      E-Cigarette/Vaping Substances    Nicotine No     THC No     CBD No     Flavoring No      Social History     Tobacco Use    Smoking status: Never Smoker    Smokeless tobacco: Never Used    Tobacco comment: no passive smoke exposure   Vaping Use    Vaping Use: Never used   Substance Use Topics    Alcohol use: No    Drug use: No       Review of Systems   Constitutional: Negative for chills, diaphoresis, fatigue and fever  HENT: Negative for congestion, dental problem, ear discharge, ear pain, facial swelling, postnasal drip, rhinorrhea, sinus pressure, sinus pain, sneezing, sore throat, tinnitus, trouble swallowing and voice change  Respiratory: Negative for cough, shortness of breath, wheezing and stridor  Cardiovascular: Negative for chest pain, palpitations and leg swelling  Gastrointestinal: Positive for abdominal pain, diarrhea, nausea and vomiting  Negative for blood in stool  Genitourinary: Negative for decreased urine volume, difficulty urinating, dysuria, flank pain, frequency, hematuria, menstrual problem, pelvic pain, urgency, vaginal bleeding, vaginal discharge and vaginal pain         "urine has an odor to it"   Musculoskeletal: Negative for arthralgias, back pain, gait problem, joint swelling, myalgias, neck pain and neck stiffness  Skin: Negative for rash and wound  Neurological: Negative for dizziness, light-headedness and headaches  All other systems reviewed and are negative  Physical Exam  Physical Exam  Constitutional:       General: She is not in acute distress  Appearance: She is well-developed  She is not ill-appearing, toxic-appearing or diaphoretic  HENT:      Head: Normocephalic and atraumatic  Right Ear: External ear normal       Nose: Nose normal    Eyes:      General: No scleral icterus  Right eye: No discharge           Left eye: No discharge  Extraocular Movements: Extraocular movements intact  Conjunctiva/sclera: Conjunctivae normal       Pupils: Pupils are equal, round, and reactive to light  Neck:      Vascular: No JVD  Trachea: No tracheal deviation  Cardiovascular:      Rate and Rhythm: Normal rate and regular rhythm  Heart sounds: Normal heart sounds  No murmur heard  No friction rub  No gallop  Pulmonary:      Effort: Pulmonary effort is normal  No respiratory distress  Breath sounds: Normal breath sounds  No stridor  No wheezing, rhonchi or rales  Chest:      Chest wall: No tenderness  Abdominal:      General: Bowel sounds are normal  There is no distension  Palpations: Abdomen is soft  There is no mass  Tenderness: There is no abdominal tenderness  There is no right CVA tenderness, left CVA tenderness, guarding or rebound  Hernia: No hernia is present  Musculoskeletal:         General: No swelling, tenderness, deformity or signs of injury  Normal range of motion  Cervical back: Normal range of motion and neck supple  No rigidity or tenderness  Right lower leg: No edema  Left lower leg: No edema  Lymphadenopathy:      Cervical: No cervical adenopathy  Skin:     General: Skin is warm  Capillary Refill: Capillary refill takes less than 2 seconds  Coloration: Skin is not jaundiced or pale  Findings: No bruising, erythema, lesion or rash  Neurological:      General: No focal deficit present  Mental Status: She is alert and oriented to person, place, and time  Mental status is at baseline  Cranial Nerves: No cranial nerve deficit  Sensory: No sensory deficit  Motor: No weakness or abnormal muscle tone  Coordination: Coordination normal    Psychiatric:         Behavior: Behavior normal          Thought Content:  Thought content normal          Judgment: Judgment normal          Vital Signs  ED Triage Vitals [06/13/22 0021] Temperature Pulse Respirations Blood Pressure SpO2   99 7 °F (37 6 °C) 84 18 155/65 100 %      Temp Source Heart Rate Source Patient Position - Orthostatic VS BP Location FiO2 (%)   Temporal Monitor Sitting Right arm --      Pain Score       10 - Worst Possible Pain           Vitals:    06/13/22 0021   BP: 155/65   Pulse: 84   Patient Position - Orthostatic VS: Sitting         Visual Acuity      ED Medications  Medications   ondansetron (ZOFRAN) injection 4 mg (4 mg Intravenous Given 6/13/22 0100)   sodium chloride 0 9 % bolus 1,000 mL (0 mL Intravenous Stopped 6/13/22 0452)   Famotidine (PF) (PEPCID) injection 20 mg (20 mg Intravenous Given 6/13/22 0100)   metoclopramide (REGLAN) injection 5 mg (5 mg Intravenous Given 6/13/22 0221)   diphenhydrAMINE (BENADRYL) injection 25 mg (25 mg Intravenous Given 6/13/22 0221)   cefTRIAXone (ROCEPHIN) IVPB (premix in dextrose) 1,000 mg 50 mL (0 mg Intravenous Stopped 6/13/22 0405)   sodium chloride 0 9 % bolus 1,000 mL (0 mL Intravenous Stopped 6/13/22 0547)   ondansetron (ZOFRAN) injection 4 mg (4 mg Intravenous Given 6/13/22 0507)       Diagnostic Studies  Results Reviewed     Procedure Component Value Units Date/Time    Urine Microscopic [[id number]]  (Abnormal) Collected: 06/13/22 0249    Lab Status: Final result Specimen: Urine, Clean Catch Updated: 06/13/22 0306     RBC, UA 0-1 /hpf      WBC, UA 20-30 /hpf      Epithelial Cells Occasional /hpf      Bacteria, UA Innumerable /hpf     Urine culture [[id number]] Collected: 06/13/22 0249    Lab Status:  In process Specimen: Urine, Clean Catch Updated: 06/13/22 0306    UA w Reflex to Microscopic w Reflex to Culture [[id number]]  (Abnormal) Collected: 06/13/22 0249    Lab Status: Final result Specimen: Urine, Clean Catch Updated: 06/13/22 0258     Color, UA Yellow     Clarity, UA Slightly Cloudy     Specific Gravity, UA 1 025     pH, UA 6 5     Leukocytes, UA 1+     Nitrite, UA Positive     Protein, UA Negative mg/dl      Glucose, UA Negative mg/dl      Ketones, UA 40 (2+) mg/dl      Urobilinogen, UA 1 0 E U /dl      Bilirubin, UA Negative     Blood, UA Negative    hCG, qualitative pregnancy [[id number]]  (Normal) Collected: 06/13/22 0100    Lab Status: Final result Specimen: Blood from Arm, Right Updated: 06/13/22 0136     Preg, Serum Negative    CMP [[id number]] Collected: 06/13/22 0100    Lab Status: Final result Specimen: Blood from Arm, Right Updated: 06/13/22 0128     Sodium 138 mmol/L      Potassium 4 3 mmol/L      Chloride 102 mmol/L      CO2 23 mmol/L      ANION GAP 13 mmol/L      BUN 10 mg/dL      Creatinine 1 04 mg/dL      Glucose 98 mg/dL      Calcium 9 8 mg/dL      AST 17 U/L      ALT 22 U/L      Alkaline Phosphatase 84 U/L      Total Protein 7 3 g/dL      Albumin 4 5 g/dL      Total Bilirubin 0 49 mg/dL      eGFR 75 ml/min/1 73sq m     Narrative:      [place] guidelines for Chronic Kidney Disease (CKD):     Stage 1 with normal or high GFR (GFR > 90 mL/min/1 73 square meters)    Stage 2 Mild CKD (GFR = 60-89 mL/min/1 73 square meters)    Stage 3A Moderate CKD (GFR = 45-59 mL/min/1 73 square meters)    Stage 3B Moderate CKD (GFR = 30-44 mL/min/1 73 square meters)    Stage 4 Severe CKD (GFR = 15-29 mL/min/1 73 square meters)    Stage 5 End Stage CKD (GFR <15 mL/min/1 73 square meters)  Note: GFR calculation is accurate only with a steady state creatinine    Lipase [[id number]]  (Normal) Collected: 06/13/22 0100    Lab Status: Final result Specimen: Blood from Arm, Right Updated: 06/13/22 0128     Lipase 25 u/L     CBC and differential [[id number]]  (Abnormal) Collected: 06/13/22 0100    Lab Status: Final result Specimen: Blood from Arm, Right Updated: 06/13/22 0108     WBC 13 86 Thousand/uL      RBC 4 46 Million/uL      Hemoglobin 11 7 g/dL      Hematocrit 36 7 %      MCV 82 fL      MCH 26 2 pg      MCHC 31 9 g/dL      RDW 13 7 %      MPV 10 1 fL      Platelets 397 Thousands/uL      nRBC 0 /100 WBCs Neutrophils Relative 78 %      Immat GRANS % 0 %      Lymphocytes Relative 16 %      Monocytes Relative 5 %      Eosinophils Relative 1 %      Basophils Relative 0 %      Neutrophils Absolute 10 81 Thousands/µL      Immature Grans Absolute 0 03 Thousand/uL      Lymphocytes Absolute 2 15 Thousands/µL      Monocytes Absolute 0 70 Thousand/µL      Eosinophils Absolute 0 12 Thousand/µL      Basophils Absolute 0 05 Thousands/µL                  CT abdomen pelvis wo contrast   Final Result by Cosme Montiel MD (06/13 0330)      No acute findings identified on this noncontrast study  Workstation performed: [id number]                    Procedures  Procedures         ED Course  ED Course as of 06/13/22 0548   Mon Jun 13, 2022   0040 Pt seen and evaluated  Non toxic  vss   0203 CBC and differential(!)   0203 CMP   0203 Lipase: 25   0203 PREGNANCY, SERUM: Negative   0221 Patient is describing no change in symptoms  IV Reglan and Benadryl have been ordered   0317 Patient remains very stable and comfortable appearing   0317 UA w Reflex to Microscopic w Reflex to Culture(!)   0335 Patient stable  Rocephin running   0335 CT ABDOMEN AND PELVIS WITHOUT IV CONTRAST     INDICATION:   pain      COMPARISON:  None      TECHNIQUE:  CT examination of the abdomen and pelvis was performed without intravenous contrast  This examination was performed without intravenous contrast in the context of the critical nationwide Omnipaque shortage  Axial, sagittal, and coronal 2D   reformatted images were created from the source data and submitted for interpretation       Radiation dose length product (DLP) for this visit:  647 24 mGy-cm   This examination, like all CT scans performed in the [facility], was performed utilizing techniques to minimize radiation dose exposure, including the use of iterative   reconstruction and automated exposure control       Enteric contrast was not administered     FINDINGS:     ABDOMEN     LOWER CHEST:  No clinically significant abnormality identified in the visualized lower chest      LIVER/BILIARY TREE:  Unremarkable      GALLBLADDER:  Gallbladder is surgically absent      SPLEEN:  Unremarkable      PANCREAS:  Unremarkable      ADRENAL GLANDS:  Unremarkable      KIDNEYS/URETERS:  Unremarkable  No hydronephrosis      STOMACH AND BOWEL:  Limited evaluation of the GI tract without enteric contrast   Mild gaseous distention of redundant sigmoid colon  Small to moderate amount of retained stool in the rectum and right colon  No evidence of bowel obstruction      APPENDIX:  No findings to suggest appendicitis      ABDOMINOPELVIC CAVITY:  No ascites  No pneumoperitoneum    No lymphadenopathy      VESSELS:  Unremarkable for patient's age      PELVIS     REPRODUCTIVE ORGANS:  Unremarkable for patient's age      URINARY BLADDER:  Unremarkable      ABDOMINAL WALL/INGUINAL REGIONS:  Unremarkable      OSSEOUS STRUCTURES:  No acute fracture or destructive osseous lesion      IMPRESSION:     No acute findings identified on this noncontrast study       0424 I discussed with patient and her got mother at bedside the workup results  Patient has received IV ceftriaxone without any difficulty  She is finishing a 2nd L now    Patient is going to try p o  challenge   0459 Pt very stable appearing  Pt has not vomited once since being here  I do feel she will be fine to go home, I have given her 2 L IVF  God mother at bedside was concerned that Cyndie needed admission  I d/w pt and God mother that while Obs is a possibility, it does not appear necessary based on stability and normalcy of labs    0545 Pt very stable  Still no vomiting    Pt has no signs of sepsis, nor life or limb threatening process    I offered further tx, I offered admission, if she felt ill, or her pain was too great, but she declined  She is ready to manage from home    She is very appreciative of her ED care and is ready to manage from home    She understands return precautions    She will f/u w/ PCP                                  SBIRT 20yo+    Flowsheet Row Most Recent Value   SBIRT (23 yo +)    In order to provide better care to our patients, we are screening all of our patients for alcohol and drug use  Would it be okay to ask you these screening questions? Yes Filed at: 06/13/2022 0024   Initial Alcohol Screen: US AUDIT-C     1  How often do you have a drink containing alcohol? 0 Filed at: 06/13/2022 0024   2  How many drinks containing alcohol do you have on a typical day you are drinking? 0 Filed at: 06/13/2022 0024   3a  Male UNDER 65: How often do you have five or more drinks on one occasion? 0 Filed at: 06/13/2022 0024   3b  FEMALE Any Age, or MALE 65+: How often do you have 4 or more drinks on one occassion? 0 Filed at: 06/13/2022 0024   Audit-C Score 0 Filed at: 06/13/2022 8077   AILYN: How many times in the past year have you    Used an illegal drug or used a prescription medication for non-medical reasons? Never Filed at: 06/13/2022 0024                    MDM    Disposition  Final diagnoses:   Gastroenteritis   UTI (urinary tract infection)     Time reflects when diagnosis was documented in both MDM as applicable and the Disposition within this note     Time User Action Codes Description Comment    6/13/2022  3:36 AM Miguelina Nichols Add [K52 9] Gastroenteritis     6/13/2022  3:36 AM Miguelina Nichols Add [N39 0] UTI (urinary tract infection)       ED Disposition     ED Disposition   Discharge    Condition   Stable    Date/Time   Mon Jun 13, 2022  5:46 AM    Comment   Hyland Skiff discharge to home/self care                 Follow-up Information     Follow up With Specialties Details Why Contact Mk Mercer MD [facility] Medicine Call today  [id number]  [address] [place] [id number] [phone]            Patient's Medications   Discharge Prescriptions    NITROFURANTOIN (MACROBID) 100 MG CAPSULE Take 1 capsule (100 mg total) by mouth 2 (two) times a day for 7 days       Start Date: 6/13/2022 End Date: 6/20/2022       Order Dose: 100 mg       Quantity: 14 capsule    Refills: 0    ONDANSETRON (ZOFRAN ODT) 4 MG DISINTEGRATING TABLET    Take 1 tablet (4 mg total) by mouth every 6 (six) hours as needed for nausea or vomiting       Start Date: 6/13/2022 End Date: --       Order Dose: 4 mg       Quantity: 20 tablet    Refills: 0       No discharge procedures on file      PDMP Review     None          ED Provider  Electronically Signed by           Elise Sanchez MD  06/13/22 7952

## 2022-06-13 NOTE — DISCHARGE INSTRUCTIONS
RETURN IF WORSE IN ANY WAY:   WORSENING PAIN,   FEVER OR FLU LIKE SYMPTOMS,   OR NEW AND CONCERNING SYMPTOMS SIGNS OR SYMPTOMS      PLEASE CALL YOUR PRIMARY DOCTOR IN THE MORNING TO SET UP FOLLOW UP for TOMORROW or the Next day  PLEASE REVIEW THE WORK UP RESULTS WITH YOUR DOCTOR  Please continue to drink plenty of fluids      You can take Zofran for nausea or vomiting

## 2022-06-15 LAB
BACTERIA UR CULT: ABNORMAL
BACTERIA UR CULT: ABNORMAL

## 2022-07-06 ENCOUNTER — VBI (OUTPATIENT)
Dept: ADMINISTRATIVE | Facility: OTHER | Age: 22
End: 2022-07-06

## 2022-07-11 ENCOUNTER — OFFICE VISIT (OUTPATIENT)
Dept: FAMILY MEDICINE CLINIC | Facility: CLINIC | Age: 22
End: 2022-07-11
Payer: COMMERCIAL

## 2022-07-11 VITALS
SYSTOLIC BLOOD PRESSURE: 118 MMHG | DIASTOLIC BLOOD PRESSURE: 66 MMHG | TEMPERATURE: 98.7 F | BODY MASS INDEX: 38.62 KG/M2 | WEIGHT: 184 LBS | OXYGEN SATURATION: 98 % | HEIGHT: 58 IN | HEART RATE: 88 BPM | RESPIRATION RATE: 16 BRPM

## 2022-07-11 DIAGNOSIS — J45.20 MILD INTERMITTENT ASTHMA, UNSPECIFIED WHETHER COMPLICATED: ICD-10-CM

## 2022-07-11 DIAGNOSIS — R11.2 NAUSEA & VOMITING: ICD-10-CM

## 2022-07-11 DIAGNOSIS — F39 MOOD DISORDER (HCC): ICD-10-CM

## 2022-07-11 DIAGNOSIS — K29.70 GASTRITIS: ICD-10-CM

## 2022-07-11 DIAGNOSIS — R05.9 COUGH: Primary | ICD-10-CM

## 2022-07-11 PROCEDURE — 87636 SARSCOV2 & INF A&B AMP PRB: CPT | Performed by: NURSE PRACTITIONER

## 2022-07-11 PROCEDURE — 99214 OFFICE O/P EST MOD 30 MIN: CPT | Performed by: NURSE PRACTITIONER

## 2022-07-11 RX ORDER — ONDANSETRON 4 MG/1
4 TABLET, ORALLY DISINTEGRATING ORAL EVERY 6 HOURS PRN
Qty: 30 TABLET | Refills: 0 | Status: SHIPPED | OUTPATIENT
Start: 2022-07-11

## 2022-07-11 RX ORDER — BENZONATATE 200 MG/1
200 CAPSULE ORAL 3 TIMES DAILY PRN
Qty: 20 CAPSULE | Refills: 0 | Status: SHIPPED | OUTPATIENT
Start: 2022-07-11 | End: 2022-08-08

## 2022-07-11 NOTE — ASSESSMENT & PLAN NOTE
- Prescription sent for Zofran  - Increase oral hydration    - Recommend BRAT diet  - Contact office if symptoms do not improve

## 2022-07-11 NOTE — PROGRESS NOTES
Assessment/Plan:    Cough  - This is day 2 of symptoms  Will send swab for COVID and flu  - Tessalon perles sent to pharmacy    - Increase oral hydration and use humidifier    - Will follow up with patient regarding results of viral test     Nausea & vomiting  - Prescription sent for Zofran  - Increase oral hydration    - Recommend BRAT diet  - Contact office if symptoms do not improve  Asthma  - Stable  - Continue albuterol as needed for SOB  - Will continue to monitor  Mood disorder ([facility] [address] )  - Stable  - On Geodon and hydroxyzine  - Will continue to monitor  Diagnoses and all orders for this visit:    Cough  -     benzonatate (TESSALON) 200 MG capsule; Take 1 capsule (200 mg total) by mouth 3 (three) times a day as needed for cough  -     Covid/Flu- Office Collect    Gastritis    Nausea & vomiting  -     ondansetron (ZOFRAN-ODT) 4 mg disintegrating tablet; Take 1 tablet (4 mg total) by mouth every 6 (six) hours as needed for nausea or vomiting    Mild intermittent asthma, unspecified whether complicated    Mood disorder (HCC)        Subjective:      Patient ID: Janny Sloan is a 23 y o  female  Patient presents with complaints of slight SOB, cough, vomiting, and diarrhea since yesterday  She denies any fever or chills  She has been able to drink fluids but has not been able to eat without vomiting  She has a productive cough with yellow/green mucus  She took a home COVID test yesterday which was negative  The following portions of the patient's history were reviewed and updated as appropriate: allergies, current medications, past family history, past medical history, past social history, past surgical history and problem list     Review of Systems   Constitutional: Negative for chills, fatigue and fever  HENT: Negative for sinus pressure, sinus pain and trouble swallowing  Eyes: Negative for visual disturbance  Respiratory: Positive for cough and shortness of breath  Cardiovascular: Negative for chest pain and palpitations  Gastrointestinal: Positive for abdominal pain and diarrhea  Negative for blood in stool  Endocrine: Negative for cold intolerance and heat intolerance  Genitourinary: Negative for difficulty urinating and dysuria  Musculoskeletal: Negative for gait problem  Skin: Negative for rash  Neurological: Positive for headaches  Negative for dizziness and syncope  Hematological: Negative for adenopathy  Psychiatric/Behavioral: Negative for behavioral problems  Objective:      /66 (BP Location: Left arm, Patient Position: Sitting, Cuff Size: Adult)   Pulse 88   Temp 98 7 °F (37 1 °C) (Tympanic)   Resp 16   Ht 4' 10" (1 473 m)   Wt 83 5 kg (184 lb)   SpO2 98%   BMI 38 46 kg/m²          Physical Exam  Vitals and nursing note reviewed  Constitutional:       General: She is not in acute distress  Appearance: Normal appearance  HENT:      Head: Normocephalic and atraumatic  Right Ear: External ear normal       Left Ear: External ear normal    Eyes:      Conjunctiva/sclera: Conjunctivae normal    Cardiovascular:      Rate and Rhythm: Normal rate and regular rhythm  Heart sounds: Normal heart sounds  Pulmonary:      Effort: Pulmonary effort is normal  No respiratory distress  Breath sounds: Normal breath sounds  Musculoskeletal:         General: Normal range of motion  Cervical back: Normal range of motion  Lymphadenopathy:      Cervical: No cervical adenopathy  Skin:     General: Skin is warm and dry  Neurological:      Mental Status: She is alert and oriented to person, place, and time  Cranial Nerves: No cranial nerve deficit  Psychiatric:         Mood and Affect: Mood normal          Behavior: Behavior normal        BMI Counseling: Body mass index is 38 46 kg/m²   The BMI is above normal  Nutrition recommendations include decreasing portion sizes, encouraging healthy choices of fruits and vegetables, decreasing fast food intake and reducing intake of cholesterol  Exercise recommendations include moderate physical activity 150 minutes/week and exercising 3-5 times per week  Rationale for BMI follow-up plan is due to patient being overweight or obese

## 2022-07-11 NOTE — ASSESSMENT & PLAN NOTE
- This is day 2 of symptoms  Will send swab for COVID and flu     - Tessalon perles sent to pharmacy    - Increase oral hydration and use humidifier    - Will follow up with patient regarding results of viral test

## 2022-07-12 LAB
FLUAV RNA RESP QL NAA+PROBE: NEGATIVE
FLUBV RNA RESP QL NAA+PROBE: NEGATIVE
SARS-COV-2 RNA RESP QL NAA+PROBE: NEGATIVE

## 2022-07-13 ENCOUNTER — TELEPHONE (OUTPATIENT)
Dept: FAMILY MEDICINE CLINIC | Facility: CLINIC | Age: 22
End: 2022-07-13

## 2022-07-13 NOTE — TELEPHONE ENCOUNTER
----- [id number] sent at 7/13/2022  9:57 AM EDT -----  Test negative for COVID and flu

## 2022-07-15 ENCOUNTER — OFFICE VISIT (OUTPATIENT)
Dept: FAMILY MEDICINE CLINIC | Facility: CLINIC | Age: 22
End: 2022-07-15
Payer: COMMERCIAL

## 2022-07-15 VITALS
BODY MASS INDEX: 39.04 KG/M2 | WEIGHT: 186 LBS | RESPIRATION RATE: 16 BRPM | DIASTOLIC BLOOD PRESSURE: 70 MMHG | TEMPERATURE: 99.3 F | HEIGHT: 58 IN | HEART RATE: 75 BPM | SYSTOLIC BLOOD PRESSURE: 110 MMHG | OXYGEN SATURATION: 98 %

## 2022-07-15 DIAGNOSIS — K21.9 GASTROESOPHAGEAL REFLUX DISEASE WITHOUT ESOPHAGITIS: ICD-10-CM

## 2022-07-15 DIAGNOSIS — J40 BRONCHITIS: Primary | ICD-10-CM

## 2022-07-15 DIAGNOSIS — J45.20 MILD INTERMITTENT ASTHMA, UNSPECIFIED WHETHER COMPLICATED: ICD-10-CM

## 2022-07-15 DIAGNOSIS — J06.9 VIRAL URI WITH COUGH: ICD-10-CM

## 2022-07-15 DIAGNOSIS — R06.02 SOB (SHORTNESS OF BREATH): ICD-10-CM

## 2022-07-15 PROCEDURE — 99214 OFFICE O/P EST MOD 30 MIN: CPT | Performed by: NURSE PRACTITIONER

## 2022-07-15 RX ORDER — PREDNISONE 50 MG/1
50 TABLET ORAL DAILY
Qty: 5 TABLET | Refills: 0 | Status: SHIPPED | OUTPATIENT
Start: 2022-07-15 | End: 2022-07-20

## 2022-07-15 RX ORDER — FLUTICASONE PROPIONATE 50 MCG
1 SPRAY, SUSPENSION (ML) NASAL DAILY
Qty: 16 G | Refills: 0 | Status: SHIPPED | OUTPATIENT
Start: 2022-07-15 | End: 2022-09-06

## 2022-07-15 RX ORDER — ALBUTEROL SULFATE 90 UG/1
2 AEROSOL, METERED RESPIRATORY (INHALATION) EVERY 4 HOURS PRN
Qty: 18 G | Refills: 0 | Status: SHIPPED | OUTPATIENT
Start: 2022-07-15

## 2022-07-15 RX ORDER — AMOXICILLIN AND CLAVULANATE POTASSIUM 875; 125 MG/1; MG/1
1 TABLET, FILM COATED ORAL EVERY 12 HOURS SCHEDULED
Qty: 20 TABLET | Refills: 0 | Status: SHIPPED | OUTPATIENT
Start: 2022-07-15 | End: 2022-07-25

## 2022-07-15 NOTE — ASSESSMENT & PLAN NOTE
- prescription sent for Augmentin and prednisone for cough  Advised of side effects   - increase oral hydration use humidifier   - contact office if symptoms do not improve

## 2022-07-15 NOTE — ASSESSMENT & PLAN NOTE
- stable  - continue albuterol as needed for shortness of breaths  Refill sent  - will continue to monitor

## 2022-07-15 NOTE — PROGRESS NOTES
Assessment/Plan:    Asthma  - stable  - continue albuterol as needed for shortness of breaths  Refill sent  - will continue to monitor  Bronchitis  - prescription sent for Augmentin and prednisone for cough  Advised of side effects   - increase oral hydration use humidifier   - contact office if symptoms do not improve  GERD (gastroesophageal reflux disease)  - well controlled on Pepcid daily  Continue same  - will continue to monitor  Diagnoses and all orders for this visit:    Bronchitis  -     amoxicillin-clavulanate (AUGMENTIN) 875-125 mg per tablet; Take 1 tablet by mouth every 12 (twelve) hours for 10 days  -     predniSONE 50 mg tablet; Take 1 tablet (50 mg total) by mouth daily for 5 days    Viral URI with cough  -     fluticasone (FLONASE) 50 mcg/act nasal spray; 1 spray into each nostril daily    SOB (shortness of breath)    Mild intermittent asthma, unspecified whether complicated  -     albuterol (PROVENTIL HFA,VENTOLIN HFA) 90 mcg/act inhaler; Inhale 2 puffs every 4 (four) hours as needed for wheezing    Gastroesophageal reflux disease without esophagitis        Subjective:      Patient ID: Nuria Bravo is a 23 y o  female  Patient presents today with slight fever, cough, nausea, and shortness of breath x1 week  She was seen in the office previously and tested negative for COVID and flu  She continues with the same symptoms  The following portions of the patient's history were reviewed and updated as appropriate: allergies, current medications, past family history, past medical history, past social history, past surgical history and problem list     Review of Systems   Constitutional: Positive for fever  Negative for fatigue  HENT: Negative for trouble swallowing  Eyes: Negative for visual disturbance  Respiratory: Positive for cough and shortness of breath  Cardiovascular: Negative for chest pain and palpitations  Gastrointestinal: Positive for nausea  Negative for abdominal pain and blood in stool  Endocrine: Negative for cold intolerance and heat intolerance  Genitourinary: Negative for difficulty urinating and dysuria  Musculoskeletal: Negative for gait problem  Skin: Negative for rash  Neurological: Negative for dizziness, syncope and headaches  Hematological: Negative for adenopathy  Psychiatric/Behavioral: Negative for behavioral problems  Objective:      /70 (BP Location: Left arm, Patient Position: Sitting, Cuff Size: Large)   Pulse 75   Temp 99 3 °F (37 4 °C) (Tympanic)   Resp 16   Ht 4' 10" (1 473 m)   Wt 84 4 kg (186 lb)   SpO2 98%   BMI 38 87 kg/m²          Physical Exam  Vitals and nursing note reviewed  Constitutional:       Appearance: Normal appearance  HENT:      Head: Normocephalic and atraumatic  Right Ear: External ear normal       Left Ear: External ear normal    Eyes:      Conjunctiva/sclera: Conjunctivae normal    Cardiovascular:      Rate and Rhythm: Normal rate and regular rhythm  Heart sounds: Normal heart sounds  Pulmonary:      Effort: Pulmonary effort is normal  No respiratory distress  Breath sounds: Wheezing and rhonchi present  Musculoskeletal:         General: Normal range of motion  Cervical back: Normal range of motion  Lymphadenopathy:      Cervical: No cervical adenopathy  Skin:     General: Skin is warm and dry  Neurological:      Mental Status: She is alert and oriented to person, place, and time  Cranial Nerves: No cranial nerve deficit     Psychiatric:         Mood and Affect: Mood normal          Behavior: Behavior normal

## 2022-08-08 ENCOUNTER — OFFICE VISIT (OUTPATIENT)
Dept: OBGYN CLINIC | Facility: CLINIC | Age: 22
End: 2022-08-08

## 2022-08-08 VITALS
HEIGHT: 58 IN | DIASTOLIC BLOOD PRESSURE: 76 MMHG | SYSTOLIC BLOOD PRESSURE: 116 MMHG | HEART RATE: 69 BPM | WEIGHT: 182 LBS | BODY MASS INDEX: 38.2 KG/M2

## 2022-08-08 DIAGNOSIS — B37.31 YEAST VAGINITIS: ICD-10-CM

## 2022-08-08 DIAGNOSIS — Z12.4 CERVICAL CANCER SCREENING: ICD-10-CM

## 2022-08-08 DIAGNOSIS — Z01.419 ENCOUNTER FOR GYNECOLOGICAL EXAMINATION (GENERAL) (ROUTINE) WITHOUT ABNORMAL FINDINGS: Primary | ICD-10-CM

## 2022-08-08 PROCEDURE — 99395 PREV VISIT EST AGE 18-39: CPT | Performed by: OBSTETRICS & GYNECOLOGY

## 2022-08-08 PROCEDURE — G0145 SCR C/V CYTO,THINLAYER,RESCR: HCPCS | Performed by: OBSTETRICS & GYNECOLOGY

## 2022-08-08 RX ORDER — FLUCONAZOLE 150 MG/1
150 TABLET ORAL ONCE
Qty: 1 TABLET | Refills: 1 | Status: SHIPPED | OUTPATIENT
Start: 2022-08-08 | End: 2022-08-08

## 2022-08-08 NOTE — PATIENT INSTRUCTIONS
Intrauterine Device   WHAT YOU NEED TO KNOW:   What is an intrauterine device (IUD)? An IUD is a type of birth control that is inserted into your uterus  It is a small, flexible piece of plastic with a string on the end  It is inserted and removed by your healthcare provider  IUDs prevent sperm from reaching or fertilizing an egg  IUDs also prevent a fertilized egg from attaching to the uterus and developing into a fetus  What are the most common types of IUDs? Your healthcare provider will recommend the type of IUD that is right for you  This is based on your age and if you have had a child  If you have not had a child, a smaller IUD will be used  A copper IUD  slowly releases a small amount of copper into your uterus  This IUD can remain in place for up to 10 years  A hormone-releasing IUD  slowly releases a small amount of progesterone into your uterus  Progesterone is a hormone that is made by your body to help control your periods  This IUD can remain in place for 3 to 5 years  What are the advantages of an IUD? An IUD is 98% to 99% effective in preventing pregnancy  The IUD can be removed by your healthcare provider if you decide to have a baby  You may be able to get pregnant as soon as the IUD is removed  An IUD protects you from pregnancy right after it is inserted  You do not have to stop sexual activity to insert it  You do not have to remember to take your birth control pill  Copper IUDs are safer for some women than oral birth control pills  Examples include women who smoke or have a history of blood clots  Hormone-releasing IUDs may decrease certain health problems  Examples include bleeding and cramping that happen with your monthly period  What are the disadvantages of an IUD? There is a small chance that you could get pregnant  Sometimes the IUD cannot be removed after you get pregnant  This increases your risk of a miscarriage or an ectopic pregnancy   Ectopic pregnancy is when the fertilized egg starts to grow somewhere other than your uterus  An IUD does not protect you from sexually transmitted infections  You may have cramps during the first weeks after you get the IUD  A copper IUD may cause your monthly period to be heavier or more painful  This is more common within the first 3 months after you get the IUD  You may need to have your IUD removed if your bleeding or pain becomes severe  You may have spotting between periods  There is a small risk of an infection within the first 20 days after the IUD is placed  Infection can lead to pelvic inflammatory disease  This can cause infertility  Your uterus may tear when the IUD is inserted  The IUD may slip part or all of the way out of your uterus  How is the IUD inserted? The IUD is usually inserted during your monthly period  This may help decrease the amount of discomfort you have during the procedure  It also helps make sure that you are not pregnant  You will be asked to lie down and place your feet in stirrups  Your healthcare provider will gently insert a speculum into your vagina  This is the same tool used during a Pap smear  The speculum allows your healthcare provider to see inside your vagina to your cervix  The cervix is the opening of your uterus  Your healthcare provider will clean your cervix with an antiseptic solution to prevent infection  You may be given numbing medicine  A long plastic tube is gently passed through your cervix and into your uterus  This tube has the IUD inside of it  The IUD is pushed out of the tube and into your uterus  You may have cramps as the IUD is inserted  The tube is removed after the IUD is in place  How can I make sure my IUD is still in place? An IUD has a string made of plastic thread  One to 2 inches of this string hangs into your vagina  You cannot see this string, and it should not cause problems when you have sex   Check your IUD string every 3 days for the first 3 months after it is inserted  After that, check the string after each monthly period  Do the following to check the placement of your IUD:  Wash your hands with soap and warm water  Dry them with a clean towel  Bend your knees and squat low to the ground  Gently put your index finger inside your vagina  The cervix is at the top of the vagina and feels like the tip of your nose  Feel for the IUD string  Do not pull on the string  You should not be able to feel the firm plastic of the IUD itself  Wash your hands after you check your IUD string  Where can I find more information? [facility] [address] , [address] [place]  Phone: [phone]  Web Address: https://[employer]    When should I seek immediate care? You have severe pain or bleeding during your period  You have a fever and severe abdominal pain  When should I call my doctor? You think you are pregnant  The IUD has come out  You have bleeding from your vagina after you have sex, and it is not your period  You have pain during sex  You cannot feel the IUD string, the string feels longer, or you feel the plastic of the IUD itself  You have vaginal discharge that is green, yellow, or has a foul odor  You have questions or concerns about your condition or care  CARE AGREEMENT:   You have the right to help plan your care  Learn about your health condition and how it may be treated  Discuss treatment options with your healthcare providers to decide what care you want to receive  You always have the right to refuse treatment  The above information is an  only  It is not intended as medical advice for individual conditions or treatments  Talk to your doctor, nurse or pharmacist before following any medical regimen to see if it is safe and effective for you    © Copyright [employer] 2022 Information is for End User's use only and may not be sold, redistributed or otherwise used for commercial purposes   All illustrations and images included in CareNotes® are the copyrighted property of A D A M [employer]  or [employer]

## 2022-08-08 NOTE — PROGRESS NOTES
Assessment/Plan:     No problem-specific Assessment & Plan notes found for this encounter  Diagnoses and all orders for this visit:    Encounter for gynecological examination (general) (routine) without abnormal findings    Cervical cancer screening  -     Liquid-based pap, screening    Yeast vaginitis  -     fluconazole (DIFLUCAN) 150 mg tablet; Take 1 tablet (150 mg total) by mouth once for 1 dose    Depression Screening Follow-up Plan: Patient's depression screening was positive with a PHQ-2 score of 1  Their PHQ-9 score was 7  Clinically patient does not have depression  No treatment is required  RTO for Mirena IUD insertion  Subjective:      Patient ID: Roger Brown is a 23 y o  female who presents for annual exam   Her last pap was 04/29/19 and was negative  She declines STD testing  She reports abnormal bleeding on DMPA and desires a Mirena  Papers were signed today  HPI    The following portions of the patient's history were reviewed and updated as appropriate: allergies, current medications, past family history, past medical history, past social history, past surgical history and problem list     Review of Systems      Objective:      /76   Pulse 69   Ht 4' 10" (1 473 m)   Wt 82 6 kg (182 lb)   LMP 07/13/2022   BMI 38 04 kg/m²          Physical Exam  Vitals and nursing note reviewed  Exam conducted with a chaperone present  Constitutional:       General: She is not in acute distress  Appearance: Normal appearance  She is well-developed  She is obese  HENT:      Head: Normocephalic  Neck:      Thyroid: No thyromegaly  Cardiovascular:      Rate and Rhythm: Normal rate and regular rhythm  Heart sounds: Normal heart sounds  No murmur heard  Pulmonary:      Effort: Pulmonary effort is normal  No respiratory distress  Breath sounds: Normal breath sounds  No wheezing or rales  Chest:      Chest wall: No tenderness     Breasts:      Right: No swelling, bleeding, inverted nipple, mass, nipple discharge, skin change or tenderness  Left: No swelling, bleeding, inverted nipple, mass, nipple discharge, skin change or tenderness  Abdominal:      General: Bowel sounds are normal  There is no distension  Palpations: Abdomen is soft  There is no mass  Tenderness: There is no abdominal tenderness  There is no guarding or rebound  Genitourinary:     Labia:         Right: No rash, tenderness, lesion or injury  Left: No rash, tenderness, lesion or injury  Vagina: No signs of injury and foreign body  Vaginal discharge and erythema present  No tenderness, bleeding, lesions or prolapsed vaginal walls  Cervix: Normal       Uterus: Normal        Adnexa:         Right: No mass, tenderness or fullness  Left: No mass, tenderness or fullness  Rectum: No external hemorrhoid  Skin:     General: Skin is warm and dry  Neurological:      General: No focal deficit present  Mental Status: She is alert and oriented to person, place, and time  Psychiatric:         Mood and Affect: Mood normal          Behavior: Behavior normal          Thought Content:  Thought content normal          Judgment: Judgment normal

## 2022-08-11 ENCOUNTER — TELEPHONE (OUTPATIENT)
Dept: OBGYN CLINIC | Facility: CLINIC | Age: 22
End: 2022-08-11

## 2022-08-12 LAB
LAB AP GYN PRIMARY INTERPRETATION: NORMAL
Lab: NORMAL
PATH INTERP SPEC-IMP: NORMAL

## 2022-08-19 ENCOUNTER — TELEPHONE (OUTPATIENT)
Dept: OBGYN CLINIC | Facility: CLINIC | Age: 22
End: 2022-08-19

## 2022-08-31 ENCOUNTER — PROCEDURE VISIT (OUTPATIENT)
Dept: OBGYN CLINIC | Facility: CLINIC | Age: 22
End: 2022-08-31

## 2022-08-31 VITALS
HEART RATE: 84 BPM | SYSTOLIC BLOOD PRESSURE: 106 MMHG | BODY MASS INDEX: 37.2 KG/M2 | DIASTOLIC BLOOD PRESSURE: 72 MMHG | WEIGHT: 178 LBS

## 2022-08-31 DIAGNOSIS — Z30.42 ENCOUNTER FOR SURVEILLANCE OF INJECTABLE CONTRACEPTIVE: Primary | ICD-10-CM

## 2022-08-31 DIAGNOSIS — Z30.430 ENCOUNTER FOR INSERTION OF MIRENA IUD: ICD-10-CM

## 2022-08-31 LAB — SL AMB POCT URINE HCG: NORMAL

## 2022-08-31 PROCEDURE — 81025 URINE PREGNANCY TEST: CPT | Performed by: OBSTETRICS & GYNECOLOGY

## 2022-08-31 PROCEDURE — 58300 INSERT INTRAUTERINE DEVICE: CPT | Performed by: OBSTETRICS & GYNECOLOGY

## 2022-08-31 RX ORDER — MEDROXYPROGESTERONE ACETATE 150 MG/ML
150 INJECTION, SUSPENSION INTRAMUSCULAR
Qty: 1 ML | Refills: 3 | Status: SHIPPED | OUTPATIENT
Start: 2022-08-31 | End: 2022-09-14

## 2022-08-31 RX ORDER — LEVONORGESTREL 52 MG/1
INTRAUTERINE DEVICE INTRAUTERINE
COMMUNITY
Start: 2022-08-10 | End: 2022-08-31

## 2022-08-31 NOTE — PROGRESS NOTES
Iud insertions    Date/Time: 8/31/2022 2:05 PM  Performed by: Milka Childers MD  Authorized by: Milka Childers MD   Universal Protocol:  Procedure performed by: (Dr Mily Solitario)  Consent: Verbal consent obtained  Written consent obtained  Risks and benefits: risks, benefits and alternatives were discussed  Consent given by: patient  Time out: Immediately prior to procedure a "time out" was called to verify the correct patient, procedure, equipment, support staff and site/side marked as required    Patient understanding: patient states understanding of the procedure being performed  Patient consent: the patient's understanding of the procedure matches consent given  Procedure consent: procedure consent matches procedure scheduled  Patient identity confirmed: verbally with patient        Procedure:     Negative urine pregnancy test: yes      Cervix cleaned and prepped: yes      Speculum placed in vagina: yes      Tenaculum applied to cervix: yes      Uterus sounded: yes      Uterus sound depth (cm):  7    IUD inserted with no complications: no      IUD type:  Mirena    Strings trimmed: no    Post-procedure:     Patient tolerated procedure well: yes    Comments:      Failed insertion due to cervical stenosis

## 2022-09-05 DIAGNOSIS — J06.9 VIRAL URI WITH COUGH: ICD-10-CM

## 2022-09-06 RX ORDER — FLUTICASONE PROPIONATE 50 MCG
SPRAY, SUSPENSION (ML) NASAL
Qty: 24 ML | Refills: 1 | Status: SHIPPED | OUTPATIENT
Start: 2022-09-06

## 2022-09-14 ENCOUNTER — OFFICE VISIT (OUTPATIENT)
Dept: OBGYN CLINIC | Facility: CLINIC | Age: 22
End: 2022-09-14

## 2022-09-14 VITALS
SYSTOLIC BLOOD PRESSURE: 121 MMHG | HEART RATE: 81 BPM | HEIGHT: 58 IN | DIASTOLIC BLOOD PRESSURE: 75 MMHG | BODY MASS INDEX: 37.24 KG/M2 | WEIGHT: 177.4 LBS

## 2022-09-14 DIAGNOSIS — N92.0 MENORRHAGIA WITH REGULAR CYCLE: ICD-10-CM

## 2022-09-14 DIAGNOSIS — Z30.09 UNWANTED FERTILITY: Primary | ICD-10-CM

## 2022-09-14 PROCEDURE — 99213 OFFICE O/P EST LOW 20 MIN: CPT | Performed by: NURSE PRACTITIONER

## 2022-09-14 RX ORDER — VENLAFAXINE HYDROCHLORIDE 37.5 MG/1
37.5 CAPSULE, EXTENDED RELEASE ORAL DAILY
COMMUNITY
Start: 2022-09-10

## 2022-09-14 NOTE — PROGRESS NOTES
PROBLEM GYNECOLOGICAL VISIT    Gavin Arnold is a 23 y o  female who presents today with complaint of heavy menses  Her general medical history has been reviewed and she reports it as follows:    Past Medical History:   Diagnosis Date    ADHD     Anxiety     Asthma     Autism     Bipolar disorder ([address] )     Depression     GERD (gastroesophageal reflux disease)      Past Surgical History:   Procedure Laterality Date    CHOLANGIOGRAM N/A 2018    Procedure: CHOLANGIOGRAM;  Surgeon: Nora Pruett MD;  Location: [facility];  Service: General    CHOLECYSTECTOMY LAPAROSCOPIC N/A 2018    Procedure: CHOLECYSTECTOMY LAPAROSCOPIC;  Surgeon: Nora Pruett MD;  [facility];  Service: General    EYE MUSCLE SURGERY Bilateral 2006    IA ESOPHAGOGASTRODUODENOSCOPY TRANSORAL DIAGNOSTIC N/A 2018    Procedure: ESOPHAGOGASTRODUODENOSCOPY (EGD) with bx;  Surgeon: Elisa Mcclain MD;  Location: [facility];   Service: General     OB History        0    Para   0    Term   0       0    AB   0    Living   0       SAB   0    IAB   0    Ectopic   0    Multiple   0    Live Births   0               Social History     Tobacco Use    Smoking status: Never Smoker    Smokeless tobacco: Never Used    Tobacco comment: no passive smoke exposure   Vaping Use    Vaping Use: Never used   Substance Use Topics    Alcohol use: Never    Drug use: Never     Social History     Substance and Sexual Activity   Sexual Activity Not Currently    Partners: Male    Birth control/protection: None       Current Outpatient Medications   Medication Instructions    albuterol (PROVENTIL HFA,VENTOLIN HFA) 90 mcg/act inhaler 2 puffs, Inhalation, Every 4 hours PRN    ergocalciferol (VITAMIN D2) 50,000 units TAKE ONE CAPSULE BY MOUTH ONE TIME PER WEEK    famotidine (PEPCID) 20 mg, Oral, 2 times daily    fluticasone (FLONASE) 50 mcg/act nasal spray SPRAY 1 SPRAY INTO EACH NOSTRIL EVERY DAY    gabapentin (NEURONTIN) 600 mg, Oral, Every evening    hydrOXYzine HCL (ATARAX) 25 mg, Oral, Every 6 hours PRN    ondansetron (ZOFRAN-ODT) 4 mg, Oral, Every 6 hours PRN    venlafaxine (EFFEXOR-XR) 37 5 mg, Oral, Daily    ziprasidone (GEODON) 20 mg, Oral, Daily at bedtime       History of Present Illness:   Patient reports heavy menses and desires for management with Nexplanon implant  She has used Depoprovera in the past but has difficulty remembering to present every 3 months for injection  Attempt at inserting Mirena IUD was performed 8/31/22 but unsuccessful due to cervical stenosis  Review of Systems:  Review of Systems   Constitutional: Negative  Gastrointestinal: Negative  Genitourinary: Positive for menstrual problem  Physical Exam:  /75   Pulse 81   Ht 4' 10" (1 473 m)   Wt 80 5 kg (177 lb 6 4 oz)   LMP 09/13/2022 (Exact Date)   BMI 37 08 kg/m²   Physical Exam  Constitutional:       General: She is not in acute distress  Neurological:      Mental Status: She is alert  Skin:     General: Skin is warm and dry  Vitals reviewed  Assessment:   1  Heavy menses  2  Unwanted fertility  Plan:   1  Desires Nexplanon  2  We will obtain insurance authorization and once device is delivered here to clinic, we will then contact patient to schedule insertion procedure

## 2022-09-14 NOTE — PATIENT INSTRUCTIONS
Thank you for your confidence in our team    We appreciate you and welcome your feedback  If you receive a survey from us, please take a few moments to let us know how we are doing     Sincerely,  KEVIN De La Vega

## 2022-09-21 ENCOUNTER — TELEPHONE (OUTPATIENT)
Dept: OBGYN CLINIC | Facility: CLINIC | Age: 22
End: 2022-09-21

## 2022-09-22 ENCOUNTER — HOSPITAL ENCOUNTER (EMERGENCY)
Facility: HOSPITAL | Age: 22
Discharge: HOME | End: 2022-09-22
Attending: EMERGENCY MEDICINE
Payer: COMMERCIAL

## 2022-09-22 ENCOUNTER — APPOINTMENT (OUTPATIENT)
Dept: RADIOLOGY | Facility: HOSPITAL | Age: 22
End: 2022-09-22
Payer: COMMERCIAL

## 2022-09-22 ENCOUNTER — APPOINTMENT (EMERGENCY)
Dept: CT IMAGING | Facility: HOSPITAL | Age: 22
End: 2022-09-22
Payer: COMMERCIAL

## 2022-09-22 VITALS
DIASTOLIC BLOOD PRESSURE: 61 MMHG | TEMPERATURE: 98.3 F | BODY MASS INDEX: 37.48 KG/M2 | RESPIRATION RATE: 18 BRPM | HEART RATE: 71 BPM | SYSTOLIC BLOOD PRESSURE: 122 MMHG | WEIGHT: 179.32 LBS | OXYGEN SATURATION: 100 %

## 2022-09-22 DIAGNOSIS — K52.9 COLITIS: Primary | ICD-10-CM

## 2022-09-22 LAB
ALBUMIN SERPL BCP-MCNC: 4.4 G/DL (ref 3.5–5)
ALP SERPL-CCNC: 108 U/L (ref 43–122)
ALT SERPL W P-5'-P-CCNC: 33 U/L
ANION GAP SERPL CALCULATED.3IONS-SCNC: 10 MMOL/L (ref 5–14)
AST SERPL W P-5'-P-CCNC: 30 U/L (ref 14–36)
ATRIAL RATE: 71 BPM
B-HCG SERPL-ACNC: <3 MIU/ML
BASOPHILS # BLD AUTO: 0.04 THOUSANDS/ΜL (ref 0–0.1)
BASOPHILS NFR BLD AUTO: 0 % (ref 0–1)
BILIRUB SERPL-MCNC: 0.34 MG/DL (ref 0.2–1)
BUN SERPL-MCNC: 19 MG/DL (ref 5–25)
CALCIUM SERPL-MCNC: 8.8 MG/DL (ref 8.4–10.2)
CHLORIDE SERPL-SCNC: 103 MMOL/L (ref 96–108)
CO2 SERPL-SCNC: 23 MMOL/L (ref 21–32)
CREAT SERPL-MCNC: 0.73 MG/DL (ref 0.6–1.2)
EOSINOPHIL # BLD AUTO: 0.05 THOUSAND/ΜL (ref 0–0.61)
EOSINOPHIL NFR BLD AUTO: 0 % (ref 0–6)
ERYTHROCYTE [DISTWIDTH] IN BLOOD BY AUTOMATED COUNT: 14.4 % (ref 11.6–15.1)
GFR SERPL CREATININE-BSD FRML MDRD: 115 ML/MIN/1.73SQ M
GLUCOSE SERPL-MCNC: 122 MG/DL (ref 70–99)
HCT VFR BLD AUTO: 39.7 % (ref 34.8–46.1)
HGB BLD-MCNC: 12.4 G/DL (ref 11.5–15.4)
IMM GRANULOCYTES # BLD AUTO: 0.05 THOUSAND/UL (ref 0–0.2)
IMM GRANULOCYTES NFR BLD AUTO: 0 % (ref 0–2)
LIPASE SERPL-CCNC: 51 U/L (ref 23–300)
LYMPHOCYTES # BLD AUTO: 1.28 THOUSANDS/ΜL (ref 0.6–4.47)
LYMPHOCYTES NFR BLD AUTO: 10 % (ref 14–44)
MCH RBC QN AUTO: 25.2 PG (ref 26.8–34.3)
MCHC RBC AUTO-ENTMCNC: 31.2 G/DL (ref 31.4–37.4)
MCV RBC AUTO: 81 FL (ref 82–98)
MONOCYTES # BLD AUTO: 0.49 THOUSAND/ΜL (ref 0.17–1.22)
MONOCYTES NFR BLD AUTO: 4 % (ref 4–12)
NEUTROPHILS # BLD AUTO: 10.74 THOUSANDS/ΜL (ref 1.85–7.62)
NEUTS SEG NFR BLD AUTO: 86 % (ref 43–75)
NRBC BLD AUTO-RTO: 0 /100 WBCS
P AXIS: 36 DEGREES
PLATELET # BLD AUTO: 373 THOUSANDS/UL (ref 149–390)
PMV BLD AUTO: 9.6 FL (ref 8.9–12.7)
POTASSIUM SERPL-SCNC: 4 MMOL/L (ref 3.5–5.3)
PR INTERVAL: 122 MS
PROT SERPL-MCNC: 7.8 G/DL (ref 6.4–8.4)
QRS AXIS: 31 DEGREES
QRSD INTERVAL: 102 MS
QT INTERVAL: 394 MS
QTC INTERVAL: 428 MS
RBC # BLD AUTO: 4.92 MILLION/UL (ref 3.81–5.12)
SODIUM SERPL-SCNC: 136 MMOL/L (ref 135–147)
T WAVE AXIS: 32 DEGREES
VENTRICULAR RATE: 71 BPM
WBC # BLD AUTO: 12.65 THOUSAND/UL (ref 4.31–10.16)

## 2022-09-22 PROCEDURE — 93010 ELECTROCARDIOGRAM REPORT: CPT | Performed by: STUDENT IN AN ORGANIZED HEALTH CARE EDUCATION/TRAINING PROGRAM

## 2022-09-22 PROCEDURE — 99285 EMERGENCY DEPT VISIT HI MDM: CPT

## 2022-09-22 PROCEDURE — 85025 COMPLETE CBC W/AUTO DIFF WBC: CPT

## 2022-09-22 PROCEDURE — 96374 THER/PROPH/DIAG INJ IV PUSH: CPT

## 2022-09-22 PROCEDURE — 93005 ELECTROCARDIOGRAM TRACING: CPT

## 2022-09-22 PROCEDURE — G1004 CDSM NDSC: HCPCS

## 2022-09-22 PROCEDURE — 74177 CT ABD & PELVIS W/CONTRAST: CPT

## 2022-09-22 PROCEDURE — 84702 CHORIONIC GONADOTROPIN TEST: CPT

## 2022-09-22 PROCEDURE — 96361 HYDRATE IV INFUSION ADD-ON: CPT

## 2022-09-22 PROCEDURE — 71045 X-RAY EXAM CHEST 1 VIEW: CPT

## 2022-09-22 PROCEDURE — 83690 ASSAY OF LIPASE: CPT

## 2022-09-22 PROCEDURE — 99284 EMERGENCY DEPT VISIT MOD MDM: CPT

## 2022-09-22 PROCEDURE — 80053 COMPREHEN METABOLIC PANEL: CPT

## 2022-09-22 PROCEDURE — 36415 COLL VENOUS BLD VENIPUNCTURE: CPT

## 2022-09-22 PROCEDURE — 96375 TX/PRO/DX INJ NEW DRUG ADDON: CPT

## 2022-09-22 RX ORDER — METRONIDAZOLE 500 MG/1
500 TABLET ORAL EVERY 12 HOURS SCHEDULED
Qty: 14 TABLET | Refills: 0 | Status: SHIPPED | OUTPATIENT
Start: 2022-09-22 | End: 2022-09-29

## 2022-09-22 RX ORDER — ONDANSETRON 4 MG/1
4 TABLET, ORALLY DISINTEGRATING ORAL EVERY 6 HOURS PRN
Qty: 20 TABLET | Refills: 0 | Status: SHIPPED | OUTPATIENT
Start: 2022-09-22

## 2022-09-22 RX ORDER — ONDANSETRON 2 MG/ML
4 INJECTION INTRAMUSCULAR; INTRAVENOUS ONCE
Status: COMPLETED | OUTPATIENT
Start: 2022-09-22 | End: 2022-09-22

## 2022-09-22 RX ORDER — MORPHINE SULFATE 4 MG/ML
4 INJECTION, SOLUTION INTRAMUSCULAR; INTRAVENOUS ONCE
Status: COMPLETED | OUTPATIENT
Start: 2022-09-22 | End: 2022-09-22

## 2022-09-22 RX ORDER — CIPROFLOXACIN 500 MG/1
500 TABLET, FILM COATED ORAL EVERY 12 HOURS SCHEDULED
Qty: 14 TABLET | Refills: 0 | Status: SHIPPED | OUTPATIENT
Start: 2022-09-22 | End: 2022-09-29

## 2022-09-22 RX ADMIN — IOHEXOL 100 ML: 350 INJECTION, SOLUTION INTRAVENOUS at 12:59

## 2022-09-22 RX ADMIN — MORPHINE SULFATE 4 MG: 4 INJECTION, SOLUTION INTRAMUSCULAR; INTRAVENOUS at 12:50

## 2022-09-22 RX ADMIN — SODIUM CHLORIDE 1000 ML: 0.9 INJECTION, SOLUTION INTRAVENOUS at 10:32

## 2022-09-22 RX ADMIN — ONDANSETRON 4 MG: 2 INJECTION INTRAMUSCULAR; INTRAVENOUS at 10:33

## 2022-09-22 NOTE — ED PROVIDER NOTES
History  Chief Complaint   Patient presents with    Vomiting     With diarrhea, nausea x3 days     23-year-old female presenting with abdominal pain, diarrhea, vomiting the last 3 days  Patient states that the pain and other symptoms have been worsening over the last 3 days  She has tried to drink fluids and eat along with over-the-counter medications but the symptoms getting worse  Patient states that the pain is in the middle of her abdomen right above her umbilicus  Patient states that the only thing that makes it worse is moving around and nothing makes it any better including efficacious  Patient states that she has had nausea and has been vomiting every day for the last 3 days  Patient states that she is able to drink some water and some food but after couple hours she does vomited up  Patient denies any blood in the vomit or diarrhea  Patient denies any chest pain, shortness of breath, constipation, headache, fevers, visual issues or increased bleeding  Patient's last menstrual period was on the 13th and was normal for the patient  Patient does not complain of any pelvic pain or vaginal discharge  Patient states that her throat is a little bit sore she thinks probably from the acid in her stomach from vomiting  Prior to Admission Medications   Prescriptions Last Dose Informant Patient Reported? Taking?    albuterol (PROVENTIL HFA,VENTOLIN HFA) 90 mcg/act inhaler   No No   Sig: Inhale 2 puffs every 4 (four) hours as needed for wheezing   ergocalciferol (VITAMIN D2) 50,000 units   No No   Sig: TAKE ONE CAPSULE BY MOUTH ONE TIME PER WEEK   famotidine (PEPCID) 20 mg tablet   No No   Sig: Take 1 tablet (20 mg total) by mouth 2 (two) times a day   fluticasone (FLONASE) 50 mcg/act nasal spray   No No   Sig: SPRAY 1 SPRAY INTO EACH NOSTRIL EVERY DAY   gabapentin (NEURONTIN) 600 MG tablet   No No   Sig: Take 1 tablet (600 mg total) by mouth every evening   hydrOXYzine HCL (ATARAX) 25 mg tablet   No No   Sig: TAKE 1 TABLET (25 MG TOTAL) BY MOUTH EVERY 6 (SIX) HOURS AS NEEDED FOR ITCHING OR ANXIETY   ondansetron (ZOFRAN-ODT) 4 mg disintegrating tablet   No No   Sig: Take 1 tablet (4 mg total) by mouth every 6 (six) hours as needed for nausea or vomiting   venlafaxine (EFFEXOR-XR) 37 5 mg 24 hr capsule   Yes No   Sig: Take 37 5 mg by mouth daily   ziprasidone (GEODON) 20 mg capsule   Yes No   Sig: Take 20 mg by mouth daily at bedtime      Facility-Administered Medications: None       Past Medical History:   Diagnosis Date    ADHD     Anxiety     Asthma     Autism     Bipolar disorder ([facility] [address] )     Depression     GERD (gastroesophageal reflux disease)        Past Surgical History:   Procedure Laterality Date    CHOLANGIOGRAM N/A 12/01/2018    Procedure: CHOLANGIOGRAM;  Surgeon: Nadia Monzon MD;  Location:  MAIN OR;  Service: General    CHOLECYSTECTOMY LAPAROSCOPIC N/A 12/01/2018    Procedure: CHOLECYSTECTOMY LAPAROSCOPIC;  Surgeon: Nadia Monzon MD;  Location:  MAIN OR;  Service: General    EYE MUSCLE SURGERY Bilateral 2006    RI ESOPHAGOGASTRODUODENOSCOPY TRANSORAL DIAGNOSTIC N/A 11/27/2018    Procedure: ESOPHAGOGASTRODUODENOSCOPY (EGD) with bx;  Surgeon: Misa Barrera MD;  Location: [facility];   Service: General       Family History   Problem Relation Age of Onset    Breast cancer Mother     Heart attack Mother     Depression Mother     Mental illness Mother     Coronary artery disease Mother     Hypertension Mother     Diabetes Mother     Hyperlipidemia Mother     Pneumonia Brother     Hypertension Maternal Grandmother     Stroke Maternal Grandmother     Diabetes Maternal Grandmother     Glaucoma Maternal Grandmother     Kidney disease Maternal Grandmother     Sarcoidosis Maternal Grandmother     Diabetes Maternal Grandfather     Hypertension Maternal Grandfather     Stroke Maternal Grandfather     Glaucoma Maternal Grandfather     Heart attack Maternal Grandfather     Colon cancer Neg Hx     Ovarian cancer Neg Hx      I have reviewed and agree with the history as documented  E-Cigarette/Vaping    E-Cigarette Use Never User      E-Cigarette/Vaping Substances     Social History     Tobacco Use    Smoking status: Never Smoker    Smokeless tobacco: Never Used    Tobacco comment: no passive smoke exposure   Vaping Use    Vaping Use: Never used   Substance Use Topics    Alcohol use: Never    Drug use: Never       Review of Systems   Constitutional: Positive for chills  Negative for fever  HENT: Positive for sore throat  Negative for congestion, drooling, ear pain, sinus pressure, sinus pain and trouble swallowing  Eyes: Negative for pain and visual disturbance  Respiratory: Negative for cough, shortness of breath, wheezing and stridor  Cardiovascular: Negative for chest pain and palpitations  Gastrointestinal: Positive for diarrhea, nausea and vomiting  Negative for abdominal pain, blood in stool and constipation  Genitourinary: Negative for difficulty urinating, dysuria, flank pain, frequency and hematuria  Musculoskeletal: Negative for arthralgias and back pain  Skin: Negative for color change and rash  Neurological: Negative for seizures and syncope  All other systems reviewed and are negative  Physical Exam  Physical Exam  Vitals and nursing note reviewed  Constitutional:       General: She is not in acute distress  Appearance: She is well-developed  She is obese  She is ill-appearing  HENT:      Head: Normocephalic and atraumatic  Mouth/Throat:      Mouth: Mucous membranes are moist       Pharynx: Oropharynx is clear  Posterior oropharyngeal erythema (Slightly red no exudates ) present  Eyes:      Conjunctiva/sclera: Conjunctivae normal    Cardiovascular:      Rate and Rhythm: Normal rate and regular rhythm  Pulses: Normal pulses  Heart sounds: Normal heart sounds  No murmur heard  No friction rub  No gallop  Pulmonary:      Effort: Pulmonary effort is normal  No respiratory distress  Breath sounds: Normal breath sounds  No stridor  No wheezing, rhonchi or rales  Chest:      Chest wall: No tenderness  Abdominal:      General: Bowel sounds are normal  There is distension (Distended due to obesity  )  Palpations: Abdomen is soft  There is no mass  Tenderness: There is abdominal tenderness  There is no right CVA tenderness, left CVA tenderness, guarding or rebound  Hernia: No hernia is present  Musculoskeletal:         General: No swelling, tenderness, deformity or signs of injury  Normal range of motion  Cervical back: Neck supple  Right lower leg: No edema  Left lower leg: No edema  Skin:     General: Skin is warm and dry  Capillary Refill: Capillary refill takes less than 2 seconds  Coloration: Skin is not jaundiced or pale  Findings: No bruising, erythema, lesion or rash  Neurological:      Mental Status: She is alert           Vital Signs  ED Triage Vitals   Temperature Pulse Respirations Blood Pressure SpO2   09/22/22 0958 09/22/22 0958 09/22/22 0958 09/22/22 0958 09/22/22 0958   98 3 °F (36 8 °C) 95 18 145/82 95 %      Temp src Heart Rate Source Patient Position - Orthostatic VS BP Location FiO2 (%)   -- 09/22/22 1138 09/22/22 1138 09/22/22 1138 --    Monitor Lying Left arm       Pain Score       09/22/22 1250       10 - Worst Possible Pain           Vitals:    09/22/22 0958 09/22/22 1138   BP: 145/82 122/61   Pulse: 95 71   Patient Position - Orthostatic VS:  Lying         Visual Acuity      ED Medications  Medications   sodium chloride 0 9 % bolus 1,000 mL (0 mL Intravenous Stopped 9/22/22 1253)   ondansetron (ZOFRAN) injection 4 mg (4 mg Intravenous Given 9/22/22 1033)   morphine injection 4 mg (4 mg Intravenous Given 9/22/22 1250)   iohexol (OMNIPAQUE) 350 MG/ML injection (SINGLE-DOSE) 100 mL (100 mL Intravenous Given 9/22/22 1259)       Diagnostic Studies  Results Reviewed     Procedure Component Value Units Date/Time    hCG, quantitative [[id number]]  (Normal) Collected: 09/22/22 1033    Lab Status: Final result Specimen: Blood from Arm, Right Updated: 09/22/22 1158     HCG, Quant <3 mIU/mL     Narrative:       Expected Ranges:     Approximate               Approximate HCG  Gestation age          Concentration ( mIU/mL)  _____________          ______________________   [facility]                      HCG values  0 2-1                       5-50  1-2                           2-3                         100-5000  3-4                         500-43102  4-5                         1000-15020  5-6                         [id number]  6-8                         [id number]  8-12                        [id number]      CBC and differential [[id number]]  (Abnormal) Collected: 09/22/22 1033    Lab Status: Final result Specimen: Blood from Arm, Right Updated: 09/22/22 1109     WBC 12 65 Thousand/uL      RBC 4 92 Million/uL      Hemoglobin 12 4 g/dL      Hematocrit 39 7 %      MCV 81 fL      MCH 25 2 pg      MCHC 31 2 g/dL      RDW 14 4 %      MPV 9 6 fL      Platelets 572 Thousands/uL      nRBC 0 /100 WBCs      Neutrophils Relative 86 %      Immat GRANS % 0 %      Lymphocytes Relative 10 %      Monocytes Relative 4 %      Eosinophils Relative 0 %      Basophils Relative 0 %      Neutrophils Absolute 10 74 Thousands/µL      Immature Grans Absolute 0 05 Thousand/uL      Lymphocytes Absolute 1 28 Thousands/µL      Monocytes Absolute 0 49 Thousand/µL      Eosinophils Absolute 0 05 Thousand/µL      Basophils Absolute 0 04 Thousands/µL     Lipase [[id number]]  (Normal) Collected: 09/22/22 1033    Lab Status: Final result Specimen: Blood from Arm, Right Updated: 09/22/22 1056     Lipase 51 u/L     CMP [[id number]]  (Abnormal) Collected: 09/22/22 1033    Lab Status: Final result Specimen: Blood from Arm, Right Updated: 09/22/22 1056     Sodium 136 mmol/L      Potassium 4 0 mmol/L      Chloride 103 mmol/L      CO2 23 mmol/L      ANION GAP 10 mmol/L      BUN 19 mg/dL      Creatinine 0 73 mg/dL      Glucose 122 mg/dL      Calcium 8 8 mg/dL      AST 30 U/L      ALT 33 U/L      Alkaline Phosphatase 108 U/L      Total Protein 7 8 g/dL      Albumin 4 4 g/dL      Total Bilirubin 0 34 mg/dL      eGFR 115 ml/min/1 73sq m     Narrative:      [place] guidelines for Chronic Kidney Disease (CKD):     Stage 1 with normal or high GFR (GFR > 90 mL/min/1 73 square meters)    Stage 2 Mild CKD (GFR = 60-89 mL/min/1 73 square meters)    Stage 3A Moderate CKD (GFR = 45-59 mL/min/1 73 square meters)    Stage 3B Moderate CKD (GFR = 30-44 mL/min/1 73 square meters)    Stage 4 Severe CKD (GFR = 15-29 mL/min/1 73 square meters)    Stage 5 End Stage CKD (GFR <15 mL/min/1 73 square meters)  Note: GFR calculation is accurate only with a steady state creatinine    UA w Reflex to Microscopic w Reflex to Culture [[id number]]     Lab Status: No result Specimen: Urine                  CT abdomen pelvis with contrast   Final Result by María Haines DO (09/22 1409)   Short segment of circumferentially thick walled distal sigmoid colon in the right central pelvis  Consider short segment colitis versus peristalsis  No similar abnormality was seen in June  This makes a fixed stricture less likely  Tiny 1 2 cm inflamed lobule of fat in the right pelvis adjacent to the abnormally narrowed distal sigmoid segment, just posterior to the otherwise normal right ovary, consider Epiploic appendicitis or a lobule of edematous fat from adjacent colitis  Moderate volumes of formed stool above this narrowed distal sigmoid segment and in the rectum (which also demonstrates some mild circumferential rectal wall thickening suggesting possible stercoral proctitis?)  Recommend GI consultation                  I personally communicated the impression to Veta Hamman on 9/22/2022 via Tiger text at 2:08 PM                Workstation performed: [id number]         X-ray chest 1 view portable   Final Result by Cooper Stein MD (09/22 1038)      No acute cardiopulmonary disease  Workstation performed: [id number]                    Procedures  Procedures         ED Course                               SBIRT 20yo+    Flowsheet Row Most Recent Value   SBIRT (25 yo +)    In order to provide better care to our patients, we are screening all of our patients for alcohol and drug use  Would it be okay to ask you these screening questions? Yes Filed at: 09/22/2022 1510   Initial Alcohol Screen: US AUDIT-C     1  How often do you have a drink containing alcohol? 0 Filed at: 09/22/2022 0959   2  How many drinks containing alcohol do you have on a typical day you are drinking? 0 Filed at: 09/22/2022 0959   3b  FEMALE Any Age, or MALE 65+: How often do you have 4 or more drinks on one occassion? 0 Filed at: 09/22/2022 0959   Audit-C Score 0 Filed at: 09/22/2022 9150   AILYN: How many times in the past year have you    Used an illegal drug or used a prescription medication for non-medical reasons? Never Filed at: 09/22/2022 6952                    MDM  Number of Diagnoses or Management Options  Diagnosis management comments: 24-year-old female presenting with abdominal pain, vomiting, diarrhea for the past 3 days  No alleviating factors  Movement makes the pain worse  Basic labs plus lipase, controls patient's symptoms with Zofran and morphine  Will wait for labs to come back to determine if imaging is warranted            Disposition  Final diagnoses:   Colitis     Time reflects when diagnosis was documented in both MDM as applicable and the Disposition within this note     Time User Action Codes Description Comment    9/22/2022  2:15 PM Ermelinda Joyner Add [K52 9] Colitis       ED Disposition     ED Disposition   Discharge    Condition   Stable    Date/Time   Thu Sep 22, 2022 2:13 PM    Comment   Bay Martínez discharge to home/self care  Follow-up Information     Follow up With Specialties Details Why Contact Info Additional [address] [facility] Emergency Department Emergency Medicine Go to  If symptoms worsen [address] [address]  [address] [facility] Emergency Department    [facility] Schedule an appointment as soon as possible for a visit  If symptoms worsen [address] [address] [facility] [id number] [address] [facility], [address] [place] [address]     [phone]          Discharge Medication List as of 9/22/2022  2:25 PM      START taking these medications    Details   ciprofloxacin (CIPRO) 500 mg tablet Take 1 tablet (500 mg total) by mouth every 12 (twelve) hours for 7 days, Starting Thu 9/22/2022, Until u 9/29/2022, Normal      metroNIDAZOLE (FLAGYL) 500 mg tablet Take 1 tablet (500 mg total) by mouth every 12 (twelve) hours for 7 days, Starting Thu 9/22/2022, Until Thu 9/29/2022, Normal      !! ondansetron (Zofran ODT) 4 mg disintegrating tablet Take 1 tablet (4 mg total) by mouth every 6 (six) hours as needed for nausea or vomiting, Starting Thu 9/22/2022, Normal       !! - Potential duplicate medications found  Please discuss with provider        CONTINUE these medications which have NOT CHANGED    Details   albuterol (PROVENTIL HFA,VENTOLIN HFA) 90 mcg/act inhaler Inhale 2 puffs every 4 (four) hours as needed for wheezing, Starting Fri 7/15/2022, Normal      ergocalciferol (VITAMIN D2) 50,000 units TAKE ONE CAPSULE BY MOUTH ONE TIME PER WEEK, Normal      famotidine (PEPCID) 20 mg tablet Take 1 tablet (20 mg total) by mouth 2 (two) times a day, Starting Thu 7/18/2019, Normal      fluticasone (FLONASE) 50 mcg/act nasal spray SPRAY 1 SPRAY INTO EACH NOSTRIL EVERY DAY, Normal      gabapentin (NEURONTIN) 600 MG tablet Take 1 tablet (600 mg total) by mouth every evening, Starting Fri 4/2/2021, Normal      hydrOXYzine HCL (ATARAX) 25 mg tablet TAKE 1 TABLET (25 MG TOTAL) BY MOUTH EVERY 6 (SIX) HOURS AS NEEDED FOR ITCHING OR ANXIETY, Starting Mon 6/7/2021, Normal      !! ondansetron (ZOFRAN-ODT) 4 mg disintegrating tablet Take 1 tablet (4 mg total) by mouth every 6 (six) hours as needed for nausea or vomiting, Starting Mon 7/11/2022, Normal      venlafaxine (EFFEXOR-XR) 37 5 mg 24 hr capsule Take 37 5 mg by mouth daily, Starting Sat 9/10/2022, Historical Med      ziprasidone (GEODON) 20 mg capsule Take 20 mg by mouth daily at bedtime, Starting Sat 2/19/2022, Historical Med       !! - Potential duplicate medications found  Please discuss with provider                PDMP Review     None          ED Provider  Electronically Signed by           Marbella Esteves PA-C  09/22/22 5515

## 2022-09-23 NOTE — ED ATTENDING ATTESTATION
I was the attending physician on duty at the time the patient visited the emergency department  The patient was evaluated and dispositioned by the APC  I was personally available for consultation  I am administratively signing the chart after the fact      Radha Abdi MD

## 2022-09-27 ENCOUNTER — PROCEDURE VISIT (OUTPATIENT)
Dept: OBGYN CLINIC | Facility: CLINIC | Age: 22
End: 2022-09-27

## 2022-09-27 VITALS
BODY MASS INDEX: 37.79 KG/M2 | HEART RATE: 72 BPM | HEIGHT: 58 IN | WEIGHT: 180 LBS | SYSTOLIC BLOOD PRESSURE: 110 MMHG | DIASTOLIC BLOOD PRESSURE: 78 MMHG

## 2022-09-27 DIAGNOSIS — Z30.017 INSERTION OF NEXPLANON: ICD-10-CM

## 2022-09-27 DIAGNOSIS — Z30.09 UNWANTED FERTILITY: Primary | ICD-10-CM

## 2022-09-27 DIAGNOSIS — N92.4 EXCESSIVE BLEEDING IN PREMENOPAUSAL PERIOD: ICD-10-CM

## 2022-09-27 LAB — SL AMB POCT URINE HCG: NEGATIVE

## 2022-09-27 PROCEDURE — 81025 URINE PREGNANCY TEST: CPT | Performed by: NURSE PRACTITIONER

## 2022-09-27 PROCEDURE — 11981 INSERTION DRUG DLVR IMPLANT: CPT | Performed by: NURSE PRACTITIONER

## 2022-09-27 NOTE — PROGRESS NOTES
Universal Protocol:  Consent: Verbal consent obtained  Written consent obtained  Risks and benefits: risks, benefits and alternatives were discussed  Consent given by: patient  Time out: Immediately prior to procedure a "time out" was called to verify the correct patient, procedure, equipment, support staff and site/side marked as required  Patient understanding: patient states understanding of the procedure being performed  Patient consent: the patient's understanding of the procedure matches consent given  Procedure consent: procedure consent matches procedure scheduled  Required items: required blood products, implants, devices, and special equipment available  Patient identity confirmed: verbally with patient    Remove and insert drug implant    Date/Time: 9/27/2022 1:10 PM  Performed by: KEVIN Cruz  Authorized by: KEVIN Cruz     Indication:     Indication: Insertion of non-biodegradable drug delivery implant    Pre-procedure:     Pre-procedure timeout performed: yes      Prepped with: alcohol 70% and povidone-iodine      Local anesthetic:  Lidocaine without epinephrine    The site was cleaned and prepped in a sterile fashion: yes    Procedure:     Procedure:   Insertion    Left/right:  Left    Preloaded contraceptive capsule trocar was placed subdermally: yes      Visualization of implant was obtained: yes      Contraceptive capsule was inserted and trocar removed: yes      Visualization of notch in stylet and palpation of device: yes      Palpation confirms placement by provider and patient: yes      Site was closed with steri-strips and pressure bandage applied: yes

## 2022-09-27 NOTE — PATIENT INSTRUCTIONS
Thank you for your confidence in our team    We appreciate you and welcome your feedback  If you receive a survey from us, please take a few moments to let us know how we are doing     Sincerely,  KEVIN Nixon

## 2022-10-11 PROBLEM — R05.9 COUGH: Status: RESOLVED | Noted: 2022-07-11 | Resolved: 2022-10-11

## 2022-11-03 DIAGNOSIS — J06.9 VIRAL URI WITH COUGH: ICD-10-CM

## 2022-11-04 RX ORDER — FLUTICASONE PROPIONATE 50 MCG
SPRAY, SUSPENSION (ML) NASAL
Qty: 24 ML | Refills: 2 | Status: SHIPPED | OUTPATIENT
Start: 2022-11-04

## 2022-11-25 ENCOUNTER — OFFICE VISIT (OUTPATIENT)
Dept: URGENT CARE | Age: 22
End: 2022-11-25

## 2022-11-25 VITALS
HEART RATE: 80 BPM | TEMPERATURE: 97.6 F | OXYGEN SATURATION: 96 % | SYSTOLIC BLOOD PRESSURE: 112 MMHG | RESPIRATION RATE: 18 BRPM | DIASTOLIC BLOOD PRESSURE: 74 MMHG

## 2022-11-25 DIAGNOSIS — J06.9 ACUTE URI: Primary | ICD-10-CM

## 2022-11-25 NOTE — PROGRESS NOTES
[facility]        NAME: Rodolfo Segovia is a 23 y o  female  : 2000    MRN: [id number]  DATE: 2022  TIME: 2:26 PM    Assessment and Plan   Acute URI [J06 9]  1  Acute URI          Patient presents with complaints of cough, congestion , clogged ear feeling since yesterday  Has not attempted any medication  Home COVID negative  No fevers  No wheezing, No SOB  Assessment notes PND  Congestion  Clear breath sounds  No adenopathy  Discussed symptom management  Patient Instructions       Follow up with PCP as needed    Chief Complaint     Chief Complaint   Patient presents with   • Headache     HA, left ear pain, congestion, cough, sore throat  Sx since yesterday  COVID and flu vaccinated  Denies recent sick exposures  Home covid test negative yesterday  History of Present Illness       Patient presents with complaints of cough, congestion , clogged ear feeling since yesterday  Has not attempted any medication  Home COVID negative  No fevers  No wheezing, No SOB  Assessment notes PND  Congestion  Clear breath sounds  No adenopathy  Discussed symptom management  Review of Systems   Review of Systems   Constitutional: Negative for chills and fever  HENT: Positive for congestion, ear pain and postnasal drip  Negative for sinus pain and sore throat  Eyes: Negative for pain and itching  Respiratory: Positive for cough  Negative for shortness of breath and wheezing  Cardiovascular: Negative for chest pain and palpitations  Gastrointestinal: Negative for abdominal pain, constipation, diarrhea, nausea and vomiting  Genitourinary: Negative for difficulty urinating and hematuria  Musculoskeletal: Negative for arthralgias and myalgias  Skin: Negative for rash  Neurological: Negative for dizziness, light-headedness and headaches  Psychiatric/Behavioral: Negative for agitation and sleep disturbance  The patient is not nervous/anxious            Current Medications       Current Outpatient Medications:   •  albuterol (PROVENTIL HFA,VENTOLIN HFA) 90 mcg/act inhaler, Inhale 2 puffs every 4 (four) hours as needed for wheezing, Disp: 18 g, Rfl: 0  •  ergocalciferol (VITAMIN D2) 50,000 units, TAKE ONE CAPSULE BY MOUTH ONE TIME PER WEEK, Disp: 4 capsule, Rfl: 0  •  famotidine (PEPCID) 20 mg tablet, Take 1 tablet (20 mg total) by mouth 2 (two) times a day, Disp: 28 tablet, Rfl: 1  •  fluticasone (FLONASE) 50 mcg/act nasal spray, SPRAY 1 SPRAY INTO EACH NOSTRIL EVERY DAY, Disp: 24 mL, Rfl: 2  •  gabapentin (NEURONTIN) 600 MG tablet, Take 1 tablet (600 mg total) by mouth every evening, Disp: 30 tablet, Rfl: 1  •  hydrOXYzine HCL (ATARAX) 25 mg tablet, TAKE 1 TABLET (25 MG TOTAL) BY MOUTH EVERY 6 (SIX) HOURS AS NEEDED FOR ITCHING OR ANXIETY, Disp: 30 tablet, Rfl: 1  •  ondansetron (Zofran ODT) 4 mg disintegrating tablet, Take 1 tablet (4 mg total) by mouth every 6 (six) hours as needed for nausea or vomiting, Disp: 20 tablet, Rfl: 0  •  ondansetron (ZOFRAN-ODT) 4 mg disintegrating tablet, Take 1 tablet (4 mg total) by mouth every 6 (six) hours as needed for nausea or vomiting, Disp: 30 tablet, Rfl: 0  •  venlafaxine (EFFEXOR-XR) 37 5 mg 24 hr capsule, Take 37 5 mg by mouth daily, Disp: , Rfl:   •  ziprasidone (GEODON) 20 mg capsule, Take 20 mg by mouth daily at bedtime, Disp: , Rfl:     Current Allergies     Allergies as of 11/25/2022   • (No Known Allergies)            The following portions of the patient's history were reviewed and updated as appropriate: allergies, current medications, past family history, past medical history, past social history, past surgical history and problem list      Past Medical History:   Diagnosis Date   • ADHD    • Anxiety    • Asthma    • Autism    • Bipolar disorder ([facility] [place] [address] )    • Depression    • GERD (gastroesophageal reflux disease)        Past Surgical History:   Procedure Laterality Date   • CHOLANGIOGRAM N/A 12/01/2018    Procedure: CHOLANGIOGRAM;  Surgeon: Leyla Hillman MD;  Location:  MAIN OR;  Service: General   • CHOLECYSTECTOMY LAPAROSCOPIC N/A 12/01/2018    Procedure: CHOLECYSTECTOMY LAPAROSCOPIC;  Surgeon: Leyla Hillman MD;  Location: [facility];  Service: General   • EYE MUSCLE SURGERY Bilateral 2006   • VA ESOPHAGOGASTRODUODENOSCOPY TRANSORAL DIAGNOSTIC N/A 11/27/2018    Procedure: ESOPHAGOGASTRODUODENOSCOPY (EGD) with bx;  Surgeon: Sandy Puri MD;  Location: [facility]; Service: General       Family History   Problem Relation Age of Onset   • Breast cancer Mother    • Heart attack Mother    • Depression Mother    • Mental illness Mother    • Coronary artery disease Mother    • Hypertension Mother    • Diabetes Mother    • Hyperlipidemia Mother    • Pneumonia Brother    • Hypertension Maternal Grandmother    • Stroke Maternal Grandmother    • Diabetes Maternal Grandmother    • Glaucoma Maternal Grandmother    • Kidney disease Maternal Grandmother    • Sarcoidosis Maternal Grandmother    • Diabetes Maternal Grandfather    • Hypertension Maternal Grandfather    • Stroke Maternal Grandfather    • Glaucoma Maternal Grandfather    • Heart attack Maternal Grandfather    • Colon cancer Neg Hx    • Ovarian cancer Neg Hx          Medications have been verified  Objective   /74   Pulse 80   Temp 97 6 °F (36 4 °C) (Tympanic)   Resp 18   SpO2 96%   No LMP recorded  Physical Exam     Physical Exam  Vitals reviewed  Constitutional:       General: She is not in acute distress  Appearance: Normal appearance  She is not ill-appearing  HENT:      Head: Normocephalic and atraumatic  Nose: Congestion present  Mouth/Throat:      Pharynx: No posterior oropharyngeal erythema  Eyes:      Extraocular Movements: Extraocular movements intact  Conjunctiva/sclera: Conjunctivae normal    Cardiovascular:      Rate and Rhythm: Normal rate and regular rhythm  Pulses: Normal pulses        Heart sounds: Normal heart sounds  Pulmonary:      Effort: Pulmonary effort is normal       Breath sounds: Normal breath sounds  Lymphadenopathy:      Cervical: No cervical adenopathy  Skin:     General: Skin is warm  Neurological:      General: No focal deficit present  Mental Status: She is alert     Psychiatric:         Mood and Affect: Mood normal          Behavior: Behavior normal          Judgment: Judgment normal

## 2022-12-14 ENCOUNTER — HOSPITAL ENCOUNTER (EMERGENCY)
Facility: HOSPITAL | Age: 22
Discharge: HOME | End: 2022-12-14
Attending: EMERGENCY MEDICINE

## 2022-12-14 VITALS
SYSTOLIC BLOOD PRESSURE: 144 MMHG | HEART RATE: 94 BPM | DIASTOLIC BLOOD PRESSURE: 83 MMHG | OXYGEN SATURATION: 100 % | RESPIRATION RATE: 18 BRPM | TEMPERATURE: 98.4 F

## 2022-12-14 DIAGNOSIS — R42 VERTIGO: ICD-10-CM

## 2022-12-14 DIAGNOSIS — Z20.822 ENCOUNTER FOR LABORATORY TESTING FOR COVID-19 VIRUS: ICD-10-CM

## 2022-12-14 DIAGNOSIS — J40 BRONCHITIS: ICD-10-CM

## 2022-12-14 DIAGNOSIS — J32.9 SINUSITIS: Primary | ICD-10-CM

## 2022-12-14 RX ORDER — AZITHROMYCIN 200 MG/5ML
POWDER, FOR SUSPENSION ORAL
Qty: 61.2 ML | Refills: 0 | Status: SHIPPED | OUTPATIENT
Start: 2022-12-14 | End: 2022-12-19

## 2022-12-14 RX ORDER — BENZONATATE 100 MG/1
100 CAPSULE ORAL 3 TIMES DAILY PRN
Qty: 20 CAPSULE | Refills: 0 | Status: SHIPPED | OUTPATIENT
Start: 2022-12-14

## 2022-12-14 RX ORDER — ALBUTEROL SULFATE 90 UG/1
2 AEROSOL, METERED RESPIRATORY (INHALATION) EVERY 6 HOURS PRN
Qty: 8.5 G | Refills: 0 | Status: SHIPPED | OUTPATIENT
Start: 2022-12-14

## 2022-12-14 RX ORDER — MECLIZINE HCL 12.5 MG/1
12.5 TABLET ORAL EVERY 12 HOURS PRN
Qty: 20 TABLET | Refills: 0 | Status: SHIPPED | OUTPATIENT
Start: 2022-12-14

## 2022-12-14 NOTE — DISCHARGE INSTRUCTIONS

## 2022-12-27 DIAGNOSIS — J06.9 VIRAL URI WITH COUGH: ICD-10-CM

## 2022-12-27 RX ORDER — FLUTICASONE PROPIONATE 50 MCG
SPRAY, SUSPENSION (ML) NASAL
Qty: 24 ML | Refills: 2 | Status: SHIPPED | OUTPATIENT
Start: 2022-12-27

## 2023-01-24 ENCOUNTER — APPOINTMENT (OUTPATIENT)
Dept: LAB | Age: 23
End: 2023-01-24

## 2023-01-24 DIAGNOSIS — Z79.899 ENCOUNTER FOR LONG-TERM (CURRENT) USE OF OTHER MEDICATIONS: ICD-10-CM

## 2023-01-24 LAB
25(OH)D3 SERPL-MCNC: 16.6 NG/ML (ref 30–100)
ALBUMIN SERPL BCP-MCNC: 3.4 G/DL (ref 3.5–5)
ALP SERPL-CCNC: 100 U/L (ref 46–116)
ALT SERPL W P-5'-P-CCNC: 21 U/L (ref 12–78)
ANION GAP SERPL CALCULATED.3IONS-SCNC: 4 MMOL/L (ref 4–13)
AST SERPL W P-5'-P-CCNC: 18 U/L (ref 5–45)
BILIRUB SERPL-MCNC: 0.23 MG/DL (ref 0.2–1)
BUN SERPL-MCNC: 12 MG/DL (ref 5–25)
CALCIUM ALBUM COR SERPL-MCNC: 9.3 MG/DL (ref 8.3–10.1)
CALCIUM SERPL-MCNC: 8.8 MG/DL (ref 8.3–10.1)
CHLORIDE SERPL-SCNC: 110 MMOL/L (ref 96–108)
CHOLEST SERPL-MCNC: 190 MG/DL
CO2 SERPL-SCNC: 24 MMOL/L (ref 21–32)
CREAT SERPL-MCNC: 0.89 MG/DL (ref 0.6–1.3)
GFR SERPL CREATININE-BSD FRML MDRD: 91 ML/MIN/1.73SQ M
GLUCOSE P FAST SERPL-MCNC: 95 MG/DL (ref 65–99)
HDLC SERPL-MCNC: 54 MG/DL
LDLC SERPL CALC-MCNC: 129 MG/DL (ref 0–100)
NONHDLC SERPL-MCNC: 136 MG/DL
POTASSIUM SERPL-SCNC: 4.3 MMOL/L (ref 3.5–5.3)
PROT SERPL-MCNC: 6.8 G/DL (ref 6.4–8.4)
SODIUM SERPL-SCNC: 138 MMOL/L (ref 135–147)
TRIGL SERPL-MCNC: 37 MG/DL
TSH SERPL DL<=0.05 MIU/L-ACNC: 1.33 UIU/ML (ref 0.45–4.5)

## 2023-01-25 LAB
EST. AVERAGE GLUCOSE BLD GHB EST-MCNC: 105 MG/DL
HBA1C MFR BLD: 5.3 %

## 2023-02-15 ENCOUNTER — OFFICE VISIT (OUTPATIENT)
Dept: URGENT CARE | Age: 23
End: 2023-02-15

## 2023-02-15 VITALS
OXYGEN SATURATION: 97 % | SYSTOLIC BLOOD PRESSURE: 131 MMHG | HEART RATE: 98 BPM | TEMPERATURE: 99.3 F | RESPIRATION RATE: 18 BRPM | DIASTOLIC BLOOD PRESSURE: 76 MMHG

## 2023-02-15 DIAGNOSIS — R05.1 ACUTE COUGH: Primary | ICD-10-CM

## 2023-02-15 RX ORDER — BENZONATATE 100 MG/1
100 CAPSULE ORAL 3 TIMES DAILY PRN
Qty: 20 CAPSULE | Refills: 0 | Status: SHIPPED | OUTPATIENT
Start: 2023-02-15

## 2023-02-15 NOTE — PROGRESS NOTES
[facility]        NAME: Olvin Puri is a 23 y o  female  : 2000    MRN: [id number]  DATE: February 15, 2023  TIME: 11:34 AM      Assessment and Plan     Acute cough [R05 1]  1  Acute cough  benzonatate (TESSALON PERLES) 100 mg capsule    Covid19 and INFLUENZA A/B PCR            Patient Instructions     Use benzonatate as directed as needed for cough  Hydration and rest   Acetaminophen and ibuprofen for pain relief and fever reduction  COVID/influenza testing  Use the [facility] to obtain lab results  PCP follow up in 3-5 days  Go to an emergency department if difficulty breathing occurs or if symptoms worsen  Recommended supplements for potential COVID-19 is the following: Vitamin D3 2000 IU  daily ,  Vitamin C 1g  every 12 hours , Multivitamin Daily   Chief Complaint     Chief Complaint   Patient presents with   • Headache     HA, cough sx since this morning  Nothing OTC  History of Present Illness     Patient is a 78-year-old female who presents with cough and headache that started this morning  Denies fever  Denies sore throat  Denies vomiting or diarrhea  Review of Systems     Review of Systems   Constitutional: Negative for chills and fever  HENT: Negative for congestion, rhinorrhea and sore throat  Respiratory: Positive for cough  Gastrointestinal: Negative for diarrhea, nausea and vomiting  Neurological: Positive for headaches  All other systems reviewed and are negative          Current Medications       Current Outpatient Medications:   •  albuterol (ProAir HFA) 90 mcg/act inhaler, Inhale 2 puffs every 6 (six) hours as needed for wheezing, Disp: 8 5 g, Rfl: 0  •  albuterol (PROVENTIL HFA,VENTOLIN HFA) 90 mcg/act inhaler, Inhale 2 puffs every 4 (four) hours as needed for wheezing, Disp: 18 g, Rfl: 0  •  benzonatate (TESSALON PERLES) 100 mg capsule, Take 1 capsule (100 mg total) by mouth 3 (three) times a day as needed for cough, Disp: 20 capsule, Rfl: 0  •  ergocalciferol (VITAMIN D2) 50,000 units, TAKE ONE CAPSULE BY MOUTH ONE TIME PER WEEK, Disp: 4 capsule, Rfl: 0  •  famotidine (PEPCID) 20 mg tablet, Take 1 tablet (20 mg total) by mouth 2 (two) times a day, Disp: 28 tablet, Rfl: 1  •  fluticasone (FLONASE) 50 mcg/act nasal spray, SPRAY 1 SPRAY INTO EACH NOSTRIL EVERY DAY, Disp: 24 mL, Rfl: 2  •  gabapentin (NEURONTIN) 600 MG tablet, Take 1 tablet (600 mg total) by mouth every evening, Disp: 30 tablet, Rfl: 1  •  hydrOXYzine HCL (ATARAX) 25 mg tablet, TAKE 1 TABLET (25 MG TOTAL) BY MOUTH EVERY 6 (SIX) HOURS AS NEEDED FOR ITCHING OR ANXIETY, Disp: 30 tablet, Rfl: 1  •  meclizine (ANTIVERT) 12 5 MG tablet, Take 1 tablet (12 5 mg total) by mouth every 12 (twelve) hours as needed for dizziness, Disp: 20 tablet, Rfl: 0  •  ondansetron (Zofran ODT) 4 mg disintegrating tablet, Take 1 tablet (4 mg total) by mouth every 6 (six) hours as needed for nausea or vomiting, Disp: 20 tablet, Rfl: 0  •  ondansetron (ZOFRAN-ODT) 4 mg disintegrating tablet, Take 1 tablet (4 mg total) by mouth every 6 (six) hours as needed for nausea or vomiting, Disp: 30 tablet, Rfl: 0  •  venlafaxine (EFFEXOR-XR) 37 5 mg 24 hr capsule, Take 37 5 mg by mouth daily, Disp: , Rfl:   •  ziprasidone (GEODON) 20 mg capsule, Take 20 mg by mouth daily at bedtime, Disp: , Rfl:     Current Allergies     Allergies as of 02/15/2023   • (No Known Allergies)              The following portions of the patient's history were reviewed and updated as appropriate: allergies, current medications, past family history, past medical history, past social history, past surgical history and problem list      Past Medical History:   Diagnosis Date   • ADHD    • Anxiety    • Asthma    • Autism    • Bipolar disorder ([facility] [address] )    • Depression    • GERD (gastroesophageal reflux disease)        Past Surgical History:   Procedure Laterality Date   • CHOLANGIOGRAM N/A 12/01/2018    Procedure: CHOLANGIOGRAM;  Surgeon: Yisel Mckee MD;  Location:  MAIN OR;  Service: General   • CHOLECYSTECTOMY LAPAROSCOPIC N/A 12/01/2018    Procedure: CHOLECYSTECTOMY LAPAROSCOPIC;  Surgeon: Yisel Mckee MD;  Location:  MAIN OR;  Service: General   • EYE MUSCLE SURGERY Bilateral 2006   • ID ESOPHAGOGASTRODUODENOSCOPY TRANSORAL DIAGNOSTIC N/A 11/27/2018    Procedure: ESOPHAGOGASTRODUODENOSCOPY (EGD) with bx;  Surgeon: Jesus Garrison MD;  [facility]; Service: General       Family History   Problem Relation Age of Onset   • Breast cancer Mother    • Heart attack Mother    • Depression Mother    • Mental illness Mother    • Coronary artery disease Mother    • Hypertension Mother    • Diabetes Mother    • Hyperlipidemia Mother    • Pneumonia Brother    • Hypertension Maternal Grandmother    • Stroke Maternal Grandmother    • Diabetes Maternal Grandmother    • Glaucoma Maternal Grandmother    • Kidney disease Maternal Grandmother    • Sarcoidosis Maternal Grandmother    • Diabetes Maternal Grandfather    • Hypertension Maternal Grandfather    • Stroke Maternal Grandfather    • Glaucoma Maternal Grandfather    • Heart attack Maternal Grandfather    • Colon cancer Neg Hx    • Ovarian cancer Neg Hx          Medications have been verified  Objective     /76   Pulse 98   Temp 99 3 °F (37 4 °C) (Tympanic)   Resp 18   SpO2 97%   No LMP recorded  Physical Exam     Physical Exam  Vitals and nursing note reviewed  Constitutional:       General: She is awake  She is not in acute distress  Appearance: Normal appearance  She is not ill-appearing, toxic-appearing or diaphoretic  HENT:      Right Ear: Tympanic membrane, ear canal and external ear normal       Left Ear: Tympanic membrane, ear canal and external ear normal       Nose: Nose normal       Mouth/Throat:      Lips: Pink  Mouth: Mucous membranes are moist       Pharynx: Oropharynx is clear  Uvula midline   No oropharyngeal exudate or posterior oropharyngeal erythema  Cardiovascular:      Rate and Rhythm: Normal rate  Pulses: Normal pulses  Heart sounds: Normal heart sounds, S1 normal and S2 normal    Pulmonary:      Effort: Pulmonary effort is normal       Breath sounds: Normal breath sounds and air entry  No stridor, decreased air movement or transmitted upper airway sounds  No decreased breath sounds, wheezing, rhonchi or rales  Skin:     General: Skin is warm  Capillary Refill: Capillary refill takes less than 2 seconds  Neurological:      Mental Status: She is alert  Psychiatric:         Mood and Affect: Mood normal          Behavior: Behavior normal          Thought Content:  Thought content normal          Judgment: Judgment normal

## 2023-02-15 NOTE — PATIENT INSTRUCTIONS
Use benzonatate as directed as needed for cough  Hydration and rest   Acetaminophen and ibuprofen for pain relief and fever reduction  COVID/influenza testing  Use the [facility] to obtain lab results  PCP follow up in 3-5 days  Go to an emergency department if difficulty breathing occurs or if symptoms worsen      Recommended supplements for potential COVID-19 is the following: Vitamin D3 2000 IU  daily ,  Vitamin C 1g  every 12 hours , Multivitamin Daily

## 2023-02-19 DIAGNOSIS — J06.9 VIRAL URI WITH COUGH: ICD-10-CM

## 2023-02-20 RX ORDER — FLUTICASONE PROPIONATE 50 MCG
SPRAY, SUSPENSION (ML) NASAL
Qty: 24 ML | Refills: 0 | Status: SHIPPED | OUTPATIENT
Start: 2023-02-20

## 2023-02-27 ENCOUNTER — HOSPITAL ENCOUNTER (EMERGENCY)
Facility: HOSPITAL | Age: 23
Discharge: HOME | End: 2023-02-27
Attending: EMERGENCY MEDICINE

## 2023-02-27 VITALS
WEIGHT: 215.17 LBS | BODY MASS INDEX: 44.97 KG/M2 | TEMPERATURE: 99 F | DIASTOLIC BLOOD PRESSURE: 72 MMHG | OXYGEN SATURATION: 100 % | HEART RATE: 84 BPM | RESPIRATION RATE: 18 BRPM | SYSTOLIC BLOOD PRESSURE: 146 MMHG

## 2023-02-27 DIAGNOSIS — N93.8 DYSFUNCTIONAL UTERINE BLEEDING: Primary | ICD-10-CM

## 2023-02-27 LAB
BACTERIA UR QL AUTO: ABNORMAL /HPF
BASOPHILS # BLD AUTO: 0.05 THOUSANDS/ÂΜL (ref 0–0.1)
BASOPHILS NFR BLD AUTO: 1 % (ref 0–1)
BILIRUB UR QL STRIP: NEGATIVE
CLARITY UR: ABNORMAL
COLOR UR: ABNORMAL
EOSINOPHIL # BLD AUTO: 0.16 THOUSAND/ÂΜL (ref 0–0.61)
EOSINOPHIL NFR BLD AUTO: 2 % (ref 0–6)
ERYTHROCYTE [DISTWIDTH] IN BLOOD BY AUTOMATED COUNT: 13.7 % (ref 11.6–15.1)
EXT PREGNANCY TEST URINE: NEGATIVE
EXT. CONTROL: NORMAL
GLUCOSE UR STRIP-MCNC: NEGATIVE MG/DL
HCT VFR BLD AUTO: 38.6 % (ref 34.8–46.1)
HGB BLD-MCNC: 11.8 G/DL (ref 11.5–15.4)
HGB UR QL STRIP.AUTO: 10
IMM GRANULOCYTES # BLD AUTO: 0.02 THOUSAND/UL (ref 0–0.2)
IMM GRANULOCYTES NFR BLD AUTO: 0 % (ref 0–2)
KETONES UR STRIP-MCNC: ABNORMAL MG/DL
LEUKOCYTE ESTERASE UR QL STRIP: 25
LYMPHOCYTES # BLD AUTO: 2.16 THOUSANDS/ÂΜL (ref 0.6–4.47)
LYMPHOCYTES NFR BLD AUTO: 26 % (ref 14–44)
MCH RBC QN AUTO: 25.7 PG (ref 26.8–34.3)
MCHC RBC AUTO-ENTMCNC: 30.6 G/DL (ref 31.4–37.4)
MCV RBC AUTO: 84 FL (ref 82–98)
MONOCYTES # BLD AUTO: 0.5 THOUSAND/ÂΜL (ref 0.17–1.22)
MONOCYTES NFR BLD AUTO: 6 % (ref 4–12)
NEUTROPHILS # BLD AUTO: 5.39 THOUSANDS/ÂΜL (ref 1.85–7.62)
NEUTS SEG NFR BLD AUTO: 65 % (ref 43–75)
NITRITE UR QL STRIP: NEGATIVE
NON-SQ EPI CELLS URNS QL MICRO: ABNORMAL /HPF
NRBC BLD AUTO-RTO: 0 /100 WBCS
OTHER STN SPEC: ABNORMAL
PH UR STRIP.AUTO: 6.5 [PH]
PLATELET # BLD AUTO: 329 THOUSANDS/UL (ref 149–390)
PMV BLD AUTO: 9.1 FL (ref 8.9–12.7)
PROT UR STRIP-MCNC: ABNORMAL MG/DL
RBC # BLD AUTO: 4.6 MILLION/UL (ref 3.81–5.12)
RBC #/AREA URNS AUTO: ABNORMAL /HPF
SP GR UR STRIP.AUTO: 1.01 (ref 1–1.04)
UROBILINOGEN UA: 1 MG/DL
WBC # BLD AUTO: 8.28 THOUSAND/UL (ref 4.31–10.16)
WBC #/AREA URNS AUTO: ABNORMAL /HPF

## 2023-02-27 RX ORDER — ACETAMINOPHEN 325 MG/1
975 TABLET ORAL ONCE
Status: COMPLETED | OUTPATIENT
Start: 2023-02-27 | End: 2023-02-27

## 2023-02-27 RX ORDER — KETOROLAC TROMETHAMINE 30 MG/ML
15 INJECTION, SOLUTION INTRAMUSCULAR; INTRAVENOUS ONCE
Status: COMPLETED | OUTPATIENT
Start: 2023-02-27 | End: 2023-02-27

## 2023-02-27 RX ADMIN — ACETAMINOPHEN 975 MG: 325 TABLET ORAL at 12:51

## 2023-02-27 RX ADMIN — KETOROLAC TROMETHAMINE 15 MG: 30 INJECTION, SOLUTION INTRAMUSCULAR; INTRAVENOUS at 12:52

## 2023-02-27 NOTE — DISCHARGE INSTRUCTIONS
As discussed, you can used tylenol and motrin at the same time to help treat your symptoms  600mg of motrin and 975mg  of tylenol can be taken every six hours  Please follow up with the provider who is managing your contraceptive device  If you have concerns for any reason, please return to the ED for a repeat evaluation

## 2023-02-27 NOTE — Clinical Note
Amanda Pool was seen and treated in our emergency department on 2/27/2023  Diagnosis:     Trini    She may return on this date: 03/01/2023         If you have any questions or concerns, please don't hesitate to call        Elsa Mars DO    ______________________________           _______________          _______________  Hospital Representative                              Date                                Time

## 2023-02-27 NOTE — ED PROVIDER NOTES
History  Chief Complaint   Patient presents with   • Vaginal Bleeding     Started yesterday  8-9 pads a day, having bad cramping  Patient is a 18-year-old female who presents for concerns of vaginal bleeding  Patient states that she has had a implantable contraceptive device for approximate 1 year  Has not had any periods since then  States that starting yesterday she began having some lower abdominal cramping, vaginal bleeding which was a mixture of dark blood and clots  She states since yesterday she is going through between 8-10 pads  No lightheadedness no chest pain no shortness of breath no fatigue  Denies any other vaginal discharge  Denies vaginal trauma  Denies recent intercourse  Denies dysuria or frequency  Prior to Admission Medications   Prescriptions Last Dose Informant Patient Reported? Taking?    albuterol (PROVENTIL HFA,VENTOLIN HFA) 90 mcg/act inhaler   No No   Sig: Inhale 2 puffs every 4 (four) hours as needed for wheezing   albuterol (ProAir HFA) 90 mcg/act inhaler   No No   Sig: Inhale 2 puffs every 6 (six) hours as needed for wheezing   benzonatate (TESSALON PERLES) 100 mg capsule   No No   Sig: Take 1 capsule (100 mg total) by mouth 3 (three) times a day as needed for cough   ergocalciferol (VITAMIN D2) 50,000 units   No No   Sig: TAKE ONE CAPSULE BY MOUTH ONE TIME PER WEEK   famotidine (PEPCID) 20 mg tablet   No No   Sig: Take 1 tablet (20 mg total) by mouth 2 (two) times a day   fluticasone (FLONASE) 50 mcg/act nasal spray   No No   Sig: SPRAY 1 SPRAY INTO EACH NOSTRIL EVERY DAY   gabapentin (NEURONTIN) 600 MG tablet   No No   Sig: Take 1 tablet (600 mg total) by mouth every evening   hydrOXYzine HCL (ATARAX) 25 mg tablet   No No   Sig: TAKE 1 TABLET (25 MG TOTAL) BY MOUTH EVERY 6 (SIX) HOURS AS NEEDED FOR ITCHING OR ANXIETY   meclizine (ANTIVERT) 12 5 MG tablet   No No   Sig: Take 1 tablet (12 5 mg total) by mouth every 12 (twelve) hours as needed for dizziness ondansetron (ZOFRAN-ODT) 4 mg disintegrating tablet   No No   Sig: Take 1 tablet (4 mg total) by mouth every 6 (six) hours as needed for nausea or vomiting   ondansetron (Zofran ODT) 4 mg disintegrating tablet   No No   Sig: Take 1 tablet (4 mg total) by mouth every 6 (six) hours as needed for nausea or vomiting   venlafaxine (EFFEXOR-XR) 37 5 mg 24 hr capsule   Yes No   Sig: Take 37 5 mg by mouth daily   ziprasidone (GEODON) 20 mg capsule   Yes No   Sig: Take 20 mg by mouth daily at bedtime      Facility-Administered Medications: None       Past Medical History:   Diagnosis Date   • ADHD    • Anxiety    • Asthma    • Autism    • Bipolar disorder ([facility] [address] )    • Depression    • GERD (gastroesophageal reflux disease)        Past Surgical History:   Procedure Laterality Date   • CHOLANGIOGRAM N/A 12/01/2018    Procedure: CHOLANGIOGRAM;  Surgeon: Viry Mishra MD;  Location: [place] [address];  Service: General   • CHOLECYSTECTOMY LAPAROSCOPIC N/A 12/01/2018    Procedure: CHOLECYSTECTOMY LAPAROSCOPIC;  Surgeon: Viry Mishra MD;  Location: [facility];  Service: General   • EYE MUSCLE SURGERY Bilateral 2006   • NM ESOPHAGOGASTRODUODENOSCOPY TRANSORAL DIAGNOSTIC N/A 11/27/2018    Procedure: ESOPHAGOGASTRODUODENOSCOPY (EGD) with bx;  Surgeon: Ashkan Hoover MD;  Location: [facility];   Service: General       Family History   Problem Relation Age of Onset   • Breast cancer Mother    • Heart attack Mother    • Depression Mother    • Mental illness Mother    • Coronary artery disease Mother    • Hypertension Mother    • Diabetes Mother    • Hyperlipidemia Mother    • Pneumonia Brother    • Hypertension Maternal Grandmother    • Stroke Maternal Grandmother    • Diabetes Maternal Grandmother    • Glaucoma Maternal Grandmother    • Kidney disease Maternal Grandmother    • Sarcoidosis Maternal Grandmother    • Diabetes Maternal Grandfather    • Hypertension Maternal Grandfather    • Stroke Maternal Grandfather    • Glaucoma Maternal Grandfather    • Heart attack Maternal Grandfather    • Colon cancer Neg Hx    • Ovarian cancer Neg Hx      I have reviewed and agree with the history as documented  E-Cigarette/Vaping   • E-Cigarette Use Never User      E-Cigarette/Vaping Substances     Social History     Tobacco Use   • Smoking status: Never   • Smokeless tobacco: Never   • Tobacco comments:     no passive smoke exposure   Vaping Use   • Vaping Use: Never used   Substance Use Topics   • Alcohol use: Never   • Drug use: Never       Review of Systems   Constitutional: Negative  Negative for chills and fever  HENT: Negative  Negative for rhinorrhea, sore throat, trouble swallowing and voice change  Eyes: Negative  Negative for pain and visual disturbance  Respiratory: Negative  Negative for cough, shortness of breath and wheezing  Cardiovascular: Negative  Negative for chest pain and palpitations  Gastrointestinal: Negative for abdominal pain, diarrhea, nausea and vomiting  Genitourinary: Positive for menstrual problem and vaginal bleeding  Negative for dysuria and frequency  Musculoskeletal: Negative  Negative for neck pain and neck stiffness  Skin: Negative  Negative for rash  Neurological: Negative  Negative for dizziness, speech difficulty, weakness, light-headedness and numbness  Physical Exam  Physical Exam  Vitals and nursing note reviewed  Constitutional:       General: She is not in acute distress  Appearance: She is well-developed  HENT:      Head: Normocephalic and atraumatic  Eyes:      Conjunctiva/sclera: Conjunctivae normal       Pupils: Pupils are equal, round, and reactive to light  Neck:      Trachea: No tracheal deviation  Cardiovascular:      Rate and Rhythm: Normal rate and regular rhythm  Pulmonary:      Effort: Pulmonary effort is normal  No respiratory distress  Breath sounds: Normal breath sounds  No wheezing or rales     Abdominal:      General: Bowel sounds are normal  There is no distension  Palpations: Abdomen is soft  Tenderness: There is no abdominal tenderness  There is no guarding or rebound  Genitourinary:     Comments: Patient declined  Musculoskeletal:         General: No tenderness or deformity  Normal range of motion  Cervical back: Normal range of motion and neck supple  Skin:     General: Skin is warm and dry  Capillary Refill: Capillary refill takes less than 2 seconds  Findings: No rash  Neurological:      Mental Status: She is alert and oriented to person, place, and time  Psychiatric:         Behavior: Behavior normal          Vital Signs  ED Triage Vitals [02/27/23 1159]   Temperature Pulse Respirations Blood Pressure SpO2   99 °F (37 2 °C) 84 18 146/72 100 %      Temp src Heart Rate Source Patient Position - Orthostatic VS BP Location FiO2 (%)   -- -- -- -- --      Pain Score       10 - Worst Possible Pain           Vitals:    02/27/23 1159   BP: 146/72   Pulse: 84         Visual Acuity      ED Medications  Medications   ketorolac (TORADOL) injection 15 mg (15 mg Intramuscular Given 2/27/23 1252)   acetaminophen (TYLENOL) tablet 975 mg (975 mg Oral Given 2/27/23 1251)       Diagnostic Studies  Results Reviewed     Procedure Component Value Units Date/Time    UA (URINE) with reflex to Scope [[id number]]  (Abnormal) Collected: 02/27/23 1239    Lab Status: Final result Specimen: Urine, Clean Catch Updated: 02/27/23 1307     Color, UA Gaby     Clarity, UA Cloudy     Specific Gravity, UA 1 015     pH, UA 6 5     Leukocytes, UA 25 0     Nitrite, UA Negative     Protein, UA 15 (Trace) mg/dl      Glucose, UA Negative mg/dl      Ketones, UA 5 (Trace) mg/dl      Bilirubin, UA Negative     Occult Blood, UA 10 0     UROBILINOGEN UA 1 0 mg/dL     Urine Microscopic [[id number]] Collected: 02/27/23 1239    Lab Status:  In process Specimen: Urine, Clean Catch Updated: 02/27/23 1305    Chlamydia/GC amplified DNA by PCR [[id number]] Collected: 02/27/23 1239    Lab Status: In process Specimen: Urine, Other Updated: 02/27/23 1254    CBC and differential [[id number]]  (Abnormal) Collected: 02/27/23 1243    Lab Status: Final result Specimen: Blood from Arm, Right Updated: 02/27/23 1251     WBC 8 28 Thousand/uL      RBC 4 60 Million/uL      Hemoglobin 11 8 g/dL      Hematocrit 38 6 %      MCV 84 fL      MCH 25 7 pg      MCHC 30 6 g/dL      RDW 13 7 %      MPV 9 1 fL      Platelets 163 Thousands/uL      nRBC 0 /100 WBCs      Neutrophils Relative 65 %      Immat GRANS % 0 %      Lymphocytes Relative 26 %      Monocytes Relative 6 %      Eosinophils Relative 2 %      Basophils Relative 1 %      Neutrophils Absolute 5 39 Thousands/µL      Immature Grans Absolute 0 02 Thousand/uL      Lymphocytes Absolute 2 16 Thousands/µL      Monocytes Absolute 0 50 Thousand/µL      Eosinophils Absolute 0 16 Thousand/µL      Basophils Absolute 0 05 Thousands/µL     POCT pregnancy, urine [[id number]]  (Normal) Resulted: 02/27/23 1240    Lab Status: Final result Updated: 02/27/23 1240     EXT Preg Test, Ur Negative     Control Valid                 No orders to display              Procedures  Procedures         ED Course                               SBIRT 22yo+    Flowsheet Row Most Recent Value   SBIRT (23 yo +)    In order to provide better care to our patients, we are screening all of our patients for alcohol and drug use  Would it be okay to ask you these screening questions? No Filed at: 02/27/2023 1233                    Medical Decision Making  75-year-old female who is well-appearing in no acute distress, benign abdominal exam   Declined vaginal exam in the ED  Hemoglobin stable, pregnancy test negative  Differential diagnosis includes but is not limited to dysfunctional uterine bleeding, pelvic infections, ectopic pregnancy  Patient's pregnancy test today is negative    No signs that she is having significant bleeding to the point where she would need emergent blood transfusion at this time  Patient updated on test results, need for follow-up care and strict return precautions were discussed  Amount and/or Complexity of Data Reviewed  Labs: ordered  Risk  OTC drugs  Prescription drug management  Disposition  Final diagnoses:   Dysfunctional uterine bleeding     Time reflects when diagnosis was documented in both MDM as applicable and the Disposition within this note     Time User Action Codes Description Comment    2/27/2023  1:08 PM Cameron Shon Add [N93 8] Dysfunctional uterine bleeding       ED Disposition     ED Disposition   Discharge    Condition   Stable    Date/Time   Mon Feb 27, 2023  1:08 PM    Comment   Stephon Ruiz discharge to home/self care  Follow-up Information     Follow up With Specialties Details Why Contact Info Additional Information    Aristides Desouza MD Family Medicine   [address] [place] [address]  [phone]       [address]  Obstetrics and Gynecology Schedule an appointment as soon as possible for a visit   [address] [place] [id number]  [address], [id number] [phone]          Patient's Medications   Discharge Prescriptions    No medications on file       No discharge procedures on file      PDMP Review     None          ED Provider  Electronically Signed by           Michele Christianson DO  02/27/23 1311

## 2023-02-28 ENCOUNTER — OFFICE VISIT (OUTPATIENT)
Dept: OBGYN CLINIC | Facility: CLINIC | Age: 23
End: 2023-02-28

## 2023-02-28 VITALS
WEIGHT: 224 LBS | HEART RATE: 93 BPM | SYSTOLIC BLOOD PRESSURE: 106 MMHG | BODY MASS INDEX: 46.82 KG/M2 | DIASTOLIC BLOOD PRESSURE: 72 MMHG

## 2023-02-28 DIAGNOSIS — Z11.3 SCREEN FOR STD (SEXUALLY TRANSMITTED DISEASE): ICD-10-CM

## 2023-02-28 DIAGNOSIS — N30.00 ACUTE CYSTITIS WITHOUT HEMATURIA: Primary | ICD-10-CM

## 2023-02-28 RX ORDER — SULFAMETHOXAZOLE AND TRIMETHOPRIM 800; 160 MG/1; MG/1
1 TABLET ORAL EVERY 12 HOURS SCHEDULED
Qty: 6 TABLET | Refills: 0 | Status: SHIPPED | OUTPATIENT
Start: 2023-02-28 | End: 2023-03-03

## 2023-02-28 RX ORDER — OLANZAPINE 5 MG/1
5 TABLET ORAL
COMMUNITY
Start: 2023-02-05

## 2023-02-28 NOTE — PROGRESS NOTES
Subjective:     Sheldon Cotton is a 23 y o   female who presents with acute onset of dysuria and pruritis for 3 days  She was seen in the ED yesterday for breakthrough bleeding on a Nexplanon  Her nexplanon  was exchanged in September and is in place for heavy menses  Her hgb was 11 9 in the ED yesterday and she has been bleeding for 2 days       Objective:    Vitals: Last menstrual period 2023, not currently breastfeeding  There is no height or weight on file to calculate BMI  Physical Exam  Vitals reviewed  Constitutional:       Appearance: Normal appearance  Cardiovascular:      Rate and Rhythm: Normal rate  Pulmonary:      Effort: Pulmonary effort is normal    Neurological:      Mental Status: She is alert  Assessment/Plan:  UTI  Prescribed Bactrim 2x day for 3 days for treatment    Heavy menses  -Discussed with pt that this is likely a breakthrough period and will end in a few days  Discussed with patient that if bleeding last longer than a week to let us know and we can prescribe a medication to slow down her bleeding      F/u as needed        Tabby Howell MD  2023  10:49 AM

## 2023-03-01 LAB
C TRACH DNA SPEC QL NAA+PROBE: NEGATIVE
N GONORRHOEA DNA SPEC QL NAA+PROBE: NEGATIVE

## 2023-03-14 ENCOUNTER — OFFICE VISIT (OUTPATIENT)
Dept: URGENT CARE | Age: 23
End: 2023-03-14

## 2023-03-14 VITALS — TEMPERATURE: 97 F | HEART RATE: 83 BPM | OXYGEN SATURATION: 98 % | RESPIRATION RATE: 18 BRPM

## 2023-03-14 DIAGNOSIS — R05.1 ACUTE COUGH: ICD-10-CM

## 2023-03-14 DIAGNOSIS — B34.9 VIRAL INFECTION: Primary | ICD-10-CM

## 2023-03-14 NOTE — PATIENT INSTRUCTIONS
- Pending COVID/Flu  - Continue Tylenol PRN  - Encourage OTC throat lozenges/products with menthol  - Encourage proper hydration and rest  - Recommend decongestant

## 2023-03-14 NOTE — PROGRESS NOTES
[facility]        NAME: Christophe Garvin is a 23 y o  female  : 2000    MRN: [id number]  DATE: 2023  TIME: 11:37 AM    Assessment and Plan   Viral infection [B34 9]  1  Viral infection        2  Acute cough  Covid/Flu-Office Collect      - Pending COVID/Flu  - Continue Tylenol PRN  - Encourage OTC throat lozenges/products with menthol  - Encourage proper hydration and rest  - Recommend decongestant      Patient Instructions   - Pending COVID/Flu  - Continue Tylenol PRN  - Encourage OTC throat lozenges/products with menthol  - Encourage proper hydration and rest  - Recommend decongestant    Follow up with PCP in 3-5 days  Proceed to  ER if symptoms worsen  Chief Complaint     Chief Complaint   Patient presents with   • Fever     Fever tmax 101, nausea, dizzy, cough, chills, congestion Sx yesterday  Taking tylenol  History of Present Illness       24 y/o F presents for cold symptoms  Admits to sore throat, cough, congestion, HA, body aches, muscle aches  Uses Tylenol  Review of Systems   Review of Systems   Constitutional: Positive for fever  Negative for chills  HENT: Positive for postnasal drip, rhinorrhea, sinus pressure and sore throat  Negative for ear discharge, ear pain and sinus pain  Respiratory: Positive for cough  Negative for shortness of breath  Gastrointestinal: Positive for vomiting  Negative for nausea           Current Medications       Current Outpatient Medications:   •  albuterol (ProAir HFA) 90 mcg/act inhaler, Inhale 2 puffs every 6 (six) hours as needed for wheezing, Disp: 8 5 g, Rfl: 0  •  albuterol (PROVENTIL HFA,VENTOLIN HFA) 90 mcg/act inhaler, Inhale 2 puffs every 4 (four) hours as needed for wheezing, Disp: 18 g, Rfl: 0  •  benzonatate (TESSALON PERLES) 100 mg capsule, Take 1 capsule (100 mg total) by mouth 3 (three) times a day as needed for cough, Disp: 20 capsule, Rfl: 0  •  ergocalciferol (VITAMIN D2) 50,000 units, TAKE ONE CAPSULE BY MOUTH ONE TIME PER WEEK, Disp: 4 capsule, Rfl: 0  •  famotidine (PEPCID) 20 mg tablet, Take 1 tablet (20 mg total) by mouth 2 (two) times a day, Disp: 28 tablet, Rfl: 1  •  fluticasone (FLONASE) 50 mcg/act nasal spray, SPRAY 1 SPRAY INTO EACH NOSTRIL EVERY DAY, Disp: 24 mL, Rfl: 0  •  gabapentin (NEURONTIN) 600 MG tablet, Take 1 tablet (600 mg total) by mouth every evening, Disp: 30 tablet, Rfl: 1  •  hydrOXYzine HCL (ATARAX) 25 mg tablet, TAKE 1 TABLET (25 MG TOTAL) BY MOUTH EVERY 6 (SIX) HOURS AS NEEDED FOR ITCHING OR ANXIETY, Disp: 30 tablet, Rfl: 1  •  meclizine (ANTIVERT) 12 5 MG tablet, Take 1 tablet (12 5 mg total) by mouth every 12 (twelve) hours as needed for dizziness, Disp: 20 tablet, Rfl: 0  •  OLANZapine (ZyPREXA) 5 mg tablet, Take 5 mg by mouth daily at bedtime, Disp: , Rfl:   •  ondansetron (Zofran ODT) 4 mg disintegrating tablet, Take 1 tablet (4 mg total) by mouth every 6 (six) hours as needed for nausea or vomiting, Disp: 20 tablet, Rfl: 0  •  ondansetron (ZOFRAN-ODT) 4 mg disintegrating tablet, Take 1 tablet (4 mg total) by mouth every 6 (six) hours as needed for nausea or vomiting, Disp: 30 tablet, Rfl: 0  •  venlafaxine (EFFEXOR-XR) 37 5 mg 24 hr capsule, Take 37 5 mg by mouth daily, Disp: , Rfl:   •  ziprasidone (GEODON) 20 mg capsule, Take 20 mg by mouth daily at bedtime, Disp: , Rfl:     Current Allergies     Allergies as of 03/14/2023   • (No Known Allergies)            The following portions of the patient's history were reviewed and updated as appropriate: allergies, current medications, past family history, past medical history, past social history, past surgical history and problem list      Past Medical History:   Diagnosis Date   • ADHD    • Anxiety    • Asthma    • Autism    • Bipolar disorder ([facility])    • Depression    • GERD (gastroesophageal reflux disease)        Past Surgical History:   Procedure Laterality Date   • CHOLANGIOGRAM N/A 12/01/2018    Procedure: CHOLANGIOGRAM; Surgeon: Quintin Piedra MD;  Location:  MAIN OR;  Service: General   • CHOLECYSTECTOMY LAPAROSCOPIC N/A 12/01/2018    Procedure: CHOLECYSTECTOMY LAPAROSCOPIC;  Surgeon: Quintin Piedra MD;  [facility];  Service: General   • EYE MUSCLE SURGERY Bilateral 2006   • [place] ESOPHAGOGASTRODUODENOSCOPY TRANSORAL DIAGNOSTIC N/A 11/27/2018    Procedure: ESOPHAGOGASTRODUODENOSCOPY (EGD) with bx;  Surgeon: Dolores Maguire MD;  Location: [facility]; Service: General       Family History   Problem Relation Age of Onset   • Breast cancer Mother    • Heart attack Mother    • Depression Mother    • Mental illness Mother    • Coronary artery disease Mother    • Hypertension Mother    • Diabetes Mother    • Hyperlipidemia Mother    • Pneumonia Brother    • Hypertension Maternal Grandmother    • Stroke Maternal Grandmother    • Diabetes Maternal Grandmother    • Glaucoma Maternal Grandmother    • Kidney disease Maternal Grandmother    • Sarcoidosis Maternal Grandmother    • Diabetes Maternal Grandfather    • Hypertension Maternal Grandfather    • Stroke Maternal Grandfather    • Glaucoma Maternal Grandfather    • Heart attack Maternal Grandfather    • Colon cancer Neg Hx    • Ovarian cancer Neg Hx          Medications have been verified  Objective   Pulse 83   Temp (!) 97 °F (36 1 °C) (Tympanic)   Resp 18   LMP 02/26/2023   SpO2 98%   Patient's last menstrual period was 02/26/2023  Physical Exam     Physical Exam  Constitutional:       General: She is not in acute distress  Appearance: She is not toxic-appearing  HENT:      Head: Normocephalic and atraumatic  Nose:      Comments: Bilateral erythema       Mouth/Throat:      Mouth: Mucous membranes are moist       Pharynx: No oropharyngeal exudate or posterior oropharyngeal erythema  Eyes:      Conjunctiva/sclera: Conjunctivae normal    Pulmonary:      Effort: Pulmonary effort is normal    Musculoskeletal:      Cervical back: No tenderness  Lymphadenopathy:      Cervical: No cervical adenopathy  Neurological:      Mental Status: She is alert

## 2023-03-22 DIAGNOSIS — J06.9 VIRAL URI WITH COUGH: ICD-10-CM

## 2023-03-22 RX ORDER — FLUTICASONE PROPIONATE 50 MCG
SPRAY, SUSPENSION (ML) NASAL
Qty: 48 ML | Refills: 1 | Status: SHIPPED | OUTPATIENT
Start: 2023-03-22

## 2023-03-23 DIAGNOSIS — B37.31 YEAST VAGINITIS: ICD-10-CM

## 2023-03-23 RX ORDER — FLUCONAZOLE 150 MG/1
150 TABLET ORAL ONCE
Qty: 1 TABLET | Refills: 1 | Status: SHIPPED | OUTPATIENT
Start: 2023-03-23 | End: 2023-03-23

## 2023-04-12 NOTE — PROGRESS NOTES
Patient is very quiet but co-operative  She became very attached to a certain peer which she feels comfortable being around  Patient is compliant with her medicine and chauhan routine   She attended some groups today [FreeTextEntry1] : Interim history\par The patient is tolerating their medications without problems. There has no new pains, injuries, or complaints and no new issues. The use of medications appears appropriate and there are no aberrant behaviors noted. Side effects to current medications are denied. Average pain score for the month is 6 out of ten. The patient's current medications are documented to the best of their ability. THe  was obtained and reviewed prior to the visit, and any discrepancies were discussed with the patient.\par Objective information\par Since the last visit there are no additional radiologic studies, labs, or pain complaints. There are no changes in the patient's physical status.\par Plan\par The patient was given refill of their medication at their current level and will return to the office as needed for follow-up. The patient is showing no aberrant behavior or evidence of diversion. Opioid contract and opioid risk assessment on chart.  reviewed and any discrepancy discussed with patent. Applicable urine toxicology reviewed and recorded in the patient's electronic record. Urine toxicology is ordered for patient per office protocol or patient's risk assessment.  Patient will follow-up in 1 month unless new issues arise the patient has returned earlier.\par

## 2023-04-27 DIAGNOSIS — B37.31 YEAST VAGINITIS: ICD-10-CM

## 2023-04-27 RX ORDER — FLUCONAZOLE 150 MG/1
150 TABLET ORAL ONCE
Qty: 1 TABLET | Refills: 1 | Status: SHIPPED | OUTPATIENT
Start: 2023-04-27 | End: 2023-04-27

## 2023-06-11 ENCOUNTER — APPOINTMENT (EMERGENCY)
Dept: CT IMAGING | Facility: HOSPITAL | Age: 23
End: 2023-06-11
Payer: COMMERCIAL

## 2023-06-11 ENCOUNTER — HOSPITAL ENCOUNTER (EMERGENCY)
Facility: HOSPITAL | Age: 23
End: 2023-06-12
Attending: EMERGENCY MEDICINE | Admitting: EMERGENCY MEDICINE
Payer: COMMERCIAL

## 2023-06-11 ENCOUNTER — HOSPITAL ENCOUNTER (EMERGENCY)
Facility: HOSPITAL | Age: 23
Discharge: HOME | End: 2023-06-11
Attending: EMERGENCY MEDICINE
Payer: COMMERCIAL

## 2023-06-11 VITALS
HEIGHT: 58 IN | BODY MASS INDEX: 47.71 KG/M2 | DIASTOLIC BLOOD PRESSURE: 56 MMHG | WEIGHT: 227.29 LBS | OXYGEN SATURATION: 100 % | HEART RATE: 86 BPM | TEMPERATURE: 98.4 F | RESPIRATION RATE: 16 BRPM | SYSTOLIC BLOOD PRESSURE: 114 MMHG

## 2023-06-11 DIAGNOSIS — R11.2 NAUSEA VOMITING AND DIARRHEA: Primary | ICD-10-CM

## 2023-06-11 DIAGNOSIS — E78.49 OTHER HYPERLIPIDEMIA: ICD-10-CM

## 2023-06-11 DIAGNOSIS — R19.7 NAUSEA VOMITING AND DIARRHEA: Primary | ICD-10-CM

## 2023-06-11 DIAGNOSIS — R45.851 SUICIDAL IDEATION: ICD-10-CM

## 2023-06-11 DIAGNOSIS — K21.00 GASTROESOPHAGEAL REFLUX DISEASE WITH ESOPHAGITIS WITHOUT HEMORRHAGE: ICD-10-CM

## 2023-06-11 DIAGNOSIS — R11.2 NAUSEA & VOMITING: ICD-10-CM

## 2023-06-11 DIAGNOSIS — F32.A DEPRESSION: Primary | ICD-10-CM

## 2023-06-11 DIAGNOSIS — Z00.8 MEDICAL CLEARANCE FOR PSYCHIATRIC ADMISSION: ICD-10-CM

## 2023-06-11 DIAGNOSIS — R10.9 ABDOMINAL PAIN: ICD-10-CM

## 2023-06-11 LAB
ALBUMIN SERPL BCP-MCNC: 4.4 G/DL (ref 3.5–5)
ALP SERPL-CCNC: 112 U/L (ref 34–104)
ALT SERPL W P-5'-P-CCNC: 38 U/L (ref 7–52)
AMPHETAMINES SERPL QL SCN: NEGATIVE
ANION GAP SERPL CALCULATED.3IONS-SCNC: 13 MMOL/L (ref 4–13)
AST SERPL W P-5'-P-CCNC: 26 U/L (ref 13–39)
ATRIAL RATE: 100 BPM
BARBITURATES UR QL: NEGATIVE
BASOPHILS # BLD AUTO: 0.03 THOUSANDS/ÂΜL (ref 0–0.1)
BASOPHILS NFR BLD AUTO: 0 % (ref 0–1)
BENZODIAZ UR QL: NEGATIVE
BILIRUB SERPL-MCNC: 0.36 MG/DL (ref 0.2–1)
BUN SERPL-MCNC: 11 MG/DL (ref 5–25)
CALCIUM SERPL-MCNC: 9.1 MG/DL (ref 8.4–10.2)
CARDIAC TROPONIN I PNL SERPL HS: <2 NG/L
CHLORIDE SERPL-SCNC: 105 MMOL/L (ref 96–108)
CO2 SERPL-SCNC: 20 MMOL/L (ref 21–32)
COCAINE UR QL: NEGATIVE
CREAT SERPL-MCNC: 0.97 MG/DL (ref 0.6–1.3)
D DIMER PPP FEU-MCNC: 2.38 UG/ML FEU
EOSINOPHIL # BLD AUTO: 0 THOUSAND/ÂΜL (ref 0–0.61)
EOSINOPHIL NFR BLD AUTO: 0 % (ref 0–6)
ERYTHROCYTE [DISTWIDTH] IN BLOOD BY AUTOMATED COUNT: 13.9 % (ref 11.6–15.1)
ETHANOL EXG-MCNC: 0 MG/DL
EXT PREGNANCY TEST URINE: NEGATIVE
EXT PREGNANCY TEST URINE: NEGATIVE
EXT. CONTROL: NORMAL
EXT. CONTROL: NORMAL
GFR SERPL CREATININE-BSD FRML MDRD: 81 ML/MIN/1.73SQ M
GLUCOSE SERPL-MCNC: 113 MG/DL (ref 65–140)
HCT VFR BLD AUTO: 39 % (ref 34.8–46.1)
HGB BLD-MCNC: 12.3 G/DL (ref 11.5–15.4)
IMM GRANULOCYTES # BLD AUTO: 0.05 THOUSAND/UL (ref 0–0.2)
IMM GRANULOCYTES NFR BLD AUTO: 1 % (ref 0–2)
LIPASE SERPL-CCNC: 13 U/L (ref 11–82)
LYMPHOCYTES # BLD AUTO: 0.72 THOUSANDS/ÂΜL (ref 0.6–4.47)
LYMPHOCYTES NFR BLD AUTO: 7 % (ref 14–44)
MCH RBC QN AUTO: 24.8 PG (ref 26.8–34.3)
MCHC RBC AUTO-ENTMCNC: 31.5 G/DL (ref 31.4–37.4)
MCV RBC AUTO: 79 FL (ref 82–98)
METHADONE UR QL: NEGATIVE
MONOCYTES # BLD AUTO: 0.64 THOUSAND/ÂΜL (ref 0.17–1.22)
MONOCYTES NFR BLD AUTO: 6 % (ref 4–12)
NEUTROPHILS # BLD AUTO: 9.44 THOUSANDS/ÂΜL (ref 1.85–7.62)
NEUTS SEG NFR BLD AUTO: 86 % (ref 43–75)
NRBC BLD AUTO-RTO: 0 /100 WBCS
OPIATES UR QL SCN: NEGATIVE
OXYCODONE+OXYMORPHONE UR QL SCN: NEGATIVE
P AXIS: 52 DEGREES
PCP UR QL: NEGATIVE
PLATELET # BLD AUTO: 300 THOUSANDS/UL (ref 149–390)
PMV BLD AUTO: 9.8 FL (ref 8.9–12.7)
POTASSIUM SERPL-SCNC: 3.6 MMOL/L (ref 3.5–5.3)
PR INTERVAL: 124 MS
PROT SERPL-MCNC: 7.5 G/DL (ref 6.4–8.4)
QRS AXIS: 58 DEGREES
QRSD INTERVAL: 96 MS
QT INTERVAL: 338 MS
QTC INTERVAL: 436 MS
RBC # BLD AUTO: 4.95 MILLION/UL (ref 3.81–5.12)
SODIUM SERPL-SCNC: 138 MMOL/L (ref 135–147)
T WAVE AXIS: 44 DEGREES
THC UR QL: NEGATIVE
TSH SERPL DL<=0.05 MIU/L-ACNC: 0.86 UIU/ML (ref 0.45–4.5)
VENTRICULAR RATE: 100 BPM
WBC # BLD AUTO: 10.88 THOUSAND/UL (ref 4.31–10.16)

## 2023-06-11 PROCEDURE — 81025 URINE PREGNANCY TEST: CPT | Performed by: EMERGENCY MEDICINE

## 2023-06-11 PROCEDURE — 84443 ASSAY THYROID STIM HORMONE: CPT | Performed by: EMERGENCY MEDICINE

## 2023-06-11 PROCEDURE — 96361 HYDRATE IV INFUSION ADD-ON: CPT

## 2023-06-11 PROCEDURE — 74177 CT ABD & PELVIS W/CONTRAST: CPT

## 2023-06-11 PROCEDURE — 83690 ASSAY OF LIPASE: CPT | Performed by: EMERGENCY MEDICINE

## 2023-06-11 PROCEDURE — 99285 EMERGENCY DEPT VISIT HI MDM: CPT

## 2023-06-11 PROCEDURE — 85379 FIBRIN DEGRADATION QUANT: CPT | Performed by: EMERGENCY MEDICINE

## 2023-06-11 PROCEDURE — 82075 ASSAY OF BREATH ETHANOL: CPT | Performed by: EMERGENCY MEDICINE

## 2023-06-11 PROCEDURE — 36415 COLL VENOUS BLD VENIPUNCTURE: CPT | Performed by: EMERGENCY MEDICINE

## 2023-06-11 PROCEDURE — 80307 DRUG TEST PRSMV CHEM ANLYZR: CPT | Performed by: EMERGENCY MEDICINE

## 2023-06-11 PROCEDURE — 93010 ELECTROCARDIOGRAM REPORT: CPT | Performed by: INTERNAL MEDICINE

## 2023-06-11 PROCEDURE — G1004 CDSM NDSC: HCPCS

## 2023-06-11 PROCEDURE — 96366 THER/PROPH/DIAG IV INF ADDON: CPT

## 2023-06-11 PROCEDURE — 93005 ELECTROCARDIOGRAM TRACING: CPT

## 2023-06-11 PROCEDURE — 71275 CT ANGIOGRAPHY CHEST: CPT

## 2023-06-11 PROCEDURE — 85025 COMPLETE CBC W/AUTO DIFF WBC: CPT | Performed by: EMERGENCY MEDICINE

## 2023-06-11 PROCEDURE — 96365 THER/PROPH/DIAG IV INF INIT: CPT

## 2023-06-11 PROCEDURE — 80053 COMPREHEN METABOLIC PANEL: CPT | Performed by: EMERGENCY MEDICINE

## 2023-06-11 PROCEDURE — 96375 TX/PRO/DX INJ NEW DRUG ADDON: CPT

## 2023-06-11 PROCEDURE — 84484 ASSAY OF TROPONIN QUANT: CPT | Performed by: EMERGENCY MEDICINE

## 2023-06-11 PROCEDURE — 99283 EMERGENCY DEPT VISIT LOW MDM: CPT

## 2023-06-11 RX ORDER — KETOROLAC TROMETHAMINE 30 MG/ML
15 INJECTION, SOLUTION INTRAMUSCULAR; INTRAVENOUS ONCE
Status: COMPLETED | OUTPATIENT
Start: 2023-06-11 | End: 2023-06-11

## 2023-06-11 RX ORDER — ONDANSETRON 4 MG/1
4 TABLET, FILM COATED ORAL EVERY 6 HOURS
Qty: 12 TABLET | Refills: 0 | Status: SHIPPED | OUTPATIENT
Start: 2023-06-11 | End: 2023-06-18

## 2023-06-11 RX ORDER — ONDANSETRON 2 MG/ML
4 INJECTION INTRAMUSCULAR; INTRAVENOUS ONCE
Status: COMPLETED | OUTPATIENT
Start: 2023-06-11 | End: 2023-06-11

## 2023-06-11 RX ADMIN — SODIUM CHLORIDE, SODIUM LACTATE, POTASSIUM CHLORIDE, AND CALCIUM CHLORIDE 1000 ML: .6; .31; .03; .02 INJECTION, SOLUTION INTRAVENOUS at 18:43

## 2023-06-11 RX ADMIN — ONDANSETRON 4 MG: 2 INJECTION INTRAMUSCULAR; INTRAVENOUS at 17:44

## 2023-06-11 RX ADMIN — SODIUM CHLORIDE 1000 ML: 0.9 INJECTION, SOLUTION INTRAVENOUS at 17:39

## 2023-06-11 RX ADMIN — KETOROLAC TROMETHAMINE 15 MG: 30 INJECTION, SOLUTION INTRAMUSCULAR; INTRAVENOUS at 17:43

## 2023-06-11 RX ADMIN — IOHEXOL 100 ML: 350 INJECTION, SOLUTION INTRAVENOUS at 18:57

## 2023-06-11 NOTE — ED NOTES
Patient unable to urinate at this time, but patient states she is not pregnant       Waleska Friedman RN  06/11/23 0088

## 2023-06-11 NOTE — DISCHARGE INSTRUCTIONS
Follow-up with PCP for further care  Contact info provided below if needed  Use over the counter medications as stated on the bottle as needed for pain control  Take your new medications as prescribed as needed for nausea and vomiting  Stay hydrated  Return to the ED with new or worsening symptoms  no

## 2023-06-11 NOTE — ED PROVIDER NOTES
"History  Chief Complaint   Patient presents with   • Vomiting     Pt reporting nausea, vomiting, abdominal pain, diarrhea, chest pain, weakness since this AM  States she is unable to keep anything down  Pt also reports a sick contact with the same SS  Pt is a 22yo F who presents for nausea, vomiting, and diarrhea  Patient reports that approximately 3 AM she had sudden onset of symptoms  She reports 30-40 bouts of nonbloody vomiting  She reports \"constant\" diarrhea that is also been nonbloody  She reports diffuse abdominal pain particularly bad with vomiting  Patient also reporting chest pain and shortness of breath  She reports her chest pain is pleuritic and not exertional   She denies any palpitations but does state that she noticed her heart rate was fast   Patient also reporting headache and lightheadedness  She states that she has not been able to tolerate anything p o  today due to the nausea and vomiting  She did take Tylenol earlier this morning with no improvement in symptoms  Patient states that she has been around someone with similar symptoms  Patient states that she was feeling well yesterday with no symptoms  Patient reports that she takes her daily medications regularly with no recent changes  Prior to Admission Medications   Prescriptions Last Dose Informant Patient Reported? Taking?    OLANZapine (ZyPREXA) 5 mg tablet   Yes No   Sig: Take 5 mg by mouth daily at bedtime   albuterol (PROVENTIL HFA,VENTOLIN HFA) 90 mcg/act inhaler   No No   Sig: Inhale 2 puffs every 4 (four) hours as needed for wheezing   albuterol (ProAir HFA) 90 mcg/act inhaler   No No   Sig: Inhale 2 puffs every 6 (six) hours as needed for wheezing   benzonatate (TESSALON PERLES) 100 mg capsule   No No   Sig: Take 1 capsule (100 mg total) by mouth 3 (three) times a day as needed for cough   ergocalciferol (VITAMIN D2) 50,000 units   No No   Sig: TAKE ONE CAPSULE BY MOUTH ONE TIME PER WEEK   famotidine " (PEPCID) 20 mg tablet   No No   Sig: Take 1 tablet (20 mg total) by mouth 2 (two) times a day   fluticasone (FLONASE) 50 mcg/act nasal spray   No No   Sig: SPRAY 1 SPRAY INTO EACH NOSTRIL EVERY DAY   gabapentin (NEURONTIN) 600 MG tablet   No No   Sig: Take 1 tablet (600 mg total) by mouth every evening   hydrOXYzine HCL (ATARAX) 25 mg tablet   No No   Sig: TAKE 1 TABLET (25 MG TOTAL) BY MOUTH EVERY 6 (SIX) HOURS AS NEEDED FOR ITCHING OR ANXIETY   meclizine (ANTIVERT) 12 5 MG tablet   No No   Sig: Take 1 tablet (12 5 mg total) by mouth every 12 (twelve) hours as needed for dizziness   ondansetron (ZOFRAN-ODT) 4 mg disintegrating tablet   No No   Sig: Take 1 tablet (4 mg total) by mouth every 6 (six) hours as needed for nausea or vomiting   ondansetron (Zofran ODT) 4 mg disintegrating tablet   No No   Sig: Take 1 tablet (4 mg total) by mouth every 6 (six) hours as needed for nausea or vomiting   venlafaxine (EFFEXOR-XR) 37 5 mg 24 hr capsule   Yes No   Sig: Take 37 5 mg by mouth daily   ziprasidone (GEODON) 20 mg capsule   Yes No   Sig: Take 20 mg by mouth daily at bedtime      Facility-Administered Medications: None       Past Medical History:   Diagnosis Date   • ADHD    • Anxiety    • Asthma    • Autism    • Bipolar disorder (HCC)    • Depression    • GERD (gastroesophageal reflux disease)        Past Surgical History:   Procedure Laterality Date   • CHOLANGIOGRAM N/A 12/01/2018    Procedure: CHOLANGIOGRAM;  Surgeon: Kallie Bridges MD;  Location:  MAIN OR;  Service: General   • CHOLECYSTECTOMY LAPAROSCOPIC N/A 12/01/2018    Procedure: CHOLECYSTECTOMY LAPAROSCOPIC;  Surgeon: Kallie Bridges MD;  Location:  MAIN OR;  Service: General   • EYE MUSCLE SURGERY Bilateral 2006   • ND ESOPHAGOGASTRODUODENOSCOPY TRANSORAL DIAGNOSTIC N/A 11/27/2018    Procedure: ESOPHAGOGASTRODUODENOSCOPY (EGD) with bx;  Surgeon: Srikanth Urrutia MD;  Location: [facility];   Service: General       Family History   Problem Relation Age of Onset   • Breast cancer Mother    • Heart attack Mother    • Depression Mother    • Mental illness Mother    • Coronary artery disease Mother    • Hypertension Mother    • Diabetes Mother    • Hyperlipidemia Mother    • Pneumonia Brother    • Hypertension Maternal Grandmother    • Stroke Maternal Grandmother    • Diabetes Maternal Grandmother    • Glaucoma Maternal Grandmother    • Kidney disease Maternal Grandmother    • Sarcoidosis Maternal Grandmother    • Diabetes Maternal Grandfather    • Hypertension Maternal Grandfather    • Stroke Maternal Grandfather    • Glaucoma Maternal Grandfather    • Heart attack Maternal Grandfather    • Colon cancer Neg Hx    • Ovarian cancer Neg Hx      I have reviewed and agree with the history as documented  E-Cigarette/Vaping   • E-Cigarette Use Never User      E-Cigarette/Vaping Substances     Social History     Tobacco Use   • Smoking status: Never   • Smokeless tobacco: Never   • Tobacco comments:     no passive smoke exposure   Vaping Use   • Vaping Use: Never used   Substance Use Topics   • Alcohol use: Never   • Drug use: Never        Review of Systems   Constitutional: Positive for appetite change (decreased 2/2 N/V), chills and fever  Respiratory: Positive for cough and shortness of breath  Cardiovascular: Positive for chest pain  Gastrointestinal: Positive for abdominal pain, diarrhea, nausea and vomiting  Neurological: Positive for light-headedness and headaches  All other systems reviewed and are negative        Physical Exam  ED Triage Vitals   Temperature Pulse Respirations Blood Pressure SpO2   06/11/23 1714 06/11/23 1709 06/11/23 1709 06/11/23 1709 06/11/23 1709   98 4 °F (36 9 °C) (!) 115 18 (!) 183/83 97 %      Temp Source Heart Rate Source Patient Position - Orthostatic VS BP Location FiO2 (%)   06/11/23 1714 06/11/23 1709 06/11/23 1709 06/11/23 1709 --   Oral Monitor Sitting Right arm       Pain Score       06/11/23 1709       9 Orthostatic Vital Signs  Vitals:    06/11/23 1709 06/11/23 1815 06/11/23 1845   BP: (!) 183/83 108/51 134/62   Pulse: (!) 115 89 83   Patient Position - Orthostatic VS: Sitting Sitting        Physical Exam  Vitals reviewed  Constitutional:       General: She is not in acute distress  Appearance: She is well-developed  She is not toxic-appearing or diaphoretic  HENT:      Head: Normocephalic and atraumatic  Right Ear: External ear normal       Left Ear: External ear normal       Nose: Nose normal       Mouth/Throat:      Pharynx: Oropharynx is clear  Comments: Overall poor dentition  Eyes:      Extraocular Movements: Extraocular movements intact  Pupils: Pupils are equal, round, and reactive to light  Cardiovascular:      Rate and Rhythm: Regular rhythm  Tachycardia present  Heart sounds: Normal heart sounds  Pulmonary:      Effort: Pulmonary effort is normal  No respiratory distress  Breath sounds: Normal breath sounds  No wheezing, rhonchi or rales  Abdominal:      General: Bowel sounds are normal  There is no distension  Palpations: Abdomen is soft  Tenderness: There is abdominal tenderness (diffuse)  There is no guarding or rebound  Musculoskeletal:         General: Normal range of motion  Cervical back: Normal range of motion and neck supple  Right lower leg: No edema  Left lower leg: No edema  Skin:     General: Skin is warm and dry  Capillary Refill: Capillary refill takes less than 2 seconds  Coloration: Skin is not pale  Findings: No erythema or rash  Neurological:      General: No focal deficit present  Mental Status: She is alert and oriented to person, place, and time  Psychiatric:         Speech: Speech normal          Behavior: Behavior is cooperative           ED Medications  Medications   sodium chloride 0 9 % bolus 1,000 mL (0 mL Intravenous Stopped 6/11/23 1842)   ketorolac (TORADOL) injection 15 mg (15 mg Intravenous Given 6/11/23 1743)   ondansetron (ZOFRAN) injection 4 mg (4 mg Intravenous Given 6/11/23 1744)   lactated ringers bolus 1,000 mL (1,000 mL Intravenous New Bag 6/11/23 1843)   iohexol (OMNIPAQUE) 350 MG/ML injection (SINGLE-DOSE) 100 mL (100 mL Intravenous Given 6/11/23 1857)       Diagnostic Studies  Results Reviewed     Procedure Component Value Units Date/Time    POCT pregnancy, urine [[id number]]  (Normal) Resulted: 06/11/23 1840    Lab Status: Final result Updated: 06/11/23 1840     EXT Preg Test, Ur Negative     Control Valid    TSH, 3rd generation with Free T4 reflex [[id number]]  (Normal) Collected: 06/11/23 1738    Lab Status: Final result Specimen: Blood from Arm, Right Updated: 06/11/23 1822     TSH 3RD GENERATON 0 856 uIU/mL     HS Troponin 0hr (reflex protocol) [[id number]]  (Normal) Collected: 06/11/23 1738    Lab Status: Final result Specimen: Blood from Arm, Right Updated: 06/11/23 1813     hs TnI 0hr <2 ng/L     Lipase [[id number]]  (Normal) Collected: 06/11/23 1738    Lab Status: Final result Specimen: Blood from Arm, Right Updated: 06/11/23 1807     Lipase 13 u/L     Comprehensive metabolic panel [[id number]]  (Abnormal) Collected: 06/11/23 1738    Lab Status: Final result Specimen: Blood from Arm, Right Updated: 06/11/23 1807     Sodium 138 mmol/L      Potassium 3 6 mmol/L      Chloride 105 mmol/L      CO2 20 mmol/L      ANION GAP 13 mmol/L      BUN 11 mg/dL      Creatinine 0 97 mg/dL      Glucose 113 mg/dL      Calcium 9 1 mg/dL      AST 26 U/L      ALT 38 U/L      Alkaline Phosphatase 112 U/L      Total Protein 7 5 g/dL      Albumin 4 4 g/dL      Total Bilirubin 0 36 mg/dL      eGFR 81 ml/min/1 73sq m     Narrative:      [place] guidelines for Chronic Kidney Disease (CKD):   •  Stage 1 with normal or high GFR (GFR > 90 mL/min/1 73 square meters)  •  Stage 2 Mild CKD (GFR = 60-89 mL/min/1 73 square meters)  •  Stage 3A Moderate CKD (GFR = 45-59 mL/min/1 73 square meters)  •  Stage 3B Moderate CKD (GFR = 30-44 mL/min/1 73 square meters)  •  Stage 4 Severe CKD (GFR = 15-29 mL/min/1 73 square meters)  •  Stage 5 End Stage CKD (GFR <15 mL/min/1 73 square meters)  Note: GFR calculation is accurate only with a steady state creatinine    D-Dimer [[id number]]  (Abnormal) Collected: 06/11/23 1738    Lab Status: Final result Specimen: Blood from Arm, Right Updated: 06/11/23 1804     D-Dimer, Quant 2 38 ug/ml FEU     CBC and differential [[id number]]  (Abnormal) Collected: 06/11/23 1738    Lab Status: Final result Specimen: Blood from Arm, Right Updated: 06/11/23 1747     WBC 10 88 Thousand/uL      RBC 4 95 Million/uL      Hemoglobin 12 3 g/dL      Hematocrit 39 0 %      MCV 79 fL      MCH 24 8 pg      MCHC 31 5 g/dL      RDW 13 9 %      MPV 9 8 fL      Platelets 839 Thousands/uL      nRBC 0 /100 WBCs      Neutrophils Relative 86 %      Immat GRANS % 1 %      Lymphocytes Relative 7 %      Monocytes Relative 6 %      Eosinophils Relative 0 %      Basophils Relative 0 %      Neutrophils Absolute 9 44 Thousands/µL      Immature Grans Absolute 0 05 Thousand/uL      Lymphocytes Absolute 0 72 Thousands/µL      Monocytes Absolute 0 64 Thousand/µL      Eosinophils Absolute 0 00 Thousand/µL      Basophils Absolute 0 03 Thousands/µL                  PE Study with CT Abdomen and Pelvis with contrast   Final Result by Evelia Leonardo MD (06/11 2010)      No evidence of lung consolidation or pulmonary embolism  No acute CT findings in the abdomen or pelvis  Correlate with urinalysis to exclude urinary tract infection                    Workstation performed: [id number]               Procedures  Procedures      ED Course  ED Course as of 06/11/23 2018   Danette Maguire Jun 11, 2023   1719 ECG 12 lead  Procedure Note: EKG  Date/Time: 06/11/23 5:19 PM   Interpreted by: Myriam Reina MD  Indications / Diagnosis: CP  ECG reviewed by me, the ED Physician: yes   The EKG demonstrates:  Rhythm: normal sinus  Intervals: normal intervals  Axis: normal axis  QRS/Blocks: normal QRS  ST Changes: No acute ST Changes, no STD/JHONY  Non-specific T wave abnormality  1748 WBC(!): 10 88  Mildly elevated  Non-diagnostic     7801 CBC and differential(!)  Reviewed and without actionable derangement  1804 D-Dimer, Quant(!): 2 38  Elevated  Will get CTA PE     1807 Comprehensive metabolic panel(!)  Reviewed and without actionable derangement  1807 Lipase: 13  WNL   1815 hs TnI 0hr: <2  Negative  No indication for delta based on result or timing  1822 TSH 3RD GENERATON: 0 856  WNL   1832 Pt encouraged to provide urine sample  1846 PREGNANCY TEST URINE: Negative  Negative  2004 Pulse: 83  Interval improvement  2004 Blood Pressure: 134/62  Interval improvement  2011 PE Study with CT Abdomen and Pelvis with contrast  No evidence of lung consolidation or pulmonary embolism  No acute CT findings in the abdomen or pelvis  Correlate with urinalysis to exclude urinary tract infection    - Pt without urinary symptoms and therefore no indication for UA at this time  2017 Reassessed pt who is resting comfortably with stable vitals  Discussed all results as well as plan for DC with symptomatic treatment at home  Pt agreeable  VSS and pt stable for DC at this time  PERC Rule for PE    Flowsheet Row Most Recent Value   PERC Rule for PE    Age >=50 0 Filed at: 06/11/2023 1805   HR >=100 1 Filed at: 06/11/2023 1805   O2 Sat on room air < 95% 0 Filed at: 06/11/2023 1805   History of PE or DVT 0 Filed at: 06/11/2023 1805   Recent trauma or surgery 0 Filed at: 06/11/2023 1805   Hemoptysis 0 Filed at: 06/11/2023 1805   Exogenous estrogen 0 Filed at: 06/11/2023 1805   Unilateral leg swelling 0 Filed at: 06/11/2023 1805   PERC Rule for PE Results 1 Filed at: 06/11/2023 1805              SBIRT 22yo+    Flowsheet Row Most Recent Value   Initial Alcohol Screen: US AUDIT-C     1   How often do you have a drink containing alcohol? 0 Filed at: 06/11/2023 1711   2  How many drinks containing alcohol do you have on a typical day you are drinking? 0 Filed at: 06/11/2023 1711   3b  FEMALE Any Age, or MALE 65+: How often do you have 4 or more drinks on one occassion? 0 Filed at: 06/11/2023 1711   Audit-C Score 0 Filed at: 06/11/2023 1711   AILYN: How many times in the past year have you    Used an illegal drug or used a prescription medication for non-medical reasons? Never Filed at: 06/11/2023 1711          Wells' Criteria for PE    Flowsheet Row Most Recent Value   Wells' Criteria for PE    Clinical signs and symptoms of DVT 0 Filed at: 06/11/2023 1805   PE is primary diagnosis or equally likely 0 Filed at: 06/11/2023 1805   HR >100 1 5 Filed at: 06/11/2023 1805   Immobilization at least 3 days or Surgery in the previous 4 weeks 0 Filed at: 06/11/2023 1805   Previous, objectively diagnosed PE or DVT 0 Filed at: 06/11/2023 1805   Hemoptysis 0 Filed at: 06/11/2023 1805   Malignancy with treatment within 6 months or palliative 0 Filed at: 06/11/2023 1805   Wells' Criteria Total 1 5 Filed at: 06/11/2023 1805            Medical Decision Making  Pt is a 20yo F who presents with nausea, vomiting, diarrhea  Exam pertinent for diffuse abdominal tenderness and tachycardia  Differential diagnosis to include but not limited to gastroenteritis, pancreatitis, intra-abdominal pathology, ACS, myocarditis, pericarditis, arrhythmia, dehydration, electrolyte abnormality, PE  Will plan for cardiac work-up including troponin, EKG, and D-dimer due to patient's complaint of chest pain that is pleuritic  Will also obtain lipase to rule out pancreatitis  Will treat symptomatically and reassess  Patient will inevitably require imaging based on D-dimer result  See ED course for results and details  Plan to discharge pt with f/u to PCP  Discussed returning the ED with new or worsening of symptoms   Discussed use of over the counter medications as stated on the bottle as needed for pain  Discussed taking new medication as prescribed as needed for nausea and vomiting  Pt expressed understanding of discharge instructions, return precautions, and medication instructions and is stable for discharge at this time  All questions were answered and pt was discharged without incident  Amount and/or Complexity of Data Reviewed  Labs: ordered  Decision-making details documented in ED Course  Radiology: ordered  Decision-making details documented in ED Course  ECG/medicine tests:  Decision-making details documented in ED Course  Risk  Prescription drug management  Disposition  Final diagnoses:   Nausea vomiting and diarrhea   Abdominal pain     Time reflects when diagnosis was documented in both MDM as applicable and the Disposition within this note     Time User Action Codes Description Comment    6/11/2023  5:26 PM Keeley Quan [R11 2,  R19 7] Nausea vomiting and diarrhea     6/11/2023  8:12 PM Keeley Quan [R10 9] Abdominal pain       ED Disposition     ED Disposition   Discharge    Condition   Stable    Date/Time   Sun Jun 11, 2023  8:12 PM    Comment   Ramya Baez discharge to home/self care  Follow-up Information     Follow up With Specialties Details Why [place] [facility] MD [facility] Call on 6/12/2023  [address] [phone]  [phone]            Patient's Medications   Discharge Prescriptions    ONDANSETRON (ZOFRAN) 4 MG TABLET    Take 1 tablet (4 mg total) by mouth every 6 (six) hours       Start Date: 6/11/2023 End Date: --       Order Dose: 4 mg       Quantity: 12 tablet    Refills: 0     No discharge procedures on file  PDMP Review     None           ED Provider  Attending physically available and evaluated Ramya Baez I managed the patient along with the ED Attending      Electronically Signed by         Nanette Ayoub MD  06/11/23 2018

## 2023-06-11 NOTE — ED ATTENDING ATTESTATION
"6/11/2023  I, Juan M Aranda DO, saw and evaluated the patient  I have discussed the patient with the resident/non-physician practitioner and agree with the resident's/non-physician practitioner's findings, Plan of Care, and MDM as documented in the resident's/non-physician practitioner's note, except where noted  All available labs and Radiology studies were reviewed  I was present for key portions of any procedure(s) performed by the resident/non-physician practitioner and I was immediately available to provide assistance  At this point I agree with the current assessment done in the Emergency Department  I have conducted an independent evaluation of this patient a history and physical is as follows:    Patient is a 64-year-old female coming in today complaining of nausea, vomiting, pleuritic chest pain, overall not feeling well  She states that her boyfriend's grandmother had similar symptoms  She does report that she has decreased p o  intake because of this  She has no dysuria urinary frequency  When my exam, patient is resting in bed playing on her cell phone  She states that she feels little bit better  On my examination of patient:   /51 (BP Location: Right arm)   Pulse 89   Temp 98 4 °F (36 9 °C) (Oral)   Resp 14   Ht 4' 10\" (1 473 m)   Wt 103 kg (227 lb 4 7 oz)   LMP  (LMP Unknown) Comment: pt states she has nexplanon  SpO2 96%   BMI 47 50 kg/m²   The patient is awake, alert and oriented  Patient appears older than stated age  Well-nourished, well-developed  Speaking normally in full sentences  No conversational dyspnea  Head: Normocephalic, atraumatic, nontender  External examination of the ears is unremarkable  Pupils are equal round and reactive to light, there is no conjunctival injection or scleral icterus noted  Nares are patent without rhinorrhea  The oropharynx is moist without injection  No malocclusion  No stridor  Normal phonation  No drooling   Normal " "swallowing  No brawniness under the tongue  Neck: Symmetric, trachea midline  No JVD  Supple  Lungs: Unlabored,  No retractions, CTAB, lungs sounds equal bilateral  No tachypnea  Heart: Regular without murmurs rubs or gallops  Abdomen: Obese   soft and nontender  There is no rebound or guarding  Musculoskeletal: Normal range of motion with grossly normal strength  Neuro: Cranial nerves II through XII grossly intact  Nonfocal exam  Skin: No rash noted, well perfused to the exposed areas  Psych: Mood and affect are appropriate    Updated on D-dimer given PERC positive & low [place]  Mild leukocytosis without shift  TSH within normal limits and CMP otherwise stable  Troponin less than 2 with a heart score less than 3  Patient was updated that we need urine pregnancy for completion of CT which she is agreeable for  Will give second IV fluid    8:14 PM  PE Study with CT Abdomen and Pelvis with contrast   Final Result      No evidence of lung consolidation or pulmonary embolism  No acute CT findings in the abdomen or pelvis  Correlate with urinalysis to exclude urinary tract infection  Workstation performed: [id number]             Diagnosis:  Nausea, vomiting  Elevated D-dimer        Disclosure: Voice to text software was used in the preparation of this document and could have resulted in translational errors  Occasional wrong word or \"sound a like\" substitutions may have occurred due to the inherent limitations of voice recognition software  Read the chart carefully and recognize, using context, where substitutions have occurred  I have independently reviewed external records are available to me to the level of detail possible within the time constraints of my patient care responsibilities in the ED            ED Course         Critical Care Time  Procedures      "

## 2023-06-12 ENCOUNTER — HOSPITAL ENCOUNTER (INPATIENT)
Facility: HOSPITAL | Age: 23
LOS: 6 days | Discharge: HOME | DRG: 753 | End: 2023-06-18
Attending: STUDENT IN AN ORGANIZED HEALTH CARE EDUCATION/TRAINING PROGRAM | Admitting: PSYCHIATRY & NEUROLOGY
Payer: COMMERCIAL

## 2023-06-12 VITALS
TEMPERATURE: 98.9 F | WEIGHT: 229.7 LBS | BODY MASS INDEX: 48.01 KG/M2 | HEART RATE: 96 BPM | SYSTOLIC BLOOD PRESSURE: 128 MMHG | OXYGEN SATURATION: 97 % | RESPIRATION RATE: 18 BRPM | DIASTOLIC BLOOD PRESSURE: 67 MMHG

## 2023-06-12 DIAGNOSIS — F31.32 BIPOLAR 1 DISORDER, DEPRESSED, MODERATE (HCC): Primary | ICD-10-CM

## 2023-06-12 DIAGNOSIS — R11.2 NAUSEA & VOMITING: ICD-10-CM

## 2023-06-12 DIAGNOSIS — E78.49 OTHER HYPERLIPIDEMIA: ICD-10-CM

## 2023-06-12 DIAGNOSIS — K21.00 GASTROESOPHAGEAL REFLUX DISEASE WITH ESOPHAGITIS WITHOUT HEMORRHAGE: ICD-10-CM

## 2023-06-12 DIAGNOSIS — Z00.8 MEDICAL CLEARANCE FOR PSYCHIATRIC ADMISSION: ICD-10-CM

## 2023-06-12 LAB — SARS-COV-2 RNA RESP QL NAA+PROBE: NEGATIVE

## 2023-06-12 PROCEDURE — 87635 SARS-COV-2 COVID-19 AMP PRB: CPT | Performed by: EMERGENCY MEDICINE

## 2023-06-12 RX ORDER — MECLIZINE HCL 12.5 MG/1
12.5 TABLET ORAL EVERY 12 HOURS PRN
Status: DISCONTINUED | OUTPATIENT
Start: 2023-06-12 | End: 2023-06-18 | Stop reason: HOSPADM

## 2023-06-12 RX ORDER — MECLIZINE HCL 12.5 MG/1
12.5 TABLET ORAL EVERY 12 HOURS PRN
Status: CANCELLED | OUTPATIENT
Start: 2023-06-12

## 2023-06-12 RX ORDER — MINERAL OIL AND PETROLATUM 150; 830 MG/G; MG/G
OINTMENT OPHTHALMIC 4 TIMES DAILY PRN
Status: CANCELLED | OUTPATIENT
Start: 2023-06-12

## 2023-06-12 RX ORDER — OLANZAPINE 10 MG/1
10 INJECTION, POWDER, LYOPHILIZED, FOR SOLUTION INTRAMUSCULAR
Status: DISCONTINUED | OUTPATIENT
Start: 2023-06-12 | End: 2023-06-18 | Stop reason: HOSPADM

## 2023-06-12 RX ORDER — ALBUTEROL SULFATE 90 UG/1
2 AEROSOL, METERED RESPIRATORY (INHALATION) EVERY 6 HOURS PRN
Status: CANCELLED | OUTPATIENT
Start: 2023-06-12

## 2023-06-12 RX ORDER — ONDANSETRON 4 MG/1
4 TABLET, ORALLY DISINTEGRATING ORAL EVERY 6 HOURS PRN
Status: CANCELLED | OUTPATIENT
Start: 2023-06-12

## 2023-06-12 RX ORDER — FAMOTIDINE 20 MG/1
20 TABLET, FILM COATED ORAL 2 TIMES DAILY
Status: CANCELLED | OUTPATIENT
Start: 2023-06-12

## 2023-06-12 RX ORDER — GABAPENTIN 600 MG/1
600 TABLET ORAL EVERY EVENING
Status: CANCELLED | OUTPATIENT
Start: 2023-06-12

## 2023-06-12 RX ORDER — OLANZAPINE 2.5 MG/1
2.5 TABLET ORAL
Status: DISCONTINUED | OUTPATIENT
Start: 2023-06-12 | End: 2023-06-18 | Stop reason: HOSPADM

## 2023-06-12 RX ORDER — ACETAMINOPHEN 325 MG/1
975 TABLET ORAL EVERY 6 HOURS PRN
Status: CANCELLED | OUTPATIENT
Start: 2023-06-12

## 2023-06-12 RX ORDER — HYDROXYZINE HYDROCHLORIDE 25 MG/1
25 TABLET, FILM COATED ORAL
Status: CANCELLED | OUTPATIENT
Start: 2023-06-12

## 2023-06-12 RX ORDER — BENZONATATE 100 MG/1
100 CAPSULE ORAL 3 TIMES DAILY PRN
Status: CANCELLED | OUTPATIENT
Start: 2023-06-12

## 2023-06-12 RX ORDER — LORAZEPAM 1 MG/1
1 TABLET ORAL
Status: CANCELLED | OUTPATIENT
Start: 2023-06-12

## 2023-06-12 RX ORDER — BENZTROPINE MESYLATE 1 MG/ML
1 INJECTION INTRAMUSCULAR; INTRAVENOUS 2 TIMES DAILY PRN
Status: CANCELLED | OUTPATIENT
Start: 2023-06-12

## 2023-06-12 RX ORDER — LORAZEPAM 2 MG/ML
1 INJECTION INTRAMUSCULAR EVERY 4 HOURS PRN
Status: CANCELLED | OUTPATIENT
Start: 2023-06-12

## 2023-06-12 RX ORDER — ALBUTEROL SULFATE 90 UG/1
2 AEROSOL, METERED RESPIRATORY (INHALATION) EVERY 4 HOURS PRN
Status: CANCELLED | OUTPATIENT
Start: 2023-06-12

## 2023-06-12 RX ORDER — ONDANSETRON 4 MG/1
4 TABLET, ORALLY DISINTEGRATING ORAL EVERY 6 HOURS PRN
Status: DISCONTINUED | OUTPATIENT
Start: 2023-06-12 | End: 2023-06-18 | Stop reason: HOSPADM

## 2023-06-12 RX ORDER — BISACODYL 10 MG
10 SUPPOSITORY, RECTAL RECTAL DAILY PRN
Status: DISCONTINUED | OUTPATIENT
Start: 2023-06-12 | End: 2023-06-12

## 2023-06-12 RX ORDER — BENZTROPINE MESYLATE 1 MG/ML
1 INJECTION INTRAMUSCULAR; INTRAVENOUS 2 TIMES DAILY PRN
Status: DISCONTINUED | OUTPATIENT
Start: 2023-06-12 | End: 2023-06-18 | Stop reason: HOSPADM

## 2023-06-12 RX ORDER — MINERAL OIL AND PETROLATUM 150; 830 MG/G; MG/G
OINTMENT OPHTHALMIC 4 TIMES DAILY PRN
Status: DISCONTINUED | OUTPATIENT
Start: 2023-06-12 | End: 2023-06-18 | Stop reason: HOSPADM

## 2023-06-12 RX ORDER — OLANZAPINE 5 MG/1
2.5 TABLET ORAL
Status: CANCELLED | OUTPATIENT
Start: 2023-06-12

## 2023-06-12 RX ORDER — TRAZODONE HYDROCHLORIDE 50 MG/1
50 TABLET ORAL
Status: CANCELLED | OUTPATIENT
Start: 2023-06-12

## 2023-06-12 RX ORDER — LORAZEPAM 2 MG/ML
2 INJECTION INTRAMUSCULAR
Status: CANCELLED | OUTPATIENT
Start: 2023-06-12

## 2023-06-12 RX ORDER — LORAZEPAM 2 MG/ML
2 INJECTION INTRAMUSCULAR
Status: DISCONTINUED | OUTPATIENT
Start: 2023-06-12 | End: 2023-06-18 | Stop reason: HOSPADM

## 2023-06-12 RX ORDER — ACETAMINOPHEN 325 MG/1
650 TABLET ORAL EVERY 4 HOURS PRN
Status: DISCONTINUED | OUTPATIENT
Start: 2023-06-12 | End: 2023-06-18 | Stop reason: HOSPADM

## 2023-06-12 RX ORDER — LORAZEPAM 1 MG/1
1 TABLET ORAL
Status: DISCONTINUED | OUTPATIENT
Start: 2023-06-12 | End: 2023-06-18 | Stop reason: HOSPADM

## 2023-06-12 RX ORDER — OLANZAPINE 10 MG/1
10 TABLET ORAL
Status: DISCONTINUED | OUTPATIENT
Start: 2023-06-12 | End: 2023-06-18 | Stop reason: HOSPADM

## 2023-06-12 RX ORDER — HYDROXYZINE 50 MG/1
50 TABLET, FILM COATED ORAL
Status: DISCONTINUED | OUTPATIENT
Start: 2023-06-12 | End: 2023-06-18 | Stop reason: HOSPADM

## 2023-06-12 RX ORDER — BENZONATATE 100 MG/1
100 CAPSULE ORAL 3 TIMES DAILY PRN
Status: DISCONTINUED | OUTPATIENT
Start: 2023-06-12 | End: 2023-06-18 | Stop reason: HOSPADM

## 2023-06-12 RX ORDER — ZIPRASIDONE HYDROCHLORIDE 20 MG/1
20 CAPSULE ORAL
Status: CANCELLED | OUTPATIENT
Start: 2023-06-12

## 2023-06-12 RX ORDER — OLANZAPINE 5 MG/1
5 TABLET ORAL
Status: DISCONTINUED | OUTPATIENT
Start: 2023-06-12 | End: 2023-06-18 | Stop reason: HOSPADM

## 2023-06-12 RX ORDER — HYDROXYZINE 50 MG/1
50 TABLET, FILM COATED ORAL
Status: CANCELLED | OUTPATIENT
Start: 2023-06-12

## 2023-06-12 RX ORDER — OLANZAPINE 5 MG/1
5 TABLET ORAL
Status: CANCELLED | OUTPATIENT
Start: 2023-06-12

## 2023-06-12 RX ORDER — BENZTROPINE MESYLATE 1 MG/1
1 TABLET ORAL 2 TIMES DAILY PRN
Status: DISCONTINUED | OUTPATIENT
Start: 2023-06-12 | End: 2023-06-18 | Stop reason: HOSPADM

## 2023-06-12 RX ORDER — OLANZAPINE 10 MG/1
5 INJECTION, POWDER, LYOPHILIZED, FOR SOLUTION INTRAMUSCULAR
Status: DISCONTINUED | OUTPATIENT
Start: 2023-06-12 | End: 2023-06-18 | Stop reason: HOSPADM

## 2023-06-12 RX ORDER — AMOXICILLIN 250 MG
1 CAPSULE ORAL DAILY PRN
Status: CANCELLED | OUTPATIENT
Start: 2023-06-12

## 2023-06-12 RX ORDER — OLANZAPINE 10 MG/1
5 INJECTION, POWDER, LYOPHILIZED, FOR SOLUTION INTRAMUSCULAR
Status: CANCELLED | OUTPATIENT
Start: 2023-06-12

## 2023-06-12 RX ORDER — MAGNESIUM HYDROXIDE/ALUMINUM HYDROXICE/SIMETHICONE 120; 1200; 1200 MG/30ML; MG/30ML; MG/30ML
30 SUSPENSION ORAL EVERY 4 HOURS PRN
Status: DISCONTINUED | OUTPATIENT
Start: 2023-06-12 | End: 2023-06-18 | Stop reason: HOSPADM

## 2023-06-12 RX ORDER — OLANZAPINE 10 MG/1
10 INJECTION, POWDER, LYOPHILIZED, FOR SOLUTION INTRAMUSCULAR
Status: CANCELLED | OUTPATIENT
Start: 2023-06-12

## 2023-06-12 RX ORDER — LORAZEPAM 2 MG/ML
1 INJECTION INTRAMUSCULAR EVERY 4 HOURS PRN
Status: DISCONTINUED | OUTPATIENT
Start: 2023-06-12 | End: 2023-06-18 | Stop reason: HOSPADM

## 2023-06-12 RX ORDER — ACETAMINOPHEN 325 MG/1
650 TABLET ORAL EVERY 6 HOURS PRN
Status: CANCELLED | OUTPATIENT
Start: 2023-06-12

## 2023-06-12 RX ORDER — ACETAMINOPHEN 325 MG/1
650 TABLET ORAL EVERY 6 HOURS PRN
Status: DISCONTINUED | OUTPATIENT
Start: 2023-06-12 | End: 2023-06-18 | Stop reason: HOSPADM

## 2023-06-12 RX ORDER — FAMOTIDINE 20 MG/1
20 TABLET, FILM COATED ORAL 2 TIMES DAILY
Status: DISCONTINUED | OUTPATIENT
Start: 2023-06-12 | End: 2023-06-18 | Stop reason: HOSPADM

## 2023-06-12 RX ORDER — ACETAMINOPHEN 325 MG/1
975 TABLET ORAL EVERY 6 HOURS PRN
Status: DISCONTINUED | OUTPATIENT
Start: 2023-06-12 | End: 2023-06-18 | Stop reason: HOSPADM

## 2023-06-12 RX ORDER — VENLAFAXINE HYDROCHLORIDE 37.5 MG/1
37.5 CAPSULE, EXTENDED RELEASE ORAL DAILY
Status: CANCELLED | OUTPATIENT
Start: 2023-06-12

## 2023-06-12 RX ORDER — BISACODYL 10 MG
10 SUPPOSITORY, RECTAL RECTAL DAILY PRN
Status: CANCELLED | OUTPATIENT
Start: 2023-06-12

## 2023-06-12 RX ORDER — TRAZODONE HYDROCHLORIDE 50 MG/1
50 TABLET ORAL
Status: DISCONTINUED | OUTPATIENT
Start: 2023-06-12 | End: 2023-06-18 | Stop reason: HOSPADM

## 2023-06-12 RX ORDER — OLANZAPINE 10 MG/1
10 TABLET ORAL
Status: CANCELLED | OUTPATIENT
Start: 2023-06-12

## 2023-06-12 RX ORDER — BENZTROPINE MESYLATE 1 MG/1
1 TABLET ORAL 2 TIMES DAILY PRN
Status: CANCELLED | OUTPATIENT
Start: 2023-06-12

## 2023-06-12 RX ORDER — ALBUTEROL SULFATE 90 UG/1
2 AEROSOL, METERED RESPIRATORY (INHALATION) EVERY 4 HOURS PRN
Status: DISCONTINUED | OUTPATIENT
Start: 2023-06-12 | End: 2023-06-18 | Stop reason: HOSPADM

## 2023-06-12 RX ORDER — ONDANSETRON 4 MG/1
4 TABLET, ORALLY DISINTEGRATING ORAL EVERY 6 HOURS PRN
Status: DISCONTINUED | OUTPATIENT
Start: 2023-06-12 | End: 2023-06-12 | Stop reason: HOSPADM

## 2023-06-12 RX ORDER — BISACODYL 10 MG
10 SUPPOSITORY, RECTAL RECTAL DAILY PRN
Status: DISCONTINUED | OUTPATIENT
Start: 2023-06-12 | End: 2023-06-18 | Stop reason: HOSPADM

## 2023-06-12 RX ORDER — AMOXICILLIN 250 MG
1 CAPSULE ORAL DAILY PRN
Status: DISCONTINUED | OUTPATIENT
Start: 2023-06-12 | End: 2023-06-18 | Stop reason: HOSPADM

## 2023-06-12 RX ORDER — HYDROXYZINE HYDROCHLORIDE 25 MG/1
25 TABLET, FILM COATED ORAL
Status: DISCONTINUED | OUTPATIENT
Start: 2023-06-12 | End: 2023-06-18 | Stop reason: HOSPADM

## 2023-06-12 RX ORDER — ACETAMINOPHEN 325 MG/1
650 TABLET ORAL EVERY 4 HOURS PRN
Status: CANCELLED | OUTPATIENT
Start: 2023-06-12

## 2023-06-12 RX ORDER — MAGNESIUM HYDROXIDE/ALUMINUM HYDROXICE/SIMETHICONE 120; 1200; 1200 MG/30ML; MG/30ML; MG/30ML
30 SUSPENSION ORAL EVERY 4 HOURS PRN
Status: CANCELLED | OUTPATIENT
Start: 2023-06-12

## 2023-06-12 RX ADMIN — OLANZAPINE 2.5 MG: 2.5 TABLET, FILM COATED ORAL at 21:03

## 2023-06-12 RX ADMIN — HYDROXYZINE HYDROCHLORIDE 50 MG: 50 TABLET, FILM COATED ORAL at 16:17

## 2023-06-12 RX ADMIN — FAMOTIDINE 20 MG: 20 TABLET, FILM COATED ORAL at 17:31

## 2023-06-12 RX ADMIN — ONDANSETRON 4 MG: 4 TABLET, ORALLY DISINTEGRATING ORAL at 10:49

## 2023-06-12 RX ADMIN — ONDANSETRON 4 MG: 4 TABLET, ORALLY DISINTEGRATING ORAL at 16:11

## 2023-06-12 NOTE — ED NOTES
No vomiting or diarrhea observed since arrival to the ED  Patient remains afebrile  Patient offering no complaints since arrival to ED          Tati Lozada RN  06/12/23 1866

## 2023-06-12 NOTE — NURSING NOTE
@7064 Pt with complaint of an increase in anxiety  Rankin score 23  PRN Atarax 50mg po given  Pt reports medication effective

## 2023-06-12 NOTE — ED NOTES
Patient is a 20year old female presenting to the ED by self-referral after an episode at home where she placed a kitchen knife to her throat  Patient is calm and cooperative and agrees to speak to this writer at this time  Patient states that lately she's been in a deep depression episode and that tonight, she reached her limit and took a kitchen knife to her throat with the intent to cut herself  Her mom, who she lives with, is the one who encouraged her to come to the ED via EMS for psychiatric evaluation  Approximately three to four years ago, she was diagnosed with bipolar disorder and has struggled to manage her mood ever since  She has been seeing a pyschiatrist, last saw two weeks ago, and a therapist, seen last week through [facility]  All medications are prescribed through psychiatry and she has been compliant but states that she doesn't feel like the medications nor the psychiatrist are really helping  She denies any explicit thoughts to harm herself at this time but does still endorse a current suicidal ideation  She denies homicidal ideation as well as any symptoms of psychosis  She states that she hasn't had these kinds of thoughts in approximately three years and has not had any intent to harm herself in about as long  Patient is vague about specific trigger regarding tonight but does acknowledge that she hasn't been feeling well physically all day  She reports poor sleep and appetite and says she hasn't been able to do much of either in two days  No legal issues reported and no issues with drug and or alcohol abuse  She is requesting to sign herself in for inpatient treatment as she does not know if she can contract to safety tonight and is seeking further mood stabilization and medication management  Patient is requesting no referrals be sent to [place] at this time  This writer explained to patient that this could inhibit her bed search and possibly extend her time in the ED  Patient verbalizes understanding, continues to ascertain that she does not want to be placed in the [place] area

## 2023-06-12 NOTE — ED NOTES
201 signed by patient and ED provider  Original placed with rest of patient chart  Copy at crisis desk  Faxed/emailed to [facility] intake at this time

## 2023-06-12 NOTE — ED NOTES
via [address] [facility] [[email or web address] [address]:     Name: Jayde Ledezma   Recipient ID: [id number]   YOB: 2000   Gender: Female             Eligibility Summary:    Type Name     [address] [facility]

## 2023-06-12 NOTE — PLAN OF CARE
Problem: SELF HARM/SUICIDALITY  Goal: Will have no self-injury during hospital stay  Description: INTERVENTIONS:  - Q 15 MINUTES: Routine safety checks  - Q WAKING SHIFT & PRN: Assess risk to determine if routine checks are adequate to maintain patient safety  - Encourage patient to participate actively in care by formulating a plan to combat response to suicidal ideation, identify supports and resources  Outcome: Progressing     Problem: DEPRESSION  Goal: Will be euthymic at discharge  Description: INTERVENTIONS:  - Administer medication as ordered  - Provide emotional support via 1:1 interaction with staff  - Encourage involvement in milieu/groups/activities  - Monitor for social isolation  Outcome: Progressing     Problem: ANXIETY  Goal: Will report anxiety at manageable levels  Description: INTERVENTIONS:  - Administer medication as ordered  - Teach and encourage coping skills  - Provide emotional support  - Assess patient/family for anxiety and ability to cope  Outcome: Progressing  Goal: By discharge: Patient will verbalize 2 strategies to deal with anxiety  Description: Interventions:  - Identify any obvious source/trigger to anxiety  - Staff will assist patient in applying identified coping technique/skills  - Encourage attendance of scheduled groups and activities  Outcome: Progressing     Problem: Ineffective Coping  Goal: Participates in unit activities  Description: Interventions:  - Provide therapeutic environment   - Provide required programming   - Redirect inappropriate behaviors   Outcome: Progressing     Problem: DISCHARGE PLANNING  Goal: Discharge to home or other facility with appropriate resources  Description: INTERVENTIONS:  - Identify barriers to discharge w/patient and caregiver  - Arrange for needed discharge resources and transportation as appropriate  - Identify discharge learning needs (meds, wound care, etc )  - Arrange for interpretive services to assist at discharge as needed  - Refer to Case Management Department for coordinating discharge planning if the patient needs post-hospital services based on physician/advanced practitioner order or complex needs related to functional status, cognitive ability, or social support system  Outcome: Progressing

## 2023-06-12 NOTE — EMTALA/ACUTE CARE TRANSFER
[facility] EMERGENCY DEPARTMENT  [address] [facility] [address]  [phone]  Dept: [address] TRANSFER CONSENT    NAME Bettye Chaudhry                                         2000                              MRN [id number]    I have been informed of my rights regarding examination, treatment, and transfer   by Dr Ami Katz:      Risks:        Consent for Transfer:  I acknowledge that my medical condition has been evaluated and explained to me by the emergency department physician or other qualified medical person and/or my attending physician, who has recommended that I be transferred to the service of  Accepting Physician: Dr Ariella Neumann at [address] Name, [address] : [place]  The above potential benefits of such transfer, the potential risks associated with such transfer, and the probable risks of not being transferred have been explained to me, and I fully understand them  The doctor has explained that, in my case, the benefits of transfer outweigh the risks  I agree to be transferred  I authorize the performance of emergency medical procedures and treatments upon me in both transit and upon arrival at the receiving facility  Additionally, I authorize the release of any and all medical records to the receiving facility and request they be transported with me, if possible  I understand that the safest mode of transportation during a medical emergency is an ambulance and that the Hospital advocates the use of this mode of transport  Risks of traveling to the receiving facility by car, including absence of medical control, life sustaining equipment, such as oxygen, and medical personnel has been explained to me and I fully understand them  (ERICK CORRECT BOX BELOW)  [  ]  I consent to the stated transfer and to be transported by ambulance/helicopter    [  ]  I consent to the stated transfer, but refuse transportation by ambulance and accept full responsibility for my transportation by car  I understand the risks of non-ambulance transfers and I exonerate the Hospital and its staff from any deterioration in my condition that results from this refusal     X___________________________________________    DATE  23  TIME________  Signature of patient or legally responsible individual signing on patient behalf           RELATIONSHIP TO PATIENT_________________________          Provider Certification    NAME Ivett Gardner                                         2000                              MRN [id number]    A medical screening exam was performed on the above named patient  Based on the examination:    Condition Necessitating Transfer The primary encounter diagnosis was Depression  Diagnoses of Suicidal ideation, Medical clearance for psychiatric admission, Nausea & vomiting, Gastroesophageal reflux disease with esophagitis without hemorrhage, and Other hyperlipidemia were also pertinent to this visit  Patient Condition:      Reason for Transfer:      Transfer Requirements: [address]   · Space available and qualified personnel available for treatment as acknowledged by    · Agreed to accept transfer and to provide appropriate medical treatment as acknowledged by       Dr Enma Santana  · Appropriate medical records of the examination and treatment of the patient are provided at the time of transfer   [address] [facility], [address] _______  · Transfer will be performed by qualified personnel from    and appropriate transfer equipment as required, including the use of necessary and appropriate life support measures      Provider Certification: I have examined the patient and explained the following risks and benefits of being transferred/refusing transfer to the patient/family:         Based on these reasonable risks and benefits to the patient and/or the unborn child(vicenta), and based upon the information available at the time of the patient’s examination, I certify that the medical benefits reasonably to be expected from the provision of appropriate medical treatments at another medical facility outweigh the increasing risks, if any, to the individual’s medical condition, and in the case of labor to the unborn child, from effecting the transfer      X____________________________________________ DATE 06/12/23        TIME_______      ORIGINAL - SEND TO MEDICAL RECORDS   COPY - SEND WITH PATIENT DURING TRANSFER

## 2023-06-12 NOTE — NURSING NOTE
Pt reports having an upset stomach with loose stool  Pt did have one episode of incontinence of stool  Was provided with clean linen and toiletries to shower

## 2023-06-12 NOTE — ED NOTES
Per Zbigniew Mayo [facility]):    Patient's case was presented to their on-call physician for review however, their physician would like the patient to be presented to their morning care team before confirming or denying patient acceptance due patient's current cold/flu-like symptoms  COVID results were already faxed over at time of this call, Zbigniew Mayo confirms receipt but provider would still like to wait until the [facility] confirms that the patient is not denied at this time and [facility] will not need to re-send clinical chart  [facility] admissions will follow up in the morning between 0730 and 0800 for updates regarding patient's physical health and to make a final decision on admission

## 2023-06-12 NOTE — ED NOTES
Insurance Authorization for admission:   Phone call placed to [facility]  Phone number: [phone]  Spoke to Karson Alpha  3 days approved  Level of care: voluntary inpatient behavioral health  Review on TBD  Authorization # will be given to admitting facility

## 2023-06-12 NOTE — ED PROVIDER NOTES
History  Chief Complaint   Patient presents with   • Psychiatric Evaluation     Arrives reporting she picked up a knife to harm herself tonight  No HI/AH/VH  52-year-old female presents with worsening depression and suicidal ideation  She reports that she came home from being evaluated for GI illness and took her regular nighttime medication and subsequently took a knife and held it to her throat  Family called 46 and the patient presents requesting admission for ongoing psychiatric care  She denies any homicidal ideation, drug or alcohol use, or other acute medical concerns  Psychiatric Evaluation  Presenting symptoms: depression, suicidal thoughts and suicidal threats    Presenting symptoms: no hallucinations and no self-mutilation    Degree of incapacity (severity):  Severe  Onset quality:  Sudden  Timing:  Constant  Progression:  Waxing and waning  Chronicity:  Recurrent  Treatment compliance: All of the time  Relieved by:  Nothing  Worsened by:  Nothing  Ineffective treatments:  None tried  Associated symptoms: feelings of worthlessness and poor judgment    Associated symptoms: no chest pain        Prior to Admission Medications   Prescriptions Last Dose Informant Patient Reported? Taking?    OLANZapine (ZyPREXA) 5 mg tablet   Yes No   Sig: Take 5 mg by mouth daily at bedtime   albuterol (PROVENTIL HFA,VENTOLIN HFA) 90 mcg/act inhaler   No No   Sig: Inhale 2 puffs every 4 (four) hours as needed for wheezing   albuterol (ProAir HFA) 90 mcg/act inhaler   No No   Sig: Inhale 2 puffs every 6 (six) hours as needed for wheezing   benzonatate (TESSALON PERLES) 100 mg capsule   No No   Sig: Take 1 capsule (100 mg total) by mouth 3 (three) times a day as needed for cough   ergocalciferol (VITAMIN D2) 50,000 units   No No   Sig: TAKE ONE CAPSULE BY MOUTH ONE TIME PER WEEK   famotidine (PEPCID) 20 mg tablet   No No   Sig: Take 1 tablet (20 mg total) by mouth 2 (two) times a day   fluticasone (FLONASE) 50 mcg/act nasal spray   No No   Sig: SPRAY 1 SPRAY INTO EACH NOSTRIL EVERY DAY   gabapentin (NEURONTIN) 600 MG tablet   No No   Sig: Take 1 tablet (600 mg total) by mouth every evening   hydrOXYzine HCL (ATARAX) 25 mg tablet   No No   Sig: TAKE 1 TABLET (25 MG TOTAL) BY MOUTH EVERY 6 (SIX) HOURS AS NEEDED FOR ITCHING OR ANXIETY   meclizine (ANTIVERT) 12 5 MG tablet   No No   Sig: Take 1 tablet (12 5 mg total) by mouth every 12 (twelve) hours as needed for dizziness   ondansetron (ZOFRAN) 4 mg tablet   No No   Sig: Take 1 tablet (4 mg total) by mouth every 6 (six) hours   ondansetron (ZOFRAN-ODT) 4 mg disintegrating tablet   No No   Sig: Take 1 tablet (4 mg total) by mouth every 6 (six) hours as needed for nausea or vomiting   ondansetron (Zofran ODT) 4 mg disintegrating tablet   No No   Sig: Take 1 tablet (4 mg total) by mouth every 6 (six) hours as needed for nausea or vomiting   venlafaxine (EFFEXOR-XR) 37 5 mg 24 hr capsule   Yes No   Sig: Take 37 5 mg by mouth daily   ziprasidone (GEODON) 20 mg capsule   Yes No   Sig: Take 20 mg by mouth daily at bedtime      Facility-Administered Medications: None       Past Medical History:   Diagnosis Date   • ADHD    • Anxiety    • Asthma    • Autism    • Bipolar disorder (HCC)    • Depression    • GERD (gastroesophageal reflux disease)        Past Surgical History:   Procedure Laterality Date   • CHOLANGIOGRAM N/A 12/01/2018    Procedure: CHOLANGIOGRAM;  Surgeon: Karoline Juarez MD;  Location:  MAIN OR;  Service: General   • CHOLECYSTECTOMY LAPAROSCOPIC N/A 12/01/2018    Procedure: CHOLECYSTECTOMY LAPAROSCOPIC;  Surgeon: Karoline Juarez MD;  Location:  MAIN OR;  Service: General   • EYE MUSCLE SURGERY Bilateral 2006   • MO ESOPHAGOGASTRODUODENOSCOPY TRANSORAL DIAGNOSTIC N/A 11/27/2018    Procedure: ESOPHAGOGASTRODUODENOSCOPY (EGD) with bx;  Surgeon: Humza Kaplan MD;  Location: [facility];   Service: General       Family History   Problem Relation Age of Onset   • Breast cancer Mother    • Heart attack Mother    • Depression Mother    • Mental illness Mother    • Coronary artery disease Mother    • Hypertension Mother    • Diabetes Mother    • Hyperlipidemia Mother    • Pneumonia Brother    • Hypertension Maternal Grandmother    • Stroke Maternal Grandmother    • Diabetes Maternal Grandmother    • Glaucoma Maternal Grandmother    • Kidney disease Maternal Grandmother    • Sarcoidosis Maternal Grandmother    • Diabetes Maternal Grandfather    • Hypertension Maternal Grandfather    • Stroke Maternal Grandfather    • Glaucoma Maternal Grandfather    • Heart attack Maternal Grandfather    • Colon cancer Neg Hx    • Ovarian cancer Neg Hx      I have reviewed and agree with the history as documented  E-Cigarette/Vaping   • E-Cigarette Use Never User      E-Cigarette/Vaping Substances     Social History     Tobacco Use   • Smoking status: Never   • Smokeless tobacco: Never   • Tobacco comments:     no passive smoke exposure   Vaping Use   • Vaping Use: Never used   Substance Use Topics   • Alcohol use: Never   • Drug use: Never       Review of Systems   Constitutional: Negative for fever  Respiratory: Negative for shortness of breath  Cardiovascular: Negative for chest pain  Gastrointestinal: Positive for diarrhea, nausea and vomiting  Psychiatric/Behavioral: Positive for suicidal ideas  Negative for hallucinations and self-injury  All other systems reviewed and are negative  Physical Exam  Physical Exam  Vitals and nursing note reviewed  Constitutional:       General: She is not in acute distress  Appearance: Normal appearance  She is well-developed  She is not ill-appearing or toxic-appearing  Comments: Disheveled, malodarous   HENT:      Head: Normocephalic and atraumatic  Right Ear: External ear normal       Left Ear: External ear normal       Nose: Nose normal  No congestion        Mouth/Throat:      Mouth: Mucous membranes are moist  Pharynx: Oropharynx is clear  Eyes:      Conjunctiva/sclera: Conjunctivae normal       Pupils: Pupils are equal, round, and reactive to light  Cardiovascular:      Rate and Rhythm: Normal rate and regular rhythm  Heart sounds: Normal heart sounds  Pulmonary:      Effort: Pulmonary effort is normal  No respiratory distress  Breath sounds: Normal breath sounds  No wheezing  Abdominal:      General: Bowel sounds are normal       Palpations: Abdomen is soft  Tenderness: There is no abdominal tenderness  There is no guarding  Musculoskeletal:         General: No tenderness or deformity  Cervical back: Normal range of motion and neck supple  No rigidity  Skin:     General: Skin is warm and dry  Capillary Refill: Capillary refill takes less than 2 seconds  Findings: No rash  Neurological:      General: No focal deficit present  Mental Status: She is alert and oriented to person, place, and time  Psychiatric:         Attention and Perception: Attention and perception normal          Mood and Affect: Mood is depressed  Affect is flat  Speech: Speech normal          Behavior: Behavior normal  Behavior is cooperative  Thought Content: Thought content includes suicidal ideation           Vital Signs  ED Triage Vitals [06/11/23 2243]   Temperature Pulse Respirations Blood Pressure SpO2   98 9 °F (37 2 °C) 101 20 164/84 97 %      Temp Source Heart Rate Source Patient Position - Orthostatic VS BP Location FiO2 (%)   Tympanic Monitor Sitting Left arm --      Pain Score       --           Vitals:    06/11/23 2243   BP: 164/84   Pulse: 101   Patient Position - Orthostatic VS: Sitting         Visual Acuity      ED Medications  Medications - No data to display    Diagnostic Studies  Results Reviewed     Procedure Component Value Units Date/Time    Rapid drug screen, urine [[id number]]  (Normal) Collected: 06/11/23 2258    Lab Status: Final result Specimen: Urine, Clean Catch Updated: 06/11/23 2325     Amph/Meth UR Negative     Barbiturate Ur Negative     Benzodiazepine Urine Negative     Cocaine Urine Negative     Methadone Urine Negative     Opiate Urine Negative     PCP Ur Negative     THC Urine Negative     Oxycodone Urine Negative    Narrative:      FOR MEDICAL PURPOSES ONLY  IF CONFIRMATION NEEDED PLEASE CONTACT THE LAB WITHIN 5 DAYS  Drug Screen Cutoff Levels:  AMPHETAMINE/METHAMPHETAMINES  1000 ng/mL  BARBITURATES     200 ng/mL  BENZODIAZEPINES     200 ng/mL  COCAINE      300 ng/mL  METHADONE      300 ng/mL  OPIATES      300 ng/mL  PHENCYCLIDINE     25 ng/mL  THC       50 ng/mL  OXYCODONE      100 ng/mL    POCT pregnancy, urine [[id number]]  (Normal) Resulted: 06/11/23 2256    Lab Status: Final result Updated: 06/11/23 2258     EXT Preg Test, Ur Negative     Control Valid    POCT alcohol breath test [[id number]]  (Normal) Resulted: 06/11/23 2254    Lab Status: Final result Updated: 06/11/23 2254     EXTBreath Alcohol 0 00                 No orders to display              Procedures  Procedures         ED Course                               SBIRT 22yo+    Flowsheet Row Most Recent Value   Initial Alcohol Screen: US AUDIT-C     1  How often do you have a drink containing alcohol? 0 Filed at: 06/11/2023 2245   2  How many drinks containing alcohol do you have on a typical day you are drinking? 0 Filed at: 06/11/2023 2245   3b  FEMALE Any Age, or MALE 65+: How often do you have 4 or more drinks on one occassion? 0 Filed at: 06/11/2023 2245   Audit-C Score 0 Filed at: 06/11/2023 2245   AILYN: How many times in the past year have you    Used an illegal drug or used a prescription medication for non-medical reasons? Never Filed at: 06/11/2023 2245                    Medical Decision Making  40-year-old female presents requesting psychiatric admission    She states that she has had increasing depression and earlier today held a knife to her throat with the intention of cutting herself  She denies drug or alcohol use, homeless ideation, or acute medical concerns other than a GI illness for which she was already evaluated earlier today  Discussed the case with the crisis worker who met with the patient and the patient has signed a 12  Amount and/or Complexity of Data Reviewed  Labs: ordered  Risk  Decision regarding hospitalization  Disposition  Final diagnoses:   Depression   Suicidal ideation     Time reflects when diagnosis was documented in both MDM as applicable and the Disposition within this note     Time User Action Codes Description Comment    6/12/2023  1:16 AM Ashok ANDREW] Depression     6/12/2023  1:16 AM Ashok Quan [U12 363] Suicidal ideation       ED Disposition     ED Disposition   Transfer to [address] [place] [address]   --    Date/Time   Mon Jun 12, 2023  1:16 AM    Comment   Jayro Gray should be transferred out to behavioral health and has been medically cleared  Follow-up Information    None         Patient's Medications   Discharge Prescriptions    No medications on file       No discharge procedures on file      PDMP Review     None          ED Provider  Electronically Signed by           Zenaida Calvo DO  06/12/23 8363

## 2023-06-12 NOTE — ED NOTES
Per Herminia Sun in [facility] admissions:    Requesting COVID swab at this time as well as treatment team to continue observation of patient's earlier GI symptoms  Orvil Gin will follow up again when admission decision is made

## 2023-06-12 NOTE — ED NOTES
Patient is accepted at [facility]   Patient is accepted by [facility]      Transportation is arranged with [facility]    Transportation is scheduled for 1430        Nurse report is to be called to [phone] prior to patient transfer

## 2023-06-12 NOTE — ED CARE HANDOFF
Emergency Department Sign Out Note        Sign out and transfer of care from Dr Amberly Coleman  See Separate Emergency Department note  The patient, Wandy Greco, was evaluated by the previous provider for psych  Workup Completed:  See previous ED notes    ED Course / Workup Pending (followup):                                  ED Course as of 06/12/23 1523   Mon Jun 12, 2023   0700 Sign out: 23yoF presenting for SI with attempt, signed 201, pending placement  Continue safety plan  [phone] Patient accepted to [facility]     Procedures  MDM        Disposition  Final diagnoses:   Depression   Suicidal ideation     Time reflects when diagnosis was documented in both MDM as applicable and the Disposition within this note     Time User Action Codes Description Comment    6/12/2023  1:16 AM Gwendolyn Hanson Add Deangelo Christen  A] Depression     6/12/2023  1:16 AM Gwendolyn Hanson Add [Z54 680] Suicidal ideation     6/12/2023 12:42 PM Derrel Gaw Add [Z00 8] Medical clearance for psychiatric admission     6/12/2023 12:42 PM Derrel Gaw Add [R11 2] Nausea & vomiting     6/12/2023 12:42 PM Derrel Gaw Add [K21 00] Gastroesophageal reflux disease with esophagitis without hemorrhage     6/12/2023 12:42 PM Derrel Gaw Add [E78 49] Other hyperlipidemia       ED Disposition     ED Disposition   Transfer to [address] [place] [address]   --    Date/Time   Mon Jun 12, 2023  1:16 AM    Comment   Wandy Greco should be transferred out to behavioral health and has been medically cleared             MD Documentation    Emanuel Salazar Most Recent Value   Accepting Physician Dr Atilio Barnett NameTresa      RN Documentation    [address] NameTresa      Follow-up Information    None       Discharge Medication List as of 6/12/2023  2:19 PM      CONTINUE these medications which have NOT CHANGED    Details   !! albuterol (ProAir HFA) 90 mcg/act inhaler Inhale 2 puffs every 6 (six) hours as needed for wheezing, Starting Wed 12/14/2022, Print      !! albuterol (PROVENTIL HFA,VENTOLIN HFA) 90 mcg/act inhaler Inhale 2 puffs every 4 (four) hours as needed for wheezing, Starting Fri 7/15/2022, Normal      benzonatate (TESSALON PERLES) 100 mg capsule Take 1 capsule (100 mg total) by mouth 3 (three) times a day as needed for cough, Starting Wed 2/15/2023, Normal      ergocalciferol (VITAMIN D2) 50,000 units TAKE ONE CAPSULE BY MOUTH ONE TIME PER WEEK, Normal      famotidine (PEPCID) 20 mg tablet Take 1 tablet (20 mg total) by mouth 2 (two) times a day, Starting Thu 7/18/2019, Normal      fluticasone (FLONASE) 50 mcg/act nasal spray SPRAY 1 SPRAY INTO EACH NOSTRIL EVERY DAY, Normal      gabapentin (NEURONTIN) 600 MG tablet Take 1 tablet (600 mg total) by mouth every evening, Starting Fri 4/2/2021, Normal      hydrOXYzine HCL (ATARAX) 25 mg tablet TAKE 1 TABLET (25 MG TOTAL) BY MOUTH EVERY 6 (SIX) HOURS AS NEEDED FOR ITCHING OR ANXIETY, Starting Mon 6/7/2021, Normal      meclizine (ANTIVERT) 12 5 MG tablet Take 1 tablet (12 5 mg total) by mouth every 12 (twelve) hours as needed for dizziness, Starting Wed 12/14/2022, Print      OLANZapine (ZyPREXA) 5 mg tablet Take 5 mg by mouth daily at bedtime, Starting Sun 2/5/2023, Historical Med      !! ondansetron (Zofran ODT) 4 mg disintegrating tablet Take 1 tablet (4 mg total) by mouth every 6 (six) hours as needed for nausea or vomiting, Starting Thu 9/22/2022, Normal      ondansetron (ZOFRAN) 4 mg tablet Take 1 tablet (4 mg total) by mouth every 6 (six) hours, Starting Sun 6/11/2023, Normal      !! ondansetron (ZOFRAN-ODT) 4 mg disintegrating tablet Take 1 tablet (4 mg total) by mouth every 6 (six) hours as needed for nausea or vomiting, Starting Mon 7/11/2022, Normal      venlafaxine (EFFEXOR-XR) 37 5 mg 24 hr capsule Take 37 5 mg by mouth daily, Starting Sat 9/10/2022, Historical Med ziprasidone (GEODON) 20 mg capsule Take 20 mg by mouth daily at bedtime, Starting Sat 2/19/2022, Historical Med       !! - Potential duplicate medications found  Please discuss with provider  No discharge procedures on file         ED Provider  Electronically Signed by     Shelley Hernandez MD  06/12/23 8825

## 2023-06-12 NOTE — NURSING NOTE
Pt is a 201 from [facility] arriving after reportedly putting knife to throat  Pt states she lives with her Mother who encouraged pt to bring self to the hospital  Pt states she doesn't recall doing this and believes she may have disassociated  Pt however does report feeling overwhelmed lately and would like a medication adjustment  Pt denies current SI/HI, AVH  Reports medical hx of asthma  Denies drug or alcohol use  Non-smoker  Pt reports poor sleep recently  Remains calm and cooperative, reviewed unit routine

## 2023-06-12 NOTE — ED NOTES
Per Marietta Seek at [place] regarding patient bed search:    [facility] currently only facility not inside [facility] that can review patient  This writer called [facility] to confirm and spoke to Mary Kay who confirms patient's clinical can be sent for review for a discharge bed  Clinical referral faxed at this time

## 2023-06-12 NOTE — ED NOTES
Pt stating that she has an upset stomach  Has prn zofran ordered which she has not been given any as of yet   States she was given tramadol yesterday when she was seen for her abd but states it did not help and only made her tired     Venkat Eller RN  06/12/23 3103

## 2023-06-13 PROBLEM — R19.7 NAUSEA VOMITING AND DIARRHEA: Status: ACTIVE | Noted: 2023-06-13

## 2023-06-13 PROBLEM — F31.32 BIPOLAR 1 DISORDER, DEPRESSED, MODERATE (HCC): Status: ACTIVE | Noted: 2023-06-13

## 2023-06-13 PROBLEM — E66.01 MORBID OBESITY (HCC): Status: ACTIVE | Noted: 2021-03-31

## 2023-06-13 PROBLEM — R07.9 CHEST PAIN: Status: ACTIVE | Noted: 2023-06-13

## 2023-06-13 PROBLEM — Z00.8 MEDICAL CLEARANCE FOR PSYCHIATRIC ADMISSION: Status: ACTIVE | Noted: 2023-06-13

## 2023-06-13 PROBLEM — R11.2 NAUSEA VOMITING AND DIARRHEA: Status: ACTIVE | Noted: 2023-06-13

## 2023-06-13 PROBLEM — F84.0 AUTISM SPECTRUM DISORDER: Status: ACTIVE | Noted: 2023-06-13

## 2023-06-13 LAB
25(OH)D3 SERPL-MCNC: 16.2 NG/ML (ref 30–100)
ALBUMIN SERPL BCP-MCNC: 4.1 G/DL (ref 3.5–5)
ALP SERPL-CCNC: 99 U/L (ref 34–104)
ALT SERPL W P-5'-P-CCNC: 54 U/L (ref 7–52)
ANION GAP SERPL CALCULATED.3IONS-SCNC: 11 MMOL/L (ref 4–13)
AST SERPL W P-5'-P-CCNC: 38 U/L (ref 13–39)
BASOPHILS # BLD AUTO: 0.02 THOUSANDS/ÂΜL (ref 0–0.1)
BASOPHILS NFR BLD AUTO: 0 % (ref 0–1)
BILIRUB SERPL-MCNC: 0.42 MG/DL (ref 0.2–1)
BUN SERPL-MCNC: 9 MG/DL (ref 5–25)
CALCIUM SERPL-MCNC: 8.8 MG/DL (ref 8.4–10.2)
CHLORIDE SERPL-SCNC: 106 MMOL/L (ref 96–108)
CHOLEST SERPL-MCNC: 150 MG/DL
CO2 SERPL-SCNC: 20 MMOL/L (ref 21–32)
CREAT SERPL-MCNC: 0.98 MG/DL (ref 0.6–1.3)
EOSINOPHIL # BLD AUTO: 0.09 THOUSAND/ÂΜL (ref 0–0.61)
EOSINOPHIL NFR BLD AUTO: 1 % (ref 0–6)
ERYTHROCYTE [DISTWIDTH] IN BLOOD BY AUTOMATED COUNT: 13.8 % (ref 11.6–15.1)
GFR SERPL CREATININE-BSD FRML MDRD: 80 ML/MIN/1.73SQ M
GLUCOSE P FAST SERPL-MCNC: 95 MG/DL (ref 65–99)
GLUCOSE SERPL-MCNC: 95 MG/DL (ref 65–140)
HCG SERPL QL: NEGATIVE
HCT VFR BLD AUTO: 38.1 % (ref 34.8–46.1)
HDLC SERPL-MCNC: 32 MG/DL
HGB BLD-MCNC: 11.7 G/DL (ref 11.5–15.4)
IMM GRANULOCYTES # BLD AUTO: 0.02 THOUSAND/UL (ref 0–0.2)
IMM GRANULOCYTES NFR BLD AUTO: 0 % (ref 0–2)
LDLC SERPL CALC-MCNC: 95 MG/DL (ref 0–100)
LYMPHOCYTES # BLD AUTO: 1.37 THOUSANDS/ÂΜL (ref 0.6–4.47)
LYMPHOCYTES NFR BLD AUTO: 19 % (ref 14–44)
MCH RBC QN AUTO: 24.7 PG (ref 26.8–34.3)
MCHC RBC AUTO-ENTMCNC: 30.7 G/DL (ref 31.4–37.4)
MCV RBC AUTO: 81 FL (ref 82–98)
MONOCYTES # BLD AUTO: 0.73 THOUSAND/ÂΜL (ref 0.17–1.22)
MONOCYTES NFR BLD AUTO: 10 % (ref 4–12)
NEUTROPHILS # BLD AUTO: 4.88 THOUSANDS/ÂΜL (ref 1.85–7.62)
NEUTS SEG NFR BLD AUTO: 70 % (ref 43–75)
NONHDLC SERPL-MCNC: 118 MG/DL
NRBC BLD AUTO-RTO: 0 /100 WBCS
PLATELET # BLD AUTO: 254 THOUSANDS/UL (ref 149–390)
PMV BLD AUTO: 10.2 FL (ref 8.9–12.7)
POTASSIUM SERPL-SCNC: 3.2 MMOL/L (ref 3.5–5.3)
PROT SERPL-MCNC: 7.2 G/DL (ref 6.4–8.4)
RBC # BLD AUTO: 4.73 MILLION/UL (ref 3.81–5.12)
SODIUM SERPL-SCNC: 137 MMOL/L (ref 135–147)
TREPONEMA PALLIDUM IGG+IGM AB [PRESENCE] IN SERUM OR PLASMA BY IMMUNOASSAY: NORMAL
TRIGL SERPL-MCNC: 113 MG/DL
TSH SERPL DL<=0.05 MIU/L-ACNC: 2.33 UIU/ML (ref 0.45–4.5)
WBC # BLD AUTO: 7.11 THOUSAND/UL (ref 4.31–10.16)

## 2023-06-13 PROCEDURE — 84703 CHORIONIC GONADOTROPIN ASSAY: CPT | Performed by: PHYSICIAN ASSISTANT

## 2023-06-13 PROCEDURE — 99223 1ST HOSP IP/OBS HIGH 75: CPT | Performed by: PSYCHIATRY & NEUROLOGY

## 2023-06-13 PROCEDURE — 80061 LIPID PANEL: CPT | Performed by: PHYSICIAN ASSISTANT

## 2023-06-13 PROCEDURE — 84443 ASSAY THYROID STIM HORMONE: CPT | Performed by: PHYSICIAN ASSISTANT

## 2023-06-13 PROCEDURE — 80053 COMPREHEN METABOLIC PANEL: CPT | Performed by: PHYSICIAN ASSISTANT

## 2023-06-13 PROCEDURE — 85025 COMPLETE CBC W/AUTO DIFF WBC: CPT | Performed by: PHYSICIAN ASSISTANT

## 2023-06-13 PROCEDURE — 86780 TREPONEMA PALLIDUM: CPT | Performed by: PHYSICIAN ASSISTANT

## 2023-06-13 PROCEDURE — 83036 HEMOGLOBIN GLYCOSYLATED A1C: CPT | Performed by: PHYSICIAN ASSISTANT

## 2023-06-13 PROCEDURE — 99253 IP/OBS CNSLTJ NEW/EST LOW 45: CPT | Performed by: PHYSICIAN ASSISTANT

## 2023-06-13 PROCEDURE — 82306 VITAMIN D 25 HYDROXY: CPT | Performed by: PHYSICIAN ASSISTANT

## 2023-06-13 RX ORDER — ARIPIPRAZOLE 5 MG/1
5 TABLET ORAL DAILY
Status: DISCONTINUED | OUTPATIENT
Start: 2023-06-13 | End: 2023-06-14

## 2023-06-13 RX ADMIN — HYDROXYZINE HYDROCHLORIDE 50 MG: 50 TABLET, FILM COATED ORAL at 06:15

## 2023-06-13 RX ADMIN — ARIPIPRAZOLE 5 MG: 5 TABLET ORAL at 09:13

## 2023-06-13 RX ADMIN — HYDROXYZINE HYDROCHLORIDE 50 MG: 50 TABLET, FILM COATED ORAL at 22:43

## 2023-06-13 RX ADMIN — ONDANSETRON 4 MG: 4 TABLET, ORALLY DISINTEGRATING ORAL at 06:15

## 2023-06-13 RX ADMIN — FAMOTIDINE 20 MG: 20 TABLET, FILM COATED ORAL at 09:13

## 2023-06-13 RX ADMIN — TRAZODONE HYDROCHLORIDE 50 MG: 50 TABLET ORAL at 21:03

## 2023-06-13 RX ADMIN — ONDANSETRON 4 MG: 4 TABLET, ORALLY DISINTEGRATING ORAL at 20:08

## 2023-06-13 RX ADMIN — ONDANSETRON 4 MG: 4 TABLET, ORALLY DISINTEGRATING ORAL at 12:05

## 2023-06-13 RX ADMIN — FAMOTIDINE 20 MG: 20 TABLET, FILM COATED ORAL at 17:01

## 2023-06-13 NOTE — NURSING NOTE
Patient appeared to sleep well throughout the night after one episode of emesis (see previous note)  No signs of distress noted  Q7 minute checks maintained

## 2023-06-13 NOTE — H&P
Psychiatric Evaluation - Boaz 20 y o  female MRN: [id number]  Unit/Bed#: [facility] 202-01 Encounter: [id number]    Assessment/Plan   Principal Problem:    Bipolar 1 disorder, depressed, moderate ([facility] )  Active Problems:    Asthma    Morbid obesity ([facility] )    Medical clearance for psychiatric admission    Nausea vomiting and diarrhea    Chest pain    Autism spectrum disorder      Plan:   Start Abilify 5mg PO QD  Admit to [address] [place] [address] psychiatry  Check admission labs: CMP, CBC, TSH, RPR, UDS, Lipid Panel, A1c  Collaborate with family/social work for baseline assessment and disposition planning    Chart reviewed and case discussed with treatment team   Start/continue the following medications:  Current Facility-Administered Medications   Medication Dose Route Frequency Provider Last Rate   • acetaminophen  650 mg Oral Q6H PRN Cristina Gilliam PA-C     • acetaminophen  650 mg Oral Q4H PRN Cristina Gilliam PA-C     • acetaminophen  975 mg Oral Q6H PRN Cristina Gilliam PA-C     • albuterol  2 puff Inhalation Q4H PRN Cristina Gilliam PA-C     • aluminum-magnesium hydroxide-simethicone  30 mL Oral Q4H PRN Cristina Gilliam PA-C     • ARIPiprazole  5 mg Oral Daily Eric Gaston DO     • artificial tear   Both Eyes 4x Daily PRN Cristina Gilliam PA-C     • benzonatate  100 mg Oral TID PRN Cristina Gilliam PA-C     • benztropine  1 mg Intramuscular BID PRN Cristina Gilliam PA-C     • benztropine  1 mg Oral BID PRN Cristina Gilliam PA-C     • bisacodyl  10 mg Rectal Daily PRN Cristofer Lacy MD     • famotidine  20 mg Oral BID Cristina Gilliam PA-C     • hydrOXYzine HCL  25 mg Oral Q6H PRN Max 4/day Cristina Gilliam PA-C     • hydrOXYzine HCL  50 mg Oral Q4H PRN Max 4/day Cristina Gilliam PA-C      Or   • LORazepam  1 mg Intramuscular Q4H PRN Cristina Gilliam PA-C     • LORazepam  1 mg Oral Q4H PRN Max 6/day Cristina Gilliam PA-C      Or   • LORazepam  2 mg "Intramuscular Q6H PRN Max 3/day Jeffrey Gloria, DAYAN     • magnesium hydroxide  30 mL Oral Daily PRN Ivon Márquez MD     • meclizine  12 5 mg Oral Q12H PRN Jeffrey lGoria PA-C     • OLANZapine  10 mg Oral Q3H PRN Max 3/day Murphy DAYAN Gloria      Or   • OLANZapine  10 mg Intramuscular Q3H PRN Max 3/day Murphy Dolrasheed, PA-C     • OLANZapine  5 mg Oral Q3H PRN Max 6/day Murphy DolDAYAN iqbal      Or   • OLANZapine  5 mg Intramuscular Q3H PRN Max 6/day Murphy Doles, PA-C     • OLANZapine  2 5 mg Oral Q3H PRN Max 8/day Mruphy DAYAN Gloria     • ondansetron  4 mg Oral Q6H PRN Jeffrey Gloria PA-C     • senna-docusate sodium  1 tablet Oral Daily PRN JENELLE ChaidezC     • traZODone  50 mg Oral HS PRN Jeffrey Gloria PA-C         Treatment options and alternatives were reviewed with the patient, who concurs with the above plan  Risks, benefits, and possible side effects of medications were explained to the patient, and she verbalizes understanding       -----------------------------------    Chief Complaint: \"I don't know what caused it\"    History of Present Illness     Per ED provider note:  Pt is a 22yo F who presents for nausea, vomiting, and diarrhea  Patient reports that approximately 3 AM she had sudden onset of symptoms  She reports 30-40 bouts of nonbloody vomiting  She reports \"constant\" diarrhea that is also been nonbloody  She reports diffuse abdominal pain particularly bad with vomiting  Patient also reporting chest pain and shortness of breath  She reports her chest pain is pleuritic and not exertional   She denies any palpitations but does state that she noticed her heart rate was fast   Patient also reporting headache and lightheadedness  She states that she has not been able to tolerate anything p o  today due to the nausea and vomiting  She did take Tylenol earlier this morning with no improvement in symptoms    Patient states that she has been around someone " with similar symptoms  Patient states that she was feeling well yesterday with no symptoms  Patient reports that she takes her daily medications regularly with no recent changes  Per Crisis worker note:  Patient is a 20year old female presenting to the ED by self-referral after an episode at home where she placed a kitchen knife to her throat  Patient is calm and cooperative and agrees to speak to this writer at this time       Patient states that lately she's been in a deep depression episode and that tonight, she reached her limit and took a kitchen knife to her throat with the intent to cut herself  Her mom, who she lives with, is the one who encouraged her to come to the ED via EMS for psychiatric evaluation  Approximately three to four years ago, she was diagnosed with bipolar disorder and has struggled to manage her mood ever since  She has been seeing a pyschiatrist, last saw two weeks ago, and a therapist, seen last week through [facility]  All medications are prescribed through psychiatry and she has been compliant but states that she doesn't feel like the medications nor the psychiatrist are really helping  She denies any explicit thoughts to harm herself at this time but does still endorse a current suicidal ideation  She denies homicidal ideation as well as any symptoms of psychosis  She states that she hasn't had these kinds of thoughts in approximately three years and has not had any intent to harm herself in about as long  Patient is vague about specific trigger regarding tonight but does acknowledge that she hasn't been feeling well physically all day  She reports poor sleep and appetite and says she hasn't been able to do much of either in two days   No legal issues reported and no issues with drug and or alcohol abuse       She is requesting to sign herself in for inpatient treatment as she does not know if she can contract to safety tonight and is seeking further mood stabilization and "medication management  Patient is requesting no referrals be sent to [facility] at this time  This writer explained to patient that this could inhibit her bed search and possibly extend her time in the ED  Patient verbalizes understanding, continues to ascertain that she does not want to be placed in the [facility] area  On admission to Inpatient Psychiatric Unit:  Patient is a 18-year-old white female with a past psychiatric history of autism spectrum disorder and bipolar disorder who presents voluntarily to inpatient [facility] with suicidal thoughts  The patient reportedly held a knife to her throat  Today, the patient does not offer an explanation as to why she did this or what exacerbating stressor she was experiencing in her life  She states that things are generally good at home and she lives with her mom and she cannot think of any exacerbating stressor  Timeline of symptoms is progressively worsening over the past week or so  Mitigating factors are family support and having outpatient psychiatric services through [place]  Patient denies current suicidal ideation at the time of my exam   She states that her mood has been low and that she has had increased sleep and decreased appetite, although this is in the setting of nausea vomiting and diarrhea  Patient states that she was taking Effexor but has not taken it in the past 5 days  She is agreeable to starting Abilify  She says that she has a history of bipolar disorder and her description of diamond does not exactly fit the classical description or the natural history of manic symptoms, although this is certainly a possibility  Her description of manic sxs includes increased mood and \"feeling normal \" She denies psychotic symptoms      Psychiatric Review Of Systems:  Medication side effects: none  Sleep: increased  Appetite: decreased  Hygiene: able to tend to instrumental and basic ADLs  Anxiety Symptoms: denies  Psychotic Symptoms: denies  Depression " "Symptoms: per HPI  Manic Symptoms: per HPI  PTSD Symptoms: denies  Suicidal Thoughts: per HPI  Homicidal Thoughts: denies    Medical Review Of Systems:   Patient denies headache or dizziness  Patient denies chest pain or palpitations  Patient denies difficulty breathing or wheezing  Patient endorses nausea, vomiting, and diarrhea  Patient denies polyuria or polydipsia  Patient denies weakness or numbness  Pertinent positives as per HPI  Historical Information     Psychiatric History:   Diagnoses: BPAD, ASD  Inpatient Hx: \"I lost count\"  Outpatient Hx: [facility]  Medications/Trials: Effexor    Substance Abuse History:  Social History     Substance and Sexual Activity   Alcohol Use Never     Social History     Substance and Sexual Activity   Drug Use Never     Denies    I spent time with Augustin Gore in counseling and education on risk of substance abuse  I assessed motivation and encouraged her for treatment as appropriate       Family History:   Family History   Problem Relation Age of Onset   • Breast cancer Mother    • Heart attack Mother    • Depression Mother    • Mental illness Mother    • Coronary artery disease Mother    • Hypertension Mother    • Diabetes Mother    • Hyperlipidemia Mother    • Pneumonia Brother    • Hypertension Maternal Grandmother    • Stroke Maternal Grandmother    • Diabetes Maternal Grandmother    • Glaucoma Maternal Grandmother    • Kidney disease Maternal Grandmother    • Sarcoidosis Maternal Grandmother    • Diabetes Maternal Grandfather    • Hypertension Maternal Grandfather    • Stroke Maternal Grandfather    • Glaucoma Maternal Grandfather    • Heart attack Maternal Grandfather    • Colon cancer Neg Hx    • Ovarian cancer Neg Hx        Social History:  Highest education: HS  Currently living: With mom  Relationships: Single, no relationship  Children: None  Occupation: Unemployed on SSD  No legal or  hx    Rest of social history as per below:    Social History " "    Socioeconomic History   • Marital status: Single     Spouse name: Not on file   • Number of children: Not on file   • Years of education: Not on file   • Highest education level: Not on file   Occupational History   • Not on file   Tobacco Use   • Smoking status: Never   • Smokeless tobacco: Never   • Tobacco comments:     no passive smoke exposure   Vaping Use   • Vaping Use: Never used   Substance and Sexual Activity   • Alcohol use: Never   • Drug use: Never   • Sexual activity: Not on file   Other Topics Concern   • Not on file   Social History Narrative   • Not on file     Social Determinants of Health     Financial Resource Strain: Low Risk  (9/14/2022)    Overall Financial Resource Strain (CARDIA)    • Difficulty of Paying Living Expenses: Not hard at all   Food Insecurity: No Food Insecurity (9/14/2022)    Hunger Vital Sign    • Worried About Running Out of Food in the Last Year: Never true    • Ran Out of Food in the Last Year: Never true   Transportation Needs: No Transportation Needs (9/14/2022)    PRAPARE - Transportation    • Lack of Transportation (Medical): No    • Lack of Transportation (Non-Medical):  No   Physical Activity: Not on file   Stress: Not on file   Social Connections: Not on file   Intimate Partner Violence: Not on file   Housing Stability: Not on file       Past Medical History:   Past Medical History:   Diagnosis Date   • ADHD    • Anxiety    • Asthma    • Autism    • [facility] disorder [facility]    • Depression    • GERD (gastroesophageal reflux disease)         -----------------------------------  Objective    Temp:  [98 1 °F (36 7 °C)-99 4 °F (37 4 °C)] 99 4 °F (37 4 °C)  HR:  [74-96] 74  Resp:  [17-18] 17  BP: (128-149)/(67-79) 128/79    Mental Status Evaluation:  Appearance: moderately kempt, malodorous  Motor:+PMR  Behavior: superficially cooperative  Speech: mostly normal with brief periods of increased volume  Mood: \"fine\"  Affect: constricted  Thought Process: concrete but " answers questions in a mostly linear fashion  Thought Content: denies auditory hallucinations, denies visual hallucinations, denies delusions  Risk Potential: denies suicidal ideation, plan, or intent  Denies homicidal ideation  Sensorium: Oriented to person, place, time, and situation  Cognition: cognitive ability mildly impaired but was not quantitatively tested  Consciousness: alert and awake  Attention: currently intact  Insight: limited  Judgement: limited      Meds/Allergies   No Known Allergies      Behavioral Health Medications: all current active meds have been reviewed as per above  Changes as per above  Risks, benefits, indications, and alternatives to treatment were discussed with patient  Laboratory results:  I have personally reviewed all pertinent laboratory/tests results    Recent Results (from the past 48 hour(s))   ECG 12 lead    Collection Time: 06/11/23  5:15 PM   Result Value Ref Range    Ventricular Rate 100 BPM    Atrial Rate 100 BPM    OK Interval 124 ms    QRSD Interval 96 ms    QT Interval 338 ms    QTC Interval 436 ms    P Axis 52 degrees    QRS Axis 58 degrees    T Wave Axis 44 degrees   CBC and differential    Collection Time: 06/11/23  5:38 PM   Result Value Ref Range    WBC 10 88 (H) 4 31 - 10 16 Thousand/uL    RBC 4 95 3 81 - 5 12 Million/uL    Hemoglobin 12 3 11 5 - 15 4 g/dL    Hematocrit 39 0 34 8 - 46 1 %    MCV 79 (L) 82 - 98 fL    MCH 24 8 (L) 26 8 - 34 3 pg    MCHC 31 5 31 4 - 37 4 g/dL    RDW 13 9 11 6 - 15 1 %    MPV 9 8 8 9 - 12 7 fL    Platelets 568 617 - 715 Thousands/uL    nRBC 0 /100 WBCs    Neutrophils Relative 86 (H) 43 - 75 %    Immat GRANS % 1 0 - 2 %    Lymphocytes Relative 7 (L) 14 - 44 %    Monocytes Relative 6 4 - 12 %    Eosinophils Relative 0 0 - 6 %    Basophils Relative 0 0 - 1 %    Neutrophils Absolute 9 44 (H) 1 85 - 7 62 Thousands/µL    Immature Grans Absolute 0 05 0 00 - 0 20 Thousand/uL    Lymphocytes Absolute 0 72 0 60 - 4 47 Thousands/µL "Monocytes Absolute 0 64 0 17 - 1 22 Thousand/µL    Eosinophils Absolute 0 00 0 00 - 0 61 Thousand/µL    Basophils Absolute 0 03 0 00 - 0 10 Thousands/µL   Comprehensive metabolic panel    Collection Time: 06/11/23  5:38 PM   Result Value Ref Range    Sodium 138 135 - 147 mmol/L    Potassium 3 6 3 5 - 5 3 mmol/L    Chloride 105 96 - 108 mmol/L    CO2 20 (L) 21 - 32 mmol/L    ANION GAP 13 4 - 13 mmol/L    BUN 11 5 - 25 mg/dL    Creatinine 0 97 0 60 - 1 30 mg/dL    Glucose 113 65 - 140 mg/dL    Calcium 9 1 8 4 - 10 2 mg/dL    AST 26 13 - 39 U/L    ALT 38 7 - 52 U/L    Alkaline Phosphatase 112 (H) 34 - 104 U/L    Total Protein 7 5 6 4 - 8 4 g/dL    Albumin 4 4 3 5 - 5 0 g/dL    Total Bilirubin 0 36 0 20 - 1 00 mg/dL    eGFR 81 ml/min/1 73sq m   TSH, 3rd generation with Free T4 reflex    Collection Time: 06/11/23  5:38 PM   Result Value Ref Range    TSH 3RD GENERATON 0 856 0 450 - 4 500 uIU/mL   HS Troponin 0hr (reflex protocol)    Collection Time: 06/11/23  5:38 PM   Result Value Ref Range    hs TnI 0hr <2 \"Refer to ACS Flowchart\"- see link ng/L   D-Dimer    Collection Time: 06/11/23  5:38 PM   Result Value Ref Range    D-Dimer, Quant 2 38 (H) <0 50 ug/ml FEU   Lipase    Collection Time: 06/11/23  5:38 PM   Result Value Ref Range    Lipase 13 11 - 82 u/L   POCT pregnancy, urine    Collection Time: 06/11/23  6:40 PM   Result Value Ref Range    EXT Preg Test, Ur Negative     Control Valid    POCT alcohol breath test    Collection Time: 06/11/23 10:54 PM   Result Value Ref Range    EXTBreath Alcohol 0 00    POCT pregnancy, urine    Collection Time: 06/11/23 10:56 PM   Result Value Ref Range    EXT Preg Test, Ur Negative     Control Valid    Rapid drug screen, urine    Collection Time: 06/11/23 10:58 PM   Result Value Ref Range    Amph/Meth UR Negative Negative    Barbiturate Ur Negative Negative    Benzodiazepine Urine Negative Negative    Cocaine Urine Negative Negative    Methadone Urine Negative Negative    Opiate Urine " Negative Negative    PCP Ur Negative Negative    THC Urine Negative Negative    Oxycodone Urine Negative Negative   COVID only    Collection Time: 06/12/23  2:37 AM    Specimen: Nose; Nares   Result Value Ref Range    SARS-CoV-2 Negative Negative        Progress Toward Goals & Illness Status: Patient is not at goal  They are psychiatrically unstable and are not yet ready for discharge  The patient's condition currently requires active psychopharmacological medication management, interdisciplinary coordination with case management, and the utilization of adjunctive milieu and group therapy to augment psychopharmacological efficacy  The patient's risk of morbidity, and progression or decompensation of psychiatric disease, is high without this current treatment            -----------------------------------    Risks / Benefits of Treatment:     Risks, benefits, and possible side effects of medications explained to patient  The patient verbalizes understanding and agreement for treatment  Counseling / Coordination of Care:     Patient's presentation on admission and proposed treatment plan were discussed with the treatment team   Diagnosis, medication changes and treatment plan were reviewed with the patient  Recent stressors were discussed with the patient  Events leading to admission were reviewed with the patient  Importance of medication and treatment compliance was reviewed with the patient  Inpatient Psychiatric Certification:     Certification: Based upon physical, mental and social evaluations, I certify that inpatient psychiatric services are medically necessary for this patient for a duration of 5-7 midnights (exact duration TBD pending patient's response to psychiatric treatment) for the diagnosis listed above  Available alternative community resources do not meet the patient's mental health care needs    I further attest that an established written individualized plan of care has been implemented and is outlined in the patient's medical records  This note has been constructed using a voice recognition system  There may be translation, syntax, or grammatical errors  If you have any questions, please contact the dictating provider

## 2023-06-13 NOTE — CASE MANAGEMENT
Intake        :  2000   Admit Date/Time:   23 at 3 pm  Discharge Date: TBD    Treatment Plan:   Reviewed & Signed    Confirmed Address:    [place]: [address]   Resides in the home with:   Parents and grandparents    Will Return Home at Discharge: Address above    Confirmed Phone Number:   [phone]   Confirmed Email Address:   n/a    Marital Status:  Children: Single  No kids    Family History:            Parents:                       Siblings: Mother (depression, anxiety, PTSD, and bipolar d/o)      1 brother ()    Commitment Status:    Status Changes:  201     No   Admitted from:   [facility]    Presenting C/O:         Patient reported not recalling the behaviors that led her to go to the ED  As per ED Note:    68-year-old female presents with worsening depression and suicidal ideation  She reports that she came home from being evaluated for GI illness and took her regular nighttime medication and subsequently took a knife and held it to her throat  Family called 46 and the patient presents requesting admission for ongoing psychiatric care  She denies any homicidal ideation, drug or alcohol use, or other acute medical concerns  Past Inpatient Tx: At least 7x’s    Past Suicide Attempts:   2 attempts by hanging in the past    Current outpatient: [address] [place] [address]   [address]  [phone]   Psychiatrist:   Dr Serene Elizalde at [address] [place] [address]    Therapist:   Anne Sparrow at [address] [place] [address]    ACT/ICM/CPS/WRT/SC:   Declined    PCP:   Dr Shahbaz Slade Hx/Concern:   Asthma  Obesity    Medications:      Pharmacy:   [id number]:   N/a   Education:   HS Grad    Work/Income:    Preferred time for appts: SSI/SSD (approx  $300 a month)   Legal:     Probation/[place] [employer]: n/a    Access to Firearms:   No   Transportation: Family transports patient    Strength: Motivated to get better    Coping Skills:   Talking to therapist, talking to family, music, and being alone    Goal:   Adjust medications    Referrals Needed:     No      Transport at Discharge:   Family will pick her up (Mother Brittney Cuellar)   IMM: [employer] Text RAQUEL: n/a   Emergency Contact:    Laurie Granados [phone]   ROIs obtained:     Laurie Granados (mother) [address] ([phone])   Insurance:    [facility]   Audit:       0 PAWSS: 0   Substance Abuse: n/a Freq   BAT: 0 UDS: negative    Heroin  Amount Last Use Notes:   Amp/Meth denies      Alcohol denies      Cocaine denies      Cannabis denies      Benzodiazepine denies      Barbituartes denies      Other denies      Tobacco denies       denies

## 2023-06-13 NOTE — PROGRESS NOTES
Reviewed Diagnosis of: Principal Problem:    Bipolar 1 disorder, depressed, moderate ([facility] )  Active Problems:    Asthma    Morbid obesity ([facility] )    Medical clearance for psychiatric admission    Nausea vomiting and diarrhea    Chest pain    Autism spectrum disorder    Reviewed Short Term Goals of:decrease in depressive symptoms, ability to stay safe on the unit    All parties in agreement         06/13/23 0210   Team Meeting   Meeting Type Tx Team Meeting   Team Members Present   Team Members Present Physician;Nurse;   Physician Team Member Dr Padmini Doherty Team Member Cesar Management Team Member Angeles Mosqueda   Patient/Family Present   Patient Present No  (pt declined to participate)   Patient's Family Present No

## 2023-06-13 NOTE — NURSING NOTE
Pt with multiple episodes of being incontinent of loose stool, noted on pants and sheets  Pt encouraged to shower  After shower pt noted to put on soiled, wet clothing  Verbal cueing to change into clean clothes needed  Wearing briefs, encouraged to stay in room and eat light meals  Nausea, vomiting and diarrhea continue throughout day  PRN Zofran given @ 1215  Pt reports medication effective

## 2023-06-13 NOTE — TREATMENT TEAM
06/13/23 1030   Activity/Group Checklist   Group Other (Comment)  (art therapy)   Attendance Attended   Attendance Duration (min) 0-15   Interactions Did not interact   Affect/Mood Blunted/flat   Goals Achieved Able to listen to others

## 2023-06-13 NOTE — NURSING NOTE
Pt reporting to staff that she briefly felt unable to contract for safety  Stating she was hearing voices to make her self vomit and claming to have anorexia  Pt reassured that she was vomiting related to GI virus  Pt able to process with staff and reassure feeling safe on the unit and will see staff with any unsafe thoughts or behaviors   Denies SI/HI/VH

## 2023-06-13 NOTE — ASSESSMENT & PLAN NOTE
• At this time, patient appears medically stable to continue inpatient psychiatric management  • Current labs, nursing notes and imaging reviewed  o Pending Labs: CMP, CBC, lipid panel, HCG, TSH, A1C, RPR, vitamin D  • Recent EKG: Sinus arrhythmia  HR 67 bpm

## 2023-06-13 NOTE — NURSING NOTE
"Trini with episode of diarrhea and emesis  Patient reports, \"I drank apple juice and it didn't agree with me  \" Jane Dwyer was encouraged to drink small sips of water instead of juice to avoid further gastric irritation  Patient declining PRN medication at this time     "

## 2023-06-13 NOTE — ASSESSMENT & PLAN NOTE
· CTA on 6/11 without acute abnormality  Labs from 6/11 with WBC 10 8 but otherwise WNL  · Lipase WNL on 6/11  · 4 days of N/V/D  · Able to tolerate water but not much else  · Await labs from this AM  · Supportive care  Encourage small amounts of water, toast, crackers  · Suspect GI viral illness given other sick contacts at home     · Keep in individual room at this time

## 2023-06-13 NOTE — NURSING NOTE
Patient approached this RN and requested and received Zyprexa 2 5 MG PO PRN for mild agitation at 21:03  Agitated behavior scale: 22  Reports feelings frustration about being here  Declined to process with this RN and would like to attempt to sleep  Medication reassessment at 22:03: Patient reports medication is effective and that she now feels more calm

## 2023-06-13 NOTE — PROGRESS NOTES
[facility] Group Note     06/13/23 1230 06/13/23 1400   Activity/Group Checklist   Group Life Skills  (Reframing time perspectives that create anxiety) Personal control  (Mindfulness Techniques - Progressive Muscle Relaxation and The Feelings Wheel)   Attendance Attended Attended   Attendance Duration (min) 31-45  (arrived late to group) 46-60   Interactions Did not interact  (Lethargic; fell asleep snf through group) Other (Comment)  (Tangential at times, but redirectable  Disclosed personal traumas with group )   Affect/Mood Blunted/flat Appropriate  (Brighter than previous encounters)   Goals Achieved Able to listen to others  (received resources/benefited from social presence of group) Identified feelings; Identified triggers; Discussed coping strategies; Discussed self-esteem issues; Able to listen to others; Able to engage in interactions; Able to reflect/comment on own behavior;Able to manage/cope with feelings; Able to self-disclose; Able to recieve feedback; Able to give feedback to another

## 2023-06-13 NOTE — ASSESSMENT & PLAN NOTE
· Had elevated d-dimer in ER but negative CTA  · Suspect related to GI illness/vomiting given timing of onset and worse with vomiting  · Supportive care  · Continue pepcid

## 2023-06-13 NOTE — PLAN OF CARE
Problem: DISCHARGE PLANNING  Goal: Discharge to home or other facility with appropriate resources  Description: INTERVENTIONS:  - Identify barriers to discharge w/patient and caregiver  - Arrange for needed discharge resources and transportation as appropriate  - Identify discharge learning needs (meds, wound care, etc )  - Arrange for interpretive services to assist at discharge as needed  - Refer to Case Management Department for coordinating discharge planning if the patient needs post-hospital services based on physician/advanced practitioner order or complex needs related to functional status, cognitive ability, or social support system  Outcome: Progressing- CM met with patient and went over Treatment Plan, Intake, and RAQUEL's

## 2023-06-13 NOTE — DISCHARGE INSTR - APPOINTMENTS
You are being discharged to your confirmed address of [facility], [address] [place] [address] Drive  You will be contacted at your confirmed phone number of [phone]  Mary Stevens or Sandra, nely Araujo, will be calling you after your discharge, on the phone number that you provided  They will be available as an additional support, if needed  If you wish to speak with one of them, you may contact Rachana Noel at [phone] or Jacquelyn Pillai at [phone]

## 2023-06-13 NOTE — TREATMENT TEAM
06/13/23 1000   Activity/Group Checklist   Group Exercise   Attendance Attended   Attendance Duration (min) 0-15   Interactions Did not interact   Affect/Mood Blunted/flat   Goals Achieved Other (Comment)  (briefly walked on unit with peers)

## 2023-06-13 NOTE — CONSULTS
[address]  Consult  Name: Jorge Mcknight 20 y o  female I MRN: [id number]  Unit/Bed#: [facility] 202-01 I Date of Admission: 6/12/2023   Date of Service: 6/13/2023 I Hospital Day: 1    [facility]  Consult performed by: Wilfredo Trinh PA-C  Consult ordered by: Duncan Humphrey PA-C          Assessment/Plan   Medical clearance for psychiatric admission  Assessment & Plan  • At this time, patient appears medically stable to continue inpatient psychiatric management  • Current labs, nursing notes and imaging reviewed  o Pending Labs: CMP, CBC, lipid panel, HCG, TSH, A1C, RPR, vitamin D  • Recent EKG: Sinus arrhythmia  HR 67 bpm    Nausea vomiting and diarrhea  Assessment & Plan  · CTA on 6/11 without acute abnormality  Labs from 6/11 with WBC 10 8 but otherwise WNL  · Lipase WNL on 6/11  · 4 days of N/V/D  · Able to tolerate water but not much else  · Await labs from this AM  · Supportive care  Encourage small amounts of water, toast, crackers  · Suspect GI viral illness given other sick contacts at home  · Keep in individual room at this time    Chest pain  Assessment & Plan  · Had elevated d-dimer in ER but negative CTA  · Suspect related to GI illness/vomiting given timing of onset and worse with vomiting  · Supportive care  · Continue pepcid    Morbid obesity ([address] )  Assessment & Plan  · Body mass index is 45 04 kg/m²  · Recommend dietary and lifestyle changes    Asthma  Assessment & Plan  · No acute exacerbation  · PRN albuterol       Recommendations for Discharge:  · SLIM will continue to be available for any questions or concerns  · Follow up with PCP upon discharge  Counseling / Coordination of Care Time: 30 minutes  Greater than 50% of total time spent on patient counseling and coordination of care  Collaboration of Care:  Were Recommendations Directly Discussed with Primary Treatment Team? - Yes     History of Present Illness:    Venessa Lee is a 20 y o  female who is originally admitted to the psychiatric service due to suicidal ideations  We are consulted for medical clearance for psychiatric hospitalization and medical management  Patient presented with vomiting, diarrhea, abdominal pain and nausea on 6/11/23  At that time she reported he had 30-40 episodes of vomiting and constant diarrhea  She also reported CP and SOB  She was discharged from the ED and then returned later that day with reports that she wanted to  a knife and harm herself  She continues with nausea, vomiting and diarrhea  Reports she is able to tolerate water but vomits up the goldfish or juice  Having a lot of diarrhea, maybe 20 episodes per day, which are watery or mushy in nature  No blood  Family members at home were also sick but are starting to get better  Reports CP with vomiting and began at the same time as her GI symptoms  Her breathing symptoms have gotten better  No urinary complaints  Has PMH of asthma and uses albuterol PRN  Review of Systems:    Review of Systems   Constitutional: Positive for fatigue  Negative for chills, diaphoresis, fever and unexpected weight change  HENT: Negative for sore throat  Respiratory: Negative for cough, chest tightness and shortness of breath  Cardiovascular: Negative for chest pain, palpitations and leg swelling  Gastrointestinal: Positive for abdominal pain, diarrhea, nausea and vomiting  Genitourinary: Negative for dysuria  Musculoskeletal: Negative for myalgias  Neurological: Positive for weakness  Negative for dizziness, syncope, numbness and headaches  Psychiatric/Behavioral: Negative for confusion  All other systems reviewed and are negative        Past Medical and Surgical History:     Past Medical History:   Diagnosis Date   • ADHD    • Anxiety    • Asthma    • Autism    • [facility] disorder ([facility]    • Depression    • GERD (gastroesophageal reflux disease) Past Surgical History:   Procedure Laterality Date   • CHOLANGIOGRAM N/A 12/01/2018    Procedure: CHOLANGIOGRAM;  Surgeon: Alesia Meigs, MD;  Location: [facility];  Service: General   • CHOLECYSTECTOMY LAPAROSCOPIC N/A 12/01/2018    Procedure: CHOLECYSTECTOMY LAPAROSCOPIC;  Surgeon: Alesia Meigs, MD;  Location:  MAIN OR;  Service: General   • EYE MUSCLE SURGERY Bilateral 2006   • RI ESOPHAGOGASTRODUODENOSCOPY TRANSORAL DIAGNOSTIC N/A 11/27/2018    Procedure: ESOPHAGOGASTRODUODENOSCOPY (EGD) with bx;  Surgeon: Elaine Randle MD;  Location: [facility]; Service: General       Meds/Allergies:    PTA meds:   Prior to Admission Medications   Prescriptions Last Dose Informant Patient Reported? Taking?    OLANZapine (ZyPREXA) 5 mg tablet Past Week  Yes Yes   Sig: Take 5 mg by mouth daily at bedtime   albuterol (PROVENTIL HFA,VENTOLIN HFA) 90 mcg/act inhaler Past Week  No Yes   Sig: Inhale 2 puffs every 4 (four) hours as needed for wheezing   albuterol (ProAir HFA) 90 mcg/act inhaler Past Week  No Yes   Sig: Inhale 2 puffs every 6 (six) hours as needed for wheezing   benzonatate (TESSALON PERLES) 100 mg capsule Not Taking  No No   Sig: Take 1 capsule (100 mg total) by mouth 3 (three) times a day as needed for cough   Patient not taking: Reported on 6/12/2023   ergocalciferol (VITAMIN D2) 50,000 units Not Taking  No No   Sig: TAKE ONE CAPSULE BY MOUTH ONE TIME PER WEEK   Patient not taking: Reported on 6/12/2023   famotidine (PEPCID) 20 mg tablet   No No   Sig: Take 1 tablet (20 mg total) by mouth 2 (two) times a day   fluticasone (FLONASE) 50 mcg/act nasal spray Past Week  No Yes   Sig: SPRAY 1 SPRAY INTO EACH NOSTRIL EVERY DAY   gabapentin (NEURONTIN) 600 MG tablet Not Taking  No No   Sig: Take 1 tablet (600 mg total) by mouth every evening   Patient not taking: Reported on 6/12/2023   hydrOXYzine HCL (ATARAX) 25 mg tablet Past Week  No Yes   Sig: TAKE 1 TABLET (25 MG TOTAL) BY MOUTH EVERY 6 (SIX) HOURS AS NEEDED FOR ITCHING OR ANXIETY   meclizine (ANTIVERT) 12 5 MG tablet Not Taking  No No   Sig: Take 1 tablet (12 5 mg total) by mouth every 12 (twelve) hours as needed for dizziness   Patient not taking: Reported on 6/12/2023   ondansetron (ZOFRAN) 4 mg tablet Not Taking  No No   Sig: Take 1 tablet (4 mg total) by mouth every 6 (six) hours   Patient not taking: Reported on 6/12/2023   ondansetron (ZOFRAN-ODT) 4 mg disintegrating tablet Not Taking  No No   Sig: Take 1 tablet (4 mg total) by mouth every 6 (six) hours as needed for nausea or vomiting   Patient not taking: Reported on 6/12/2023   ondansetron (Zofran ODT) 4 mg disintegrating tablet Not Taking  No No   Sig: Take 1 tablet (4 mg total) by mouth every 6 (six) hours as needed for nausea or vomiting   Patient not taking: Reported on 6/12/2023   venlafaxine (EFFEXOR-XR) 37 5 mg 24 hr capsule Past Week  Yes Yes   Sig: Take 37 5 mg by mouth daily   ziprasidone (GEODON) 20 mg capsule Not Taking  Yes No   Sig: Take 20 mg by mouth daily at bedtime   Patient not taking: Reported on 6/12/2023      Facility-Administered Medications: None       Allergies: No Known Allergies    Social History:     Marital Status: Single    Substance Use History:   Social History     Substance and Sexual Activity   Alcohol Use Never     Social History     Tobacco Use   Smoking Status Never   Smokeless Tobacco Never   Tobacco Comments    no passive smoke exposure     Social History     Substance and Sexual Activity   Drug Use Never       Family History:    Family History   Problem Relation Age of Onset   • Breast cancer Mother    • Heart attack Mother    • Depression Mother    • Mental illness Mother    • Coronary artery disease Mother    • Hypertension Mother    • Diabetes Mother    • Hyperlipidemia Mother    • Pneumonia Brother    • Hypertension Maternal Grandmother    • Stroke Maternal Grandmother    • Diabetes Maternal Grandmother    • Glaucoma Maternal Grandmother    • Kidney disease "Maternal Grandmother    • Sarcoidosis Maternal Grandmother    • Diabetes Maternal Grandfather    • Hypertension Maternal Grandfather    • Stroke Maternal Grandfather    • Glaucoma Maternal Grandfather    • Heart attack Maternal Grandfather    • Colon cancer Neg Hx    • Ovarian cancer Neg Hx        Physical Exam:     Vitals:   Blood Pressure: 128/79 (06/13/23 0729)  Pulse: 74 (06/13/23 0729)  Temperature: 99 4 °F (37 4 °C) (06/13/23 0729)  Temp Source: Tympanic (06/13/23 0729)  Respirations: 17 (06/13/23 0729)  Height: 4' 11\" (149 9 cm) (06/12/23 1558)  Weight - Scale: 101 kg (223 lb) (06/12/23 1558)  SpO2: 97 % (06/13/23 0729)    Physical Exam  Vitals and nursing note reviewed  Constitutional:       General: She is not in acute distress  Appearance: Normal appearance  She is obese  She is not ill-appearing or diaphoretic  Comments: Seen in room   HENT:      Head: Normocephalic and atraumatic  Mouth/Throat:      Mouth: Mucous membranes are moist       Comments: MM moist  Cardiovascular:      Rate and Rhythm: Normal rate and regular rhythm  Pulmonary:      Effort: Pulmonary effort is normal       Breath sounds: Normal breath sounds  No stridor  No wheezing, rhonchi or rales  Abdominal:      General: Bowel sounds are normal       Palpations: Abdomen is soft  There is no mass  Tenderness: There is abdominal tenderness (diffusely)  There is no guarding  Musculoskeletal:      Right lower leg: No edema  Left lower leg: No edema  Skin:     General: Skin is warm and dry  Neurological:      Mental Status: She is alert  Comments: CN 2-12 grossly intact  Follows commands     Psychiatric:         Mood and Affect: Mood normal          Behavior: Behavior normal          Additional Data:     Lab Results:     Results from last 7 days   Lab Units 06/11/23  1738   EOS PCT % 0   HEMATOCRIT % 39 0   HEMOGLOBIN g/dL 12 3   LYMPHS PCT % 7*   MONOS PCT % 6   NEUTROS PCT % 86*   PLATELETS Thousands/uL " 300   WBC Thousand/uL 10 88*     Results from last 7 days   Lab Units 06/11/23  1738   ANION GAP mmol/L 13   ALBUMIN g/dL 4 4   ALK PHOS U/L 112*   ALT U/L 38   AST U/L 26   BUN mg/dL 11   CALCIUM mg/dL 9 1   CHLORIDE mmol/L 105   CO2 mmol/L 20*   CREATININE mg/dL 0 97   GLUCOSE RANDOM mg/dL 113   POTASSIUM mmol/L 3 6   SODIUM mmol/L 138   TOTAL BILIRUBIN mg/dL 0 36             Lab Results   Component Value Date/Time    HGBA1C 5 3 01/24/2023 01:07 PM    HGBA1C 5 5 03/30/2022 09:33 AM    HGBA1C 5 3 10/26/2020 10:00 AM               Imaging: I have personally reviewed pertinent reports  No orders to display   IMPRESSION:     No evidence of lung consolidation or pulmonary embolism  No acute CT findings in the abdomen or pelvis  Correlate with urinalysis to exclude urinary tract infection        EKG, Pathology, and Other Studies Reviewed on Admission:   · EKG: see above    ** Please Note: This note has been constructed using a voice recognition system   **

## 2023-06-13 NOTE — CMS CERTIFICATION NOTE
Recertification: Based upon physical, mental and social evaluations, I certify that inpatient psychiatric services continue to be medically necessary for this patient for a duration of 5 midnights for the treatment of  Bipolar 1 disorder, depressed, moderate ([facility] [address] )   Available alternative community resources still do not meet the patient's mental health care needs  I further attest that an established written individualized plan of care has been updated and is outlined in the patient's medical records

## 2023-06-13 NOTE — TREATMENT PLAN
TREATMENT PLAN REVIEW - [facility] 23 y o  2000 female MRN: [id number]    [address] [place] [address] Room / Bed: Amparo Yepez [facility] 297-20 Encounter: [id number]          Admit Date/Time:  6/12/2023  3:00 PM    Treatment Team: Attending Provider: Omaira Clayton MD; Registered Nurse: Jenni Arevalo RN; Patient Care Technician: Nikki Macias;  Charge Nurse: Nick Ballesteros RN; Care Manager: Jose Rivers RN; Licensed Practical Nurse: Layne Mccormack LPN; Licensed Practical Nurse: Michael Lai LPN; Patient Care Assistant: Mike Jonas; Patient Care Technician: Lori Guerra; Registered Nurse: Lidya Triplett RN; Charge Nurse: Eliu Lockett RN; Patient Care Assistant: Carol Ann Person    Diagnosis: Principal Problem:    Bipolar 1 disorder, depressed, moderate ([facility] [address] )  Active Problems:    Asthma    Morbid obesity ([facility] )    Medical clearance for psychiatric admission    Nausea vomiting and diarrhea    Chest pain    Autism spectrum disorder      Patient Strengths/Assets: good past treatment response    Patient Barriers/Limitations: noncompliant with medication, poor interpersonal skills    Short Term Goals: decrease in depressive symptoms, ability to stay safe on the unit    Long Term Goals: improvement in depression, free of suicidal thoughts    Progress Towards Goals: starting psychiatric medications as prescribed, continue psychiatric medications as prescribed, improving gradually    Recommended Treatment: medication management, patient medication education, group therapy, milieu therapy, continued Behavioral Health psychiatric evaluation/assessment process    Treatment Frequency: daily medication monitoring, group and milieu therapy daily, monitoring through interdisciplinary rounds, monitoring through weekly patient care conferences    Expected Discharge Date:  5-7 days    Discharge Plan: discharge to home    Treatment Plan Created/Updated By: Ale Major DO

## 2023-06-13 NOTE — NURSING NOTE
@8081 PRN Atarax 50mg po given for anxiety  Rankin score 20  PRN Zofran 4mg given at this time for nausea  @2489 PRN Atarax effective pt reports feeling calm  PRN Zofran ineffective pt continues with nausea and vomiting

## 2023-06-13 NOTE — NURSING NOTE
Patient belongings in locker:    1 cell phone  1 pair sneakers (laces)  1 pair swimming trunks (strings)    At bedside:    1 black shirt  1 pink sock

## 2023-06-13 NOTE — PROGRESS NOTES
Patient is a 89 from [facility]  Patient went to the ED after a suicide attempt with a knife  Patient reported was encouraged to go to the ED by her mother, whom she resides with  Patient reports feeling overwhelmed and is unable to recall incident  Patient denies current SI/HI, AVH, and denies drug or alcohol use  UDS, PAWSS, AUDIT, and UDS reviewed  Discharge Date: TBD       06/13/23 4036   Team Meeting   Meeting Type Daily Rounds   Team Members Present   Team Members Present Physician;Nurse;; Other (Discipline and Name)   Physician Team Member Dr Gege Ramirez/ [facility] Member [facility] Management Team Member Todd/ Ciaran/ Alexa   Other (Discipline and Name) Art Therapist, [place]   Patient/Family Present   Patient Present No   Patient's Family Present No

## 2023-06-13 NOTE — DISCHARGE INSTR - OTHER ORDERS
[facility] is a confidential 7 days/week telephone support service manned by trained mental health consumers  ? ? [facility] operates daily but?is not able to accept?calls between? 2AM-6AM  ? [facility] provides support, a listening ear and can provide information about available services  [facility] specializes in the concerns of mental health consumers, their families and friends  ? However, we are also here for anyone who has a mental health concern, is confused about or just doesn't know anything about mental health or where to get information  To reach [facility], call [phone] accepts calls between 6:00 AM to 10:00 AM and from 4:00 PM to 12:00 AM     Text CONNECT to 709100 from anywhere in the [place], anytime, about any type of crisis  A live, trained Crisis Counselor receives the text and lets you know that they are here to listen  The volunteer Crisis Counselor will help you move from a hot moment to a cool moment  [facility] AT [facility] - licensed telephone and mobile crisis services that provide mental health assessments to all age groups regardless of income or insurance  ? Crisis Intervention operates 24-hour/7 days a week  [address] [place] [address] assists consumer who are experiencing a mental health emergency and lack the resources to assist themselves  ? Immediate intervention for suicidal and depressed individuals with home visits/outreach being top priority  Crisis can be contacted at [phone]  [facility]) offers various education & support groups for you & your family  For more information visit their website at    http://Modria/    Dial 2-1-1 to get connected/get help  Free, confidential information & referral available 24/7: Aging Services, Child & Youth Services, Counseling, Education/Training, Food/Shelter/Clothing, Health Services, Parenting, Substance Abuse, Support Groups, Volunteer Opportunities, & much more     Phone: 2-1-1 or [phone], Web: JTP [email or web address] IPK, Email: Agustinso@Clutch.io      [facility]   [facility] ([facility], [facility], [address] [place] [address]) offers persons with a mental illness a safe, healing environment to explore their personal and vocational potential and receive support in achieving their goals  Instead of traditional therapy, the work of the house is the rehabilitation  As members contribute meaningful work to the house, they build confidence and a sense of purpose  Be a Member - It’s Free   Membership in the [facility] is open to any [facility] resident who is 25 or older and has a history of mental illness  Membership is free for life  [facility], [facility], [address] [place] [address]   Hours: Monday - Friday: 8 a m  - 4 p m  With varying hours on holidays    ([address] [place] [address] ([place] [address], [place], [place]) provides a stress-free atmosphere for persons 18 and older who have experienced mental health issues  What [facility] [employer]   [employer]   Holiday gatherings   Recovery   Employment   Education   Community Resources   Empowerment   Helps participants achieve their goals   Enhances quality of life   Encourages leadership      The [address]   Hours: Monday through Friday: 4 p m  - 9 p m  Saturday: 2 p m  - 8 p m  Closed Sundays   Varying hours on holidays             Contact [phone]      Support & Referral Helpline   Support and Referral Helpline is a 24-hour, 7 days-a-week, listening and referral service provided to all of [place]  Please call [phone] or tty [phone] to speak with one of our specialists  The helpline is possible through partnerships with the 51 [employer] for [facility] (formerly known as the [employer]) provides free and confidential emotional support to people in suicidal crisis or emotional distress 24 hours a day, 7 days a week, across the [place]  The Lifeline is comprised of a national network of over 200 local crisis centers, combining custom local care and resources with national standards and best practices

## 2023-06-14 LAB
EST. AVERAGE GLUCOSE BLD GHB EST-MCNC: 108 MG/DL
HBA1C MFR BLD: 5.4 %

## 2023-06-14 PROCEDURE — 99232 SBSQ HOSP IP/OBS MODERATE 35: CPT | Performed by: PSYCHIATRY & NEUROLOGY

## 2023-06-14 RX ORDER — POTASSIUM CHLORIDE 20 MEQ/1
40 TABLET, EXTENDED RELEASE ORAL ONCE
Status: COMPLETED | OUTPATIENT
Start: 2023-06-14 | End: 2023-06-14

## 2023-06-14 RX ORDER — ARIPIPRAZOLE 15 MG/1
7.5 TABLET ORAL DAILY
Status: DISCONTINUED | OUTPATIENT
Start: 2023-06-15 | End: 2023-06-18 | Stop reason: HOSPADM

## 2023-06-14 RX ADMIN — FAMOTIDINE 20 MG: 20 TABLET, FILM COATED ORAL at 08:50

## 2023-06-14 RX ADMIN — ONDANSETRON 4 MG: 4 TABLET, ORALLY DISINTEGRATING ORAL at 23:08

## 2023-06-14 RX ADMIN — TRAZODONE HYDROCHLORIDE 50 MG: 50 TABLET ORAL at 22:02

## 2023-06-14 RX ADMIN — ACETAMINOPHEN 975 MG: 325 TABLET, FILM COATED ORAL at 22:02

## 2023-06-14 RX ADMIN — FAMOTIDINE 20 MG: 20 TABLET, FILM COATED ORAL at 17:00

## 2023-06-14 RX ADMIN — POTASSIUM CHLORIDE 40 MEQ: 1500 TABLET, EXTENDED RELEASE ORAL at 12:16

## 2023-06-14 RX ADMIN — ARIPIPRAZOLE 5 MG: 5 TABLET ORAL at 08:50

## 2023-06-14 RX ADMIN — HYDROXYZINE HYDROCHLORIDE 50 MG: 50 TABLET, FILM COATED ORAL at 22:02

## 2023-06-14 RX ADMIN — ONDANSETRON 4 MG: 4 TABLET, ORALLY DISINTEGRATING ORAL at 12:32

## 2023-06-14 NOTE — NURSING NOTE
Patient resting in bed, eyes closed, respirations regular, even and non-labored  No s/s of pain or distress noted  Personal items within reach  Q 7 minute safety checks maintained throughout shift

## 2023-06-14 NOTE — NURSING NOTE
Patient cooperative and pleasant, currently denies SI, AVH, HI, but reports that she was sad and depressed  C/O of nausea which was relieved by Zofran  Reports difficulty falling asleep at nights and requested PRN Trazodone  Patient was encourage to attend evening group as a way of improving mood  Patient was visible on the unit, social with peers and appropriate with members of staff

## 2023-06-14 NOTE — NURSING NOTE
Pt refused breakfast  Reports improvement since yesterday  Able to eat lunch and attend some groups during the day  Pt denies SI/HI/AH/VH  Scant during interaction  Reports hopeful for discharge by Friday  K+ 3 2  Potassium supplement ordered  Immediately after taking medication, pt vomited into amy cup  Pt was assisted back to room  Pt proceed to vomit moderate amount in toilet  Offered Zofran  Educated if Zofran is ineffective, pt has Tigan injection ordered  Medical notified

## 2023-06-14 NOTE — QUICK NOTE
K+ 3 3  Pt continues to have nausea/vomiting  Diarrhea has improved  Attempted PO 40mEq KCl however patient vomited shortly afterwards  Added Tigan as second line to zofran  Hold off on further oral potassium to prevent further emesis  Currently on pepcid BID for gastric protection  Recheck BMP tomorrow am  If K+ >3 0, will need to go to ER for IV potassium  If >3, will attempt PO again  Continue to monitor GI symptoms

## 2023-06-14 NOTE — PROGRESS NOTES
[facility] Group Note     06/14/23 1400   Activity/Group Checklist   Group Self Esteem  (Finding the [employer] - Questioning Concreate Thoughts)   Attendance Attended   Attendance Duration (min) 31-45  (arrived late to group)   Interactions Interacted appropriately   Affect/Mood Appropriate;Calm   Goals Achieved Identified feelings; Identified triggers; Discussed coping strategies; Able to listen to others; Able to engage in interactions; Able to reflect/comment on own behavior;Able to self-disclose; Able to recieve feedback; Able to give feedback to another

## 2023-06-14 NOTE — TREATMENT TEAM
06/14/23 1230   Activity/Group Checklist   Group Other (Comment)  (art therapy)   Attendance Attended   Attendance Duration (min) 31-45   Interactions Interacted appropriately   Affect/Mood Calm   Goals Achieved Able to listen to others; Able to engage in interactions; Other (Comment)  (authentic, spontaneous self expression, connection, and insight)

## 2023-06-14 NOTE — CASE MANAGEMENT
CM contacted [facility] to confirm the Pt's appointments and inform them she is currently inpatient  Partridge Red stated that she was scheduled to speak to the psychiatrist later today 6/14/2023 and then to speak with her therapist tomorrow 6/15  KAREN stated that no discharge date is set yet but CM will contact them when one is set so they can rescheduled psychiatrist and therapist appointments

## 2023-06-14 NOTE — PROGRESS NOTES
06/14/23 0830   Team Meeting   Meeting Type Daily Rounds   Team Members Present   Team Members Present Nurse;Physician;; Other (Discipline and Name)   Physician Team Member Dr Kavin Delacruz / Dr Mike Cervantes / Galen Barrett Member Karl Smart / [facility] Management Team Member Esa Irene / Blanca Adkins / Beulah Dawson   Other (Discipline and Name) Lisa Cordero (art therapist)   Patient/Family Present   Patient Present No   Patient's Family Present No     Status: Pt slept through the night  Pt reports AH telling her to make herself sick  Pt reports vomiting due to a GI virus  Pt denies SI, HI, and VH  Pt is reported to have limited ADLs  Medication: No medication changes / Pt was given Atarax 50mg PRN for anxiety @ 3737  Pt was given Trazodone PRN to assist with sleeping  Pt was given Zofran Prn to assist with nausea  D/C: No discharge date discussed at this time

## 2023-06-14 NOTE — NURSING NOTE
F/U to Trazodone administration as sleep aid, patient observed conversing on the phone and with peers, states she wasn't ready to lay in bed as yet

## 2023-06-14 NOTE — NURSING NOTE
Pt received prn Atarax 50 mg at 2243 for moderate anxiety  Rankin score 21  At this time, [phone], pt is observed laying in bed with eyes closed, no signs of anxiety or distress noted  Will continue to monitor Q 7 min  Medication effective

## 2023-06-14 NOTE — PROGRESS NOTES
[facility] Group Note     06/14/23 1000 06/14/23 1045   Activity/Group Checklist   Group Personal control  (Mindfulness Technique - 5 Senses Grounding) Life Skills  (Positive Vs  Negative Statements)   Attendance Attended Attended   Attendance Duration (min) 16-30 31-45   Interactions Other (Comment)  (completed activity but did not share with group) Did not interact   Affect/Mood Blunted/flat Constricted  (slept)   Goals Achieved Able to listen to others; Able to recieve feedback; Able to give feedback to another  (received resources/benefited from social presence of group) Able to listen to others  (received resources/benefited from social presence of group)

## 2023-06-14 NOTE — NURSING NOTE
Zofran effective per pt  Tigan was not needed at this time  Pt showered  Tolerated dinner well  No compliant of GI upset  Pt denies SI/HI/AH/VH  Pt brighter during the evening  Walking the halls with peers  Reports improved mood  Denies any question or concern at this time

## 2023-06-15 LAB
ANION GAP SERPL CALCULATED.3IONS-SCNC: 10 MMOL/L (ref 4–13)
BACTERIA UR QL AUTO: ABNORMAL /HPF
BILIRUB UR QL STRIP: ABNORMAL
BUDDING YEAST: PRESENT
BUN SERPL-MCNC: 10 MG/DL (ref 5–25)
CALCIUM SERPL-MCNC: 9.5 MG/DL (ref 8.4–10.2)
CAOX CRY URNS QL MICRO: ABNORMAL /HPF
CHLORIDE SERPL-SCNC: 102 MMOL/L (ref 96–108)
CLARITY UR: ABNORMAL
CO2 SERPL-SCNC: 25 MMOL/L (ref 21–32)
COLOR UR: YELLOW
CREAT SERPL-MCNC: 0.93 MG/DL (ref 0.6–1.3)
GFR SERPL CREATININE-BSD FRML MDRD: 85 ML/MIN/1.73SQ M
GLUCOSE P FAST SERPL-MCNC: 89 MG/DL (ref 65–99)
GLUCOSE SERPL-MCNC: 89 MG/DL (ref 65–140)
GLUCOSE UR STRIP-MCNC: NEGATIVE MG/DL
HGB UR QL STRIP.AUTO: NEGATIVE
KETONES UR STRIP-MCNC: ABNORMAL MG/DL
LEUKOCYTE ESTERASE UR QL STRIP: NEGATIVE
MUCOUS THREADS UR QL AUTO: ABNORMAL
NITRITE UR QL STRIP: NEGATIVE
NON-SQ EPI CELLS URNS QL MICRO: ABNORMAL /HPF
PH UR STRIP.AUTO: 6.5 [PH]
POTASSIUM SERPL-SCNC: 3.8 MMOL/L (ref 3.5–5.3)
PROT UR STRIP-MCNC: ABNORMAL MG/DL
RBC #/AREA URNS AUTO: ABNORMAL /HPF
SODIUM SERPL-SCNC: 137 MMOL/L (ref 135–147)
SP GR UR STRIP.AUTO: >=1.03 (ref 1–1.03)
UROBILINOGEN UR STRIP-ACNC: 2 MG/DL
WBC #/AREA URNS AUTO: ABNORMAL /HPF

## 2023-06-15 PROCEDURE — 81001 URINALYSIS AUTO W/SCOPE: CPT | Performed by: PHYSICIAN ASSISTANT

## 2023-06-15 PROCEDURE — 99232 SBSQ HOSP IP/OBS MODERATE 35: CPT | Performed by: PSYCHIATRY & NEUROLOGY

## 2023-06-15 PROCEDURE — 80048 BASIC METABOLIC PNL TOTAL CA: CPT | Performed by: PHYSICIAN ASSISTANT

## 2023-06-15 RX ADMIN — TRAZODONE HYDROCHLORIDE 50 MG: 50 TABLET ORAL at 21:17

## 2023-06-15 RX ADMIN — FAMOTIDINE 20 MG: 20 TABLET, FILM COATED ORAL at 17:11

## 2023-06-15 RX ADMIN — ONDANSETRON 4 MG: 4 TABLET, ORALLY DISINTEGRATING ORAL at 21:18

## 2023-06-15 RX ADMIN — ACETAMINOPHEN 650 MG: 325 TABLET, FILM COATED ORAL at 21:17

## 2023-06-15 RX ADMIN — FAMOTIDINE 20 MG: 20 TABLET, FILM COATED ORAL at 08:20

## 2023-06-15 RX ADMIN — HYDROXYZINE HYDROCHLORIDE 50 MG: 50 TABLET, FILM COATED ORAL at 21:18

## 2023-06-15 RX ADMIN — ONDANSETRON 4 MG: 4 TABLET, ORALLY DISINTEGRATING ORAL at 10:16

## 2023-06-15 RX ADMIN — ARIPIPRAZOLE 7.5 MG: 15 TABLET ORAL at 08:20

## 2023-06-15 NOTE — NURSING NOTE
"Pt out of room and more active this shift  Reports \"my mental health is good today  It's my physical health that's not well\"  Continues with complaint of nausea with no vomiting  PRN Zofran given @1016  Medication minimally effective  Pt states she ate breakfast and was able to keep it down, staff reports pt refused breakfast  Denies SI/HI/AH/VH  Pt incont loose stools x1  Showered, changed, wearing brief    "

## 2023-06-15 NOTE — PROGRESS NOTES
Progress Note - Behavioral Health   Jerzy Delatorre 20 y o  female MRN: [id number]  Unit/Bed#: [facility] 202-01 Encounter: [id number]    Assessment:  Principal Problem:    Bipolar 1 disorder, depressed, moderate ([facility] 75 )  Active Problems:    Asthma    Morbid obesity ([facility] )    Medical clearance for psychiatric admission    Nausea vomiting and diarrhea    Chest pain    Autism spectrum disorder      Plan:  --Continue with psychiatric hospitalization  --Continue with individual, group, and milieu therapy  --Continue the following medications:  Current Facility-Administered Medications   Medication Dose Route Frequency   • acetaminophen (TYLENOL) tablet 650 mg  650 mg Oral Q6H PRN   • acetaminophen (TYLENOL) tablet 650 mg  650 mg Oral Q4H PRN   • acetaminophen (TYLENOL) tablet 975 mg  975 mg Oral Q6H PRN   • albuterol (PROVENTIL HFA,VENTOLIN HFA) inhaler 2 puff  2 puff Inhalation Q4H PRN   • aluminum-magnesium hydroxide-simethicone (MYLANTA) oral suspension 30 mL  30 mL Oral Q4H PRN   • ARIPiprazole (ABILIFY) tablet 7 5 mg  7 5 mg Oral Daily   • artificial tear (LUBRIFRESH P M ) ophthalmic ointment   Both Eyes 4x Daily PRN   • benzonatate (TESSALON PERLES) capsule 100 mg  100 mg Oral TID PRN   • benztropine (COGENTIN) injection 1 mg  1 mg Intramuscular BID PRN   • benztropine (COGENTIN) tablet 1 mg  1 mg Oral BID PRN   • bisacodyl (DULCOLAX) rectal suppository 10 mg  10 mg Rectal Daily PRN   • famotidine (PEPCID) tablet 20 mg  20 mg Oral BID   • hydrOXYzine HCL (ATARAX) tablet 25 mg  25 mg Oral Q6H PRN Max 4/day   • hydrOXYzine HCL (ATARAX) tablet 50 mg  50 mg Oral Q4H PRN Max 4/day    Or   • LORazepam (ATIVAN) injection 1 mg  1 mg Intramuscular Q4H PRN   • LORazepam (ATIVAN) tablet 1 mg  1 mg Oral Q4H PRN Max 6/day    Or   • LORazepam (ATIVAN) injection 2 mg  2 mg Intramuscular Q6H PRN Max 3/day   • magnesium hydroxide (MILK OF MAGNESIA) oral suspension 30 mL  30 mL Oral Daily PRN   • meclizine (ANTIVERT) tablet 12 5 mg  12 5 mg "Oral Q12H PRN   • OLANZapine (ZyPREXA) tablet 10 mg  10 mg Oral Q3H PRN Max 3/day    Or   • OLANZapine (ZyPREXA) IM injection 10 mg  10 mg Intramuscular Q3H PRN Max 3/day   • OLANZapine (ZyPREXA) tablet 5 mg  5 mg Oral Q3H PRN Max 6/day    Or   • OLANZapine (ZyPREXA) IM injection 5 mg  5 mg Intramuscular Q3H PRN Max 6/day   • OLANZapine (ZyPREXA) tablet 2 5 mg  2 5 mg Oral Q3H PRN Max 8/day   • ondansetron (ZOFRAN-ODT) dispersible tablet 4 mg  4 mg Oral Q6H PRN   • senna-docusate sodium (SENOKOT S) 8 6-50 mg per tablet 1 tablet  1 tablet Oral Daily PRN   • traZODone (DESYREL) tablet 50 mg  50 mg Oral HS PRN   • trimethobenzamide (TIGAN) IM injection 200 mg  200 mg Intramuscular Q6H PRN       Subjective: Patient was seen for continuation of care  Chart was reviewed and discussed with treatment team      No acute behavioral events over the past 24 hours  Today, patient was seen and examined at bedside for continuation of care  Patient feels \"terrible\" due to physical nausea and vomiting  She vomited yesterday afternoon  Her vomiting has been coinciding with when she is given medications to take  She is c/o mild abdomdinal pain today  Of note, she is attending group and out of bed today  Patient denied adverse effects to their psychiatric medication regimen  Patient denied new or worsening psychiatric symptoms/complaints at this time  Discussed the importance of continuing to take medications as prescribed, as well as the importance of continuing to attend groups on the unit  Psychiatric Review of Systems:  Medication adverse effects: none  Sleep: unchanged  Appetite: unchanged  Behavior over the past 24 hours: as per above    Vitals:  Vitals:    06/15/23 0727   BP: 101/68   Pulse: 65   Resp: 17   Temp: 98 1 °F (36 7 °C)   SpO2: 99%       Laboratory results:    I have personally reviewed all pertinent laboratory/tests results  No results found for this or any previous visit (from the past 48 hour(s))   " "    Current Medications:  Current medications as per above  All medications have been reviewed  Risks, benefits, alternatives, and possible side effects of patient's psychiatric medications were discussed with patient  Mental Status Evaluation:  Appearance: moderately kempt  Motor: no psychomotor disturbances  Behavior: superficially cooperative  Speech: mostly normal with brief periods of increased volume  Mood: \"terrible\"  Affect: constricted  Thought Process: concrete but answers questions in a mostly linear fashion  Thought Content: +auditory hallucinations, denies visual hallucinations, denies delusions  Risk Potential: denies suicidal ideation, plan, or intent  Denies homicidal ideation  Sensorium: Oriented to person, place, time, and situation  Cognition: cognitive ability mildly impaired but was not quantitatively tested  Consciousness: alert and awake  Attention: currently intact  Insight: limited  Judgement: limited       Progress Toward Goals & Illness Status: Patient is not at goal  They are not yet ready for discharge  The patient's condition currently requires active psychopharmacological medication management, interdisciplinary coordination with case management, and the utilization of adjunctive milieu and group therapy to augment psychopharmacological efficacy  The patient's risk of morbidity, and progression or decompensation of psychiatric disease, is higher without this current treatment  This note has been constructed using a voice recognition system  There may be translation, syntax, or grammatical errors  If you have any questions, please contact the dictating provider      "

## 2023-06-15 NOTE — PROGRESS NOTES
[facility] Group Note     06/15/23 1230   Activity/Group Checklist   Group Anger management  (Anger Warning Signs)   Attendance Attended   Attendance Duration (min) 46-60   Interactions Interacted appropriately   Affect/Mood Appropriate;Calm   Goals Achieved Identified feelings; Identified triggers; Discussed coping strategies; Able to listen to others; Able to engage in interactions; Able to reflect/comment on own behavior;Able to recieve feedback; Able to give feedback to another

## 2023-06-15 NOTE — NURSING NOTE
Has been sleeping since 2400, without restlessness or wakefulness  Prior to sleep onset, Theadora Sample had Tylenol 975 mg  Po prn  Stomach pain pain of 10/10; Atarax 50 mg  For moderate anxiety (H=20) & Trazodone 50 mg  Po prn at 2202  Was still struggling to sleep at 2308, when she requested & received Zofran 4 mg  Po prn N&V  Remains asleep at present
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
## 2023-06-15 NOTE — PROGRESS NOTES
06/15/23 0830   Team Meeting   Meeting Type Daily Rounds   Team Members Present   Team Members Present Physician;Nurse;; Other (Discipline and Name)   Physician Team Member Dr Dylon Capps / Dr Christos Mcnamara / Ami Avendaño / Nelsy Barth Team Member Swati Malagon / Jimbo Ramirez / [facility] Management Team Member Tomas Steiner / Yamilet Delacruz / Randy Guerrero   Other (Discipline and Name) Dayo Juárez (art therapist)   Patient/Family Present   Patient Present No   Patient's Family Present No     Status: Pt slept through the night after receiving PRNs  Pt reports issues with nausea, vomiting, and diarrhea  Pt denies SI, HI, and AVH  Pt reported improved mood and is walking the halls with peers  Pt reported to have showered  Medication: Pt received PRN of Tylenol 975mg for stomach pain, Atarax 50mg for anxiety, Zofran 4 mg for nausea and vomiting, and Trazodone 50mg for sleep  D/C: No discharge date set at this time

## 2023-06-15 NOTE — TREATMENT TEAM
06/15/23 1030   Activity/Group Checklist   Group Other (Comment)  (art therapy)   Attendance Attended   Attendance Duration (min) 31-45   Interactions Interacted appropriately   Affect/Mood Appropriate   Goals Achieved Able to engage in interactions; Able to listen to others; Other (Comment)  (authentic, spontaneous self expression, connection and insight)

## 2023-06-16 PROCEDURE — 99232 SBSQ HOSP IP/OBS MODERATE 35: CPT | Performed by: PSYCHIATRY & NEUROLOGY

## 2023-06-16 PROCEDURE — 99232 SBSQ HOSP IP/OBS MODERATE 35: CPT | Performed by: PHYSICIAN ASSISTANT

## 2023-06-16 RX ORDER — DICYCLOMINE HYDROCHLORIDE 10 MG/1
10 CAPSULE ORAL
Status: DISCONTINUED | OUTPATIENT
Start: 2023-06-16 | End: 2023-06-18 | Stop reason: HOSPADM

## 2023-06-16 RX ADMIN — HYDROXYZINE HYDROCHLORIDE 50 MG: 50 TABLET, FILM COATED ORAL at 23:08

## 2023-06-16 RX ADMIN — DICYCLOMINE HYDROCHLORIDE 10 MG: 10 CAPSULE ORAL at 15:55

## 2023-06-16 RX ADMIN — ONDANSETRON 4 MG: 4 TABLET, ORALLY DISINTEGRATING ORAL at 08:26

## 2023-06-16 RX ADMIN — DICYCLOMINE HYDROCHLORIDE 10 MG: 10 CAPSULE ORAL at 21:16

## 2023-06-16 RX ADMIN — ARIPIPRAZOLE 7.5 MG: 15 TABLET ORAL at 08:25

## 2023-06-16 RX ADMIN — FAMOTIDINE 20 MG: 20 TABLET, FILM COATED ORAL at 17:01

## 2023-06-16 RX ADMIN — DICYCLOMINE HYDROCHLORIDE 10 MG: 10 CAPSULE ORAL at 08:26

## 2023-06-16 RX ADMIN — ALBUTEROL SULFATE 2 PUFF: 90 AEROSOL, METERED RESPIRATORY (INHALATION) at 19:35

## 2023-06-16 RX ADMIN — TRAZODONE HYDROCHLORIDE 50 MG: 50 TABLET ORAL at 21:15

## 2023-06-16 RX ADMIN — DICYCLOMINE HYDROCHLORIDE 10 MG: 10 CAPSULE ORAL at 11:22

## 2023-06-16 RX ADMIN — ACETAMINOPHEN 325 MG: 325 TABLET, FILM COATED ORAL at 17:38

## 2023-06-16 RX ADMIN — FAMOTIDINE 20 MG: 20 TABLET, FILM COATED ORAL at 08:26

## 2023-06-16 NOTE — NURSING NOTE
Patient approached RN with several complaints  Remains focused on abdomen, states she is having pain and nausea, requesting both Tylenol and Zofran as these were effective prior  Additionally reports anxiety, Rankin score = 20  Requested medication for sleep d/t anxiety  At 2118, administered Tylenol 650mg, Atarax 50mg, Trazodone 50mg and Zofran 4mg  On reassessment patient is observed resting in bed, reports an improvement in all symptoms and denies any further complaints or needs  Encouraged to approach staff as needed, as well as utilize coping mechanisms, pt verbalized understanding

## 2023-06-16 NOTE — ASSESSMENT & PLAN NOTE
· CTA on 6/11 without acute abnormality  Labs from 6/11 with WBC 10 8 but otherwise WNL  · Lipase WNL on 6/11  · Supportive care  Encourage small amounts of water, toast, crackers  · Patient overall reports improvement in her symptoms following Zofran    She is now able to tolerate oral diet, loose stools also improved  · UA negative UTI, does appear concentrated, encouraged oral hydration  · Add Bentyl for report of abdominal cramping  · Repeat BMP with potassium now WNL - can monitor PRN

## 2023-06-16 NOTE — PROGRESS NOTES
Progress Note - Behavioral Health   Rocky Moses 20 y o  female MRN: [id number]  Unit/Bed#: [facility] 202-01 Encounter: [id number]    Assessment:  Principal Problem:    Bipolar 1 disorder, depressed, moderate ([facility] )  Active Problems:    Asthma    Morbid obesity ([facility] )    Medical clearance for psychiatric admission    Nausea vomiting and diarrhea    Chest pain    Autism spectrum disorder      Plan:  --Discharge Sunday  --Continue with psychiatric hospitalization  --Continue with individual, group, and milieu therapy  --Continue the following medications:  Current Facility-Administered Medications   Medication Dose Route Frequency   • acetaminophen (TYLENOL) tablet 650 mg  650 mg Oral Q6H PRN   • acetaminophen (TYLENOL) tablet 650 mg  650 mg Oral Q4H PRN   • acetaminophen (TYLENOL) tablet 975 mg  975 mg Oral Q6H PRN   • albuterol (PROVENTIL HFA,VENTOLIN HFA) inhaler 2 puff  2 puff Inhalation Q4H PRN   • aluminum-magnesium hydroxide-simethicone (MYLANTA) oral suspension 30 mL  30 mL Oral Q4H PRN   • ARIPiprazole (ABILIFY) tablet 7 5 mg  7 5 mg Oral Daily   • artificial tear (LUBRIFRESH P M ) ophthalmic ointment   Both Eyes 4x Daily PRN   • benzonatate (TESSALON PERLES) capsule 100 mg  100 mg Oral TID PRN   • benztropine (COGENTIN) injection 1 mg  1 mg Intramuscular BID PRN   • benztropine (COGENTIN) tablet 1 mg  1 mg Oral BID PRN   • bisacodyl (DULCOLAX) rectal suppository 10 mg  10 mg Rectal Daily PRN   • dicyclomine (BENTYL) capsule 10 mg  10 mg Oral 4x Daily (AC & HS)   • famotidine (PEPCID) tablet 20 mg  20 mg Oral BID   • hydrOXYzine HCL (ATARAX) tablet 25 mg  25 mg Oral Q6H PRN Max 4/day   • hydrOXYzine HCL (ATARAX) tablet 50 mg  50 mg Oral Q4H PRN Max 4/day    Or   • LORazepam (ATIVAN) injection 1 mg  1 mg Intramuscular Q4H PRN   • LORazepam (ATIVAN) tablet 1 mg  1 mg Oral Q4H PRN Max 6/day    Or   • LORazepam (ATIVAN) injection 2 mg  2 mg Intramuscular Q6H PRN Max 3/day   • magnesium hydroxide (MILK OF MAGNESIA) oral suspension 30 mL  30 mL Oral Daily PRN   • meclizine (ANTIVERT) tablet 12 5 mg  12 5 mg Oral Q12H PRN   • OLANZapine (ZyPREXA) tablet 10 mg  10 mg Oral Q3H PRN Max 3/day    Or   • OLANZapine (ZyPREXA) IM injection 10 mg  10 mg Intramuscular Q3H PRN Max 3/day   • OLANZapine (ZyPREXA) tablet 5 mg  5 mg Oral Q3H PRN Max 6/day    Or   • OLANZapine (ZyPREXA) IM injection 5 mg  5 mg Intramuscular Q3H PRN Max 6/day   • OLANZapine (ZyPREXA) tablet 2 5 mg  2 5 mg Oral Q3H PRN Max 8/day   • ondansetron (ZOFRAN-ODT) dispersible tablet 4 mg  4 mg Oral Q6H PRN   • senna-docusate sodium (SENOKOT S) 8 6-50 mg per tablet 1 tablet  1 tablet Oral Daily PRN   • traZODone (DESYREL) tablet 50 mg  50 mg Oral HS PRN   • trimethobenzamide (TIGAN) IM injection 200 mg  200 mg Intramuscular Q6H PRN       Subjective: Patient was seen for continuation of care  Chart was reviewed and discussed with treatment team      No acute behavioral events over the past 24 hours  Today, patient was seen and examined at bedside for continuation of care  Patient signed a 72 hour notice yesterday  She is feeling much improved and is currently denying all symptoms  Her nausea and vomiting have fully resolved  Patient denied adverse effects to their psychiatric medication regimen  Patient denied other new or worsening psychiatric symptoms/complaints at this time  Discussed the importance of continuing to take medications as prescribed, as well as the importance of continuing to attend groups on the unit       Psychiatric Review of Systems:  Medication adverse effects: none  Sleep: unchanged  Appetite: unchanged  Behavior over the past 24 hours: as per above    Vitals:  Vitals:    06/16/23 0737   BP: 129/76   Pulse: 75   Resp: 16   Temp: 98 2 °F (36 8 °C)   SpO2:        Laboratory results:    I have personally reviewed all pertinent laboratory/tests results  Recent Results (from the past 48 hour(s))   Basic metabolic panel    Collection Time: 06/15/23 "9:57 AM   Result Value Ref Range    Sodium 137 135 - 147 mmol/L    Potassium 3 8 3 5 - 5 3 mmol/L    Chloride 102 96 - 108 mmol/L    CO2 25 21 - 32 mmol/L    ANION GAP 10 4 - 13 mmol/L    BUN 10 5 - 25 mg/dL    Creatinine 0 93 0 60 - 1 30 mg/dL    Glucose 89 65 - 140 mg/dL    Glucose, Fasting 89 65 - 99 mg/dL    Calcium 9 5 8 4 - 10 2 mg/dL    eGFR 85 ml/min/1 73sq m   Urinalysis with microscopic    Collection Time: 06/15/23 11:03 AM   Result Value Ref Range    Color, UA Yellow     Clarity, UA Cloudy     Specific Gravity, UA >=1 030 1 005 - 1 030    pH, UA 6 5 4 5, 5 0, 5 5, 6 0, 6 5, 7 0, 7 5, 8 0    Leukocytes, UA Negative Negative    Nitrite, UA Negative Negative    Protein, UA 30 (1+) (A) Negative mg/dl    Glucose, UA Negative Negative mg/dl    Ketones, UA 80 (3+) (A) Negative mg/dl    Urobilinogen, UA 2 0 (A) <2 0 mg/dl mg/dl    Bilirubin, UA Small (A) Negative    Occult Blood, UA Negative Negative    RBC, UA None Seen None Seen, 2-4 /hpf    WBC, UA None Seen None Seen, 2-4, 5-60 /hpf    Epithelial Cells Innumerable (A) None Seen, Occasional /hpf    Bacteria, UA Occasional None Seen, Occasional /hpf    MUCUS THREADS Occasional (A) None Seen    Ca Oxalate Simi, UA Innumerable (A) None Seen /hpf    Budding Yeast Present         Current Medications:  Current medications as per above  All medications have been reviewed  Risks, benefits, alternatives, and possible side effects of patient's psychiatric medications were discussed with patient       Mental Status Evaluation:  Appearance: casually dressed, appears consistent with stated age  Motor: no psychomotor disturbances, no gait abnormalities  Behavior: cooperative, interacts with this writer appropriately  Speech: normal rate, rhythm, and volume  Mood: \"good\"  Affect: euthymic, normal range and intensity  Thought Process: organized, linear, and goal-oriented  Thought Content: denies auditory hallucinations, denies visual hallucinations, denies delusions  Risk " Potential: denies suicidal ideation, plan, or intent  Denies homicidal ideation  Sensorium: Oriented to person, place, time, and situation  Cognition: cognitive ability appears intact but was not quantitatively tested  Consciousness: alert and awake  Attention: able to focus without difficulty  Insight: improving  Judgement: improving      Progress Toward Goals & Illness Status: Patient is not at goal  They are not yet ready for discharge  The patient's condition currently requires active psychopharmacological medication management, interdisciplinary coordination with case management, and the utilization of adjunctive milieu and group therapy to augment psychopharmacological efficacy  The patient's risk of morbidity, and progression or decompensation of psychiatric disease, is higher without this current treatment  This note has been constructed using a voice recognition system  There may be translation, syntax, or grammatical errors  If you have any questions, please contact the dictating provider

## 2023-06-16 NOTE — NURSING NOTE
Pt reported feeling nauseous this morning and requesting PRN  Received PRN Zofran in addition to AM meds  Writer reviewed newly ordered Bentyl and its use  Pt reports feeling much better on follow up and feels the Bentyl was helpful  Pt denies any further nausea at this time  Reports mild abdominal discomfort and states she is still experiencing loose stools  Pt states overall GI symptoms are improved  In regards to mood, pt reports marked improvement  Denies SI/HI  Denies feeling depressed or anxious  Reports sleeping well  Visible on unit

## 2023-06-16 NOTE — ASSESSMENT & PLAN NOTE
· CTA on 6/11 without acute abnormality  Labs from 6/11 with WBC 10 8 but otherwise WNL  · Lipase WNL on 6/11  · Supportive care  Encourage small amounts of water, toast, crackers  · Patient overall reports improvement in her symptoms following Zofran    She is now able to tolerate oral diet, reports resolution in diarrhea  · UA negative UTI, does appear concentrated, encouraged oral hydration  · Add Bentyl for report of abdominal cramping  · Repeat BMP with potassium now WNL - can monitor PRN

## 2023-06-16 NOTE — CASE MANAGEMENT
CM contacted [facility] @[phone] to reschedule new patient appointment with the psychiatrist and therapist  Tano Rooney scheduled the Pt for 6/21/2023 @10:15am with Dr Romario Padilla said that they cannot schedule with a therapist because they make their own schedule  Tano Rooney said that the therapist will contact the CM or Pt to schedule  CM spoke with Pt about discharge planning  CM stated that they contacted [facility] and got them a new patient appointment with Dr Romario Valencia on 6/21/2023 @10:15am  CM stated that Tano Rooney said that they will have the Pt's therapist contact the CM or Pt directly to scheduled since they make their own schedules  CM asked if the Pt's mother Adam Velasquez is going to be picking her up on 6/18/2023 @10:00am  Pt confirmed and said that she will call her mother to tell her to pick her up @10:00am  Pt reported being ready for discharge  CM contacted Pt's mother Adam Velasquez to confirm discharge and pick on 6/18/2023 @10:00am  Adam Velasquez confirmed that she pick her up  CM provided Adam Velasquez with the nursing station number [phone]

## 2023-06-16 NOTE — PROGRESS NOTES
[address]  Progress Note  Name: Denver Armstrong  MRN: [id number]  Unit/Bed#: [facility] 202-01 I Date of Admission: 6/12/2023   Date of Service: 6/16/2023 I Hospital Day: 4    Assessment/Plan   Nausea vomiting and diarrhea  Assessment & Plan  · CTA on 6/11 without acute abnormality  Labs from 6/11 with WBC 10 8 but otherwise WNL  · Lipase WNL on 6/11  · Supportive care  Encourage small amounts of water, toast, crackers  · Patient overall reports improvement in her symptoms following Zofran  She is now able to tolerate oral diet, loose stools also improved  · UA negative UTI, does appear concentrated, encouraged oral hydration  · Add Bentyl for report of abdominal cramping  · Repeat BMP with potassium now WNL - can monitor PRN        VTE Pharmacologic Prophylaxis:   Low Risk (Score 0-2) - Encourage Ambulation  Patient Centered Rounds: I performed bedside rounds with nursing staff today  Discussions with Specialists or Other Care Team Provider:     Education and Discussions with Family / Patient: Patient declined call to   Total Time Spent on Date of Encounter in care of patient: 35 minutes This time was spent on one or more of the following: performing physical exam; counseling and coordination of care; obtaining or reviewing history; documenting in the medical record; reviewing/ordering tests, medications or procedures; communicating with other healthcare professionals and discussing with patient's family/caregivers  Current Length of Stay: 4 day(s)  Current Patient Status: Inpatient Psych   Certification Statement: The patient will continue to require additional inpatient hospital stay due to Per primary service  Discharge Plan: Lidia Zelaya is following this patient on consult  They are medically stable for discharge when deemed appropriate by primary service      Code Status: Level 1 - Full Code    Subjective:   Patient states her nausea and vomiting overall have improved and she has been tolerating oral diet  Does report intermittent lower abdominal cramping  Also states her diarrhea has resolved  Denies chest pain/palpitations, shortness of breath  Objective:     Vitals:   Temp (24hrs), Av 1 °F (36 7 °C), Min:98 °F (36 7 °C), Max:98 2 °F (36 8 °C)    Temp:  [98 °F (36 7 °C)-98 2 °F (36 8 °C)] 98 2 °F (36 8 °C)  HR:  [75-93] 75  Resp:  [16-20] 16  BP: (129-142)/(76-90) 129/76  SpO2:  [99 %] 99 %  Body mass index is 45 04 kg/m²  Input and Output Summary (last 24 hours):   No intake or output data in the 24 hours ending 23 1214    Physical Exam:   Physical Exam  Vitals and nursing note reviewed  Constitutional:       Appearance: Normal appearance  Comments: No acute distress   HENT:      Head: Normocephalic  Eyes:      General: No scleral icterus  Extraocular Movements: Extraocular movements intact  Conjunctiva/sclera: Conjunctivae normal    Cardiovascular:      Rate and Rhythm: Normal rate and regular rhythm  Heart sounds: S1 normal and S2 normal    Pulmonary:      Effort: Pulmonary effort is normal       Breath sounds: Normal breath sounds  No wheezing, rhonchi or rales  Abdominal:      General: Bowel sounds are normal       Palpations: Abdomen is soft  Tenderness: There is generalized abdominal tenderness  There is no guarding or rebound  Musculoskeletal:         General: No swelling, tenderness or deformity  Cervical back: Normal range of motion  Comments: Able to move upper/lower extremities bilaterally, no edema   Skin:     General: Skin is warm and dry  Neurological:      Mental Status: She is alert and oriented to person, place, and time     Psychiatric:         Mood and Affect: Mood normal          Speech: Speech normal          Behavior: Behavior normal           Additional Data:     Labs:  Results from last 7 days   Lab Units 23  0651   WBC Thousand/uL 7 11   HEMOGLOBIN g/dL 11 7   HEMATOCRIT % 38 1   PLATELETS Thousands/uL 254   NEUTROS PCT % 70   LYMPHS PCT % 19   MONOS PCT % 10   EOS PCT % 1     Results from last 7 days   Lab Units 06/15/23  0957 06/13/23  0651   SODIUM mmol/L 137 137   POTASSIUM mmol/L 3 8 3 2*   CHLORIDE mmol/L 102 106   CO2 mmol/L 25 20*   BUN mg/dL 10 9   CREATININE mg/dL 0 93 0 98   ANION GAP mmol/L 10 11   CALCIUM mg/dL 9 5 8 8   ALBUMIN g/dL  --  4 1   TOTAL BILIRUBIN mg/dL  --  0 42   ALK PHOS U/L  --  99   ALT U/L  --  54*   AST U/L  --  38   GLUCOSE RANDOM mg/dL 89 95             Results from last 7 days   Lab Units 06/13/23  0651   HEMOGLOBIN A1C % 5 4           Lines/Drains:  Invasive Devices     None                       Imaging: No pertinent imaging reviewed      Recent Cultures (last 7 days):         Last 24 Hours Medication List:   Current Facility-Administered Medications   Medication Dose Route Frequency Provider Last Rate   • acetaminophen  650 mg Oral Q6H PRN Leann DAYAN Nathan     • acetaminophen  650 mg Oral Q4H PRN Leann DAYAN Nathan     • acetaminophen  975 mg Oral Q6H PRN Leann DAYAN Nathan     • albuterol  2 puff Inhalation Q4H PRN Leann DAYAN Nathan     • aluminum-magnesium hydroxide-simethicone  30 mL Oral Q4H PRN Leann DAYAN Nathan     • ARIPiprazole  7 5 mg Oral Daily Minaya Court, DO     • artificial tear   Both Eyes 4x Daily PRN Leann DAYAN Nathan     • benzonatate  100 mg Oral TID PRN Leann DAYAN Nathan     • benztropine  1 mg Intramuscular BID PRN Leann DAYAN Nathan     • benztropine  1 mg Oral BID PRN Leann DAYAN Nathan     • bisacodyl  10 mg Rectal Daily PRN Amarilys Camejo MD     • dicyclomine  10 mg Oral 4x Daily (AC & HS) Misael Msaon PA-C     • famotidine  20 mg Oral BID Leann DAYAN Nathan     • hydrOXYzine HCL  25 mg Oral Q6H PRN Max 4/day Leann DAYAN Nathan     • hydrOXYzine HCL  50 mg Oral Q4H PRN Max 4/day Leann DAYAN Nathan      Or   • LORazepam  1 mg Intramuscular Q4H PRN Delano Nery Bettye Fatima PA-C     • LORazepam  1 mg Oral Q4H PRN Max 6/day Duncan Humphrey PA-C      Or   • LORazepam  2 mg Intramuscular Q6H PRN Max 3/day Duncan Humphrey PA-C     • magnesium hydroxide  30 mL Oral Daily PRN Shemar Smiht MD     • meclizine  12 5 mg Oral Q12H PRN Duncan Humphrey PA-C     • OLANZapine  10 mg Oral Q3H PRN Max 3/day Duncan Humphrey PA-C      Or   • OLANZapine  10 mg Intramuscular Q3H PRN Max 3/day Duncan Humphrey PA-C     • OLANZapine  5 mg Oral Q3H PRN Max 6/day Duncan Humphrey PA-C      Or   • OLANZapine  5 mg Intramuscular Q3H PRN Max 6/day Duncan Humphrey PA-C     • OLANZapine  2 5 mg Oral Q3H PRN Max 8/day Duncan Humphrey PA-C     • ondansetron  4 mg Oral Q6H PRN Duncan Humphrey PA-C     • senna-docusate sodium  1 tablet Oral Daily PRN Duncan Humphrey PA-C     • traZODone  50 mg Oral HS PRN Duncan Humphrey PA-C     • trimethobenzamide  200 mg Intramuscular Q6H PRN Zaid Robles PA-C          Today, Patient Was Seen By: Julissa Castro PA-C    **Please Note: This note may have been constructed using a voice recognition system  **

## 2023-06-16 NOTE — NURSING NOTE
Pt reports continued lower abdominal pain rated 9/10 and mild nausea at times, denies vomiting  Pt is trying to eat her meals but reports stomach upset after eating small amounts  Pt reports improved mood and anxiety with her current medications  She denies SI/HI, reports she does not remember the incident that led to her admission and that she had not been feeling suicidal previously  Pt is looking forward to discharge soon and expressed readiness

## 2023-06-16 NOTE — PLAN OF CARE
Problem: DISCHARGE PLANNING  Goal: Discharge to home or other facility with appropriate resources  Description: INTERVENTIONS:  - Identify barriers to discharge w/patient and caregiver  - Arrange for needed discharge resources and transportation as appropriate  - Identify discharge learning needs (meds, wound care, etc )  - Arrange for interpretive services to assist at discharge as needed  - Refer to Case Management Department for coordinating discharge planning if the patient needs post-hospital services based on physician/advanced practitioner order or complex needs related to functional status, cognitive ability, or social support system  Outcome: Adequate for Discharge  Note: Pt signed 72 hours notice on 6/15/2023  Pt is set to be discharged on 6/18/2023 with follow up appointment with [facility]

## 2023-06-16 NOTE — PROGRESS NOTES
06/16/23 0750   Team Meeting   Meeting Type Daily Rounds   Team Members Present   Team Members Present Physician;Nurse;; Other (Discipline and Name)   Physician Team Member Dr Basilio Franks / Dr Noel Cordoba / Debra Duarte / Doris Carlin Team Member Natividad Arango / [facility] Management Team Member Red Montero / Matias Lockhart / Keven Leyva   Other (Discipline and Name) Kizzy Arevalo (art therapist)   Patient/Family Present   Patient Present No   Patient's Family Present No     Status: Pt is reported to be ready for discharge  Pt signed a 72 hours notice on 6/15/2023 @0537  Pt is reported to be denying SI, HI, AVH  Pt is reported to be denying nausea, dizziness, diarrhea, and vomiting  Medication: Pt's Abilify is being increase to 7 5mg    D/C: Pt will be discharged on 6/18/2023

## 2023-06-17 PROCEDURE — 99232 SBSQ HOSP IP/OBS MODERATE 35: CPT | Performed by: STUDENT IN AN ORGANIZED HEALTH CARE EDUCATION/TRAINING PROGRAM

## 2023-06-17 RX ADMIN — DICYCLOMINE HYDROCHLORIDE 10 MG: 10 CAPSULE ORAL at 21:05

## 2023-06-17 RX ADMIN — DICYCLOMINE HYDROCHLORIDE 10 MG: 10 CAPSULE ORAL at 06:58

## 2023-06-17 RX ADMIN — TRAZODONE HYDROCHLORIDE 50 MG: 50 TABLET ORAL at 21:05

## 2023-06-17 RX ADMIN — DICYCLOMINE HYDROCHLORIDE 10 MG: 10 CAPSULE ORAL at 10:48

## 2023-06-17 RX ADMIN — FAMOTIDINE 20 MG: 20 TABLET, FILM COATED ORAL at 16:21

## 2023-06-17 RX ADMIN — ONDANSETRON 4 MG: 4 TABLET, ORALLY DISINTEGRATING ORAL at 10:52

## 2023-06-17 RX ADMIN — FAMOTIDINE 20 MG: 20 TABLET, FILM COATED ORAL at 09:20

## 2023-06-17 RX ADMIN — DICYCLOMINE HYDROCHLORIDE 10 MG: 10 CAPSULE ORAL at 16:21

## 2023-06-17 RX ADMIN — HYDROXYZINE HYDROCHLORIDE 50 MG: 50 TABLET, FILM COATED ORAL at 21:05

## 2023-06-17 RX ADMIN — ARIPIPRAZOLE 7.5 MG: 15 TABLET ORAL at 09:20

## 2023-06-17 NOTE — NURSING NOTE
Pt reports continued stomach discomfort and nausea at times today, feels tired and drained  She reports feeling much better emotionally with improved mood and anxiety and she denies SI/HI  She feels ready for discharge and reports good support from her mother  Pleasant in conversation, out in milieu and social with peers, cooperative

## 2023-06-17 NOTE — TREATMENT TEAM
06/17/23 0900   Team Meeting   Meeting Type Daily Rounds   Team Members Present   Team Members Present Physician;Nurse   Physician Team Member Merna Bangura   Nursing Team Member Minda   Patient/Family Present   Patient Present No   Patient's Family Present No       AM rounds- denies SI/HI, reports GI upset following meals however improving  Feels mood has been improving  72 hour notice 15th 1745  Slept well

## 2023-06-17 NOTE — NURSING NOTE
Patient appears to have slept the night  Observed to be resting in bed with eyes closed, respirations even and unlabored  No s/s of distress  Q 7 5 minute checks ongoing for patient safety

## 2023-06-17 NOTE — NURSING NOTE
Patient received PRN Zofran 4 mg r/t complaints of nausea  Upon follow up pt reports no nausea  Pt reports she continues to have loose stools  Asked patient to report to nurse next time she has a BM so this nurse can visualize consistency

## 2023-06-17 NOTE — NURSING NOTE
"Patient pleasant and cooperative during interview  Good eye contact  Denies SI/HI/AVH  Reports excitement r/t d/c tomorrow  Feels relief that she can get home to her emotional support dog  Attends group, visible on unit and is social with peers  Reports she believes another pt is \"hitting on me because he likes to talk to me  \" She is redirectable at this time  Encouraged pt that peer is not hitting on her  Will continue to monitor     "

## 2023-06-17 NOTE — PLAN OF CARE
Problem: SELF HARM/SUICIDALITY  Goal: Will have no self-injury during hospital stay  Description: INTERVENTIONS:  - Q 15 MINUTES: Routine safety checks  - Q WAKING SHIFT & PRN: Assess risk to determine if routine checks are adequate to maintain patient safety  - Encourage patient to participate actively in care by formulating a plan to combat response to suicidal ideation, identify supports and resources  Outcome: Progressing     Problem: DEPRESSION  Goal: Will be euthymic at discharge  Description: INTERVENTIONS:  - Administer medication as ordered  - Provide emotional support via 1:1 interaction with staff  - Encourage involvement in milieu/groups/activities  - Monitor for social isolation  Outcome: Progressing     Problem: ANXIETY  Goal: Will report anxiety at manageable levels  Description: INTERVENTIONS:  - Administer medication as ordered  - Teach and encourage coping skills  - Provide emotional support  - Assess patient/family for anxiety and ability to cope  Outcome: Progressing  Goal: By discharge: Patient will verbalize 2 strategies to deal with anxiety  Description: Interventions:  - Identify any obvious source/trigger to anxiety  - Staff will assist patient in applying identified coping technique/skills  - Encourage attendance of scheduled groups and activities  Outcome: Progressing     Problem: Ineffective Coping  Goal: Participates in unit activities  Description: Interventions:  - Provide therapeutic environment   - Provide required programming   - Redirect inappropriate behaviors   Outcome: Progressing     Problem: Nutrition/Hydration-ADULT  Goal: Nutrient/Hydration intake appropriate for improving, restoring or maintaining nutritional needs  Description: Monitor and assess patient's nutrition/hydration status for malnutrition  Collaborate with interdisciplinary team and initiate plan and interventions as ordered  Monitor patient's weight and dietary intake as ordered or per policy   Utilize nutrition screening tool and intervene as necessary  Determine patient's food preferences and provide high-protein, high-caloric foods as appropriate       INTERVENTIONS:  - Monitor oral intake, urinary output, labs, and treatment plans  - Assess nutrition and hydration status and recommend course of action  - Evaluate amount of meals eaten  - Assist patient with eating if necessary   - Allow adequate time for meals  - Recommend/ encourage appropriate diets, oral nutritional supplements, and vitamin/mineral supplements  - Order, calculate, and assess calorie counts as needed  - Recommend, monitor, and adjust tube feedings and TPN/PPN based on assessed needs  - Assess need for intravenous fluids  - Provide specific nutrition/hydration education as appropriate  - Include patient/family/caregiver in decisions related to nutrition  Outcome: Progressing

## 2023-06-17 NOTE — PROGRESS NOTES
"Progress Note - Boaz 20 y o  female MRN: [id number]  Unit/Bed#: [facility] 202-01 Encounter: [id number]    Patient was seen today for continuation of care, records reviewed and patient was discussed with the morning case review team   Per staff, Arielle Minaya has been meal and medication compliant  She is denying symptoms  She slept and no acute behaviors    Arielle Minaya was seen today for psychiatric follow-up  On assessment today, Arielle Minaya was seen in her room  Arielle Minaya reports that her mood is \"good\"  She is denying any current depression or anxiety  She reports that the Abilify has helped her mood  She is reports some residual stomach pain from previous vomiting  She did take Bentyl and has been somewhat effective  She feels ready for discharge tomorrow  Arielle Minaya denies acute suicidal/self-harm ideation/intent/plan  Arielle Minaya remains behaviorally appropriate, no agitation or aggression noted on exam or in report  Arielle Minaya also denies HI/AH/VH, and does not appear overtly manic  No overt delusions or paranoia are verbalized  Arielle Minaya remains adherent to her current psychotropic medication regimen and denies any side effects from medications, as well as none noted on exam     Continue Abilify 7 5mg PO daily, patient signed 72 hour notice on Thursday, DC tomorrow  Vitals:  Vitals:    06/17/23 0750   BP: 131/64   Pulse: 71   Resp: 17   Temp: (!) 97 3 °F (36 3 °C)   SpO2:        Laboratory Results:    I have personally reviewed all pertinent laboratory/tests results    Most Recent Labs:   Lab Results   Component Value Date    WBC 7 11 06/13/2023    RBC 4 73 06/13/2023    HGB 11 7 06/13/2023    HCT 38 1 06/13/2023     06/13/2023    RDW 13 8 06/13/2023    NEUTROABS 4 88 06/13/2023    SODIUM 137 06/15/2023    K 3 8 06/15/2023     06/15/2023    CO2 25 06/15/2023    BUN 10 06/15/2023    CREATININE 0 93 06/15/2023    GLUC 89 06/15/2023    GLUF 89 06/15/2023    CALCIUM 9 5 06/15/2023    " AST 38 06/13/2023    ALT 54 (H) 06/13/2023    ALKPHOS 99 06/13/2023    TP 7 2 06/13/2023    ALB 4 1 06/13/2023    TBILI 0 42 06/13/2023    CHOLESTEROL 150 06/13/2023    HDL 32 (L) 06/13/2023    TRIG 113 06/13/2023    LDLCALC 95 06/13/2023    NONHDLC 118 06/13/2023    IDH0THBNAZWZ 2 330 06/13/2023    PREGUR negative 07/28/2021    PREGSERUM Negative 06/13/2023    HCGQUANT <3 09/22/2022    HGBA1C 5 4 06/13/2023     06/13/2023       Psychiatric Review of Systems:  Behavior over the last 24 hours:  improved  Sleep: adequate  Appetite: adequate  Medication side effects:   ROS: no complaints, denies shortness of breath or chest pain and all other systems are negative for acute changes    Mental Status Evaluation:    Appearance:  age appropriate, improved grooming, looks stated age   Behavior:  pleasant, cooperative, calm   Speech:  normal rate and volume, fluent, clear   Mood:  improved, euthymic   Affect:  appropriate   Thought Process:  organized, goal directed, linear, normal rate of thoughts   Associations: intact associations   Thought Content:  no overt delusions   Perceptual Disturbances: denies auditory or visual hallucinations when asked, does not appear responding to internal stimuli   Risk Potential: Suicidal ideation - None  Homicidal ideation - None  Potential for aggression - No   Sensorium:  oriented to person, place and situation   Memory:  recent memory intact   Consciousness:  alert and awake   Attention/Concentration: attention span and concentration are age appropriate   Insight:  age appropriate   Judgment: age appropriate   Gait/Station: normal gait/station   Motor Activity: no abnormal movements     Progress Toward Goals:   Zacarias Booker is progressing towards goals of inpatient psychiatric treatment by continued medication compliance and is attending therapeutic modalities on the milieu   However, the patient continues to require inpatient psychiatric hospitalization for continued medication management and titration to optimize symptom reduction, improve sleep hygiene, and demonstrate adequate self-care  Risk of Harm to Self:   Nursing Suicide Risk Assessment Last 24 hours: C-SSRS Risk (Since Last Contact)  Calculated C-SSRS Risk Score (Since Last Contact): No Risk Indicated    Risk of Harm to Others:  Nursing Homicide Risk Assessment: Violence Risk to Others: Denies within past 6 months    The following interventions are recommended: behavioral checks every 7 minutes, continued hospitalization on locked unit      Assessment/Plan   Principal Problem:    Bipolar 1 disorder, depressed, moderate (HCC)  Active Problems:    Asthma    Morbid obesity ([facility] [address] )    Medical clearance for psychiatric admission    Nausea vomiting and diarrhea    Chest pain    Autism spectrum disorder      Recommended Treatment: Treatment plan and medication changes discussed and per the attending physician the plan is: 1  Continue with group therapy, milieu therapy and occupational therapy  2  Behavioral Health checks every 7 minutes  3  Continue frequent safety checks and vitals per unit protocol  4  Continue with SLIM medical management as indicated  5  Continue with current medication regimen  6  Will review labs in the a m  7 Disposition Planning: Discharge planning and efforts remain ongoing    Behavioral Health Medications: all current active meds have been reviewed and continue current psychiatric medications    Current Facility-Administered Medications   Medication Dose Route Frequency Provider Last Rate   • acetaminophen  650 mg Oral Q6H PRN Jolene Estevez PA-C     • acetaminophen  650 mg Oral Q4H PRN Jolene Estevez PA-C     • acetaminophen  975 mg Oral Q6H PRN Jolene Estevez PA-C     • albuterol  2 puff Inhalation Q4H PRN Jolene Estevez PA-C     • aluminum-magnesium hydroxide-simethicone  30 mL Oral Q4H PRN Jolene Estevez PA-C     • ARIPiprazole  7 5 mg Oral Daily Kwabena Dawson DO     • artificial tear   Both Eyes [facility]x [facility]     • benzonatate  100 mg Oral TID PRN [facility]     • benztropine  1 mg Intramuscular BID PRN [facility]     • benztropine  1 mg Oral BID PRN [facility]     • bisacodyl  10 mg Rectal Daily PRN Julio Bustillo MD     • dicyclomine  10 mg Oral 4x Daily (AC & HS) Thor Syed PA-C     • famotidine  20 mg Oral BID [facility]     • hydrOXYzine HCL  25 mg Oral Q6H PRN Max 4/day [facility]     • hydrOXYzine HCL  50 mg Oral Q4H PRN Max 4/day [facility]      Or   • LORazepam  1 mg Intramuscular Q4H PRN [facility]     • LORazepam  1 mg Oral Q4H PRN Max 6/day [facility]      Or   • LORazepam  2 mg Intramuscular Q6H PRN Max 3/day [facility]     • magnesium hydroxide  30 mL Oral Daily PRN Julio Bustillo MD     • meclizine  12 5 mg Oral Q12H PRN [facility]     • OLANZapine  10 mg Oral Q3H PRN Max 3/day [facility]      Or   • OLANZapine  10 mg Intramuscular Q3H PRN Max 3/day [facility]     • OLANZapine  5 mg Oral Q3H PRN Max 6/day [facility]      Or   • OLANZapine  5 mg Intramuscular Q3H PRN Max 6/day [facility]     • OLANZapine  2 5 mg Oral Q3H PRN Max 8/day [facility]     • ondansetron  4 mg Oral Q6H PRN [facility]     • senna-docusate sodium  1 tablet Oral Daily PRN [facility]     • traZODone  50 mg Oral HS PRN [facility]     • trimethobenzamide  200 mg Intramuscular Q6H PRN Maria G Dee PA-C         Risks / Benefits of Treatment:  Risks, benefits, and possible side effects of medications explained to patient and patient verbalizes understanding and agreement for treatment  Counseling / Coordination of Care:  Patient's progress reviewed with nursing staff  Medications, treatment progress and treatment plan reviewed with patient    Supportive counseling provided to the patient  Total floor/unit time spent today 25 minutes  Greater than 50% of total time was spent with the patient and / or family counseling and / or coordination of care  A description of the counseling / coordination of care: medication education, treatment plan, supportive therapy

## 2023-06-18 VITALS
BODY MASS INDEX: 44.96 KG/M2 | SYSTOLIC BLOOD PRESSURE: 133 MMHG | WEIGHT: 223 LBS | HEART RATE: 75 BPM | HEIGHT: 59 IN | TEMPERATURE: 97.8 F | DIASTOLIC BLOOD PRESSURE: 72 MMHG | OXYGEN SATURATION: 100 % | RESPIRATION RATE: 17 BRPM

## 2023-06-18 PROCEDURE — 99239 HOSP IP/OBS DSCHRG MGMT >30: CPT | Performed by: STUDENT IN AN ORGANIZED HEALTH CARE EDUCATION/TRAINING PROGRAM

## 2023-06-18 RX ORDER — DICYCLOMINE HYDROCHLORIDE 10 MG/1
10 CAPSULE ORAL
Qty: 120 CAPSULE | Refills: 0 | Status: SHIPPED | OUTPATIENT
Start: 2023-06-18 | End: 2023-07-18

## 2023-06-18 RX ORDER — ARIPIPRAZOLE 15 MG/1
7.5 TABLET ORAL DAILY
Qty: 15 TABLET | Refills: 0 | Status: SHIPPED | OUTPATIENT
Start: 2023-06-18 | End: 2023-07-18

## 2023-06-18 RX ADMIN — DICYCLOMINE HYDROCHLORIDE 10 MG: 10 CAPSULE ORAL at 06:29

## 2023-06-18 RX ADMIN — ARIPIPRAZOLE 7.5 MG: 15 TABLET ORAL at 08:17

## 2023-06-18 RX ADMIN — ONDANSETRON 4 MG: 4 TABLET, ORALLY DISINTEGRATING ORAL at 08:30

## 2023-06-18 RX ADMIN — FAMOTIDINE 20 MG: 20 TABLET, FILM COATED ORAL at 08:16

## 2023-06-18 NOTE — TREATMENT TEAM
06/18/23 0700   Team Meeting   Meeting Type Daily Rounds   Team Members Present   Team Members Present Physician;Nurse   Physician Team Member Merna Bangura   Nursing Team Member Minda   Patient/Family Present   Patient Present No   Patient's Family Present No       AM rounds- denies SI/HI, mood improved  Looking forward to discharge  Mom to arrive at 10am to pick patient up  Slept well

## 2023-06-18 NOTE — BH TRANSITION RECORD
Contact Information: If you have any questions, concerns, pended studies, tests and/or procedures, or emergencies regarding your inpatient behavioral health visit  Please contact [address] [place] [address] behavioral health unit [phone] and ask to speak to a , nurse or physician  A contact is available 24 hours/ 7 days a week at this number  Summary of Procedures Performed During your Stay:  Below is a list of major procedures performed during your hospital stay and a summary of results:  - Cardiac Procedures/Studies: ECG-NSR, non specific T wave abnormality   - Major Imaging Studies: CTA-negative  Pending Studies (From admission, onward)    None        Please follow up on the above pending studies with your PCP and/or referring provider

## 2023-06-18 NOTE — DISCHARGE SUMMARY
"Discharge Summary - Boaz 20 y o  female MRN: [id number]  Unit/Bed#: [facility] 202-01 Encounter: [id number]     Admission Date: 6/12/2023         Discharge Date: 06/18/2023    Attending Psychiatrist: Mary Rodriguez DO    Reason for Admission/HPI: Per H & P completed by Dr Mary Rodriguez on 6/13/2023  \"Per ED provider note:  Pt is a 20yo F who presents for nausea, vomiting, and diarrhea   Patient reports that approximately 3 AM she had sudden onset of symptoms   She reports 30-40 bouts of nonbloody vomiting   She reports \"constant\" diarrhea that is also been nonbloody   She reports diffuse abdominal pain particularly bad with vomiting   Patient also reporting chest pain and shortness of breath   She reports her chest pain is pleuritic and not exertional   She denies any palpitations but does state that she noticed her heart rate was fast   Patient also reporting headache and lightheadedness   She states that she has not been able to tolerate anything p o  today due to the nausea and vomiting   She did take Tylenol earlier this morning with no improvement in symptoms   Patient states that she has been around someone with similar symptoms   Patient states that she was feeling well yesterday with no symptoms   Patient reports that she takes her daily medications regularly with no recent changes      Per Crisis worker note:  Patient is a 20year old female presenting to the ED by self-referral after an episode at home where she placed a kitchen knife to her throat  Patient is calm and cooperative and agrees to speak to this writer at this time       Patient states that lately she's been in a deep depression episode and that tonight, she reached her limit and took a kitchen knife to her throat with the intent to cut herself  Her mom, who she lives with, is the one who encouraged her to come to the [facility] for psychiatric evaluation   Approximately three to four years ago, she was " diagnosed with bipolar disorder and has struggled to manage her mood ever since  She has been seeing a pyschiatrist, last saw two weeks ago, and a therapist, seen last week through [facility] services  All medications are prescribed through psychiatry and she has been compliant but states that she doesn't feel like the medications nor the psychiatrist are really helping  She denies any explicit thoughts to harm herself at this time but does still endorse a current suicidal ideation  She denies homicidal ideation as well as any symptoms of psychosis  She states that she hasn't had these kinds of thoughts in approximately three years and has not had any intent to harm herself in about as long  Patient is vague about specific trigger regarding tonight but does acknowledge that she hasn't been feeling well physically all day  She reports poor sleep and appetite and says she hasn't been able to do much of either in two days  No legal issues reported and no issues with drug and or alcohol abuse       She is requesting to sign herself in for inpatient treatment as she does not know if she can contract to safety tonight and is seeking further mood stabilization and medication management  Patient is requesting no referrals be sent to [facility] at this time  This writer explained to patient that this could inhibit her bed search and possibly extend her time in the ED  Patient verbalizes understanding, continues to ascertain that she does not want to be placed in the [facility] area      On admission to Inpatient Psychiatric Unit:  Patient is a 41-year-old white female with a past psychiatric history of autism spectrum disorder and bipolar disorder who presents voluntarily to inpatient BHU with suicidal thoughts  The patient reportedly held a knife to her throat  Today, the patient does not offer an explanation as to why she did this or what exacerbating stressor she was experiencing in her life    She states that things "are generally good at home and she lives with her mom and she cannot think of any exacerbating stressor  Timeline of symptoms is progressively worsening over the past week or so  Mitigating factors are family support and having outpatient psychiatric services through [facility]  Patient denies current suicidal ideation at the time of my exam   She states that her mood has been low and that she has had increased sleep and decreased appetite, although this is in the setting of nausea vomiting and diarrhea  Patient states that she was taking Effexor but has not taken it in the past 5 days  She is agreeable to starting Abilify  She says that she has a history of bipolar disorder and her description of diamond does not exactly fit the classical description or the natural history of manic symptoms, although this is certainly a possibility  Her description of manic sxs includes increased mood and \"feeling normal \" She denies psychotic symptoms  \"    Social History     Tobacco History     Smoking Status  Never    Smokeless Tobacco Use  Never    Tobacco Comments  no passive smoke exposure          Alcohol History     Alcohol Use Status  Never          Drug Use     Drug Use Status  Never          Sexual Activity     Sexually Active  Not Asked          Activities of Daily Living    Not Asked               Additional Substance Use Detail     Questions Responses    Problems Due to Past Use of Alcohol? No    Problems Due to Past Use of Substances?  No    Substance Use Assessment Denies substance use within the past 12 months    Alcohol Use Frequency Denies use in past 12 months    Cannabis frequency Never used    Comment: Never used on 11/30/2018     Heroin Frequency Denies use in past 12 months    Cocaine frequency Never used    Comment: Never used on 11/30/2018     Crack Cocaine Frequency Denies use in past 12 months    Methamphetamine Frequency Denies use in past 12 months    Narcotic Frequency Denies use in past 12 months    " Benzodiazepine Frequency Denies use in past 12 months    Amphetamine frequency Denies use in past 12 months    Barbituate Frequency Denies use use in past 12 months    Inhalant frequency Never used    Comment: Never used on 11/30/2018     Hallucinogen frequency Never used    Comment: Never used on 11/30/2018     Ecstasy frequency Never used    Comment: Never used on 11/30/2018     Other drug frequency Never used    Comment: Never used on 11/30/2018     Opiate frequency Denies use in past 12 months    Last reviewed by Zachary Davis RN on 6/12/2023          Past Medical History:   Diagnosis Date   • ADHD    • Anxiety    • Asthma    • Autism    • Bipolar disorder ([facility] [address] )    • Depression    • GERD (gastroesophageal reflux disease)      Past Surgical History:   Procedure Laterality Date   • CHOLANGIOGRAM N/A 12/01/2018    Procedure: CHOLANGIOGRAM;  Surgeon: Ketan Hazel MD;  Location:  MAIN OR;  Service: General   • CHOLECYSTECTOMY LAPAROSCOPIC N/A 12/01/2018    Procedure: CHOLECYSTECTOMY LAPAROSCOPIC;  Surgeon: Ketan Hazel MD;  Location:  MAIN OR;  Service: General   • EYE MUSCLE SURGERY Bilateral 2006   • VA ESOPHAGOGASTRODUODENOSCOPY TRANSORAL DIAGNOSTIC N/A 11/27/2018    Procedure: ESOPHAGOGASTRODUODENOSCOPY (EGD) with bx;  Surgeon: Tommy Lee MD;  Location: [facility]; Service: General       Medications: All current active medications have been reviewed  Medications prior to admission:    Prior to Admission Medications   Prescriptions Last Dose Informant Patient Reported? Taking?    OLANZapine (ZyPREXA) 5 mg tablet Past Week  Yes Yes   Sig: Take 5 mg by mouth daily at bedtime   albuterol (PROVENTIL HFA,VENTOLIN HFA) 90 mcg/act inhaler Past Week  No Yes   Sig: Inhale 2 puffs every 4 (four) hours as needed for wheezing   albuterol (ProAir HFA) 90 mcg/act inhaler Past Week  No Yes   Sig: Inhale 2 puffs every 6 (six) hours as needed for wheezing   benzonatate (TESSALON PERLES) 100 mg capsule Not Taking  No No   Sig: Take 1 capsule (100 mg total) by mouth 3 (three) times a day as needed for cough   Patient not taking: Reported on 6/12/2023   ergocalciferol (VITAMIN D2) 50,000 units Not Taking  No No   Sig: TAKE ONE CAPSULE BY MOUTH ONE TIME PER WEEK   Patient not taking: Reported on 6/12/2023   famotidine (PEPCID) 20 mg tablet   No No   Sig: Take 1 tablet (20 mg total) by mouth 2 (two) times a day   fluticasone (FLONASE) 50 mcg/act nasal spray Past Week  No Yes   Sig: SPRAY 1 SPRAY INTO EACH NOSTRIL EVERY DAY   gabapentin (NEURONTIN) 600 MG tablet Not Taking  No No   Sig: Take 1 tablet (600 mg total) by mouth every evening   Patient not taking: Reported on 6/12/2023   hydrOXYzine HCL (ATARAX) 25 mg tablet Past Week  No Yes   Sig: TAKE 1 TABLET (25 MG TOTAL) BY MOUTH EVERY 6 (SIX) HOURS AS NEEDED FOR ITCHING OR ANXIETY   meclizine (ANTIVERT) 12 5 MG tablet Not Taking  No No   Sig: Take 1 tablet (12 5 mg total) by mouth every 12 (twelve) hours as needed for dizziness   ondansetron (ZOFRAN) 4 mg tablet Not Taking  No No   Sig: Take 1 tablet (4 mg total) by mouth every 6 (six) hours   Patient not taking: Reported on 6/12/2023   ondansetron (ZOFRAN-ODT) 4 mg disintegrating tablet Not Taking  No No   Sig: Take 1 tablet (4 mg total) by mouth every 6 (six) hours as needed for nausea or vomiting   Patient not taking: Reported on 6/12/2023   ondansetron (Zofran ODT) 4 mg disintegrating tablet Not Taking  No No   Sig: Take 1 tablet (4 mg total) by mouth every 6 (six) hours as needed for nausea or vomiting   Patient not taking: Reported on 6/12/2023   venlafaxine (EFFEXOR-XR) 37 5 mg 24 hr capsule Past Week  Yes Yes   Sig: Take 37 5 mg by mouth daily   ziprasidone (GEODON) 20 mg capsule Not Taking  Yes No   Sig: Take 20 mg by mouth daily at bedtime   Patient not taking: Reported on 6/12/2023      Facility-Administered Medications: None       Allergies:     No Known Allergies    Objective     Vital signs in last 24 hours: Temp:  [97 8 °F (36 6 °C)-98 3 °F (36 8 °C)] 97 8 °F (36 6 °C)  HR:  [68-75] 75  Resp:  [17-18] 17  BP: (133-137)/(71-72) 133/72    No intake or output data in the 24 hours ending 06/18/23 482 Saira Miguel Course:     Funmilayo Andre was admitted to the inpatient psychiatric unit and started on Behavioral Health checks every 7 minutes  During the hospitalization she was attending individual therapy, group therapy, milieu therapy and occupational therapy  Reynaldo Staples Psychiatric medications were adjusted over the hospital stay  To address depression, depressive symptoms, mood instability, impulsivity and suicidal ideation, Funmilayo Andre was treated with antipsychotic medication Abilify  Medication doses were staretd and adjusted during the hospital course  Abilify was added and titrated to 7 5mg PO daily  Prior to beginning of treatment medications risks and benefits and possible side effects including risk of parkinsonian symptoms, Tardive Dyskinesia and metabolic syndrome related to treatment with antipsychotic medications were reviewed with Trini  She verbalized understanding and agreement for treatment  Upon admission Funmilayo Andre was seen by medical service for medical clearance for inpatient treatment and medical follow up  Trini's symptoms slowly improved over the hospital course  Initially after admission she was still feeling depressed  With adjustment of medications and therapeutic milieu her symptoms slowly improved  At the end of treatment Funmilayo Andre was more stable  Her mood was more stable at the time of discharge  Funmilayo Andre denied suicidal ideation, intent or plan at the time of discharge and denied homicidal ideation, intent or plan at the time of discharge  There was no overt psychosis at the time of discharge  Funmilayo Andre was participating appropriately in milieu at the time of discharge  Behavior was appropriate on the unit at the time of discharge  Sleep and appetite were improved      Trini signed 72 hour notice "and requested discharge  At the time of the 72 hour notice expiration She had no criteria for involuntary commitment and denied any suicidal or homicidal ideation  Patient was attending to all ADLs, taking medications appropriately, eating meals, and not demonstrating any clinical stigmata of acute psychosis  At the time of discharge, they were goal oriented, forward thinking and optimistic about the future  The outpatient follow up with [address] [place] [address] was arranged by the unit  upon discharge  Mental Status at Time of Discharge:     Appearance: casually dressed, appears consistent with stated age  Motor: no psychomotor disturbances, no gait abnormalities  Behavior: cooperative, interacts with this writer appropriately  Speech: normal rate, rhythm, and volume  Mood: \"good\"  Affect: euthymic, normal range and intensity  Thought Process: organized, linear, and goal-oriented  Thought Content: denies auditory or visual hallucinations  Perception: denies delusions or other perceptual disturbances  Risk Potential: denies suicidal ideation, plan, or intent   Denies homicidal ideation  Sensorium: Oriented to person, place, time, and situation  Cognition: cognitive ability appears intact but was not quantitatively tested  Consciousness: alert and awake  Attention: able to focus without difficulty  Insight: improved  Judgement: improved    Admission Diagnosis:    Principal Problem:    Bipolar 1 disorder, depressed, moderate ([facility] [address] )  Active Problems:    Asthma    Morbid obesity ([facility] [address] )    Medical clearance for psychiatric admission    Nausea vomiting and diarrhea    Chest pain    Autism spectrum disorder      Discharge Diagnosis:     Principal Problem:    Bipolar 1 disorder, depressed, moderate ([address] )  Active Problems:    Asthma    Morbid obesity ([facility] [address] )    Medical clearance for psychiatric admission    Nausea vomiting and diarrhea    Chest pain    Autism spectrum disorder  Resolved Problems:    * No " resolved hospital problems  *      Lab Results:   I have personally reviewed all pertinent laboratory/tests results  Most Recent Labs:   Lab Results   Component Value Date    WBC 7 11 06/13/2023    RBC 4 73 06/13/2023    HGB 11 7 06/13/2023    HCT 38 1 06/13/2023     06/13/2023    RDW 13 8 06/13/2023    NEUTROABS 4 88 06/13/2023    SODIUM 137 06/15/2023    K 3 8 06/15/2023     06/15/2023    CO2 25 06/15/2023    BUN 10 06/15/2023    CREATININE 0 93 06/15/2023    GLUC 89 06/15/2023    GLUF 89 06/15/2023    CALCIUM 9 5 06/15/2023    AST 38 06/13/2023    ALT 54 (H) 06/13/2023    ALKPHOS 99 06/13/2023    TP 7 2 06/13/2023    ALB 4 1 06/13/2023    TBILI 0 42 06/13/2023    CHOLESTEROL 150 06/13/2023    HDL 32 (L) 06/13/2023    TRIG 113 06/13/2023    LDLCALC 95 06/13/2023    NONHDLC 118 06/13/2023    LMC9DZVHONIA 2 330 06/13/2023    PREGUR negative 07/28/2021    PREGSERUM Negative 06/13/2023    HCGQUANT <3 09/22/2022    HGBA1C 5 4 06/13/2023     06/13/2023       Discharge Medications:    See after visit summary for all reconciled discharge medications provided to patient and family  Discharge instructions/Information to patient and family:     See after visit summary for information provided to patient and family  Provisions for Follow-Up Care:    See after visit summary for information related to follow-up care and any pertinent home health orders  Discharge Statement:    I spent 35 minutes discharging the patient  This time was spent on the day of discharge  I had direct contact with the patient on the day of discharge  Additional documentation is required if more than 30 minutes were spent on discharge:    I reviewed with Trini importance of compliance with medications and outpatient treatment after discharge  I discussed the medication regimen and possible side effects of the medications with Trini prior to discharge   At the time of discharge she was tolerating psychiatric medications  I discussed outpatient follow up with Kristen Meza  I reviewed with Kristen Meza crisis plan and safety plan upon discharge  Kristen Meza was competent to understand risks and benefits of withholding information and risks and benefits of her actions  Trini signed 72 hour notice and requested discharge  At the time of the 72 hour notice expiration she had no criteria for involuntary commitment and denied any suicidal or homicidal ideation      Discharge on Two Antipsychotic Medications: No    [facility], KEVIN 06/18/23

## 2023-06-18 NOTE — NURSING NOTE
AVS discharge instructions reviewed with patient  Patient denies any questions or concerns and expresses readiness for discharge  Patient discharged from the unit pleasant and cooperative

## 2023-06-18 NOTE — PLAN OF CARE
Problem: SELF HARM/SUICIDALITY  Goal: Will have no self-injury during hospital stay  Description: INTERVENTIONS:  - Q 15 MINUTES: Routine safety checks  - Q WAKING SHIFT & PRN: Assess risk to determine if routine checks are adequate to maintain patient safety  - Encourage patient to participate actively in care by formulating a plan to combat response to suicidal ideation, identify supports and resources  Outcome: Adequate for Discharge     Problem: DEPRESSION  Goal: Will be euthymic at discharge  Description: INTERVENTIONS:  - Administer medication as ordered  - Provide emotional support via 1:1 interaction with staff  - Encourage involvement in milieu/groups/activities  - Monitor for social isolation  Outcome: Adequate for Discharge     Problem: ANXIETY  Goal: Will report anxiety at manageable levels  Description: INTERVENTIONS:  - Administer medication as ordered  - Teach and encourage coping skills  - Provide emotional support  - Assess patient/family for anxiety and ability to cope  Outcome: Adequate for Discharge  Goal: By discharge: Patient will verbalize 2 strategies to deal with anxiety  Description: Interventions:  - Identify any obvious source/trigger to anxiety  - Staff will assist patient in applying identified coping technique/skills  - Encourage attendance of scheduled groups and activities  Outcome: Adequate for Discharge     Problem: Ineffective Coping  Goal: Participates in unit activities  Description: Interventions:  - Provide therapeutic environment   - Provide required programming   - Redirect inappropriate behaviors   Outcome: Adequate for Discharge     Problem: DISCHARGE PLANNING  Goal: Discharge to home or other facility with appropriate resources  Description: INTERVENTIONS:  - Identify barriers to discharge w/patient and caregiver  - Arrange for needed discharge resources and transportation as appropriate  - Identify discharge learning needs (meds, wound care, etc )  - Arrange for interpretive services to assist at discharge as needed  - Refer to Case Management Department for coordinating discharge planning if the patient needs post-hospital services based on physician/advanced practitioner order or complex needs related to functional status, cognitive ability, or social support system  Outcome: Adequate for Discharge     Problem: Nutrition/Hydration-ADULT  Goal: Nutrient/Hydration intake appropriate for improving, restoring or maintaining nutritional needs  Description: Monitor and assess patient's nutrition/hydration status for malnutrition  Collaborate with interdisciplinary team and initiate plan and interventions as ordered  Monitor patient's weight and dietary intake as ordered or per policy  Utilize nutrition screening tool and intervene as necessary  Determine patient's food preferences and provide high-protein, high-caloric foods as appropriate       INTERVENTIONS:  - Monitor oral intake, urinary output, labs, and treatment plans  - Assess nutrition and hydration status and recommend course of action  - Evaluate amount of meals eaten  - Assist patient with eating if necessary   - Allow adequate time for meals  - Recommend/ encourage appropriate diets, oral nutritional supplements, and vitamin/mineral supplements  - Order, calculate, and assess calorie counts as needed  - Recommend, monitor, and adjust tube feedings and TPN/PPN based on assessed needs  - Assess need for intravenous fluids  - Provide specific nutrition/hydration education as appropriate  - Include patient/family/caregiver in decisions related to nutrition  Outcome: Adequate for Discharge

## 2023-07-11 DIAGNOSIS — R11.2 NAUSEA & VOMITING: ICD-10-CM

## 2023-07-12 ENCOUNTER — OFFICE VISIT (OUTPATIENT)
Dept: URGENT CARE | Age: 23
End: 2023-07-12
Payer: COMMERCIAL

## 2023-07-12 VITALS
TEMPERATURE: 97.9 F | OXYGEN SATURATION: 99 % | DIASTOLIC BLOOD PRESSURE: 72 MMHG | SYSTOLIC BLOOD PRESSURE: 122 MMHG | RESPIRATION RATE: 18 BRPM | HEART RATE: 68 BPM

## 2023-07-12 DIAGNOSIS — R11.2 NAUSEA VOMITING AND DIARRHEA: Primary | ICD-10-CM

## 2023-07-12 DIAGNOSIS — R19.7 NAUSEA VOMITING AND DIARRHEA: Primary | ICD-10-CM

## 2023-07-12 PROCEDURE — 99213 OFFICE O/P EST LOW 20 MIN: CPT | Performed by: NURSE PRACTITIONER

## 2023-07-12 RX ORDER — GABAPENTIN 600 MG/1
600 TABLET ORAL EVERY EVENING
COMMUNITY
Start: 2023-06-19

## 2023-07-12 RX ORDER — TRAZODONE HYDROCHLORIDE 50 MG/1
50 TABLET ORAL
COMMUNITY
Start: 2023-06-21

## 2023-07-12 RX ORDER — ONDANSETRON 4 MG/1
4 TABLET, FILM COATED ORAL EVERY 8 HOURS PRN
Qty: 15 TABLET | Refills: 0 | Status: SHIPPED | OUTPATIENT
Start: 2023-07-12

## 2023-07-12 RX ORDER — HYDROXYZINE PAMOATE 50 MG/1
50 CAPSULE ORAL DAILY PRN
COMMUNITY
Start: 2023-06-21

## 2023-07-12 RX ORDER — FLUTICASONE PROPIONATE 50 MCG
SPRAY, SUSPENSION (ML) NASAL
COMMUNITY
Start: 2023-06-18

## 2023-07-12 RX ORDER — VENLAFAXINE HYDROCHLORIDE 37.5 MG/1
CAPSULE, EXTENDED RELEASE ORAL
COMMUNITY
Start: 2023-07-05

## 2023-07-12 NOTE — PROGRESS NOTES
[facility]        NAME: Donald Early is a 20 y.o. female  : 2000    MRN: [id number]  DATE: 2023  TIME: 11:20 AM    Assessment and Plan   Nausea vomiting and diarrhea [R11.2, R19.7]  1. Nausea vomiting and diarrhea  ondansetron (ZOFRAN) 4 mg tablet            Patient Instructions     Chronic versus acute viral gastroenteritis-CT scan provided 2023 was negative for acute abnormalities with same/similar symptoms. Patient was with acute GI and she has not scheduled yet. Will recommend bland diet increase fluids monitor hydration status with input/output educated patient. Will provide Zofran 4 mg every 6-8 hours as needed nausea/vomiting worsening of symptoms report to ED  Follow up with PCP in 3-5 days for reevaluation. Proceed to  ER if symptoms worsen-Red flags discussed. Chief Complaint     Chief Complaint   Patient presents with   • Vomiting     Patient nausea, vomiting, dizziness starting 1 hour ago. Also having diarrhea. Some slight ear pain         History of Present Illness       Patient is a 68-year-old female arrives with complaints of nausea vomiting diarrhea started this morning. Also associated with slight diffuse abdominal pain which is relieved by vomiting or defecation. Denies fever. Denies any blood in vomit or stool. Denies cough coffee-ground emesis. Denies all other viral symptoms such as nasal congestion rhinorrhea cough. Patient does report she has had similar symptoms coming and going approximately every other month. Last time was 2023. At that time she did have a CT scan of the abdomen and pelvis which was negative for acute abnormality. She was provided GI referral however she has not scheduled yet. Review of Systems   Review of Systems   Constitutional: Negative for activity change, appetite change, chills, fatigue and fever. HENT: Negative for congestion, postnasal drip, rhinorrhea, sneezing and sore throat.     Respiratory: Negative for cough, chest tightness, shortness of breath and wheezing. Cardiovascular: Negative for chest pain and palpitations. Gastrointestinal: Positive for abdominal pain (diffuse), diarrhea, nausea and vomiting. Negative for anal bleeding, blood in stool, constipation and rectal pain. Musculoskeletal: Negative for arthralgias and myalgias. Skin: Negative for color change, pallor and rash. Neurological: Negative for dizziness, weakness, light-headedness and headaches. Hematological: Negative for adenopathy. Psychiatric/Behavioral: Negative for agitation and confusion.          Current Medications       Current Outpatient Medications:   •  ARIPiprazole (ABILIFY) 15 mg tablet, Take 0.5 tablets (7.5 mg total) by mouth daily, Disp: 15 tablet, Rfl: 0  •  dicyclomine (BENTYL) 10 mg capsule, Take 1 capsule (10 mg total) by mouth 4 (four) times a day (before meals and at bedtime), Disp: 120 capsule, Rfl: 0  •  gabapentin (NEURONTIN) 600 MG tablet, Take 600 mg by mouth every evening, Disp: , Rfl:   •  hydrOXYzine pamoate (VISTARIL) 50 mg capsule, Take 50 mg by mouth daily as needed, Disp: , Rfl:   •  ondansetron (ZOFRAN) 4 mg tablet, Take 1 tablet (4 mg total) by mouth every 8 (eight) hours as needed for nausea or vomiting for up to 20 doses, Disp: 15 tablet, Rfl: 0  •  traZODone (DESYREL) 50 mg tablet, Take 50 mg by mouth daily at bedtime, Disp: , Rfl:   •  albuterol (PROVENTIL HFA,VENTOLIN HFA) 90 mcg/act inhaler, Inhale 2 puffs every 4 (four) hours as needed for wheezing, Disp: 18 g, Rfl: 0  •  famotidine (PEPCID) 20 mg tablet, Take 1 tablet (20 mg total) by mouth 2 (two) times a day, Disp: 28 tablet, Rfl: 1  •  fluticasone (FLONASE) 50 mcg/act nasal spray, , Disp: , Rfl:   •  meclizine (ANTIVERT) 12.5 MG tablet, Take 1 tablet (12.5 mg total) by mouth every 12 (twelve) hours as needed for dizziness (Patient not taking: Reported on 7/12/2023), Disp: 20 tablet, Rfl: 0  •  venlafaxine (EFFEXOR-XR) 37.5 mg 24 hr capsule, , Disp: , Rfl:     Current Allergies     Allergies as of 07/12/2023   • (No Known Allergies)            The following portions of the patient's history were reviewed and updated as appropriate: allergies, current medications, past family history, past medical history, past social history, past surgical history and problem list.     Past Medical History:   Diagnosis Date   • ADHD    • Anxiety    • Asthma    • Autism    • Bipolar disorder ([address])    • Depression    • GERD (gastroesophageal reflux disease)        Past Surgical History:   Procedure Laterality Date   • CHOLANGIOGRAM N/A 12/01/2018    Procedure: CHOLANGIOGRAM;  Surgeon: Ariella Ku MD;  Location:  MAIN OR;  Service: General   • CHOLECYSTECTOMY LAPAROSCOPIC N/A 12/01/2018    Procedure: CHOLECYSTECTOMY LAPAROSCOPIC;  Surgeon: Ariella Ku MD;  Location:  MAIN OR;  Service: General   • EYE MUSCLE SURGERY Bilateral 2006   • [place] ESOPHAGOGASTRODUODENOSCOPY TRANSORAL DIAGNOSTIC N/A 11/27/2018    Procedure: ESOPHAGOGASTRODUODENOSCOPY (EGD) with bx;  Surgeon: Brandt Rodriguez MD;  Location: [facility]; Service: General       Family History   Problem Relation Age of Onset   • Breast cancer Mother    • Heart attack Mother    • Depression Mother    • Mental illness Mother    • Coronary artery disease Mother    • Hypertension Mother    • Diabetes Mother    • Hyperlipidemia Mother    • Pneumonia Brother    • Hypertension Maternal Grandmother    • Stroke Maternal Grandmother    • Diabetes Maternal Grandmother    • Glaucoma Maternal Grandmother    • Kidney disease Maternal Grandmother    • Sarcoidosis Maternal Grandmother    • Diabetes Maternal Grandfather    • Hypertension Maternal Grandfather    • Stroke Maternal Grandfather    • Glaucoma Maternal Grandfather    • Heart attack Maternal Grandfather    • Colon cancer Neg Hx    • Ovarian cancer Neg Hx          Medications have been verified.         Objective   /72   Pulse 68   Temp 97.9 °F (36.6 °C)   Resp 18   SpO2 99%   No LMP recorded. Physical Exam     Physical Exam  Vitals and nursing note reviewed. Constitutional:       General: She is not in acute distress. Appearance: Normal appearance. She is not ill-appearing or diaphoretic. HENT:      Head: Normocephalic and atraumatic. Right Ear: Tympanic membrane, ear canal and external ear normal. There is no impacted cerumen. Left Ear: Tympanic membrane, ear canal and external ear normal. There is no impacted cerumen. Nose: No congestion or rhinorrhea. Mouth/Throat:      Pharynx: No posterior oropharyngeal erythema. Eyes:      General: No scleral icterus. Right eye: No discharge. Left eye: No discharge. Conjunctiva/sclera: Conjunctivae normal.   Cardiovascular:      Rate and Rhythm: Normal rate and regular rhythm. Pulmonary:      Effort: Pulmonary effort is normal. No respiratory distress. Breath sounds: Normal breath sounds. No stridor. No wheezing, rhonchi or rales. Abdominal:      General: Abdomen is flat. Palpations: Abdomen is soft. There is no mass. Tenderness: There is abdominal tenderness (diffuse). There is no right CVA tenderness, left CVA tenderness, guarding or rebound. Musculoskeletal:         General: Normal range of motion. Cervical back: Normal range of motion. Lymphadenopathy:      Cervical: No cervical adenopathy. Skin:     Coloration: Skin is not jaundiced or pale. Findings: No bruising, erythema or rash. Neurological:      General: No focal deficit present. Mental Status: She is alert and oriented to person, place, and time. Psychiatric:         Mood and Affect: Mood normal.         Behavior: Behavior normal.         Thought Content:  Thought content normal.         Judgment: Judgment normal.

## 2023-07-14 RX ORDER — DICYCLOMINE HYDROCHLORIDE 10 MG/1
CAPSULE ORAL
Qty: 360 CAPSULE | Refills: 1 | OUTPATIENT
Start: 2023-07-14

## 2023-07-16 ENCOUNTER — HOSPITAL ENCOUNTER (EMERGENCY)
Facility: HOSPITAL | Age: 23
Discharge: HOME | End: 2023-07-16
Attending: EMERGENCY MEDICINE
Payer: COMMERCIAL

## 2023-07-16 ENCOUNTER — APPOINTMENT (EMERGENCY)
Dept: CT IMAGING | Facility: HOSPITAL | Age: 23
End: 2023-07-16
Payer: COMMERCIAL

## 2023-07-16 VITALS
OXYGEN SATURATION: 100 % | TEMPERATURE: 98.2 F | DIASTOLIC BLOOD PRESSURE: 57 MMHG | RESPIRATION RATE: 16 BRPM | HEART RATE: 71 BPM | SYSTOLIC BLOOD PRESSURE: 121 MMHG

## 2023-07-16 DIAGNOSIS — N12 PYELONEPHRITIS: Primary | ICD-10-CM

## 2023-07-16 LAB
ALBUMIN SERPL BCP-MCNC: 4.1 G/DL (ref 3.5–5)
ALP SERPL-CCNC: 85 U/L (ref 34–104)
ALT SERPL W P-5'-P-CCNC: 19 U/L (ref 7–52)
ANION GAP SERPL CALCULATED.3IONS-SCNC: 9 MMOL/L
AST SERPL W P-5'-P-CCNC: 31 U/L (ref 13–39)
BACTERIA UR QL AUTO: ABNORMAL /HPF
BASOPHILS # BLD AUTO: 0.04 THOUSANDS/ÂΜL (ref 0–0.1)
BASOPHILS NFR BLD AUTO: 0 % (ref 0–1)
BILIRUB SERPL-MCNC: 0.48 MG/DL (ref 0.2–1)
BILIRUB UR QL STRIP: NEGATIVE
BUN SERPL-MCNC: 10 MG/DL (ref 5–25)
CALCIUM SERPL-MCNC: 9 MG/DL (ref 8.4–10.2)
CHLORIDE SERPL-SCNC: 107 MMOL/L (ref 96–108)
CLARITY UR: ABNORMAL
CO2 SERPL-SCNC: 22 MMOL/L (ref 21–32)
COLOR UR: YELLOW
CREAT SERPL-MCNC: 0.96 MG/DL (ref 0.6–1.3)
EOSINOPHIL # BLD AUTO: 0.1 THOUSAND/ÂΜL (ref 0–0.61)
EOSINOPHIL NFR BLD AUTO: 1 % (ref 0–6)
ERYTHROCYTE [DISTWIDTH] IN BLOOD BY AUTOMATED COUNT: 14.9 % (ref 11.6–15.1)
EXT PREGNANCY TEST URINE: NEGATIVE
EXT PREGNANCY TEST URINE: NEGATIVE
EXT. CONTROL: NORMAL
EXT. CONTROL: NORMAL
GFR SERPL CREATININE-BSD FRML MDRD: 82 ML/MIN/1.73SQ M
GLUCOSE SERPL-MCNC: 87 MG/DL (ref 65–140)
GLUCOSE UR STRIP-MCNC: NEGATIVE MG/DL
HCT VFR BLD AUTO: 37.3 % (ref 34.8–46.1)
HGB BLD-MCNC: 11.9 G/DL (ref 11.5–15.4)
HGB UR QL STRIP.AUTO: 150
IMM GRANULOCYTES # BLD AUTO: 0.03 THOUSAND/UL (ref 0–0.2)
IMM GRANULOCYTES NFR BLD AUTO: 0 % (ref 0–2)
KETONES UR STRIP-MCNC: NEGATIVE MG/DL
LEUKOCYTE ESTERASE UR QL STRIP: 500
LIPASE SERPL-CCNC: 14 U/L (ref 11–82)
LYMPHOCYTES # BLD AUTO: 2.28 THOUSANDS/ÂΜL (ref 0.6–4.47)
LYMPHOCYTES NFR BLD AUTO: 21 % (ref 14–44)
MCH RBC QN AUTO: 25.9 PG (ref 26.8–34.3)
MCHC RBC AUTO-ENTMCNC: 31.9 G/DL (ref 31.4–37.4)
MCV RBC AUTO: 81 FL (ref 82–98)
MONOCYTES # BLD AUTO: 0.76 THOUSAND/ÂΜL (ref 0.17–1.22)
MONOCYTES NFR BLD AUTO: 7 % (ref 4–12)
MUCOUS THREADS UR QL AUTO: ABNORMAL
NEUTROPHILS # BLD AUTO: 7.48 THOUSANDS/ÂΜL (ref 1.85–7.62)
NEUTS SEG NFR BLD AUTO: 71 % (ref 43–75)
NITRITE UR QL STRIP: POSITIVE
NON-SQ EPI CELLS URNS QL MICRO: ABNORMAL /HPF
NRBC BLD AUTO-RTO: 0 /100 WBCS
PH UR STRIP.AUTO: 6 [PH]
PLATELET # BLD AUTO: 316 THOUSANDS/UL (ref 149–390)
PMV BLD AUTO: 10.1 FL (ref 8.9–12.7)
POTASSIUM SERPL-SCNC: 4.6 MMOL/L (ref 3.5–5.3)
PROT SERPL-MCNC: 6.5 G/DL (ref 6.4–8.4)
PROT UR STRIP-MCNC: ABNORMAL MG/DL
RBC # BLD AUTO: 4.59 MILLION/UL (ref 3.81–5.12)
RBC #/AREA URNS AUTO: ABNORMAL /HPF
SODIUM SERPL-SCNC: 138 MMOL/L (ref 135–147)
SP GR UR STRIP.AUTO: 1.01 (ref 1–1.04)
UROBILINOGEN UA: NEGATIVE MG/DL
WBC # BLD AUTO: 10.69 THOUSAND/UL (ref 4.31–10.16)
WBC #/AREA URNS AUTO: ABNORMAL /HPF

## 2023-07-16 PROCEDURE — 96374 THER/PROPH/DIAG INJ IV PUSH: CPT

## 2023-07-16 PROCEDURE — 96361 HYDRATE IV INFUSION ADD-ON: CPT

## 2023-07-16 PROCEDURE — 96375 TX/PRO/DX INJ NEW DRUG ADDON: CPT

## 2023-07-16 PROCEDURE — 87077 CULTURE AEROBIC IDENTIFY: CPT | Performed by: EMERGENCY MEDICINE

## 2023-07-16 PROCEDURE — 36415 COLL VENOUS BLD VENIPUNCTURE: CPT | Performed by: EMERGENCY MEDICINE

## 2023-07-16 PROCEDURE — 81001 URINALYSIS AUTO W/SCOPE: CPT | Performed by: EMERGENCY MEDICINE

## 2023-07-16 PROCEDURE — 80053 COMPREHEN METABOLIC PANEL: CPT | Performed by: EMERGENCY MEDICINE

## 2023-07-16 PROCEDURE — 85025 COMPLETE CBC W/AUTO DIFF WBC: CPT | Performed by: EMERGENCY MEDICINE

## 2023-07-16 PROCEDURE — 87186 SC STD MICRODIL/AGAR DIL: CPT | Performed by: EMERGENCY MEDICINE

## 2023-07-16 PROCEDURE — 74177 CT ABD & PELVIS W/CONTRAST: CPT

## 2023-07-16 PROCEDURE — 83690 ASSAY OF LIPASE: CPT | Performed by: EMERGENCY MEDICINE

## 2023-07-16 PROCEDURE — 81025 URINE PREGNANCY TEST: CPT | Performed by: EMERGENCY MEDICINE

## 2023-07-16 PROCEDURE — 99284 EMERGENCY DEPT VISIT MOD MDM: CPT

## 2023-07-16 PROCEDURE — 87086 URINE CULTURE/COLONY COUNT: CPT | Performed by: EMERGENCY MEDICINE

## 2023-07-16 PROCEDURE — 81003 URINALYSIS AUTO W/O SCOPE: CPT | Performed by: EMERGENCY MEDICINE

## 2023-07-16 RX ORDER — ONDANSETRON 2 MG/ML
4 INJECTION INTRAMUSCULAR; INTRAVENOUS ONCE
Status: COMPLETED | OUTPATIENT
Start: 2023-07-16 | End: 2023-07-16

## 2023-07-16 RX ORDER — FLUCONAZOLE 150 MG/1
150 TABLET ORAL ONCE
COMMUNITY
Start: 2023-06-18 | End: 2023-07-18

## 2023-07-16 RX ORDER — CEPHALEXIN 500 MG/1
500 CAPSULE ORAL ONCE
Status: COMPLETED | OUTPATIENT
Start: 2023-07-16 | End: 2023-07-16

## 2023-07-16 RX ORDER — CEPHALEXIN 500 MG/1
500 CAPSULE ORAL EVERY 6 HOURS SCHEDULED
Qty: 40 CAPSULE | Refills: 0 | Status: SHIPPED | OUTPATIENT
Start: 2023-07-16 | End: 2023-07-26

## 2023-07-16 RX ORDER — SODIUM CHLORIDE, SODIUM LACTATE, POTASSIUM CHLORIDE, CALCIUM CHLORIDE 600; 310; 30; 20 MG/100ML; MG/100ML; MG/100ML; MG/100ML
150 INJECTION, SOLUTION INTRAVENOUS CONTINUOUS
Status: DISCONTINUED | OUTPATIENT
Start: 2023-07-16 | End: 2023-07-16 | Stop reason: HOSPADM

## 2023-07-16 RX ORDER — KETOROLAC TROMETHAMINE 30 MG/ML
15 INJECTION, SOLUTION INTRAMUSCULAR; INTRAVENOUS ONCE
Status: COMPLETED | OUTPATIENT
Start: 2023-07-16 | End: 2023-07-16

## 2023-07-16 RX ADMIN — KETOROLAC TROMETHAMINE 15 MG: 30 INJECTION, SOLUTION INTRAMUSCULAR; INTRAVENOUS at 16:38

## 2023-07-16 RX ADMIN — SODIUM CHLORIDE, SODIUM LACTATE, POTASSIUM CHLORIDE, AND CALCIUM CHLORIDE 150 ML/HR: .6; .31; .03; .02 INJECTION, SOLUTION INTRAVENOUS at 16:35

## 2023-07-16 RX ADMIN — CEPHALEXIN 500 MG: 500 CAPSULE ORAL at 17:10

## 2023-07-16 RX ADMIN — IOHEXOL 100 ML: 350 INJECTION, SOLUTION INTRAVENOUS at 16:59

## 2023-07-16 RX ADMIN — ONDANSETRON 4 MG: 2 INJECTION INTRAMUSCULAR; INTRAVENOUS at 16:36

## 2023-07-16 NOTE — ED PROVIDER NOTES
History  Chief Complaint   Patient presents with   • Abdominal Pain     Patient reports umbilical pain that radiates into the right lower quadrant. Took 1 advil this am. Complains of nausea. Patient with significant history including morbid obesity, bipolar disorder, depression, ADHD, intellectual disability, autism spectrum. Patient reports 2 days of pain radiating between the umbilicus and the right lower quadrant present constantly without interruption no obvious provoking or relieving factors apart from some slight increase in pain with movement. Mild associated nausea, 1 loose stool nonbloody. No fever no chills no chest pain no shortness of breath no dysuria hematuria frequency. Patient reports she has not had any abdominal surgeries. Prior to Admission Medications   Prescriptions Last Dose Informant Patient Reported? Taking?    ARIPiprazole (ABILIFY) 15 mg tablet   No No   Sig: Take 0.5 tablets (7.5 mg total) by mouth daily   albuterol (PROVENTIL HFA,VENTOLIN HFA) 90 mcg/act inhaler   No No   Sig: Inhale 2 puffs every 4 (four) hours as needed for wheezing   dicyclomine (BENTYL) 10 mg capsule   No No   Sig: Take 1 capsule (10 mg total) by mouth 4 (four) times a day (before meals and at bedtime)   famotidine (PEPCID) 20 mg tablet   No No   Sig: Take 1 tablet (20 mg total) by mouth 2 (two) times a day   fluconazole (DIFLUCAN) 150 mg tablet   Yes No   Sig: Take 150 mg by mouth once   fluticasone (FLONASE) 50 mcg/act nasal spray   Yes No   gabapentin (NEURONTIN) 600 MG tablet   Yes No   Sig: Take 600 mg by mouth every evening   hydrOXYzine pamoate (VISTARIL) 50 mg capsule   Yes No   Sig: Take 50 mg by mouth daily as needed   meclizine (ANTIVERT) 12.5 MG tablet   No No   Sig: Take 1 tablet (12.5 mg total) by mouth every 12 (twelve) hours as needed for dizziness   Patient not taking: Reported on 7/12/2023   ondansetron (ZOFRAN) 4 mg tablet   No No   Sig: Take 1 tablet (4 mg total) by mouth every 8 (eight) hours as needed for nausea or vomiting for up to 20 doses   traZODone (DESYREL) 50 mg tablet   Yes No   Sig: Take 50 mg by mouth daily at bedtime   venlafaxine (EFFEXOR-XR) 37.5 mg 24 hr capsule   Yes No   Patient not taking: Reported on 7/12/2023      Facility-Administered Medications: None       Past Medical History:   Diagnosis Date   • ADHD    • Anxiety    • Asthma    • Autism    • Bipolar disorder ([address])    • Depression    • GERD (gastroesophageal reflux disease)        Past Surgical History:   Procedure Laterality Date   • CHOLANGIOGRAM N/A 12/01/2018    Procedure: CHOLANGIOGRAM;  Surgeon: Heriberto Borja MD;  Location:  MAIN OR;  Service: General   • CHOLECYSTECTOMY LAPAROSCOPIC N/A 12/01/2018    Procedure: CHOLECYSTECTOMY LAPAROSCOPIC;  Surgeon: Heriberto Borja MD;  Location:  MAIN OR;  Service: General   • EYE MUSCLE SURGERY Bilateral 2006   • [place] ESOPHAGOGASTRODUODENOSCOPY TRANSORAL DIAGNOSTIC N/A 11/27/2018    Procedure: ESOPHAGOGASTRODUODENOSCOPY (EGD) with bx;  Surgeon: Sharif Swanson MD;  Location: [facility]; Service: General       Family History   Problem Relation Age of Onset   • Breast cancer Mother    • Heart attack Mother    • Depression Mother    • Mental illness Mother    • Coronary artery disease Mother    • Hypertension Mother    • Diabetes Mother    • Hyperlipidemia Mother    • Pneumonia Brother    • Hypertension Maternal Grandmother    • Stroke Maternal Grandmother    • Diabetes Maternal Grandmother    • Glaucoma Maternal Grandmother    • Kidney disease Maternal Grandmother    • Sarcoidosis Maternal Grandmother    • Diabetes Maternal Grandfather    • Hypertension Maternal Grandfather    • Stroke Maternal Grandfather    • Glaucoma Maternal Grandfather    • Heart attack Maternal Grandfather    • Colon cancer Neg Hx    • Ovarian cancer Neg Hx      I have reviewed and agree with the history as documented.     E-Cigarette/Vaping   • E-Cigarette Use Never User E-Cigarette/Vaping Substances     Social History     Tobacco Use   • Smoking status: Never   • Smokeless tobacco: Never   • Tobacco comments:     no passive smoke exposure   Vaping Use   • Vaping Use: Never used   Substance Use Topics   • Alcohol use: Never   • Drug use: Never       Review of Systems   Constitutional: Negative for fever. HENT: Negative for rhinorrhea. Eyes: Negative for visual disturbance. Respiratory: Negative for shortness of breath. Cardiovascular: Negative for chest pain. Gastrointestinal: Positive for abdominal pain, diarrhea and nausea. Negative for vomiting. Endocrine: Negative for polydipsia. Genitourinary: Negative for dysuria, frequency and hematuria. Musculoskeletal: Negative for neck stiffness. Skin: Negative for rash. Allergic/Immunologic: Negative for immunocompromised state. Neurological: Negative for speech difficulty, weakness and numbness. Psychiatric/Behavioral: Negative for suicidal ideas. Physical Exam  Physical Exam  Constitutional:       General: She is not in acute distress. HENT:      Head: Normocephalic and atraumatic. Right Ear: External ear normal.      Left Ear: External ear normal.      Nose: Nose normal.      Mouth/Throat:      Pharynx: Oropharynx is clear. Eyes:      Conjunctiva/sclera: Conjunctivae normal.   Cardiovascular:      Rate and Rhythm: Normal rate and regular rhythm. Heart sounds: Normal heart sounds. No murmur heard. Pulmonary:      Effort: Pulmonary effort is normal.      Breath sounds: Normal breath sounds. Abdominal:      General: Bowel sounds are normal.      Palpations: Abdomen is soft. There is no mass. Tenderness: There is abdominal tenderness in the right lower quadrant. There is no guarding or rebound. Musculoskeletal:         General: No swelling or tenderness. Cervical back: Normal range of motion and neck supple. Right lower leg: No edema. Left lower leg: No edema. Skin:     General: Skin is warm and dry. Capillary Refill: Capillary refill takes less than 2 seconds. Neurological:      General: No focal deficit present. Mental Status: She is alert and oriented to person, place, and time.    Psychiatric:         Mood and Affect: Mood normal.         Vital Signs  ED Triage Vitals [07/16/23 1555]   Temperature Pulse Respirations Blood Pressure SpO2   99.8 °F (37.7 °C) 77 15 126/74 99 %      Temp Source Heart Rate Source Patient Position - Orthostatic VS BP Location FiO2 (%)   Tympanic Monitor Sitting Left arm --      Pain Score       9           Vitals:    07/16/23 1555 07/16/23 1820   BP: 126/74 121/57   Pulse: 77 71   Patient Position - Orthostatic VS: Sitting Sitting         Visual Acuity      ED Medications  Medications   ondansetron (ZOFRAN) injection 4 mg (4 mg Intravenous Given 7/16/23 1636)   ketorolac (TORADOL) injection 15 mg (15 mg Intravenous Given 7/16/23 1638)   iohexol (OMNIPAQUE) 350 MG/ML injection (SINGLE-DOSE) 100 mL (100 mL Intravenous Given 7/16/23 1659)   cephalexin (KEFLEX) capsule 500 mg (500 mg Oral Given 7/16/23 1710)       Diagnostic Studies  Results Reviewed     Procedure Component Value Units Date/Time    Comprehensive metabolic panel [[id number]] Collected: 07/16/23 1625    Lab Status: Final result Specimen: Blood from Arm, Left Updated: 07/16/23 1656     Sodium 138 mmol/L      Potassium 4.6 mmol/L      Chloride 107 mmol/L      CO2 22 mmol/L      ANION GAP 9 mmol/L      BUN 10 mg/dL      Creatinine 0.96 mg/dL      Glucose 87 mg/dL      Calcium 9.0 mg/dL      AST 31 U/L      ALT 19 U/L      Alkaline Phosphatase 85 U/L      Total Protein 6.5 g/dL      Albumin 4.1 g/dL      Total Bilirubin 0.48 mg/dL      eGFR 82 ml/min/1.73sq m     Narrative:      [place] guidelines for Chronic Kidney Disease (CKD):   •  Stage 1 with normal or high GFR (GFR > 90 mL/min/1.73 square meters)  •  Stage 2 Mild CKD (GFR = 60-89 mL/min/1.73 square meters)  •  Stage 3A Moderate CKD (GFR = 45-59 mL/min/1.73 square meters)  •  Stage 3B Moderate CKD (GFR = 30-44 mL/min/1.73 square meters)  •  Stage 4 Severe CKD (GFR = 15-29 mL/min/1.73 square meters)  •  Stage 5 End Stage CKD (GFR <15 mL/min/1.73 square meters)  Note: GFR calculation is accurate only with a steady state creatinine    Lipase [[id number]]  (Normal) Collected: 07/16/23 1625    Lab Status: Final result Specimen: Blood from Arm, Left Updated: 07/16/23 1656     Lipase 14 u/L     Urine Microscopic [[id number]]  (Abnormal) Collected: 07/16/23 1604    Lab Status: Final result Specimen: Urine, Other Updated: 07/16/23 1640     RBC, UA 2-4 /hpf      WBC, UA Innumerable /hpf      Epithelial Cells Occasional /hpf      Bacteria, UA Innumerable /hpf      MUCUS THREADS Occasional    Urine culture [[id number]] Collected: 07/16/23 1604    Lab Status:  In process Specimen: Urine, Other Updated: 07/16/23 1640    POCT pregnancy, urine [[id number]]  (Normal) Resulted: 07/16/23 1626    Lab Status: Final result Updated: 07/16/23 1637     EXT Preg Test, Ur Negative     Control Valid    CBC and differential [[id number]]  (Abnormal) Collected: 07/16/23 1625    Lab Status: Final result Specimen: Blood from Arm, Left Updated: 07/16/23 1634     WBC 10.69 Thousand/uL      RBC 4.59 Million/uL      Hemoglobin 11.9 g/dL      Hematocrit 37.3 %      MCV 81 fL      MCH 25.9 pg      MCHC 31.9 g/dL      RDW 14.9 %      MPV 10.1 fL      Platelets 943 Thousands/uL      nRBC 0 /100 WBCs      Neutrophils Relative 71 %      Immat GRANS % 0 %      Lymphocytes Relative 21 %      Monocytes Relative 7 %      Eosinophils Relative 1 %      Basophils Relative 0 %      Neutrophils Absolute 7.48 Thousands/µL      Immature Grans Absolute 0.03 Thousand/uL      Lymphocytes Absolute 2.28 Thousands/µL      Monocytes Absolute 0.76 Thousand/µL      Eosinophils Absolute 0.10 Thousand/µL      Basophils Absolute 0.04 Thousands/µL     UA w Reflex to Microscopic w Reflex to Culture [[id number]]  (Abnormal) Collected: 07/16/23 1604    Lab Status: Final result Specimen: Urine, Other Updated: 07/16/23 1631     Color, UA Yellow     Clarity, UA Cloudy     Specific Gravity, UA 1.015     pH, UA 6.0     Leukocytes, .0     Nitrite, UA Positive     Protein,  (2+) mg/dl      Glucose, UA Negative mg/dl      Ketones, UA Negative mg/dl      Bilirubin, UA Negative     Occult Blood, .0     UROBILINOGEN UA Negative mg/dL     POCT pregnancy, urine [[id number]]  (Normal) Resulted: 07/16/23 1613    Lab Status: Final result Updated: 07/16/23 1613     EXT Preg Test, Ur Negative     Control Valid                 CT abdomen pelvis with contrast   Final Result by Noemí Gamez MD (07/16 1810)      Mild right-sided periureteral stranding and enhancement of its walls without an obstructing ureteral calculus. This likely reflects infection/ureteritis. There is bladder wall thickening concerning for cystitis. No bowel obstruction or CT evidence for acute appendicitis. Workstation performed: [id number]                    Procedures  Procedures         ED Course                               SBIRT 22yo+    Flowsheet Row Most Recent Value   Initial Alcohol Screen: US AUDIT-C     1. How often do you have a drink containing alcohol? 0 Filed at: 07/16/2023 1557   2. How many drinks containing alcohol do you have on a typical day you are drinking? 0 Filed at: 07/16/2023 1557   3a. Male UNDER 65: How often do you have five or more drinks on one occasion? 0 Filed at: 07/16/2023 1557   3b. FEMALE Any Age, or MALE 65+: How often do you have 4 or more drinks on one occassion? 0 Filed at: 07/16/2023 1557   Audit-C Score 0 Filed at: 07/16/2023 1557   AILYN: How many times in the past year have you. .. Used an illegal drug or used a prescription medication for non-medical reasons?  Never Filed at: 07/16/2023 1557                    Medical Decision Making  RLQ pain with tenderness and nausea. Obviously differential includes appy, diverticulitis as well as less emergent conditions such as UTI. Clinical picture more suggestive of non-emergent causes but will need labs and imaging for final dx. UTI with peritereteral stranding on CT, not yet pyelo. Suitable for treatment at home, oral abx, pmd f/u    Amount and/or Complexity of Data Reviewed  Labs: ordered. Radiology: ordered. Risk  Prescription drug management. Disposition  Final diagnoses:   Pyelonephritis     Time reflects when diagnosis was documented in both MDM as applicable and the Disposition within this note     Time User Action Codes Description Comment    7/16/2023  6:16 PM Pervis Forward Add [N12] Pyelonephritis       ED Disposition     ED Disposition   Discharge    Condition   Stable    Date/Time   Sun Jul 16, 2023  6:16 PM    Comment   Alicia Mckee discharge to home/self care.                Follow-up Information     Follow up With Specialties Details Why Contact Info    Nikki España MD Family Medicine Schedule an appointment as soon as possible for a visit in 2 days  [address]  [facility]  [phone]            Discharge Medication List as of 7/16/2023  6:17 PM      START taking these medications    Details   cephalexin (KEFLEX) 500 mg capsule Take 1 capsule (500 mg total) by mouth every 6 (six) hours for 10 days, Starting Sun 7/16/2023, Until Wed 7/26/2023, Normal         CONTINUE these medications which have NOT CHANGED    Details   albuterol (PROVENTIL HFA,VENTOLIN HFA) 90 mcg/act inhaler Inhale 2 puffs every 4 (four) hours as needed for wheezing, Starting Fri 7/15/2022, Normal      ARIPiprazole (ABILIFY) 15 mg tablet Take 0.5 tablets (7.5 mg total) by mouth daily, Starting Sun 6/18/2023, Until Tue 7/18/2023, Normal      dicyclomine (BENTYL) 10 mg capsule Take 1 capsule (10 mg total) by mouth 4 (four) times a day (before meals and at bedtime), Starting Sun 6/18/2023, Until Tue 7/18/2023, Normal      famotidine (PEPCID) 20 mg tablet Take 1 tablet (20 mg total) by mouth 2 (two) times a day, Starting Thu 7/18/2019, Normal      fluconazole (DIFLUCAN) 150 mg tablet Take 150 mg by mouth once, Starting Sun 6/18/2023, Historical Med      fluticasone (FLONASE) 50 mcg/act nasal spray Starting Sun 6/18/2023, Historical Med      gabapentin (NEURONTIN) 600 MG tablet Take 600 mg by mouth every evening, Starting Mon 6/19/2023, Historical Med      hydrOXYzine pamoate (VISTARIL) 50 mg capsule Take 50 mg by mouth daily as needed, Starting Wed 6/21/2023, Historical Med      meclizine (ANTIVERT) 12.5 MG tablet Take 1 tablet (12.5 mg total) by mouth every 12 (twelve) hours as needed for dizziness, Starting Wed 12/14/2022, Print      ondansetron (ZOFRAN) 4 mg tablet Take 1 tablet (4 mg total) by mouth every 8 (eight) hours as needed for nausea or vomiting for up to 20 doses, Starting Wed 7/12/2023, Normal      traZODone (DESYREL) 50 mg tablet Take 50 mg by mouth daily at bedtime, Starting Wed 6/21/2023, Historical Med      venlafaxine (EFFEXOR-XR) 37.5 mg 24 hr capsule Starting Wed 7/5/2023, Historical Med             No discharge procedures on file.     PDMP Review     None          ED Provider  Electronically Signed by           Coreen Noel MD  07/16/23 0964

## 2023-07-17 DIAGNOSIS — B37.31 ACUTE CANDIDIASIS OF VULVA AND VAGINA: ICD-10-CM

## 2023-07-18 RX ORDER — FLUCONAZOLE 150 MG/1
TABLET ORAL
Qty: 1 TABLET | Refills: 1 | Status: SHIPPED | OUTPATIENT
Start: 2023-07-18 | End: 2023-07-19

## 2023-07-19 LAB
BACTERIA UR CULT: ABNORMAL
BACTERIA UR CULT: ABNORMAL

## 2023-08-02 NOTE — PROGRESS NOTES
Pt admitted status 201 from [facility] ED for SI and increased depression and anxiety  Pt said she was trying to run away from home "to explore" but parents do not want her leaving the home on her own  Pt said she was making self vomit "to self-harm " Pt reports increased anxiety and depression but denies any current stressors  Pt says this behavior came "out of the blue " Pt reports both depression and anxiety 7/10  Pt denies SI at this time and contracts for safety  Denies HI  Denies hx of violence  Denies hx of cutting  Denies substance abuse  Notified pt's mother Louis Hartyahir of arrival to unit at pt's request  Pt's mother verified current medications  Pt disheveled, poor ADLs  Pt showered upon arrival to unit when prompted to do so  Oriented to unit rules/routine  Pt cooperative at this time  Medical Necessity Information: It is in your best interest to select a reason for this procedure from the list below. All of these items fulfill various CMS LCD requirements except the new and changing color options. Medical Necessity Clause: This procedure was medically necessary because the lesion that was treated was: Lab: [id number] Lab Facility: [id number] Detail Level: Detailed Was A Bandage Applied: Yes Size Of Lesion In Cm (Required): 0.6 X Size Of Lesion In Cm (Optional): 0 Depth Of Shave: dermis Biopsy Method: Dermablade Anesthesia Type: 1% lidocaine with epinephrine Hemostasis: Drysol Wound Care: Petrolatum Render Path Notes In Note?: No Consent was obtained from the patient. The risks and benefits to therapy were discussed in detail. Specifically, the risks of infection, scarring, bleeding, prolonged wound healing, incomplete removal, allergy to anesthesia, nerve injury and recurrence were addressed. Prior to the procedure, the treatment site was clearly identified and confirmed by the patient. All components of Universal Protocol/PAUSE Rule completed. Post-Care Instructions: I reviewed with the patient in detail post-care instructions. Patient is to keep the biopsy site dry overnight, and then apply bacitracin twice daily until healed. Patient may apply hydrogen peroxide soaks to remove any crusting. Notification Instructions: Patient will be notified of pathology results. However, patient instructed to call the office if not contacted within 2 weeks. Billing Type: Third-Party Bill

## 2023-08-10 DIAGNOSIS — B37.31 ACUTE CANDIDIASIS OF VULVA AND VAGINA: ICD-10-CM

## 2023-08-11 RX ORDER — FLUCONAZOLE 150 MG/1
TABLET ORAL
Qty: 1 TABLET | Refills: 1 | Status: SHIPPED | OUTPATIENT
Start: 2023-08-11 | End: 2023-08-12

## 2023-08-24 NOTE — PROGRESS NOTES
Honorio Neumann is a 20 y.o. female who presents today for annual GYN exam.  Her last pap smear was performed 22 and result was negative. She reports no history of abnormal pap smears in her past. She contraceptive method is Nexplanon. Her general medical history has been reviewed and she reports it as follows:    Past Medical History:   Diagnosis Date   • ADHD    • Anxiety    • Asthma    • Autism    • Bipolar disorder ([address])    • Depression    • GERD (gastroesophageal reflux disease)      Past Surgical History:   Procedure Laterality Date   • CHOLANGIOGRAM N/A 2018    Procedure: CHOLANGIOGRAM;  Surgeon: Ashly Awan MD;  Location:  MAIN OR;  Service: General   • CHOLECYSTECTOMY LAPAROSCOPIC N/A 2018    Procedure: CHOLECYSTECTOMY LAPAROSCOPIC;  Surgeon: Ashly Awan MD;  Location:  MAIN OR;  Service: General   • EYE MUSCLE SURGERY Bilateral 2006   • AK ESOPHAGOGASTRODUODENOSCOPY TRANSORAL DIAGNOSTIC N/A 2018    Procedure: ESOPHAGOGASTRODUODENOSCOPY (EGD) with bx;  Surgeon: Tal Hill MD;  Location: [facility]; Service: General     OB History        0    Para   0    Term   0       0    AB   0    Living   0       SAB   0    IAB   0    Ectopic   0    Multiple   0    Live Births   0               Social History     Tobacco Use   • Smoking status: Never   • Smokeless tobacco: Never   • Tobacco comments:     no passive smoke exposure   Vaping Use   • Vaping Use: Never used   Substance Use Topics   • Alcohol use: Never   • Drug use: Never     Cancer-related family history includes Breast cancer in her mother. There is no history of Colon cancer or Ovarian cancer.     Current Outpatient Medications:   •  albuterol (PROVENTIL HFA,VENTOLIN HFA) 90 mcg/act inhaler, Inhale 2 puffs every 4 (four) hours as needed for wheezing, Disp: 18 g, Rfl: 0  •  famotidine (PEPCID) 20 mg tablet, Take 1 tablet (20 mg total) by mouth 2 (two) times a day, Disp: 28 tablet, Rfl: 1  •  fluticasone (FLONASE) 50 mcg/act nasal spray, , Disp: , Rfl:   •  gabapentin (NEURONTIN) 600 MG tablet, Take 600 mg by mouth every evening, Disp: , Rfl:   •  hydrOXYzine pamoate (VISTARIL) 50 mg capsule, Take 50 mg by mouth daily as needed, Disp: , Rfl:   •  ondansetron (ZOFRAN) 4 mg tablet, Take 1 tablet (4 mg total) by mouth every 8 (eight) hours as needed for nausea or vomiting for up to 20 doses, Disp: 15 tablet, Rfl: 0  •  traZODone (DESYREL) 50 mg tablet, Take 50 mg by mouth daily at bedtime, Disp: , Rfl:   •  ARIPiprazole (ABILIFY) 15 mg tablet, Take 0.5 tablets (7.5 mg total) by mouth daily, Disp: 15 tablet, Rfl: 0  •  dicyclomine (BENTYL) 10 mg capsule, Take 1 capsule (10 mg total) by mouth 4 (four) times a day (before meals and at bedtime), Disp: 120 capsule, Rfl: 0  •  meclizine (ANTIVERT) 12.5 MG tablet, Take 1 tablet (12.5 mg total) by mouth every 12 (twelve) hours as needed for dizziness (Patient not taking: Reported on 7/12/2023), Disp: 20 tablet, Rfl: 0  •  venlafaxine (EFFEXOR-XR) 37.5 mg 24 hr capsule, , Disp: , Rfl:     Review of Systems:  Review of Systems    Depression Screening Follow-up Plan: Patient's depression screening was positive with a PHQ-2 score of 0. Their PHQ-9 score was 0. Clinically patient does not have depression. No treatment is required. Physical Exam:  Physical Exam  Vitals and nursing note reviewed. Constitutional:       General: She is not in acute distress. Appearance: She is well-developed. HENT:      Head: Normocephalic. Neck:      Thyroid: No thyromegaly. Cardiovascular:      Rate and Rhythm: Normal rate and regular rhythm. Heart sounds: Normal heart sounds. No murmur heard. Pulmonary:      Effort: Pulmonary effort is normal. No respiratory distress. Breath sounds: Normal breath sounds. No wheezing or rales. Chest:      Chest wall: No tenderness.    Breasts:     Right: No swelling, bleeding, inverted nipple, mass, nipple discharge, skin change or tenderness. Left: No swelling, bleeding, inverted nipple, mass, nipple discharge, skin change or tenderness. Abdominal:      General: Bowel sounds are normal. There is no distension. Palpations: Abdomen is soft. There is no mass. Tenderness: There is no abdominal tenderness. There is no guarding or rebound. Genitourinary:     Labia:         Right: No rash, tenderness, lesion or injury. Left: No rash, tenderness, lesion or injury. Vagina: Normal.      Cervix: Normal.      Adnexa:         Right: No mass, tenderness or fullness. Left: No mass, tenderness or fullness. Rectum: No external hemorrhoid. Skin:     General: Skin is warm and dry. Neurological:      Mental Status: She is alert and oriented to person, place, and time. Psychiatric:         Behavior: Behavior normal.         Thought Content: Thought content normal.         Judgment: Judgment normal.           Assessment:   1. Normal well-woman GYN exam.   2. Nexplanon in place. Plan:   1. Pap smear not done    2. Has same partner-declines STD testing.

## 2023-08-28 ENCOUNTER — ANNUAL EXAM (OUTPATIENT)
Dept: OBGYN CLINIC | Facility: CLINIC | Age: 23
End: 2023-08-28

## 2023-08-28 VITALS
HEIGHT: 59 IN | DIASTOLIC BLOOD PRESSURE: 57 MMHG | WEIGHT: 212 LBS | BODY MASS INDEX: 42.74 KG/M2 | HEART RATE: 74 BPM | SYSTOLIC BLOOD PRESSURE: 101 MMHG

## 2023-08-28 DIAGNOSIS — Z97.5 NEXPLANON IN PLACE: ICD-10-CM

## 2023-08-28 DIAGNOSIS — Z01.419 ENCOUNTER FOR GYNECOLOGICAL EXAMINATION (GENERAL) (ROUTINE) WITHOUT ABNORMAL FINDINGS: Primary | ICD-10-CM

## 2023-08-28 PROCEDURE — 99395 PREV VISIT EST AGE 18-39: CPT | Performed by: OBSTETRICS & GYNECOLOGY

## 2023-09-01 ENCOUNTER — HOSPITAL ENCOUNTER (EMERGENCY)
Facility: HOSPITAL | Age: 23
Discharge: HOME | End: 2023-09-01
Attending: EMERGENCY MEDICINE
Payer: COMMERCIAL

## 2023-09-01 ENCOUNTER — APPOINTMENT (EMERGENCY)
Dept: ULTRASOUND IMAGING | Facility: HOSPITAL | Age: 23
End: 2023-09-01
Payer: COMMERCIAL

## 2023-09-01 VITALS
BODY MASS INDEX: 42.97 KG/M2 | DIASTOLIC BLOOD PRESSURE: 56 MMHG | HEART RATE: 64 BPM | SYSTOLIC BLOOD PRESSURE: 112 MMHG | RESPIRATION RATE: 18 BRPM | TEMPERATURE: 98.6 F | OXYGEN SATURATION: 98 % | WEIGHT: 212.74 LBS

## 2023-09-01 DIAGNOSIS — N93.9 ABNORMAL UTERINE BLEEDING: Primary | ICD-10-CM

## 2023-09-01 DIAGNOSIS — R10.9 ABDOMINAL CRAMPING: ICD-10-CM

## 2023-09-01 LAB
ALBUMIN SERPL BCP-MCNC: 4.3 G/DL (ref 3.5–5)
ALP SERPL-CCNC: 107 U/L (ref 34–104)
ALT SERPL W P-5'-P-CCNC: 43 U/L (ref 7–52)
ANION GAP SERPL CALCULATED.3IONS-SCNC: 8 MMOL/L
AST SERPL W P-5'-P-CCNC: 16 U/L (ref 13–39)
BACTERIA UR QL AUTO: ABNORMAL /HPF
BASOPHILS # BLD AUTO: 0.04 THOUSANDS/ÂΜL (ref 0–0.1)
BASOPHILS NFR BLD AUTO: 0 % (ref 0–1)
BILIRUB SERPL-MCNC: 0.28 MG/DL (ref 0.2–1)
BILIRUB UR QL STRIP: NEGATIVE
BUN SERPL-MCNC: 8 MG/DL (ref 5–25)
CALCIUM SERPL-MCNC: 9 MG/DL (ref 8.4–10.2)
CHLORIDE SERPL-SCNC: 106 MMOL/L (ref 96–108)
CLARITY UR: CLEAR
CO2 SERPL-SCNC: 23 MMOL/L (ref 21–32)
COLOR UR: YELLOW
CREAT SERPL-MCNC: 0.92 MG/DL (ref 0.6–1.3)
EOSINOPHIL # BLD AUTO: 0.08 THOUSAND/ÂΜL (ref 0–0.61)
EOSINOPHIL NFR BLD AUTO: 1 % (ref 0–6)
ERYTHROCYTE [DISTWIDTH] IN BLOOD BY AUTOMATED COUNT: 14.4 % (ref 11.6–15.1)
EXT PREGNANCY TEST URINE: NEGATIVE
EXT. CONTROL: NORMAL
GFR SERPL CREATININE-BSD FRML MDRD: 86 ML/MIN/1.73SQ M
GLUCOSE SERPL-MCNC: 108 MG/DL (ref 65–140)
GLUCOSE UR STRIP-MCNC: NEGATIVE MG/DL
HCT VFR BLD AUTO: 38.5 % (ref 34.8–46.1)
HGB BLD-MCNC: 11.9 G/DL (ref 11.5–15.4)
HGB UR QL STRIP.AUTO: ABNORMAL
IMM GRANULOCYTES # BLD AUTO: 0.03 THOUSAND/UL (ref 0–0.2)
IMM GRANULOCYTES NFR BLD AUTO: 0 % (ref 0–2)
KETONES UR STRIP-MCNC: NEGATIVE MG/DL
LEUKOCYTE ESTERASE UR QL STRIP: NEGATIVE
LYMPHOCYTES # BLD AUTO: 2.24 THOUSANDS/ÂΜL (ref 0.6–4.47)
LYMPHOCYTES NFR BLD AUTO: 22 % (ref 14–44)
MCH RBC QN AUTO: 25.1 PG (ref 26.8–34.3)
MCHC RBC AUTO-ENTMCNC: 30.9 G/DL (ref 31.4–37.4)
MCV RBC AUTO: 81 FL (ref 82–98)
MONOCYTES # BLD AUTO: 0.52 THOUSAND/ÂΜL (ref 0.17–1.22)
MONOCYTES NFR BLD AUTO: 5 % (ref 4–12)
MUCOUS THREADS UR QL AUTO: ABNORMAL
NEUTROPHILS # BLD AUTO: 7.34 THOUSANDS/ÂΜL (ref 1.85–7.62)
NEUTS SEG NFR BLD AUTO: 72 % (ref 43–75)
NITRITE UR QL STRIP: NEGATIVE
NON-SQ EPI CELLS URNS QL MICRO: ABNORMAL /HPF
NRBC BLD AUTO-RTO: 0 /100 WBCS
PH UR STRIP.AUTO: 6 [PH] (ref 4.5–8)
PLATELET # BLD AUTO: 309 THOUSANDS/UL (ref 149–390)
PMV BLD AUTO: 10 FL (ref 8.9–12.7)
POTASSIUM SERPL-SCNC: 3.4 MMOL/L (ref 3.5–5.3)
PROT SERPL-MCNC: 6.8 G/DL (ref 6.4–8.4)
PROT UR STRIP-MCNC: ABNORMAL MG/DL
RBC # BLD AUTO: 4.75 MILLION/UL (ref 3.81–5.12)
RBC #/AREA URNS AUTO: ABNORMAL /HPF
SODIUM SERPL-SCNC: 137 MMOL/L (ref 135–147)
SP GR UR STRIP.AUTO: 1.02 (ref 1–1.03)
UROBILINOGEN UR QL STRIP.AUTO: 0.2 E.U./DL
WBC # BLD AUTO: 10.25 THOUSAND/UL (ref 4.31–10.16)
WBC #/AREA URNS AUTO: ABNORMAL /HPF

## 2023-09-01 PROCEDURE — 36415 COLL VENOUS BLD VENIPUNCTURE: CPT | Performed by: PHYSICIAN ASSISTANT

## 2023-09-01 PROCEDURE — 99284 EMERGENCY DEPT VISIT MOD MDM: CPT

## 2023-09-01 PROCEDURE — 96374 THER/PROPH/DIAG INJ IV PUSH: CPT

## 2023-09-01 PROCEDURE — 99284 EMERGENCY DEPT VISIT MOD MDM: CPT | Performed by: PHYSICIAN ASSISTANT

## 2023-09-01 PROCEDURE — 76856 US EXAM PELVIC COMPLETE: CPT

## 2023-09-01 PROCEDURE — 80053 COMPREHEN METABOLIC PANEL: CPT | Performed by: PHYSICIAN ASSISTANT

## 2023-09-01 PROCEDURE — 76830 TRANSVAGINAL US NON-OB: CPT

## 2023-09-01 PROCEDURE — 85025 COMPLETE CBC W/AUTO DIFF WBC: CPT | Performed by: PHYSICIAN ASSISTANT

## 2023-09-01 PROCEDURE — 81025 URINE PREGNANCY TEST: CPT | Performed by: PHYSICIAN ASSISTANT

## 2023-09-01 PROCEDURE — 96361 HYDRATE IV INFUSION ADD-ON: CPT

## 2023-09-01 PROCEDURE — 81001 URINALYSIS AUTO W/SCOPE: CPT

## 2023-09-01 PROCEDURE — 96375 TX/PRO/DX INJ NEW DRUG ADDON: CPT

## 2023-09-01 RX ORDER — NORGESTIMATE AND ETHINYL ESTRADIOL 0.25-0.035
1 KIT ORAL DAILY
Qty: 30 TABLET | Refills: 0 | Status: SHIPPED | OUTPATIENT
Start: 2023-09-01

## 2023-09-01 RX ORDER — KETOROLAC TROMETHAMINE 30 MG/ML
15 INJECTION, SOLUTION INTRAMUSCULAR; INTRAVENOUS ONCE
Status: COMPLETED | OUTPATIENT
Start: 2023-09-01 | End: 2023-09-01

## 2023-09-01 RX ORDER — ONDANSETRON 2 MG/ML
4 INJECTION INTRAMUSCULAR; INTRAVENOUS ONCE
Status: COMPLETED | OUTPATIENT
Start: 2023-09-01 | End: 2023-09-01

## 2023-09-01 RX ADMIN — ONDANSETRON 4 MG: 2 INJECTION INTRAMUSCULAR; INTRAVENOUS at 15:34

## 2023-09-01 RX ADMIN — SODIUM CHLORIDE 1000 ML: 0.9 INJECTION, SOLUTION INTRAVENOUS at 15:34

## 2023-09-01 RX ADMIN — KETOROLAC TROMETHAMINE 15 MG: 30 INJECTION, SOLUTION INTRAMUSCULAR; INTRAVENOUS at 16:42

## 2023-09-01 NOTE — ED PROVIDER NOTES
History  Chief Complaint   Patient presents with   • Vaginal Bleeding     Pt reports vaginal bleeding and cramping for past 5 days. Clots noted with cramping the size of a quarter. Going through [employer] every hour. +lightheadedness. Patient is a 64-year-old  female presenting to the ED for evaluation of vaginal bleeding x5 days. Patient has been experiencing vaginal bleeding over the past 5 days. She had a Nexplanon placed in 2022 and says that she has not had any periods/vaginal bleeding since that time. She states that the bleeding was initially very mild but has been progressively worsening. She has been soaking through a maxi pad every 1-2 hours for the past 3 to 4 days. She is also passing clots the size of a quarter. She reports associated lower abdominal/pelvic cramping as well fatigue, nausea and lightheadedness. She denies any fevers, chills, vomiting, chest pain, SOB or urinary symptoms. Prior to Admission Medications   Prescriptions Last Dose Informant Patient Reported? Taking?    ARIPiprazole (ABILIFY) 15 mg tablet   No No   Sig: Take 0.5 tablets (7.5 mg total) by mouth daily   albuterol (PROVENTIL HFA,VENTOLIN HFA) 90 mcg/act inhaler   No No   Sig: Inhale 2 puffs every 4 (four) hours as needed for wheezing   dicyclomine (BENTYL) 10 mg capsule   No No   Sig: Take 1 capsule (10 mg total) by mouth 4 (four) times a day (before meals and at bedtime)   famotidine (PEPCID) 20 mg tablet   No No   Sig: Take 1 tablet (20 mg total) by mouth 2 (two) times a day   fluticasone (FLONASE) 50 mcg/act nasal spray   Yes No   gabapentin (NEURONTIN) 600 MG tablet   Yes No   Sig: Take 600 mg by mouth every evening   hydrOXYzine pamoate (VISTARIL) 50 mg capsule   Yes No   Sig: Take 50 mg by mouth daily as needed   meclizine (ANTIVERT) 12.5 MG tablet   No No   Sig: Take 1 tablet (12.5 mg total) by mouth every 12 (twelve) hours as needed for dizziness   Patient not taking: Reported on 2023 ondansetron (ZOFRAN) 4 mg tablet   No No   Sig: Take 1 tablet (4 mg total) by mouth every 8 (eight) hours as needed for nausea or vomiting for up to 20 doses   traZODone (DESYREL) 50 mg tablet   Yes No   Sig: Take 50 mg by mouth daily at bedtime   venlafaxine (EFFEXOR-XR) 37.5 mg 24 hr capsule   Yes No   Patient not taking: Reported on 7/12/2023      Facility-Administered Medications: None       Past Medical History:   Diagnosis Date   • ADHD    • Anxiety    • Asthma    • Autism    • Bipolar disorder ([address] [facility])    • Depression    • GERD (gastroesophageal reflux disease)        Past Surgical History:   Procedure Laterality Date   • CHOLANGIOGRAM N/A 12/01/2018    Procedure: CHOLANGIOGRAM;  Surgeon: Mario Ashford MD;  Location:  MAIN OR;  Service: General   • CHOLECYSTECTOMY LAPAROSCOPIC N/A 12/01/2018    Procedure: CHOLECYSTECTOMY LAPAROSCOPIC;  Surgeon: Mario Ashford MD;  Location:  MAIN OR;  Service: General   • EYE MUSCLE SURGERY Bilateral 2006   • ME ESOPHAGOGASTRODUODENOSCOPY TRANSORAL DIAGNOSTIC N/A 11/27/2018    Procedure: ESOPHAGOGASTRODUODENOSCOPY (EGD) with bx;  Surgeon: Elmer Bartlett MD;  Location: [facility];   Service: General       Family History   Problem Relation Age of Onset   • Breast cancer Mother    • Heart attack Mother    • Depression Mother    • Mental illness Mother    • Coronary artery disease Mother    • Hypertension Mother    • Diabetes Mother    • Hyperlipidemia Mother    • Pneumonia Brother    • Hypertension Maternal Grandmother    • Stroke Maternal Grandmother    • Diabetes Maternal Grandmother    • Glaucoma Maternal Grandmother    • Kidney disease Maternal Grandmother    • Sarcoidosis Maternal Grandmother    • Diabetes Maternal Grandfather    • Hypertension Maternal Grandfather    • Stroke Maternal Grandfather    • Glaucoma Maternal Grandfather    • Heart attack Maternal Grandfather    • Colon cancer Neg Hx    • Ovarian cancer Neg Hx      I have reviewed and agree with the history as documented. E-Cigarette/Vaping   • E-Cigarette Use Never User      E-Cigarette/Vaping Substances     Social History     Tobacco Use   • Smoking status: Never   • Smokeless tobacco: Never   • Tobacco comments:     no passive smoke exposure   Vaping Use   • Vaping Use: Never used   Substance Use Topics   • Alcohol use: Never   • Drug use: Never       Review of Systems   Constitutional: Negative for chills and fever. HENT: Negative for congestion, rhinorrhea and sore throat. Respiratory: Negative for cough and shortness of breath. Cardiovascular: Negative for chest pain, palpitations and leg swelling. Gastrointestinal: Positive for nausea. Negative for blood in stool, constipation, diarrhea and vomiting. Genitourinary: Positive for pelvic pain and vaginal bleeding. Negative for difficulty urinating, dysuria, flank pain, frequency, hematuria and urgency. Musculoskeletal: Negative for arthralgias, back pain, joint swelling, myalgias and neck pain. Neurological: Positive for dizziness and light-headedness. Negative for syncope and headaches. All other systems reviewed and are negative. Physical Exam  Physical Exam  Vitals and nursing note reviewed. Exam conducted with a chaperone present. Constitutional:       General: She is awake. Appearance: Normal appearance. She is well-developed. She is not toxic-appearing or diaphoretic. HENT:      Head: Normocephalic and atraumatic. Right Ear: External ear normal.      Left Ear: External ear normal.      Nose: Nose normal.      Mouth/Throat:      Lips: Pink. Mouth: Mucous membranes are moist.   Eyes:      General: Lids are normal. No scleral icterus. Conjunctiva/sclera: Conjunctivae normal.      Pupils: Pupils are equal, round, and reactive to light. Cardiovascular:      Rate and Rhythm: Normal rate and regular rhythm. Pulses: Normal pulses. Radial pulses are 2+ on the right side and 2+ on the left side. Heart sounds: Normal heart sounds, S1 normal and S2 normal.   Pulmonary:      Effort: Pulmonary effort is normal. No accessory muscle usage. Breath sounds: Normal breath sounds. No stridor. No decreased breath sounds, wheezing, rhonchi or rales. Abdominal:      General: Abdomen is flat. Bowel sounds are normal. There is no distension. Palpations: Abdomen is soft. Tenderness: There is abdominal tenderness in the suprapubic area and left lower quadrant. There is no right CVA tenderness, left CVA tenderness, guarding or rebound. Genitourinary:     Comments: Small amount of blood in vaginal vault. No large clots. No heavy bleeding/hemorrhage. Musculoskeletal:      Cervical back: Full passive range of motion without pain and neck supple. No signs of trauma. No pain with movement. Right lower leg: No edema. Left lower leg: No edema. Lymphadenopathy:      Cervical: No cervical adenopathy. Skin:     General: Skin is warm and dry. Capillary Refill: Capillary refill takes less than 2 seconds. Coloration: Skin is not cyanotic, jaundiced or pale. Neurological:      Mental Status: She is alert and oriented to person, place, and time. GCS: GCS eye subscore is 4. GCS verbal subscore is 5. GCS motor subscore is 6. Gait: Gait normal.   Psychiatric:         Mood and Affect: Mood normal.         Speech: Speech normal.         Behavior: Behavior is cooperative.          Vital Signs  ED Triage Vitals   Temperature Pulse Respirations Blood Pressure SpO2   09/01/23 1455 09/01/23 1455 09/01/23 1455 09/01/23 1456 09/01/23 1455   98.6 °F (37 °C) 81 19 98/51 98 %      Temp Source Heart Rate Source Patient Position - Orthostatic VS BP Location FiO2 (%)   09/01/23 1455 09/01/23 1455 09/01/23 1455 09/01/23 1455 --   Oral Monitor Sitting Right arm       Pain Score       09/01/23 1455       10 - Worst Possible Pain           Vitals:    09/01/23 1455 09/01/23 1456 09/01/23 1731   BP: 98/51 112/56   Pulse: 81  64   Patient Position - Orthostatic VS: Sitting  Lying         Visual Acuity      ED Medications  Medications   sodium chloride 0.9 % bolus 1,000 mL (0 mL Intravenous Stopped 9/1/23 1731)   ondansetron (ZOFRAN) injection 4 mg (4 mg Intravenous Given 9/1/23 1534)   ketorolac (TORADOL) injection 15 mg (15 mg Intravenous Given 9/1/23 1642)       Diagnostic Studies  Results Reviewed     Procedure Component Value Units Date/Time    Comprehensive metabolic panel [[id number]]  (Abnormal) Collected: 09/01/23 1534    Lab Status: Final result Specimen: Blood from Arm, Right Updated: 09/01/23 1610     Sodium 137 mmol/L      Potassium 3.4 mmol/L      Chloride 106 mmol/L      CO2 23 mmol/L      ANION GAP 8 mmol/L      BUN 8 mg/dL      Creatinine 0.92 mg/dL      Glucose 108 mg/dL      Calcium 9.0 mg/dL      AST 16 U/L      ALT 43 U/L      Alkaline Phosphatase 107 U/L      Total Protein 6.8 g/dL      Albumin 4.3 g/dL      Total Bilirubin 0.28 mg/dL      eGFR 86 ml/min/1.73sq m     Narrative:      [place] guidelines for Chronic Kidney Disease (CKD):   •  Stage 1 with normal or high GFR (GFR > 90 mL/min/1.73 square meters)  •  Stage 2 Mild CKD (GFR = 60-89 mL/min/1.73 square meters)  •  Stage 3A Moderate CKD (GFR = 45-59 mL/min/1.73 square meters)  •  Stage 3B Moderate CKD (GFR = 30-44 mL/min/1.73 square meters)  •  Stage 4 Severe CKD (GFR = 15-29 mL/min/1.73 square meters)  •  Stage 5 End Stage CKD (GFR <15 mL/min/1.73 square meters)  Note: GFR calculation is accurate only with a steady state creatinine    Urine Microscopic [[id number]]  (Abnormal) Collected: 09/01/23 1538    Lab Status: Final result Specimen: Urine, Clean Catch Updated: 09/01/23 1556     RBC, UA Innumerable /hpf      WBC, UA 4-10 /hpf      Epithelial Cells Moderate /hpf      Bacteria, UA Occasional /hpf      MUCUS THREADS Innumerable    CBC and differential [[id number]]  (Abnormal) Collected: 09/01/23 1534    Lab Status: Final result Specimen: Blood from Arm, Right Updated: 09/01/23 1547     WBC 10.25 Thousand/uL      RBC 4.75 Million/uL      Hemoglobin 11.9 g/dL      Hematocrit 38.5 %      MCV 81 fL      MCH 25.1 pg      MCHC 30.9 g/dL      RDW 14.4 %      MPV 10.0 fL      Platelets 277 Thousands/uL      nRBC 0 /100 WBCs      Neutrophils Relative 72 %      Immat GRANS % 0 %      Lymphocytes Relative 22 %      Monocytes Relative 5 %      Eosinophils Relative 1 %      Basophils Relative 0 %      Neutrophils Absolute 7.34 Thousands/µL      Immature Grans Absolute 0.03 Thousand/uL      Lymphocytes Absolute 2.24 Thousands/µL      Monocytes Absolute 0.52 Thousand/µL      Eosinophils Absolute 0.08 Thousand/µL      Basophils Absolute 0.04 Thousands/µL     POCT pregnancy, urine [[id number]]  (Normal) Resulted: 09/01/23 1544    Lab Status: Final result Updated: 09/01/23 1544     EXT Preg Test, Ur Negative     Control Valid    Urine Macroscopic, POC [[id number]]  (Abnormal) Collected: 09/01/23 1538    Lab Status: Final result Specimen: Urine Updated: 09/01/23 1539     Color, UA Yellow     Clarity, UA Clear     pH, UA 6.0     Leukocytes, UA Negative     Nitrite, UA Negative     Protein, UA 30 (1+) mg/dl      Glucose, UA Negative mg/dl      Ketones, UA Negative mg/dl      Urobilinogen, UA 0.2 E.U./dl      Bilirubin, UA Negative     Occult Blood, UA Large     Specific Gravity, UA 1.025    Narrative:      CL[facility] RESULT                 US pelvis complete w transvaginal   Final Result by Michael Klein MD (09/01 1822)      Normal. No evidence of ovarian torsion. Workstation performed: [id number]                    Procedures  Procedures         ED Course  ED Course as of 09/01/23 2223   Fri Sep 01, 2023   1812 Hemoglobin: 11.9  Stable. SBIRT 20yo+    Flowsheet Row Most Recent Value   Initial Alcohol Screen: US AUDIT-C     1.  How often do you have a drink containing alcohol? 0 Filed at: 2023 155   2. How many drinks containing alcohol do you have on a typical day you are drinking? 0 Filed at: 2023   3b. FEMALE Any Age, or MALE 65+: How often do you have 4 or more drinks on one occassion? 0 Filed at: 2023   Audit-C Score 0 Filed at: 2023   AILYN: How many times in the past year have you. .. Used an illegal drug or used a prescription medication for non-medical reasons? Never Filed at: 2023                    Medical Decision Making  Patient is a 39-year-old female presenting to the ED for evaluation of vaginal bleeding and pelvic cramping x5 days. DDx including but not limited to: Dysfunctional uterine bleeding, uterine fibroids, ectopic pregnancy, threatened , incomplete , retained products of conception, PCOS, ovarian cyst/torsion. Will check labs, pregnancy test and pelvic U/S. I reviewed patient's labs which are noncontributing. Hemoglobin is stable at 11.9. Urine pregnancy test negative. UA negative for infection. Pelvic U/S shows no acute abnormalities. Pelvic exam reassuring with minimal blood in vaginal vault, no hemorrhage/heavy bleeding. Patient feeling improved after IV fluids and Toradol. Discussed with GYN attending who recommends starting patient on a higher dose OCP such as Sprintec to help slow the bleeding if no contraindications. Patient denies any hx of venous thromboembolism or other contraindications to OCPs; states she has had OCP's in the past but switched to Nexplanon to have less bleeding. A prescription for Sprintec was sent to patient's pharmacy. The management plan was discussed in detail with the patient at bedside and all questions were answered. Strict ED return instructions were discussed at bedside. Prior to discharge, both verbal and written instructions were provided. We discussed the signs and symptoms that should prompt the patient to return to the ED.  All questions were answered and the patient was comfortable with the plan of care and discharged home. The patient agrees to return to the Emergency Department for concerns and/or progression of illness. Amount and/or Complexity of Data Reviewed  Labs: ordered. Decision-making details documented in ED Course. Radiology: ordered. Discussion of management or test interpretation with external provider(s): Dr. Asia Ayoub (OBGYN)    Risk  Prescription drug management. Disposition  Final diagnoses:   Abnormal uterine bleeding   Abdominal cramping     Time reflects when diagnosis was documented in both MDM as applicable and the Disposition within this note     Time User Action Codes Description Comment    9/1/2023  7:03 PM Gloria Cruz Add [N93.9] Abnormal uterine bleeding     9/1/2023  7:04 PM Gloria Cruz Add [R10.9] Abdominal cramping       ED Disposition     ED Disposition   Discharge    Condition   Stable    Date/Time   Fri Sep 1, 2023  7:03 PM    Comment   Jodi Sarmiento discharge to home/self care.                Follow-up Information     Follow up With Specialties Details Why Contact Info Additional [address] [facility] [place], [facility] by [facility] Schedule an appointment as soon as possible for a visit   [address] [place] [phone]  [address] [place] [address], [address] [place] [address], [address] [place] [address]  Emergency Department Emergency Medicine  If symptoms worsen [address] [phone]  [address] [facility] Emergency Department, [address] [place] [address], [id number]          Discharge Medication List as of 9/1/2023  7:06 PM      START taking these medications    Details   norgestimate-ethinyl estradiol (Sprintec 28) 0.25-35 MG-MCG per tablet Take 1 tablet by mouth daily, Starting Fri 9/1/2023, Normal CONTINUE these medications which have NOT CHANGED    Details   albuterol (PROVENTIL HFA,VENTOLIN HFA) 90 mcg/act inhaler Inhale 2 puffs every 4 (four) hours as needed for wheezing, Starting Fri 7/15/2022, Normal      famotidine (PEPCID) 20 mg tablet Take 1 tablet (20 mg total) by mouth 2 (two) times a day, Starting Thu 7/18/2019, Normal      ARIPiprazole (ABILIFY) 15 mg tablet Take 0.5 tablets (7.5 mg total) by mouth daily, Starting Sun 6/18/2023, Until Tue 7/18/2023, Normal      dicyclomine (BENTYL) 10 mg capsule Take 1 capsule (10 mg total) by mouth 4 (four) times a day (before meals and at bedtime), Starting Sun 6/18/2023, Until Tue 7/18/2023, Normal      fluticasone (FLONASE) 50 mcg/act nasal spray Starting Sun 6/18/2023, Historical Med      gabapentin (NEURONTIN) 600 MG tablet Take 600 mg by mouth every evening, Starting Mon 6/19/2023, Historical Med      hydrOXYzine pamoate (VISTARIL) 50 mg capsule Take 50 mg by mouth daily as needed, Starting Wed 6/21/2023, Historical Med      meclizine (ANTIVERT) 12.5 MG tablet Take 1 tablet (12.5 mg total) by mouth every 12 (twelve) hours as needed for dizziness, Starting Wed 12/14/2022, Print      ondansetron (ZOFRAN) 4 mg tablet Take 1 tablet (4 mg total) by mouth every 8 (eight) hours as needed for nausea or vomiting for up to 20 doses, Starting Wed 7/12/2023, Normal      traZODone (DESYREL) 50 mg tablet Take 50 mg by mouth daily at bedtime, Starting Wed 6/21/2023, Historical Med      venlafaxine (EFFEXOR-XR) 37.5 mg 24 hr capsule Starting Wed 7/5/2023, Historical Med             No discharge procedures on file.     PDMP Review     None          ED Provider  Electronically Signed by           Collin Laureano PA-C  09/01/23 7943

## 2023-09-27 ENCOUNTER — HOSPITAL ENCOUNTER (EMERGENCY)
Facility: HOSPITAL | Age: 23
Discharge: HOME | End: 2023-09-27
Attending: EMERGENCY MEDICINE
Payer: COMMERCIAL

## 2023-09-27 VITALS
BODY MASS INDEX: 40.56 KG/M2 | SYSTOLIC BLOOD PRESSURE: 127 MMHG | RESPIRATION RATE: 16 BRPM | WEIGHT: 200.8 LBS | TEMPERATURE: 97.8 F | HEART RATE: 80 BPM | OXYGEN SATURATION: 97 % | DIASTOLIC BLOOD PRESSURE: 74 MMHG

## 2023-09-27 DIAGNOSIS — R11.2 NAUSEA AND VOMITING, UNSPECIFIED VOMITING TYPE: ICD-10-CM

## 2023-09-27 DIAGNOSIS — B37.31 YEAST VAGINITIS: ICD-10-CM

## 2023-09-27 DIAGNOSIS — R19.7 NAUSEA VOMITING AND DIARRHEA: ICD-10-CM

## 2023-09-27 DIAGNOSIS — E87.6 HYPOKALEMIA: ICD-10-CM

## 2023-09-27 DIAGNOSIS — R74.01 TRANSAMINITIS: Primary | ICD-10-CM

## 2023-09-27 DIAGNOSIS — R11.2 NAUSEA VOMITING AND DIARRHEA: ICD-10-CM

## 2023-09-27 LAB
ALBUMIN SERPL BCP-MCNC: 4.5 G/DL (ref 3.5–5)
ALP SERPL-CCNC: 120 U/L (ref 34–104)
ALT SERPL W P-5'-P-CCNC: 469 U/L (ref 7–52)
ANION GAP SERPL CALCULATED.3IONS-SCNC: 11 MMOL/L
AST SERPL W P-5'-P-CCNC: 265 U/L (ref 13–39)
BACTERIA UR QL AUTO: ABNORMAL /HPF
BASOPHILS # BLD AUTO: 0.03 THOUSANDS/ÂΜL (ref 0–0.1)
BASOPHILS NFR BLD AUTO: 0 % (ref 0–1)
BILIRUB SERPL-MCNC: 0.76 MG/DL (ref 0.2–1)
BILIRUB UR QL STRIP: ABNORMAL
BUN SERPL-MCNC: 5 MG/DL (ref 5–25)
CALCIUM SERPL-MCNC: 9.4 MG/DL (ref 8.4–10.2)
CHLORIDE SERPL-SCNC: 101 MMOL/L (ref 96–108)
CLARITY UR: ABNORMAL
CO2 SERPL-SCNC: 24 MMOL/L (ref 21–32)
COLOR UR: ABNORMAL
CREAT SERPL-MCNC: 0.92 MG/DL (ref 0.6–1.3)
EOSINOPHIL # BLD AUTO: 0.04 THOUSAND/ÂΜL (ref 0–0.61)
EOSINOPHIL NFR BLD AUTO: 1 % (ref 0–6)
ERYTHROCYTE [DISTWIDTH] IN BLOOD BY AUTOMATED COUNT: 14 % (ref 11.6–15.1)
EXT PREGNANCY TEST URINE: NEGATIVE
EXT. CONTROL: NORMAL
GFR SERPL CREATININE-BSD FRML MDRD: 86 ML/MIN/1.73SQ M
GLUCOSE SERPL-MCNC: 118 MG/DL (ref 65–140)
GLUCOSE UR STRIP-MCNC: NEGATIVE MG/DL
HAV IGM SER QL: NORMAL
HBV CORE IGM SER QL: NORMAL
HBV SURFACE AG SER QL: NORMAL
HCT VFR BLD AUTO: 39.6 % (ref 34.8–46.1)
HCV AB SER QL: NORMAL
HGB BLD-MCNC: 12.4 G/DL (ref 11.5–15.4)
HGB UR QL STRIP.AUTO: NEGATIVE
IMM GRANULOCYTES # BLD AUTO: 0.03 THOUSAND/UL (ref 0–0.2)
IMM GRANULOCYTES NFR BLD AUTO: 0 % (ref 0–2)
KETONES UR STRIP-MCNC: ABNORMAL MG/DL
LEUKOCYTE ESTERASE UR QL STRIP: 25
LIPASE SERPL-CCNC: 34 U/L (ref 11–82)
LYMPHOCYTES # BLD AUTO: 1.24 THOUSANDS/ÂΜL (ref 0.6–4.47)
LYMPHOCYTES NFR BLD AUTO: 18 % (ref 14–44)
MAGNESIUM SERPL-MCNC: 2.1 MG/DL (ref 1.9–2.7)
MCH RBC QN AUTO: 24.6 PG (ref 26.8–34.3)
MCHC RBC AUTO-ENTMCNC: 31.3 G/DL (ref 31.4–37.4)
MCV RBC AUTO: 79 FL (ref 82–98)
MONOCYTES # BLD AUTO: 0.57 THOUSAND/ÂΜL (ref 0.17–1.22)
MONOCYTES NFR BLD AUTO: 9 % (ref 4–12)
MUCOUS THREADS UR QL AUTO: ABNORMAL
NEUTROPHILS # BLD AUTO: 4.82 THOUSANDS/ÂΜL (ref 1.85–7.62)
NEUTS SEG NFR BLD AUTO: 72 % (ref 43–75)
NITRITE UR QL STRIP: NEGATIVE
NON-SQ EPI CELLS URNS QL MICRO: ABNORMAL /HPF
NRBC BLD AUTO-RTO: 0 /100 WBCS
OTHER STN SPEC: ABNORMAL
PH UR STRIP.AUTO: 6 [PH]
PLATELET # BLD AUTO: 374 THOUSANDS/UL (ref 149–390)
PMV BLD AUTO: 10.3 FL (ref 8.9–12.7)
POTASSIUM SERPL-SCNC: 3.3 MMOL/L (ref 3.5–5.3)
PROT SERPL-MCNC: 7.1 G/DL (ref 6.4–8.4)
PROT UR STRIP-MCNC: ABNORMAL MG/DL
RBC # BLD AUTO: 5.04 MILLION/UL (ref 3.81–5.12)
RBC #/AREA URNS AUTO: ABNORMAL /HPF
SODIUM SERPL-SCNC: 136 MMOL/L (ref 135–147)
SP GR UR STRIP.AUTO: 1.02 (ref 1–1.04)
URINE COMMENT: ABNORMAL
UROBILINOGEN UA: 8 MG/DL
WBC # BLD AUTO: 6.73 THOUSAND/UL (ref 4.31–10.16)
WBC #/AREA URNS AUTO: ABNORMAL /HPF

## 2023-09-27 PROCEDURE — 85025 COMPLETE CBC W/AUTO DIFF WBC: CPT

## 2023-09-27 PROCEDURE — 80074 ACUTE HEPATITIS PANEL: CPT

## 2023-09-27 PROCEDURE — 99285 EMERGENCY DEPT VISIT HI MDM: CPT | Performed by: EMERGENCY MEDICINE

## 2023-09-27 PROCEDURE — 81003 URINALYSIS AUTO W/O SCOPE: CPT

## 2023-09-27 PROCEDURE — 36415 COLL VENOUS BLD VENIPUNCTURE: CPT

## 2023-09-27 PROCEDURE — 83690 ASSAY OF LIPASE: CPT

## 2023-09-27 PROCEDURE — 96375 TX/PRO/DX INJ NEW DRUG ADDON: CPT

## 2023-09-27 PROCEDURE — 96361 HYDRATE IV INFUSION ADD-ON: CPT

## 2023-09-27 PROCEDURE — 96374 THER/PROPH/DIAG INJ IV PUSH: CPT

## 2023-09-27 PROCEDURE — 99283 EMERGENCY DEPT VISIT LOW MDM: CPT

## 2023-09-27 PROCEDURE — 81001 URINALYSIS AUTO W/SCOPE: CPT

## 2023-09-27 PROCEDURE — 80053 COMPREHEN METABOLIC PANEL: CPT

## 2023-09-27 PROCEDURE — 81025 URINE PREGNANCY TEST: CPT

## 2023-09-27 PROCEDURE — 83735 ASSAY OF MAGNESIUM: CPT

## 2023-09-27 RX ORDER — FLUCONAZOLE 100 MG/1
200 TABLET ORAL ONCE
Status: COMPLETED | OUTPATIENT
Start: 2023-09-27 | End: 2023-09-27

## 2023-09-27 RX ORDER — KETOROLAC TROMETHAMINE 30 MG/ML
15 INJECTION, SOLUTION INTRAMUSCULAR; INTRAVENOUS ONCE
Status: COMPLETED | OUTPATIENT
Start: 2023-09-27 | End: 2023-09-27

## 2023-09-27 RX ORDER — ONDANSETRON 4 MG/1
4 TABLET, FILM COATED ORAL EVERY 8 HOURS PRN
Qty: 20 TABLET | Refills: 0 | Status: SHIPPED | OUTPATIENT
Start: 2023-09-27 | End: 2023-10-01 | Stop reason: SDUPTHER

## 2023-09-27 RX ORDER — ONDANSETRON 2 MG/ML
4 INJECTION INTRAMUSCULAR; INTRAVENOUS ONCE
Status: COMPLETED | OUTPATIENT
Start: 2023-09-27 | End: 2023-09-27

## 2023-09-27 RX ORDER — POTASSIUM CHLORIDE 750 MG/1
20 TABLET, EXTENDED RELEASE ORAL ONCE
Status: COMPLETED | OUTPATIENT
Start: 2023-09-27 | End: 2023-09-27

## 2023-09-27 RX ADMIN — FLUCONAZOLE 200 MG: 100 TABLET ORAL at 10:43

## 2023-09-27 RX ADMIN — KETOROLAC TROMETHAMINE 15 MG: 30 INJECTION, SOLUTION INTRAMUSCULAR; INTRAVENOUS at 09:11

## 2023-09-27 RX ADMIN — POTASSIUM CHLORIDE 20 MEQ: 750 TABLET, EXTENDED RELEASE ORAL at 10:43

## 2023-09-27 RX ADMIN — SODIUM CHLORIDE 1000 ML: 0.9 INJECTION, SOLUTION INTRAVENOUS at 09:04

## 2023-09-27 RX ADMIN — ONDANSETRON 4 MG: 2 INJECTION INTRAMUSCULAR; INTRAVENOUS at 09:11

## 2023-09-27 NOTE — Clinical Note
Brandy Veras was seen and treated in our emergency department on 9/27/2023. Diagnosis:     Annie Marcial  may return to work on return date. She may return on this date: 09/29/2023         If you have any questions or concerns, please don't hesitate to call.       Jean Paul Salcedo MD    ______________________________           _______________          _______________  Hospital Representative                              Date                                Time

## 2023-09-27 NOTE — ED PROVIDER NOTES
History  Chief Complaint   Patient presents with   • Abdominal Pain     Abd pain since Friday with N/V/D. No medication taken. PCP told her to come to ED     Kwan Lafleur, 23-year-old female with past medical history of depression bipolar disorder, comes to the emergency room today for abdominal pain. The abdominal pain is rated at a 10 out of 10, and began last Friday. The pain is located in the epigastric region, and is associated with nausea vomiting and diarrhea. On Friday when the pain began it was only a 3 out of 10, but has escalated to a 10 out of 10 very quickly and was described as sharp. The pain is now constant and is aggravated by any movements. Patient has not been able to keep any food down since last Friday and has been vomiting with all of her meals. Patient has never had this kind of pain before. Patient did not take any medications to improve the abdominal pain. Patient felt subjective fevers and checked her temperature which was the highest 99.9 Fahrenheit and took Tylenol. She denies having any new foods, having any recent cookouts or parties, or eating leftovers. Patient denies any sick contacts or anyone with similar symptoms. She lives with her mom dad and grandpa. Prior to Admission Medications   Prescriptions Last Dose Informant Patient Reported? Taking?    ARIPiprazole (ABILIFY) 15 mg tablet   No No   Sig: Take 0.5 tablets (7.5 mg total) by mouth daily   albuterol (PROVENTIL HFA,VENTOLIN HFA) 90 mcg/act inhaler   No No   Sig: Inhale 2 puffs every 4 (four) hours as needed for wheezing   dicyclomine (BENTYL) 10 mg capsule   No No   Sig: Take 1 capsule (10 mg total) by mouth 4 (four) times a day (before meals and at bedtime)   famotidine (PEPCID) 20 mg tablet   No No   Sig: Take 1 tablet (20 mg total) by mouth 2 (two) times a day   fluticasone (FLONASE) 50 mcg/act nasal spray   Yes No   gabapentin (NEURONTIN) 600 MG tablet   Yes No   Sig: Take 600 mg by mouth every evening   hydrOXYzine pamoate (VISTARIL) 50 mg capsule   Yes No   Sig: Take 50 mg by mouth daily as needed   meclizine (ANTIVERT) 12.5 MG tablet   No No   Sig: Take 1 tablet (12.5 mg total) by mouth every 12 (twelve) hours as needed for dizziness   Patient not taking: Reported on 7/12/2023   norgestimate-ethinyl estradiol (Sprintec 28) 0.25-35 MG-MCG per tablet   No No   Sig: Take 1 tablet by mouth daily   ondansetron (ZOFRAN) 4 mg tablet   No No   Sig: Take 1 tablet (4 mg total) by mouth every 8 (eight) hours as needed for nausea or vomiting for up to 20 doses   traZODone (DESYREL) 50 mg tablet   Yes No   Sig: Take 50 mg by mouth daily at bedtime   venlafaxine (EFFEXOR-XR) 37.5 mg 24 hr capsule   Yes No   Patient not taking: Reported on 7/12/2023      Facility-Administered Medications: None       Past Medical History:   Diagnosis Date   • ADHD    • Anxiety    • Asthma    • Autism    • Bipolar disorder ([address] [facility])    • Depression    • GERD (gastroesophageal reflux disease)        Past Surgical History:   Procedure Laterality Date   • CHOLANGIOGRAM N/A 12/01/2018    Procedure: CHOLANGIOGRAM;  Surgeon: Rosalio Feliciano MD;  Location:  MAIN OR;  Service: General   • CHOLECYSTECTOMY LAPAROSCOPIC N/A 12/01/2018    Procedure: CHOLECYSTECTOMY LAPAROSCOPIC;  Surgeon: Rosalio Feliciano MD;  Location:  MAIN OR;  Service: General   • EYE MUSCLE SURGERY Bilateral 2006   • GA ESOPHAGOGASTRODUODENOSCOPY TRANSORAL DIAGNOSTIC N/A 11/27/2018    Procedure: ESOPHAGOGASTRODUODENOSCOPY (EGD) with bx;  Surgeon: Arliss Curling, MD;  Location: [facility];   Service: General       Family History   Problem Relation Age of Onset   • Breast cancer Mother    • Heart attack Mother    • Depression Mother    • Mental illness Mother    • Coronary artery disease Mother    • Hypertension Mother    • Diabetes Mother    • Hyperlipidemia Mother    • Pneumonia Brother    • Hypertension Maternal Grandmother    • Stroke Maternal Grandmother    • Diabetes Maternal Grandmother    • Glaucoma Maternal Grandmother    • Kidney disease Maternal Grandmother    • Sarcoidosis Maternal Grandmother    • Diabetes Maternal Grandfather    • Hypertension Maternal Grandfather    • Stroke Maternal Grandfather    • Glaucoma Maternal Grandfather    • Heart attack Maternal Grandfather    • Colon cancer Neg Hx    • Ovarian cancer Neg Hx      I have reviewed and agree with the history as documented. E-Cigarette/Vaping   • E-Cigarette Use Never User      E-Cigarette/Vaping Substances     Social History     Tobacco Use   • Smoking status: Never   • Smokeless tobacco: Never   • Tobacco comments:     no passive smoke exposure   Vaping Use   • Vaping Use: Never used   Substance Use Topics   • Alcohol use: Never   • Drug use: Never        Review of Systems   Constitutional: Positive for fever. HENT: Negative for sore throat. Respiratory: Negative for cough and shortness of breath. Cardiovascular: Negative for palpitations. Gastrointestinal: Positive for abdominal pain, diarrhea, nausea and vomiting. Negative for blood in stool. Genitourinary: Negative for dysuria, frequency and hematuria. Skin: Negative for color change and rash. Neurological: Positive for dizziness and weakness. Negative for syncope.        Physical Exam  ED Triage Vitals   Temperature Pulse Respirations Blood Pressure SpO2   09/27/23 0832 09/27/23 0832 09/27/23 0832 09/27/23 0832 09/27/23 0832   97.8 °F (36.6 °C) 84 18 130/70 97 %      Temp Source Heart Rate Source Patient Position - Orthostatic VS BP Location FiO2 (%)   09/27/23 0832 09/27/23 0832 09/27/23 0832 09/27/23 0832 --   Tympanic Monitor Lying Left arm       Pain Score       09/27/23 0911       10 - Worst Possible Pain             Orthostatic Vital Signs  Vitals:    09/27/23 0832 09/27/23 1058   BP: 130/70 127/74   Pulse: 84 80   Patient Position - Orthostatic VS: Lying Lying       Physical Exam  Constitutional:       General: She is not in acute distress. Appearance: She is obese. She is not toxic-appearing. HENT:      Head: Normocephalic. Cardiovascular:      Rate and Rhythm: Normal rate and regular rhythm. Heart sounds: Normal heart sounds. No murmur heard. Pulmonary:      Effort: Pulmonary effort is normal. No respiratory distress. Breath sounds: Normal breath sounds. No wheezing or rales. Abdominal:      General: Abdomen is protuberant. Bowel sounds are decreased. There is no distension. Palpations: Abdomen is soft. There is no mass. Tenderness: There is abdominal tenderness in the epigastric area. There is no guarding or rebound. Hernia: No hernia is present. Skin:     General: Skin is warm. Capillary Refill: Capillary refill takes less than 2 seconds. Coloration: Skin is not jaundiced. Neurological:      General: No focal deficit present. Mental Status: She is alert. ED Medications  Medications   sodium chloride 0.9 % bolus 1,000 mL (1,000 mL Intravenous New Bag 9/27/23 0904)   ketorolac (TORADOL) injection 15 mg (15 mg Intravenous Given 9/27/23 0911)   ondansetron (ZOFRAN) injection 4 mg (4 mg Intravenous Given 9/27/23 0911)   potassium chloride (K-DUR,KLOR-CON) CR tablet 20 mEq (20 mEq Oral Given 9/27/23 1043)   fluconazole (DIFLUCAN) tablet 200 mg (200 mg Oral Given 9/27/23 1043)       Diagnostic Studies  Results Reviewed     Procedure Component Value Units Date/Time    Hepatitis panel, acute [[id number]] Collected: 09/27/23 1044    Lab Status:  In process Specimen: Blood from Arm, Right Updated: 09/27/23 1052    Urine Microscopic [[id number]]  (Abnormal) Collected: 09/27/23 0910    Lab Status: Final result Specimen: Urine, Other Updated: 09/27/23 1034     RBC, UA None Seen /hpf      WBC, UA 1-2 /hpf      Epithelial Cells Moderate /hpf      Bacteria, UA Moderate /hpf      OTHER OBSERVATIONS Yeast Cells Present     MUCUS THREADS Moderate     URINE COMMENT Concentrated microscopic done on a low volume urine specimen.     Comprehensive metabolic panel [[id number]]  (Abnormal) Collected: 09/27/23 0910    Lab Status: Final result Specimen: Blood from Arm, Right Updated: 09/27/23 1027     Sodium 136 mmol/L      Potassium 3.3 mmol/L      Chloride 101 mmol/L      CO2 24 mmol/L      ANION GAP 11 mmol/L      BUN 5 mg/dL      Creatinine 0.92 mg/dL      Glucose 118 mg/dL      Calcium 9.4 mg/dL       U/L       U/L      Alkaline Phosphatase 120 U/L      Total Protein 7.1 g/dL      Albumin 4.5 g/dL      Total Bilirubin 0.76 mg/dL      eGFR 86 ml/min/1.73sq m     Narrative:      National Kidney Disease Foundation guidelines for Chronic Kidney Disease (CKD):   •  Stage 1 with normal or high GFR (GFR > 90 mL/min/1.73 square meters)  •  Stage 2 Mild CKD (GFR = 60-89 mL/min/1.73 square meters)  •  Stage 3A Moderate CKD (GFR = 45-59 mL/min/1.73 square meters)  •  Stage 3B Moderate CKD (GFR = 30-44 mL/min/1.73 square meters)  •  Stage 4 Severe CKD (GFR = 15-29 mL/min/1.73 square meters)  •  Stage 5 End Stage CKD (GFR <15 mL/min/1.73 square meters)  Note: GFR calculation is accurate only with a steady state creatinine    Lipase [[id number]]  (Normal) Collected: 09/27/23 0910    Lab Status: Final result Specimen: Blood from Arm, Right Updated: 09/27/23 1027     Lipase 34 u/L     Magnesium [[id number]]  (Normal) Collected: 09/27/23 0910    Lab Status: Final result Specimen: Blood from Arm, Right Updated: 09/27/23 1027     Magnesium 2.1 mg/dL     UA w Reflex to Microscopic w Reflex to Culture [[id number]]  (Abnormal) Collected: 09/27/23 0910    Lab Status: Final result Specimen: Urine, Other Updated: 09/27/23 1022     Color, UA Brown     Clarity, UA Cloudy     Specific Gravity, UA 1.020     pH, UA 6.0     Leukocytes, UA 25.0     Nitrite, UA Negative     Protein, UA 30 (1+) mg/dl      Glucose, UA Negative mg/dl      Ketones, UA 15 (1+) mg/dl      Bilirubin, UA 3 mg/dL     Occult Blood, UA Negative     UROBILINOGEN UA 8.0 mg/dL     CBC and differential [[id number]]  (Abnormal) Collected: 09/27/23 0910    Lab Status: Final result Specimen: Blood from Arm, Right Updated: 09/27/23 0945     WBC 6.73 Thousand/uL      RBC 5.04 Million/uL      Hemoglobin 12.4 g/dL      Hematocrit 39.6 %      MCV 79 fL      MCH 24.6 pg      MCHC 31.3 g/dL      RDW 14.0 %      MPV 10.3 fL      Platelets 396 Thousands/uL      nRBC 0 /100 WBCs      Neutrophils Relative 72 %      Immat GRANS % 0 %      Lymphocytes Relative 18 %      Monocytes Relative 9 %      Eosinophils Relative 1 %      Basophils Relative 0 %      Neutrophils Absolute 4.82 Thousands/µL      Immature Grans Absolute 0.03 Thousand/uL      Lymphocytes Absolute 1.24 Thousands/µL      Monocytes Absolute 0.57 Thousand/µL      Eosinophils Absolute 0.04 Thousand/µL      Basophils Absolute 0.03 Thousands/µL     POCT pregnancy, urine [[id number]]  (Normal) Resulted: 09/27/23 0904    Lab Status: Final result Updated: 09/27/23 0904     EXT Preg Test, Ur Negative     Control Valid                 No orders to display         Procedures  Procedures      ED Course                             SBIRT 20yo+    Flowsheet Row Most Recent Value   Initial Alcohol Screen: US AUDIT-C     1. How often do you have a drink containing alcohol? 0 Filed at: 09/27/2023 0837   2. How many drinks containing alcohol do you have on a typical day you are drinking? 0 Filed at: 09/27/2023 0837   3a. Male UNDER 65: How often do you have five or more drinks on one occasion? 0 Filed at: 09/27/2023 0837   3b. FEMALE Any Age, or MALE 65+: How often do you have 4 or more drinks on one occassion? 0 Filed at: 09/27/2023 0837   Audit-C Score 0 Filed at: 09/27/2023 1923   AILYN: How many times in the past year have you. .. Used an illegal drug or used a prescription medication for non-medical reasons?  Never Filed at: 09/27/2023 2870                Medical Decision Making  20year old female with PMhx of bipolar d/o, depressionm ADHD, presented to the ED for abdominal pain that began last Friday ( 5 days ago) associated with nausea, vomiting, and diarrhea. Patient is S/P cholecystectomy    Gastroenteritis vs. Pregnancy vs pancreatis     Patient given Zofran to control the nausea, and ketorolac injection for the pain. Urine pregnancy test was ordered. Lipase order to eval for possible pancreatitis. On re-evaluation after medicine given the pain is markedly decreased, nausea has improved. Pregnancy test negative. Normal CBC, normal lipase. On review of labs, CMP showed transaminitis. We ordered acute hepatitis panel, and she was instructed to follow-up with her PCP in a few days. Urine sample showed yeast. We prescribed Diflucan PO one dose. She was found to be hypokalemic, we gave 20 mg PO potassium. Likely viral gastroenteritis. Patient is stable. Symptoms have markedly improved. Patient was sent home with PO  Zofran, if nausea recurs. Discharge to home. Amount and/or Complexity of Data Reviewed  Labs: ordered. Risk  Prescription drug management.             Disposition  Final diagnoses:   Transaminitis   Hypokalemia   Yeast vaginitis   Nausea and vomiting, unspecified vomiting type   Nausea vomiting and diarrhea     Time reflects when diagnosis was documented in both MDM as applicable and the Disposition within this note     Time User Action Codes Description Comment    9/27/2023 10:52 AM Darryn Nelson Add [R74.01] Transaminitis     9/27/2023 10:52 AM Darryn Nelson Add [E87.6] Hypokalemia     9/27/2023 10:53 AM Darryn Nelson Add [B37.31] Yeast vaginitis     9/27/2023 10:53 AM Tilman Rives, Joanna Krabbe Add [R11.2] Nausea and vomiting, unspecified vomiting type     9/27/2023 10:53 AM Darryn Nelson Add [R11.2,  R19.7] Nausea vomiting and diarrhea       ED Disposition     ED Disposition   Discharge    Condition   Stable    Date/Time   Wed Sep 27, 2023 10:52 AM    Comment   Eugene Hernández discharge to home/self care.               Follow-up Information     Follow up With Specialties Details Why Contact Info    Tevin Fox MD Family Medicine   [address] [facility]  [phone]            Patient's Medications   Discharge Prescriptions    ONDANSETRON (ZOFRAN) 4 MG TABLET    Take 1 tablet (4 mg total) by mouth every 8 (eight) hours as needed for nausea or vomiting       Start Date: 9/27/2023 End Date: --       Order Dose: 4 mg       Quantity: 20 tablet    Refills: 0     No discharge procedures on file. PDMP Review     None           ED Provider  Attending physically available and evaluated Eugene Hernández. I managed the patient along with the ED Attending.     Electronically Signed by         Darryn Damon MD  09/27/23 4431

## 2023-10-01 RX ORDER — ONDANSETRON 4 MG/1
4 TABLET, FILM COATED ORAL EVERY 8 HOURS PRN
Qty: 20 TABLET | Refills: 0 | Status: SHIPPED | OUTPATIENT
Start: 2023-10-01

## 2023-12-13 ENCOUNTER — APPOINTMENT (EMERGENCY)
Dept: CT IMAGING | Facility: HOSPITAL | Age: 23
End: 2023-12-13
Payer: COMMERCIAL

## 2023-12-13 ENCOUNTER — HOSPITAL ENCOUNTER (EMERGENCY)
Facility: HOSPITAL | Age: 23
Discharge: HOME | End: 2023-12-13
Attending: EMERGENCY MEDICINE
Payer: COMMERCIAL

## 2023-12-13 VITALS
TEMPERATURE: 98 F | HEIGHT: 59 IN | DIASTOLIC BLOOD PRESSURE: 64 MMHG | HEART RATE: 59 BPM | SYSTOLIC BLOOD PRESSURE: 124 MMHG | RESPIRATION RATE: 18 BRPM | BODY MASS INDEX: 40.56 KG/M2 | OXYGEN SATURATION: 100 %

## 2023-12-13 DIAGNOSIS — R10.9 ABDOMINAL PAIN: Primary | ICD-10-CM

## 2023-12-13 LAB
ALBUMIN SERPL BCP-MCNC: 3.8 G/DL (ref 3.5–5)
ALP SERPL-CCNC: 83 U/L (ref 34–104)
ALT SERPL W P-5'-P-CCNC: 18 U/L (ref 7–52)
ANION GAP SERPL CALCULATED.3IONS-SCNC: 4 MMOL/L
AST SERPL W P-5'-P-CCNC: 13 U/L (ref 13–39)
BACTERIA UR QL AUTO: ABNORMAL /HPF
BASOPHILS # BLD AUTO: 0.03 THOUSANDS/ÂΜL (ref 0–0.1)
BASOPHILS NFR BLD AUTO: 0 % (ref 0–1)
BILIRUB SERPL-MCNC: 0.26 MG/DL (ref 0.2–1)
BILIRUB UR QL STRIP: NEGATIVE
BUN SERPL-MCNC: 9 MG/DL (ref 5–25)
CALCIUM SERPL-MCNC: 8.8 MG/DL (ref 8.4–10.2)
CHLORIDE SERPL-SCNC: 106 MMOL/L (ref 96–108)
CLARITY UR: CLEAR
CO2 SERPL-SCNC: 25 MMOL/L (ref 21–32)
COLOR UR: YELLOW
CREAT SERPL-MCNC: 0.86 MG/DL (ref 0.6–1.3)
EOSINOPHIL # BLD AUTO: 0.15 THOUSAND/ÂΜL (ref 0–0.61)
EOSINOPHIL NFR BLD AUTO: 2 % (ref 0–6)
ERYTHROCYTE [DISTWIDTH] IN BLOOD BY AUTOMATED COUNT: 14.7 % (ref 11.6–15.1)
EXT PREGNANCY TEST URINE: NEGATIVE
EXT. CONTROL: NORMAL
GFR SERPL CREATININE-BSD FRML MDRD: 94 ML/MIN/1.73SQ M
GLUCOSE SERPL-MCNC: 97 MG/DL (ref 65–140)
GLUCOSE UR STRIP-MCNC: NEGATIVE MG/DL
HCT VFR BLD AUTO: 37.6 % (ref 34.8–46.1)
HGB BLD-MCNC: 11.7 G/DL (ref 11.5–15.4)
HGB UR QL STRIP.AUTO: ABNORMAL
IMM GRANULOCYTES # BLD AUTO: 0.02 THOUSAND/UL (ref 0–0.2)
IMM GRANULOCYTES NFR BLD AUTO: 0 % (ref 0–2)
KETONES UR STRIP-MCNC: NEGATIVE MG/DL
LEUKOCYTE ESTERASE UR QL STRIP: NEGATIVE
LIPASE SERPL-CCNC: 24 U/L (ref 11–82)
LYMPHOCYTES # BLD AUTO: 1.83 THOUSANDS/ÂΜL (ref 0.6–4.47)
LYMPHOCYTES NFR BLD AUTO: 25 % (ref 14–44)
MCH RBC QN AUTO: 25.2 PG (ref 26.8–34.3)
MCHC RBC AUTO-ENTMCNC: 31.1 G/DL (ref 31.4–37.4)
MCV RBC AUTO: 81 FL (ref 82–98)
MONOCYTES # BLD AUTO: 0.47 THOUSAND/ÂΜL (ref 0.17–1.22)
MONOCYTES NFR BLD AUTO: 6 % (ref 4–12)
NEUTROPHILS # BLD AUTO: 4.94 THOUSANDS/ÂΜL (ref 1.85–7.62)
NEUTS SEG NFR BLD AUTO: 67 % (ref 43–75)
NITRITE UR QL STRIP: NEGATIVE
NON-SQ EPI CELLS URNS QL MICRO: ABNORMAL /HPF
NRBC BLD AUTO-RTO: 0 /100 WBCS
PH UR STRIP.AUTO: 5.5 [PH] (ref 4.5–8)
PLATELET # BLD AUTO: 271 THOUSANDS/UL (ref 149–390)
PMV BLD AUTO: 10.3 FL (ref 8.9–12.7)
POTASSIUM SERPL-SCNC: 4 MMOL/L (ref 3.5–5.3)
PROT SERPL-MCNC: 6.3 G/DL (ref 6.4–8.4)
PROT UR STRIP-MCNC: NEGATIVE MG/DL
RBC # BLD AUTO: 4.65 MILLION/UL (ref 3.81–5.12)
RBC #/AREA URNS AUTO: ABNORMAL /HPF
SODIUM SERPL-SCNC: 135 MMOL/L (ref 135–147)
SP GR UR STRIP.AUTO: 1.02 (ref 1–1.03)
UROBILINOGEN UR QL STRIP.AUTO: 0.2 E.U./DL
WBC # BLD AUTO: 7.44 THOUSAND/UL (ref 4.31–10.16)
WBC #/AREA URNS AUTO: ABNORMAL /HPF

## 2023-12-13 PROCEDURE — 83690 ASSAY OF LIPASE: CPT | Performed by: EMERGENCY MEDICINE

## 2023-12-13 PROCEDURE — 81025 URINE PREGNANCY TEST: CPT | Performed by: EMERGENCY MEDICINE

## 2023-12-13 PROCEDURE — 99285 EMERGENCY DEPT VISIT HI MDM: CPT | Performed by: EMERGENCY MEDICINE

## 2023-12-13 PROCEDURE — 85025 COMPLETE CBC W/AUTO DIFF WBC: CPT | Performed by: EMERGENCY MEDICINE

## 2023-12-13 PROCEDURE — 81001 URINALYSIS AUTO W/SCOPE: CPT

## 2023-12-13 PROCEDURE — 80053 COMPREHEN METABOLIC PANEL: CPT | Performed by: EMERGENCY MEDICINE

## 2023-12-13 PROCEDURE — 36415 COLL VENOUS BLD VENIPUNCTURE: CPT

## 2023-12-13 PROCEDURE — 74177 CT ABD & PELVIS W/CONTRAST: CPT

## 2023-12-13 PROCEDURE — 99284 EMERGENCY DEPT VISIT MOD MDM: CPT

## 2023-12-13 PROCEDURE — G1004 CDSM NDSC: HCPCS

## 2023-12-13 RX ORDER — ONDANSETRON 2 MG/ML
4 INJECTION INTRAMUSCULAR; INTRAVENOUS ONCE AS NEEDED
Status: DISCONTINUED | OUTPATIENT
Start: 2023-12-13 | End: 2023-12-13 | Stop reason: HOSPADM

## 2023-12-13 RX ORDER — DICYCLOMINE HCL 20 MG
20 TABLET ORAL 4 TIMES DAILY PRN
Qty: 12 TABLET | Refills: 0 | Status: SHIPPED | OUTPATIENT
Start: 2023-12-13

## 2023-12-13 RX ORDER — ONDANSETRON 4 MG/1
4 TABLET, ORALLY DISINTEGRATING ORAL EVERY 6 HOURS PRN
Qty: 8 TABLET | Refills: 0 | Status: SHIPPED | OUTPATIENT
Start: 2023-12-13

## 2023-12-13 RX ORDER — FAMOTIDINE 20 MG/1
20 TABLET, FILM COATED ORAL 2 TIMES DAILY
Qty: 28 TABLET | Refills: 0 | Status: SHIPPED | OUTPATIENT
Start: 2023-12-13

## 2023-12-13 RX ADMIN — IOHEXOL 100 ML: 350 INJECTION, SOLUTION INTRAVENOUS at 15:26

## 2023-12-13 NOTE — ED PROVIDER NOTES
"History  Chief Complaint   Patient presents with    Abdominal Pain     Reports waking up with R abdominal pain this morning with nausea.      24 yo F c/o abdominal pain waking her from sleep early this morning, she localizes \"my right side\" associated with nausea and vomiting, no diarrhea no fever.  She denies h/o similar abdominal pain, h/o cholecystectomy.  LMP current.        History provided by:  Patient  Abdominal Pain  Pain location:  RLQ and R flank      Prior to Admission Medications   Prescriptions Last Dose Informant Patient Reported? Taking?   ARIPiprazole (ABILIFY) 15 mg tablet   No No   Sig: Take 0.5 tablets (7.5 mg total) by mouth daily   albuterol (PROVENTIL HFA,VENTOLIN HFA) 90 mcg/act inhaler   No No   Sig: Inhale 2 puffs every 4 (four) hours as needed for wheezing   dicyclomine (BENTYL) 10 mg capsule   No No   Sig: Take 1 capsule (10 mg total) by mouth 4 (four) times a day (before meals and at bedtime)   famotidine (PEPCID) 20 mg tablet   No No   Sig: Take 1 tablet (20 mg total) by mouth 2 (two) times a day   fluticasone (FLONASE) 50 mcg/act nasal spray   Yes No   gabapentin (NEURONTIN) 600 MG tablet   Yes No   Sig: Take 600 mg by mouth every evening   hydrOXYzine pamoate (VISTARIL) 50 mg capsule   Yes No   Sig: Take 50 mg by mouth daily as needed   meclizine (ANTIVERT) 12.5 MG tablet   No No   Sig: Take 1 tablet (12.5 mg total) by mouth every 12 (twelve) hours as needed for dizziness   Patient not taking: Reported on 7/12/2023   norgestimate-ethinyl estradiol (Sprintec 28) 0.25-35 MG-MCG per tablet   No No   Sig: Take 1 tablet by mouth daily   ondansetron (ZOFRAN) 4 mg tablet   No No   Sig: Take 1 tablet (4 mg total) by mouth every 8 (eight) hours as needed for nausea or vomiting for up to 20 doses   ondansetron (ZOFRAN) 4 mg tablet   No No   Sig: Take 1 tablet (4 mg total) by mouth every 8 (eight) hours as needed for nausea or vomiting   traZODone (DESYREL) 50 mg tablet   Yes No   Sig: Take 50 mg " by mouth daily at bedtime   venlafaxine (EFFEXOR-XR) 37.5 mg 24 hr capsule   Yes No   Patient not taking: Reported on 7/12/2023      Facility-Administered Medications: None       Past Medical History:   Diagnosis Date    ADHD     Anxiety     Asthma     Autism     Bipolar disorder (HCC)     Depression     GERD (gastroesophageal reflux disease)        Past Surgical History:   Procedure Laterality Date    CHOLANGIOGRAM N/A 12/01/2018    Procedure: CHOLANGIOGRAM;  Surgeon: Angelo Alcantara MD;  Location:  MAIN OR;  Service: General    CHOLECYSTECTOMY LAPAROSCOPIC N/A 12/01/2018    Procedure: CHOLECYSTECTOMY LAPAROSCOPIC;  Surgeon: Angelo Alcantara MD;  Location:  MAIN OR;  Service: General    EYE MUSCLE SURGERY Bilateral 2006    MD ESOPHAGOGASTRODUODENOSCOPY TRANSORAL DIAGNOSTIC N/A 11/27/2018    Procedure: ESOPHAGOGASTRODUODENOSCOPY (EGD) with bx;  Surgeon: Rashmi Vazquez MD;  Location: [facility];  Service: General       Family History   Problem Relation Age of Onset    Breast cancer Mother     Heart attack Mother     Depression Mother     Mental illness Mother     Coronary artery disease Mother     Hypertension Mother     Diabetes Mother     Hyperlipidemia Mother     Pneumonia Brother     Hypertension Maternal Grandmother     Stroke Maternal Grandmother     Diabetes Maternal Grandmother     Glaucoma Maternal Grandmother     Kidney disease Maternal Grandmother     Sarcoidosis Maternal Grandmother     Diabetes Maternal Grandfather     Hypertension Maternal Grandfather     Stroke Maternal Grandfather     Glaucoma Maternal Grandfather     Heart attack Maternal Grandfather     Colon cancer Neg Hx     Ovarian cancer Neg Hx      I have reviewed and agree with the history as documented.    E-Cigarette/Vaping    E-Cigarette Use Never User      E-Cigarette/Vaping Substances     Social History     Tobacco Use    Smoking status: Never    Smokeless tobacco: Never    Tobacco comments:     no passive smoke exposure   Vaping  Use    Vaping status: Never Used   Substance Use Topics    Alcohol use: Never    Drug use: Never       Review of Systems   Gastrointestinal:  Positive for abdominal pain.       Physical Exam  Physical Exam  Vitals and nursing note reviewed.   Constitutional:       General: She is not in acute distress.     Appearance: She is well-developed. She is obese. She is not diaphoretic.   HENT:      Head: Normocephalic and atraumatic.      Right Ear: External ear normal.      Left Ear: External ear normal.      Nose: Nose normal.      Mouth/Throat:      Mouth: Mucous membranes are moist.   Eyes:      Conjunctiva/sclera: Conjunctivae normal.      Pupils: Pupils are equal, round, and reactive to light.   Cardiovascular:      Rate and Rhythm: Normal rate and regular rhythm.   Pulmonary:      Effort: Pulmonary effort is normal.   Abdominal:      Palpations: Abdomen is soft.      Tenderness: There is abdominal tenderness in the right upper quadrant and right lower quadrant.   Musculoskeletal:         General: Normal range of motion.      Cervical back: Normal range of motion and neck supple.   Skin:     General: Skin is warm and dry.      Capillary Refill: Capillary refill takes less than 2 seconds.      Findings: No rash.   Neurological:      Mental Status: She is alert and oriented to person, place, and time.      Gait: Gait normal.   Psychiatric:         Behavior: Behavior normal.         Thought Content: Thought content normal.         Judgment: Judgment normal.         Vital Signs  ED Triage Vitals [12/13/23 1303]   Temperature Pulse Respirations Blood Pressure SpO2   98 °F (36.7 °C) 69 16 119/59 100 %      Temp Source Heart Rate Source Patient Position - Orthostatic VS BP Location FiO2 (%)   Oral Monitor Sitting Right arm --      Pain Score       6           Vitals:    12/13/23 1303 12/13/23 1503   BP: 119/59 124/64   Pulse: 69 59   Patient Position - Orthostatic VS: Sitting Lying         Visual Acuity      ED  Medications  Medications   ondansetron (ZOFRAN) injection 4 mg (has no administration in time range)   iohexol (OMNIPAQUE) 350 MG/ML injection (SINGLE-DOSE) 100 mL (100 mL Intravenous Given 12/13/23 1526)       Diagnostic Studies  Results Reviewed       Procedure Component Value Units Date/Time    Urine Microscopic [[id number]]  (Abnormal) Collected: 12/13/23 1428    Lab Status: Final result Specimen: Urine, Clean Catch Updated: 12/13/23 1445     RBC, UA Innumerable /hpf      WBC, UA 1-2 /hpf      Epithelial Cells Occasional /hpf      Bacteria, UA Occasional /hpf     POCT pregnancy, urine [[id number]]  (Normal) Resulted: 12/13/23 1431    Lab Status: Final result Updated: 12/13/23 1431     EXT Preg Test, Ur Negative     Control Valid    Urine Macroscopic, POC [[id number]]  (Abnormal) Collected: 12/13/23 1428    Lab Status: Final result Specimen: Urine Updated: 12/13/23 1429     Color, UA Yellow     Clarity, UA Clear     pH, UA 5.5     Leukocytes, UA Negative     Nitrite, UA Negative     Protein, UA Negative mg/dl      Glucose, UA Negative mg/dl      Ketones, UA Negative mg/dl      Urobilinogen, UA 0.2 E.U./dl      Bilirubin, UA Negative     Occult Blood, UA Large     Specific Gravity, UA 1.020    Narrative:      CLINITEK RESULT    Comprehensive metabolic panel [[id number]]  (Abnormal) Collected: 12/13/23 1309    Lab Status: Final result Specimen: Blood from Arm, Right Updated: 12/13/23 1336     Sodium 135 mmol/L      Potassium 4.0 mmol/L      Chloride 106 mmol/L      CO2 25 mmol/L      ANION GAP 4 mmol/L      BUN 9 mg/dL      Creatinine 0.86 mg/dL      Glucose 97 mg/dL      Calcium 8.8 mg/dL      AST 13 U/L      ALT 18 U/L      Alkaline Phosphatase 83 U/L      Total Protein 6.3 g/dL      Albumin 3.8 g/dL      Total Bilirubin 0.26 mg/dL      eGFR 94 ml/min/1.73sq m     Narrative:      [employer] guidelines for Chronic Kidney Disease (CKD):     Stage 1 with normal or high GFR (GFR > 90 mL/min/1.73  "square meters)    Stage 2 Mild CKD (GFR = 60-89 mL/min/1.73 square meters)    Stage 3A Moderate CKD (GFR = 45-59 mL/min/1.73 square meters)    Stage 3B Moderate CKD (GFR = 30-44 mL/min/1.73 square meters)    Stage 4 Severe CKD (GFR = 15-29 mL/min/1.73 square meters)    Stage 5 End Stage CKD (GFR <15 mL/min/1.73 square meters)  Note: GFR calculation is accurate only with a steady state creatinine    Lipase [[id number]]  (Normal) Collected: 12/13/23 1309    Lab Status: Final result Specimen: Blood from Arm, Right Updated: 12/13/23 1336     Lipase 24 u/L     CBC and differential [[id number]]  (Abnormal) Collected: 12/13/23 1309    Lab Status: Final result Specimen: Blood from Arm, Right Updated: 12/13/23 1316     WBC 7.44 Thousand/uL      RBC 4.65 Million/uL      Hemoglobin 11.7 g/dL      Hematocrit 37.6 %      MCV 81 fL      MCH 25.2 pg      MCHC 31.1 g/dL      RDW 14.7 %      MPV 10.3 fL      Platelets 271 Thousands/uL      nRBC 0 /100 WBCs      Neutrophils Relative 67 %      Immat GRANS % 0 %      Lymphocytes Relative 25 %      Monocytes Relative 6 %      Eosinophils Relative 2 %      Basophils Relative 0 %      Neutrophils Absolute 4.94 Thousands/µL      Immature Grans Absolute 0.02 Thousand/uL      Lymphocytes Absolute 1.83 Thousands/µL      Monocytes Absolute 0.47 Thousand/µL      Eosinophils Absolute 0.15 Thousand/µL      Basophils Absolute 0.03 Thousands/µL                    CT abdomen pelvis with contrast   Final Result by Jaydon Landry MD (12/13 1628)      No acute CT findings in the abdomen or pelvis.            Workstation performed: [id number]                    Procedures  Procedures         ED Course  ED Course as of 12/13/23 1657   Wed Dec 13, 2023   1616 PREGNANCY TEST URINE: Negative   1643 CT abdomen pelvis with contrast  Imaging reviewed and interpreted myself and concur with radiologist:   \"No findings to suggest appendicitis\"   1656 Reviewed results with patient at bedside and updated on the " plan.  ED findings are reassuring.  She is discharged with instructions for symptoms at home, and return precautions.                                 SBIRT 20yo+      Flowsheet Row Most Recent Value   Initial Alcohol Screen: US AUDIT-C     1. How often do you have a drink containing alcohol? 0 Filed at: 12/13/2023 1446   2. How many drinks containing alcohol do you have on a typical day you are drinking?  0 Filed at: 12/13/2023 1446   3b. FEMALE Any Age, or MALE 65+: How often do you have 4 or more drinks on one occassion? 0 Filed at: 12/13/2023 1446   Audit-C Score 0 Filed at: 12/13/2023 1446   AILYN: How many times in the past year have you...    Used an illegal drug or used a prescription medication for non-medical reasons? Never Filed at: 12/13/2023 1446                      Medical Decision Making  Amount and/or Complexity of Data Reviewed  Labs: ordered. Decision-making details documented in ED Course.  Radiology: ordered. Decision-making details documented in ED Course.    Risk  Prescription drug management.             Disposition  Final diagnoses:   Abdominal pain     Time reflects when diagnosis was documented in both MDM as applicable and the Disposition within this note       Time User Action Codes Description Comment    12/13/2023  4:43 PM Lonnie Stock Add [R10.9] Abdominal pain           ED Disposition       ED Disposition   Discharge    Condition   Good    Date/Time   Wed Dec 13, 2023 1643    Comment   Trini Olivarez discharge to home/self care.                   Follow-up Information       Follow up With Specialties Details Why Contact Info    Jay Price MD Family Medicine Call  If symptoms worsen [address] [place] [id number] [phone]              Patient's Medications   Discharge Prescriptions    DICYCLOMINE (BENTYL) 20 MG TABLET    Take 1 tablet (20 mg total) by mouth 4 (four) times a day as needed (abdominal cramping, diarrhea)       Start Date: 12/13/2023End Date:  --       Order Dose: 20 mg       Quantity: 12 tablet    Refills: 0    FAMOTIDINE (PEPCID) 20 MG TABLET    Take 1 tablet (20 mg total) by mouth 2 (two) times a day       Start Date: 12/13/2023End Date: --       Order Dose: 20 mg       Quantity: 28 tablet    Refills: 0    ONDANSETRON (ZOFRAN-ODT) 4 MG DISINTEGRATING TABLET    Take 1 tablet (4 mg total) by mouth every 6 (six) hours as needed for nausea or vomiting       Start Date: 12/13/2023End Date: --       Order Dose: 4 mg       Quantity: 8 tablet    Refills: 0       No discharge procedures on file.    PDMP Review       None            ED Provider  Electronically Signed by             Lonnie Stock MD  12/13/23 9955

## 2023-12-13 NOTE — DISCHARGE INSTRUCTIONS
Abdominal Pain   WHAT YOU NEED TO KNOW:   Abdominal pain can be dull, achy, or sharp. You may have pain in one area of your abdomen, or in your entire abdomen. Your pain may be caused by a condition such as constipation, food sensitivity or poisoning, infection, or a blockage. Abdominal pain can also be from a hernia, appendicitis, or an ulcer. Liver, gallbladder, or kidney conditions can also cause abdominal pain. The cause of your abdominal pain may be unknown.   DISCHARGE INSTRUCTIONS:   Return to the emergency department if:   You have new chest pain or shortness of breath.    You have pulsing pain in your upper abdomen or lower back that suddenly becomes constant.    Your pain is in the right lower abdominal area and worsens with movement.     You have a fever over 100.4°F (38°C) or shaking chills.     You are vomiting and cannot keep food or liquids down.     Your pain does not improve or gets worse over the next 8 to 12 hours.     You see blood in your vomit or bowel movements, or they look black and tarry.     Your skin or the whites of your eyes turn yellow.     You are a woman and have a large amount of vaginal bleeding that is not your monthly period.

## 2024-01-08 ENCOUNTER — HOSPITAL ENCOUNTER (EMERGENCY)
Facility: HOSPITAL | Age: 24
Discharge: HOME | End: 2024-01-08
Attending: EMERGENCY MEDICINE
Payer: COMMERCIAL

## 2024-01-08 VITALS
DIASTOLIC BLOOD PRESSURE: 64 MMHG | OXYGEN SATURATION: 99 % | SYSTOLIC BLOOD PRESSURE: 136 MMHG | TEMPERATURE: 98.1 F | WEIGHT: 198.19 LBS | HEART RATE: 76 BPM | RESPIRATION RATE: 17 BRPM | BODY MASS INDEX: 40.03 KG/M2

## 2024-01-08 DIAGNOSIS — R10.13 EPIGASTRIC PAIN: ICD-10-CM

## 2024-01-08 DIAGNOSIS — R11.2 NAUSEA AND VOMITING: Primary | ICD-10-CM

## 2024-01-08 DIAGNOSIS — R74.01 TRANSAMINITIS: ICD-10-CM

## 2024-01-08 LAB
ALBUMIN SERPL BCP-MCNC: 4.1 G/DL (ref 3.5–5)
ALP SERPL-CCNC: 102 U/L (ref 34–104)
ALT SERPL W P-5'-P-CCNC: 206 U/L (ref 7–52)
ANION GAP SERPL CALCULATED.3IONS-SCNC: 8 MMOL/L
AST SERPL W P-5'-P-CCNC: 61 U/L (ref 13–39)
ATRIAL RATE: 71 BPM
BACTERIA UR QL AUTO: ABNORMAL /HPF
BASOPHILS # BLD AUTO: 0.05 THOUSANDS/ÂΜL (ref 0–0.1)
BASOPHILS NFR BLD AUTO: 1 % (ref 0–1)
BILIRUB SERPL-MCNC: 0.45 MG/DL (ref 0.2–1)
BILIRUB UR QL STRIP: ABNORMAL
BUN SERPL-MCNC: 8 MG/DL (ref 5–25)
CALCIUM SERPL-MCNC: 9.1 MG/DL (ref 8.4–10.2)
CHLORIDE SERPL-SCNC: 107 MMOL/L (ref 96–108)
CLARITY UR: CLEAR
CO2 SERPL-SCNC: 24 MMOL/L (ref 21–32)
COLOR UR: YELLOW
CREAT SERPL-MCNC: 0.8 MG/DL (ref 0.6–1.3)
EOSINOPHIL # BLD AUTO: 0.21 THOUSAND/ÂΜL (ref 0–0.61)
EOSINOPHIL NFR BLD AUTO: 2 % (ref 0–6)
ERYTHROCYTE [DISTWIDTH] IN BLOOD BY AUTOMATED COUNT: 14.4 % (ref 11.6–15.1)
EXT PREGNANCY TEST URINE: NEGATIVE
EXT. CONTROL: NORMAL
GFR SERPL CREATININE-BSD FRML MDRD: 102 ML/MIN/1.73SQ M
GLUCOSE SERPL-MCNC: 95 MG/DL (ref 65–140)
GLUCOSE UR STRIP-MCNC: NEGATIVE MG/DL
HCT VFR BLD AUTO: 35.3 % (ref 34.8–46.1)
HGB BLD-MCNC: 11.3 G/DL (ref 11.5–15.4)
HGB UR QL STRIP.AUTO: ABNORMAL
IMM GRANULOCYTES # BLD AUTO: 0.06 THOUSAND/UL (ref 0–0.2)
IMM GRANULOCYTES NFR BLD AUTO: 1 % (ref 0–2)
KETONES UR STRIP-MCNC: ABNORMAL MG/DL
LEUKOCYTE ESTERASE UR QL STRIP: NEGATIVE
LIPASE SERPL-CCNC: 16 U/L (ref 11–82)
LYMPHOCYTES # BLD AUTO: 1.31 THOUSANDS/ÂΜL (ref 0.6–4.47)
LYMPHOCYTES NFR BLD AUTO: 12 % (ref 14–44)
MCH RBC QN AUTO: 25.4 PG (ref 26.8–34.3)
MCHC RBC AUTO-ENTMCNC: 32 G/DL (ref 31.4–37.4)
MCV RBC AUTO: 79 FL (ref 82–98)
MONOCYTES # BLD AUTO: 0.58 THOUSAND/ÂΜL (ref 0.17–1.22)
MONOCYTES NFR BLD AUTO: 6 % (ref 4–12)
MUCOUS THREADS UR QL AUTO: ABNORMAL
NEUTROPHILS # BLD AUTO: 8.34 THOUSANDS/ÂΜL (ref 1.85–7.62)
NEUTS SEG NFR BLD AUTO: 78 % (ref 43–75)
NITRITE UR QL STRIP: NEGATIVE
NON-SQ EPI CELLS URNS QL MICRO: ABNORMAL /HPF
NRBC BLD AUTO-RTO: 0 /100 WBCS
P AXIS: 43 DEGREES
PH UR STRIP.AUTO: 6.5 [PH] (ref 4.5–8)
PLATELET # BLD AUTO: 306 THOUSANDS/UL (ref 149–390)
PMV BLD AUTO: 10.3 FL (ref 8.9–12.7)
POTASSIUM SERPL-SCNC: 3.9 MMOL/L (ref 3.5–5.3)
PR INTERVAL: 126 MS
PROT SERPL-MCNC: 7.1 G/DL (ref 6.4–8.4)
PROT UR STRIP-MCNC: ABNORMAL MG/DL
QRS AXIS: 46 DEGREES
QRSD INTERVAL: 96 MS
QT INTERVAL: 394 MS
QTC INTERVAL: 428 MS
RBC # BLD AUTO: 4.45 MILLION/UL (ref 3.81–5.12)
RBC #/AREA URNS AUTO: ABNORMAL /HPF
SODIUM SERPL-SCNC: 139 MMOL/L (ref 135–147)
SP GR UR STRIP.AUTO: 1.02 (ref 1–1.03)
T WAVE AXIS: 28 DEGREES
UROBILINOGEN UR QL STRIP.AUTO: 1 E.U./DL
VENTRICULAR RATE: 71 BPM
WBC # BLD AUTO: 10.55 THOUSAND/UL (ref 4.31–10.16)
WBC #/AREA URNS AUTO: ABNORMAL /HPF

## 2024-01-08 PROCEDURE — 85025 COMPLETE CBC W/AUTO DIFF WBC: CPT | Performed by: PHYSICIAN ASSISTANT

## 2024-01-08 PROCEDURE — 80053 COMPREHEN METABOLIC PANEL: CPT | Performed by: PHYSICIAN ASSISTANT

## 2024-01-08 PROCEDURE — 99284 EMERGENCY DEPT VISIT MOD MDM: CPT | Performed by: EMERGENCY MEDICINE

## 2024-01-08 PROCEDURE — 81001 URINALYSIS AUTO W/SCOPE: CPT

## 2024-01-08 PROCEDURE — 81025 URINE PREGNANCY TEST: CPT | Performed by: PHYSICIAN ASSISTANT

## 2024-01-08 PROCEDURE — 83690 ASSAY OF LIPASE: CPT | Performed by: PHYSICIAN ASSISTANT

## 2024-01-08 PROCEDURE — 96361 HYDRATE IV INFUSION ADD-ON: CPT

## 2024-01-08 PROCEDURE — 36415 COLL VENOUS BLD VENIPUNCTURE: CPT | Performed by: PHYSICIAN ASSISTANT

## 2024-01-08 PROCEDURE — 96374 THER/PROPH/DIAG INJ IV PUSH: CPT

## 2024-01-08 PROCEDURE — 93005 ELECTROCARDIOGRAM TRACING: CPT

## 2024-01-08 PROCEDURE — 96375 TX/PRO/DX INJ NEW DRUG ADDON: CPT

## 2024-01-08 PROCEDURE — 99283 EMERGENCY DEPT VISIT LOW MDM: CPT

## 2024-01-08 RX ORDER — KETOROLAC TROMETHAMINE 30 MG/ML
15 INJECTION, SOLUTION INTRAMUSCULAR; INTRAVENOUS ONCE
Status: COMPLETED | OUTPATIENT
Start: 2024-01-08 | End: 2024-01-08

## 2024-01-08 RX ORDER — LIDOCAINE HYDROCHLORIDE 20 MG/ML
15 SOLUTION OROPHARYNGEAL ONCE
Status: COMPLETED | OUTPATIENT
Start: 2024-01-08 | End: 2024-01-08

## 2024-01-08 RX ORDER — MAGNESIUM HYDROXIDE/ALUMINUM HYDROXICE/SIMETHICONE 120; 1200; 1200 MG/30ML; MG/30ML; MG/30ML
30 SUSPENSION ORAL ONCE
Status: COMPLETED | OUTPATIENT
Start: 2024-01-08 | End: 2024-01-08

## 2024-01-08 RX ORDER — FAMOTIDINE 20 MG/1
20 TABLET, FILM COATED ORAL ONCE
Status: COMPLETED | OUTPATIENT
Start: 2024-01-08 | End: 2024-01-08

## 2024-01-08 RX ORDER — SUCRALFATE 1 G/1
1 TABLET ORAL 4 TIMES DAILY
Qty: 28 TABLET | Refills: 0 | Status: SHIPPED | OUTPATIENT
Start: 2024-01-08 | End: 2024-01-15

## 2024-01-08 RX ORDER — ONDANSETRON 2 MG/ML
4 INJECTION INTRAMUSCULAR; INTRAVENOUS ONCE
Status: COMPLETED | OUTPATIENT
Start: 2024-01-08 | End: 2024-01-08

## 2024-01-08 RX ORDER — ONDANSETRON 4 MG/1
4 TABLET, ORALLY DISINTEGRATING ORAL EVERY 6 HOURS PRN
Qty: 20 TABLET | Refills: 0 | Status: SHIPPED | OUTPATIENT
Start: 2024-01-08

## 2024-01-08 RX ORDER — ALUMINUM HYDROXIDE, MAGNESIUM HYDROXIDE, SIMETHICONE 400; 400; 40 MG/10ML; MG/10ML; MG/10ML
30 SUSPENSION ORAL 4 TIMES DAILY PRN
Qty: 355 ML | Refills: 0 | Status: SHIPPED | OUTPATIENT
Start: 2024-01-08

## 2024-01-08 RX ORDER — FAMOTIDINE 20 MG/1
20 TABLET, FILM COATED ORAL 2 TIMES DAILY
Qty: 30 TABLET | Refills: 0 | Status: SHIPPED | OUTPATIENT
Start: 2024-01-08

## 2024-01-08 RX ADMIN — LIDOCAINE HYDROCHLORIDE 15 ML: 20 SOLUTION ORAL at 17:39

## 2024-01-08 RX ADMIN — ONDANSETRON 4 MG: 2 INJECTION INTRAMUSCULAR; INTRAVENOUS at 16:06

## 2024-01-08 RX ADMIN — KETOROLAC TROMETHAMINE 15 MG: 30 INJECTION, SOLUTION INTRAMUSCULAR; INTRAVENOUS at 16:54

## 2024-01-08 RX ADMIN — FAMOTIDINE 20 MG: 20 TABLET, FILM COATED ORAL at 17:39

## 2024-01-08 RX ADMIN — SODIUM CHLORIDE 1000 ML: 0.9 INJECTION, SOLUTION INTRAVENOUS at 16:05

## 2024-01-08 RX ADMIN — ALUMINUM HYDROXIDE, MAGNESIUM HYDROXIDE, AND DIMETHICONE 30 ML: 200; 20; 200 SUSPENSION ORAL at 17:39

## 2024-01-08 NOTE — Clinical Note
Trini Olivarez was seen and treated in our emergency department on 1/8/2024.                Diagnosis:     Trini  .    She may return on this date: 01/10/2024         If you have any questions or concerns, please don't hesitate to call.      Patric Muir PA-C    ______________________________           _______________          _______________  Hospital Representative                              Date                                Time

## 2024-01-08 NOTE — ED PROVIDER NOTES
History  Chief Complaint   Patient presents with    Vomiting     Pt reports vomiting starting today, hasn't had a BM in 3 days, and states she has a cough that wont go away.      Trini is a 22 yo F presenting with nausea, vomiting, and periumbilical/epigastric abdominal pain with onset this am. Reports 3 episodes nonbloody nonbilious emesis today. Reports constipation for the past 3 days although continues to pass flatus. No fevers/chills reported. No known sick contacts, recent travel, or suspicious food intake. Tried naproxen without significant relief of pain. Denies alcohol or illicit substance use.  She does have a remote history of cholecystectomy.      History provided by:  Patient   used: No        Prior to Admission Medications   Prescriptions Last Dose Informant Patient Reported? Taking?   ARIPiprazole (ABILIFY) 15 mg tablet   No No   Sig: Take 0.5 tablets (7.5 mg total) by mouth daily   albuterol (PROVENTIL HFA,VENTOLIN HFA) 90 mcg/act inhaler   No No   Sig: Inhale 2 puffs every 4 (four) hours as needed for wheezing   dicyclomine (BENTYL) 20 mg tablet   No No   Sig: Take 1 tablet (20 mg total) by mouth 4 (four) times a day as needed (abdominal cramping, diarrhea)   famotidine (PEPCID) 20 mg tablet   No No   Sig: Take 1 tablet (20 mg total) by mouth 2 (two) times a day   famotidine (PEPCID) 20 mg tablet   No No   Sig: Take 1 tablet (20 mg total) by mouth 2 (two) times a day   fluticasone (FLONASE) 50 mcg/act nasal spray   Yes No   gabapentin (NEURONTIN) 600 MG tablet   Yes No   Sig: Take 600 mg by mouth every evening   norgestimate-ethinyl estradiol (Sprintec 28) 0.25-35 MG-MCG per tablet   No No   Sig: Take 1 tablet by mouth daily   ondansetron (ZOFRAN-ODT) 4 mg disintegrating tablet   No No   Sig: Take 1 tablet (4 mg total) by mouth every 6 (six) hours as needed for nausea or vomiting   traZODone (DESYREL) 50 mg tablet   Yes No   Sig: Take 50 mg by mouth daily at bedtime    venlafaxine (EFFEXOR-XR) 37.5 mg 24 hr capsule   Yes No   Patient not taking: Reported on 7/12/2023      Facility-Administered Medications: None       Past Medical History:   Diagnosis Date    ADHD     Anxiety     Asthma     Autism     Bipolar disorder (HCC)     Depression     GERD (gastroesophageal reflux disease)        Past Surgical History:   Procedure Laterality Date    CHOLANGIOGRAM N/A 12/01/2018    Procedure: CHOLANGIOGRAM;  Surgeon: Angelo Alcantara MD;  Location:  MAIN OR;  Service: General    CHOLECYSTECTOMY LAPAROSCOPIC N/A 12/01/2018    Procedure: CHOLECYSTECTOMY LAPAROSCOPIC;  Surgeon: Angelo Alcantara MD;  Location:  MAIN OR;  Service: General    EYE MUSCLE SURGERY Bilateral 2006    CO ESOPHAGOGASTRODUODENOSCOPY TRANSORAL DIAGNOSTIC N/A 11/27/2018    Procedure: ESOPHAGOGASTRODUODENOSCOPY (EGD) with bx;  Surgeon: Rashmi Vazquez MD;  Location: [facility];  Service: General       Family History   Problem Relation Age of Onset    Breast cancer Mother     Heart attack Mother     Depression Mother     Mental illness Mother     Coronary artery disease Mother     Hypertension Mother     Diabetes Mother     Hyperlipidemia Mother     Pneumonia Brother     Hypertension Maternal Grandmother     Stroke Maternal Grandmother     Diabetes Maternal Grandmother     Glaucoma Maternal Grandmother     Kidney disease Maternal Grandmother     Sarcoidosis Maternal Grandmother     Diabetes Maternal Grandfather     Hypertension Maternal Grandfather     Stroke Maternal Grandfather     Glaucoma Maternal Grandfather     Heart attack Maternal Grandfather     Colon cancer Neg Hx     Ovarian cancer Neg Hx      I have reviewed and agree with the history as documented.    E-Cigarette/Vaping    E-Cigarette Use Never User      E-Cigarette/Vaping Substances     Social History     Tobacco Use    Smoking status: Never    Smokeless tobacco: Never    Tobacco comments:     no passive smoke exposure   Vaping Use    Vaping status:  Never Used   Substance Use Topics    Alcohol use: Never    Drug use: Never       Review of Systems   Constitutional:  Negative for chills, fatigue and fever.   HENT:  Negative for congestion, rhinorrhea and sore throat.    Eyes:  Negative for pain and visual disturbance.   Respiratory:  Negative for cough, shortness of breath and wheezing.    Cardiovascular:  Negative for chest pain and palpitations.   Gastrointestinal:  Positive for abdominal pain, constipation, nausea and vomiting. Negative for diarrhea.   Genitourinary:  Negative for dysuria, frequency and urgency.   Musculoskeletal:  Negative for back pain, neck pain and neck stiffness.   Skin:  Negative for rash and wound.   Neurological:  Negative for dizziness, weakness, light-headedness and numbness.       Physical Exam  Physical Exam  Constitutional:       General: She is not in acute distress.     Appearance: She is well-developed. She is not diaphoretic.   HENT:      Head: Normocephalic and atraumatic.      Right Ear: External ear normal.      Left Ear: External ear normal.   Eyes:      Extraocular Movements:      Right eye: Normal extraocular motion.      Left eye: Normal extraocular motion.      Conjunctiva/sclera: Conjunctivae normal.      Pupils: Pupils are equal, round, and reactive to light.   Cardiovascular:      Rate and Rhythm: Normal rate and regular rhythm.   Pulmonary:      Effort: Pulmonary effort is normal. No accessory muscle usage or respiratory distress.   Abdominal:      General: Abdomen is flat. There is no distension.      Tenderness: There is abdominal tenderness. There is no right CVA tenderness, left CVA tenderness or guarding.      Comments: Periumbilical and epigastric abdominal tenderness without rigidity, rebound, or guarding.  No CVA tenderness.  Negative psoas and obturator signs.  No referred rebound.   Musculoskeletal:      Cervical back: Normal range of motion. No rigidity.   Skin:     General: Skin is warm and dry.       Capillary Refill: Capillary refill takes less than 2 seconds.      Findings: No erythema or rash.   Neurological:      Mental Status: She is alert and oriented to person, place, and time.      Motor: No abnormal muscle tone.      Coordination: Coordination normal.   Psychiatric:         Behavior: Behavior normal.         Thought Content: Thought content normal.         Judgment: Judgment normal.         Vital Signs  ED Triage Vitals   Temperature Pulse Respirations Blood Pressure SpO2   01/08/24 1435 01/08/24 1435 01/08/24 1435 01/08/24 1435 01/08/24 1435   98.1 °F (36.7 °C) 71 17 126/57 99 %      Temp Source Heart Rate Source Patient Position - Orthostatic VS BP Location FiO2 (%)   01/08/24 1435 01/08/24 1435 01/08/24 1435 01/08/24 1435 --   Oral Monitor Sitting Left arm       Pain Score       01/08/24 1654       10 - Worst Possible Pain           Vitals:    01/08/24 1435 01/08/24 1651 01/08/24 1830   BP: 126/57 112/56 136/64   Pulse: 71 77 76   Patient Position - Orthostatic VS: Sitting Lying Lying         Visual Acuity      ED Medications  Medications   ondansetron (ZOFRAN) injection 4 mg (4 mg Intravenous Given 1/8/24 1606)   sodium chloride 0.9 % bolus 1,000 mL (0 mL Intravenous Stopped 1/8/24 1714)   ketorolac (TORADOL) injection 15 mg (15 mg Intravenous Given 1/8/24 1654)   aluminum-magnesium hydroxide-simethicone (MAALOX) oral suspension 30 mL (30 mL Oral Given 1/8/24 1739)   Lidocaine Viscous HCl (XYLOCAINE) 2 % mucosal solution 15 mL (15 mL Swish & Spit Given 1/8/24 1739)   famotidine (PEPCID) tablet 20 mg (20 mg Oral Given 1/8/24 1739)       Diagnostic Studies  Results Reviewed       Procedure Component Value Units Date/Time    Urine Microscopic [[id number]]  (Abnormal) Collected: 01/08/24 1609    Lab Status: Final result Specimen: Urine, Other Updated: 01/08/24 1722     RBC, UA 4-10 /hpf      WBC, UA 2-4 /hpf      Epithelial Cells Innumerable /hpf      Bacteria, UA Moderate /hpf      MUCUS THREADS  Moderate    Comprehensive metabolic panel [[id number]]  (Abnormal) Collected: 01/08/24 1603    Lab Status: Final result Specimen: Blood from Arm, Right Updated: 01/08/24 1635     Sodium 139 mmol/L      Potassium 3.9 mmol/L      Chloride 107 mmol/L      CO2 24 mmol/L      ANION GAP 8 mmol/L      BUN 8 mg/dL      Creatinine 0.80 mg/dL      Glucose 95 mg/dL      Calcium 9.1 mg/dL      AST 61 U/L       U/L      Alkaline Phosphatase 102 U/L      Total Protein 7.1 g/dL      Albumin 4.1 g/dL      Total Bilirubin 0.45 mg/dL      eGFR 102 ml/min/1.73sq m     Narrative:      National Kid[employer] Foundation guidelines for Chronic Kidney Disease (CKD):     Stage 1 with normal or high GFR (GFR > 90 mL/min/1.73 square meters)    Stage 2 Mild CKD (GFR = 60-89 mL/min/1.73 square meters)    Stage 3A Moderate CKD (GFR = 45-59 mL/min/1.73 square meters)    Stage 3B Moderate CKD (GFR = 30-44 mL/min/1.73 square meters)    Stage 4 Severe CKD (GFR = 15-29 mL/min/1.73 square meters)    Stage 5 End Stage CKD (GFR <15 mL/min/1.73 square meters)  Note: GFR calculation is accurate only with a steady state creatinine    Lipase [[id number]]  (Normal) Collected: 01/08/24 1603    Lab Status: Final result Specimen: Blood from Arm, Right Updated: 01/08/24 1635     Lipase 16 u/L     POCT pregnancy, urine [[id number]]  (Normal) Resulted: 01/08/24 1617    Lab Status: Final result Updated: 01/08/24 1617     EXT Preg Test, Ur Negative     Control Valid    CBC and differential [[id number]]  (Abnormal) Collected: 01/08/24 1603    Lab Status: Final result Specimen: Blood from Arm, Right Updated: 01/08/24 1614     WBC 10.55 Thousand/uL      RBC 4.45 Million/uL      Hemoglobin 11.3 g/dL      Hematocrit 35.3 %      MCV 79 fL      MCH 25.4 pg      MCHC 32.0 g/dL      RDW 14.4 %      MPV 10.3 fL      Platelets 306 Thousands/uL      nRBC 0 /100 WBCs      Neutrophils Relative 78 %      Immat GRANS % 1 %      Lymphocytes Relative 12 %      Monocytes Relative  6 %      Eosinophils Relative 2 %      Basophils Relative 1 %      Neutrophils Absolute 8.34 Thousands/µL      Immature Grans Absolute 0.06 Thousand/uL      Lymphocytes Absolute 1.31 Thousands/µL      Monocytes Absolute 0.58 Thousand/µL      Eosinophils Absolute 0.21 Thousand/µL      Basophils Absolute 0.05 Thousands/µL     Urine Macroscopic, POC [[id number]]  (Abnormal) Collected: 01/08/24 1609    Lab Status: Final result Specimen: Urine Updated: 01/08/24 1611     Color, UA Yellow     Clarity, UA Clear     pH, UA 6.5     Leukocytes, UA Negative     Nitrite, UA Negative     Protein, UA 30 (1+) mg/dl      Glucose, UA Negative mg/dl      Ketones, UA 40 (2+) mg/dl      Urobilinogen, UA 1.0 E.U./dl      Bilirubin, UA Small     Occult Blood, UA Large     Specific Gravity, UA 1.020    Narrative:      CLINITEK RESULT                   No orders to display              Procedures  ECG 12 Lead Documentation Only    Date/Time: 1/8/2024 6:10 PM    Performed by: Patric Muir PA-C  Authorized by: Patric Muir PA-C    Indications / Diagnosis:  Chest tightness  ECG reviewed by me, the ED Provider: yes    Patient location:  ED  Previous ECG:     Previous ECG:  Compared to current    Similarity:  No change    Comparison to cardiac monitor: Yes    Interpretation:     Interpretation: non-specific    Rate:     ECG rate:  71    ECG rate assessment: normal    Rhythm:     Rhythm: sinus rhythm    Ectopy:     Ectopy: none    QRS:     QRS axis:  Normal    QRS intervals:  Normal  Conduction:     Conduction: normal    ST segments:     ST segments:  Normal  T waves:     T waves: non-specific             ED Course                               SBIRT 22yo+      Flowsheet Row Most Recent Value   Initial Alcohol Screen: US AUDIT-C     1. How often do you have a drink containing alcohol? 0 Filed at: 01/08/2024 1432   2. How many drinks containing alcohol do you have on a typical day you are drinking?  0 Filed at: 01/08/2024 1431   3b.  FEMALE Any Age, or MALE 65+: How often do you have 4 or more drinks on one occassion? 0 Filed at: 01/08/2024 1437   Audit-C Score 0 Filed at: 01/08/2024 1432   AILYN: How many times in the past year have you...    Used an illegal drug or used a prescription medication for non-medical reasons? Never Filed at: 01/08/2024 1433                      Medical Decision Making  Patient presenting with nausea, vomiting, periumbilical and epigastric abdominal pain with onset this morning.  On exam she is well-appearing, afebrile, in no acute distress.  She does have tenderness to palpation to periumbilical region and epigastrium without peritoneal signs or further focality.  Previous history of cholecystectomy noted.  She is noted to have several somewhat similar presentations to the emergency department over the past 6 months, notes Zofran did help previously    Lab workup reveals mild transaminitis - noted two ED visits ago, had corrected by last visit. Given mildly elevated, f/u with GI in office for same. Nausea controlled following Zofran. Pain initially improved somewhat with toradol, although subsequently noted a tightness in her chest. Subsequent EKG NSR, nonspecific T wave abnormality, grossly unchanged from previous. She was given GI cocktail as well with significant overall improvement in chest and abdominal discomfort.     Suspect symptoms primarily from gastritis/PUD. Given recent negative CT on 12/13 further imaging deferred at this time. Will start on regimen of pepcid, carafate, Zofran and maalox PRN. F/u with GI recommended, referral for same provided. Microscopic hematuria noted although also present previously. F/u with PCP for same recommended.  Dietary recommendations discussed. Follow up/return to ED indications reviewed.     Amount and/or Complexity of Data Reviewed  Labs: ordered.    Risk  OTC drugs.  Prescription drug management.             Disposition  Final diagnoses:   Nausea and vomiting    Epigastric pain   Transaminitis     Time reflects when diagnosis was documented in both MDM as applicable and the Disposition within this note       Time User Action Codes Description Comment    1/8/2024  7:09 PM Patric Muir Add [R11.2] Nausea and vomiting     1/8/2024  7:09 PM Patric Muir Add [R10.13] Epigastric pain     1/8/2024  7:11 PM Patric Muir Add [R74.01] Transaminitis           ED Disposition       ED Disposition   Discharge    Condition   Stable    Date/Time   Mon Jan 8, 2024 1909    Comment   Trini Olivarez discharge to home/self care.                   Follow-up Information       Follow up With Specialties Details Why Contact Info Additional Information    [facility] Emergency Department Emergency Medicine  If symptoms worsen [address] [facility] [id number] [phone] [facility] Emergency Department, [address] [place], [place], [address]    [facility] Schedule an appointment as soon as possible for a visit   [address]  [address]  [facility] [address] [phone] [facility] [place], [address], [place] [address], [place], [place], [address] [phone]    Jay Price MD Family Medicine Call   [address] [place] [address] [phone]               Discharge Medication List as of 1/8/2024  7:12 PM        START taking these medications    Details   aluminum-magnesium hydroxide-simethicone (MAALOX) 200-200-20 MG/5ML SUSP Take 30 mL by mouth 4 (four) times a day as needed for heartburn or cramping, Starting Mon 1/8/2024, Normal      !! famotidine (PEPCID) 20 mg tablet Take 1 tablet (20 mg total) by mouth 2 (two) times a day, Starting Mon 1/8/2024, Normal      !! ondansetron (ZOFRAN-ODT) 4 mg disintegrating tablet Take 1 tablet (4 mg total) by mouth every 6 (six) hours as needed for nausea or  vomiting, Starting Mon 1/8/2024, Normal      sucralfate (CARAFATE) 1 g tablet Take 1 tablet (1 g total) by mouth 4 (four) times a day for 7 days, Starting Mon 1/8/2024, Until Mon 1/15/2024, Normal       !! - Potential duplicate medications found. Please discuss with provider.        CONTINUE these medications which have NOT CHANGED    Details   albuterol (PROVENTIL HFA,VENTOLIN HFA) 90 mcg/act inhaler Inhale 2 puffs every 4 (four) hours as needed for wheezing, Starting Fri 7/15/2022, Normal      ARIPiprazole (ABILIFY) 15 mg tablet Take 0.5 tablets (7.5 mg total) by mouth daily, Starting Sun 6/18/2023, Until Tue 7/18/2023, Normal      dicyclomine (BENTYL) 20 mg tablet Take 1 tablet (20 mg total) by mouth 4 (four) times a day as needed (abdominal cramping, diarrhea), Starting Wed 12/13/2023, Print      !! famotidine (PEPCID) 20 mg tablet Take 1 tablet (20 mg total) by mouth 2 (two) times a day, Starting Thu 7/18/2019, Normal      !! famotidine (PEPCID) 20 mg tablet Take 1 tablet (20 mg total) by mouth 2 (two) times a day, Starting Wed 12/13/2023, Normal      fluticasone (FLONASE) 50 mcg/act nasal spray Starting Sun 6/18/2023, Historical Med      gabapentin (NEURONTIN) 600 MG tablet Take 600 mg by mouth every evening, Starting Mon 6/19/2023, Historical Med      norgestimate-ethinyl estradiol (Sprintec 28) 0.25-35 MG-MCG per tablet Take 1 tablet by mouth daily, Starting Fri 9/1/2023, Normal      !! ondansetron (ZOFRAN-ODT) 4 mg disintegrating tablet Take 1 tablet (4 mg total) by mouth every 6 (six) hours as needed for nausea or vomiting, Starting Wed 12/13/2023, Normal      traZODone (DESYREL) 50 mg tablet Take 50 mg by mouth daily at bedtime, Starting Wed 6/21/2023, Historical Med      venlafaxine (EFFEXOR-XR) 37.5 mg 24 hr capsule Starting Wed 7/5/2023, Historical Med       !! - Potential duplicate medications found. Please discuss with provider.              PDMP Review       None            ED  Provider  Electronically Signed by             Patric Muir PA-C  01/08/24 1951

## 2024-01-09 NOTE — DISCHARGE INSTRUCTIONS
Please refer to the attached information for strict return instructions. If symptoms worsen or new symptoms develop please return to the ER. Please follow up with gastroenterology for re-evaluation of symptoms.

## 2024-01-12 ENCOUNTER — OFFICE VISIT (OUTPATIENT)
Dept: FAMILY MEDICINE CLINIC | Facility: CLINIC | Age: 24
End: 2024-01-12
Payer: COMMERCIAL

## 2024-01-12 VITALS
TEMPERATURE: 99 F | WEIGHT: 196 LBS | DIASTOLIC BLOOD PRESSURE: 70 MMHG | HEART RATE: 87 BPM | OXYGEN SATURATION: 97 % | RESPIRATION RATE: 16 BRPM | SYSTOLIC BLOOD PRESSURE: 110 MMHG | BODY MASS INDEX: 39.51 KG/M2 | HEIGHT: 59 IN

## 2024-01-12 DIAGNOSIS — R05.9 COUGH, UNSPECIFIED TYPE: ICD-10-CM

## 2024-01-12 DIAGNOSIS — J01.00 ACUTE NON-RECURRENT MAXILLARY SINUSITIS: Primary | ICD-10-CM

## 2024-01-12 PROCEDURE — 99213 OFFICE O/P EST LOW 20 MIN: CPT | Performed by: NURSE PRACTITIONER

## 2024-01-12 PROCEDURE — 87636 SARSCOV2 & INF A&B AMP PRB: CPT | Performed by: NURSE PRACTITIONER

## 2024-01-12 RX ORDER — BENZONATATE 100 MG/1
100 CAPSULE ORAL 3 TIMES DAILY PRN
Qty: 20 CAPSULE | Refills: 0 | Status: SHIPPED | OUTPATIENT
Start: 2024-01-12

## 2024-01-12 RX ORDER — AZITHROMYCIN 250 MG/1
TABLET, FILM COATED ORAL DAILY
Qty: 6 TABLET | Refills: 0 | Status: SHIPPED | OUTPATIENT
Start: 2024-01-12 | End: 2024-01-16

## 2024-01-12 NOTE — PROGRESS NOTES
Name: Trini Olivarez      : 2000      MRN: [id number]  Encounter Provider: KEVIN Gann  Encounter Date: 2024   Encounter department: [facility]    Assessment & Plan     1. Acute non-recurrent maxillary sinusitis  Assessment & Plan:  - Prescriptions sent for Z-yovana and tessalon perles. Advised of side effects.   - Continue Flonase.   - Increase oral hydration and use humidifier.  - Contact office if symptoms do not improve.    Orders:  -     azithromycin (Zithromax) 250 mg tablet; Take 2 tablets (500 mg total) by mouth daily for 1 day, THEN 1 tablet (250 mg total) daily for 4 days.  -     benzonatate (TESSALON PERLES) 100 mg capsule; Take 1 capsule (100 mg total) by mouth 3 (three) times a day as needed for cough    2. Cough, unspecified type  -     Covid/Flu- Office Collect Normal           Subjective     Patient presents to office today with complaints of cough, congestion, and sore throat. Had episode of vomiting this morning. Denies any fever. Symptoms have been occurring for past few weeks. Boyfriend had bronchitis. She denies any other concerns or complaints today.           Review of Systems   Constitutional:  Negative for fatigue and fever.   HENT:  Positive for congestion, sinus pressure, sinus pain and sore throat. Negative for trouble swallowing.    Eyes:  Negative for visual disturbance.   Respiratory:  Positive for cough. Negative for shortness of breath.    Cardiovascular:  Negative for chest pain and palpitations.   Gastrointestinal:  Negative for abdominal pain and blood in stool.   Endocrine: Negative for cold intolerance and heat intolerance.   Genitourinary:  Negative for difficulty urinating and dysuria.   Musculoskeletal:  Negative for gait problem.   Skin:  Negative for rash.   Neurological:  Negative for dizziness, syncope and headaches.   Hematological:  Negative for adenopathy.   Psychiatric/Behavioral:  Negative for behavioral problems.         Past Medical History:   Diagnosis Date   • ADHD    • Anxiety    • Asthma    • Autism    • Bipolar disorder (HCC)    • Depression    • GERD (gastroesophageal reflux disease)      Past Surgical History:   Procedure Laterality Date   • CHOLANGIOGRAM N/A 12/01/2018    Procedure: CHOLANGIOGRAM;  Surgeon: Angelo Alcantara MD;  Location:  MAIN OR;  Service: General   • CHOLECYSTECTOMY LAPAROSCOPIC N/A 12/01/2018    Procedure: CHOLECYSTECTOMY LAPAROSCOPIC;  Surgeon: Angelo Alcantara MD;  Location:  MAIN OR;  Service: General   • EYE MUSCLE SURGERY Bilateral 2006   • [place] ESOPHAGOGASTRODUODENOSCOPY TRANSORAL DIAGNOSTIC N/A 11/27/2018    Procedure: ESOPHAGOGASTRODUODENOSCOPY (EGD) with bx;  Surgeon: Rashmi Vazquez MD;  Location: [facility];  Service: General     Family History   Problem Relation Age of Onset   • Breast cancer Mother    • Heart attack Mother    • Depression Mother    • Mental illness Mother    • Coronary artery disease Mother    • Hypertension Mother    • Diabetes Mother    • Hyperlipidemia Mother    • Pneumonia Brother    • Hypertension Maternal Grandmother    • Stroke Maternal Grandmother    • Diabetes Maternal Grandmother    • Glaucoma Maternal Grandmother    • Kidney disease Maternal Grandmother    • Sarcoidosis Maternal Grandmother    • Diabetes Maternal Grandfather    • Hypertension Maternal Grandfather    • Stroke Maternal Grandfather    • Glaucoma Maternal Grandfather    • Heart attack Maternal Grandfather    • Colon cancer Neg Hx    • Ovarian cancer Neg Hx      Social History     Socioeconomic History   • Marital status: Single     Spouse name: None   • Number of children: None   • Years of education: None   • Highest education level: None   Occupational History   • None   Tobacco Use   • Smoking status: Never   • Smokeless tobacco: Never   • Tobacco comments:     no passive smoke exposure   Vaping Use   • Vaping status: Never Used   Substance and Sexual Activity   • Alcohol use: Never   •  Drug use: Never   • Sexual activity: Yes   Other Topics Concern   • None   Social History Narrative   • None     Social Determinants of Health     Financial Resource Strain: Low Risk  (8/27/2023)    Overall Financial Resource Strain (CARDIA)    • Difficulty of Paying Living Expenses: Not hard at all   Food Insecurity: No Food Insecurity (8/27/2023)    Hunger Vital Sign    • Worried About Running Out of Food in the Last Year: Never true    • Ran Out of Food in the Last Year: Never true   Transportation Needs: No Transportation Needs (8/27/2023)    PRAPARE - Transportation    • Lack of Transportation (Medical): No    • Lack of Transportation (Non-Medical): No   Physical Activity: Not on file   Stress: Not on file   Social Connections: Not on file   Intimate Partner Violence: Not on file   Housing Stability: Not on file     Current Outpatient Medications on File Prior to Visit   Medication Sig   • albuterol (PROVENTIL HFA,VENTOLIN HFA) 90 mcg/act inhaler Inhale 2 puffs every 4 (four) hours as needed for wheezing   • aluminum-magnesium hydroxide-simethicone (MAALOX) 200-200-20 MG/5ML SUSP Take 30 mL by mouth 4 (four) times a day as needed for heartburn or cramping   • dicyclomine (BENTYL) 20 mg tablet Take 1 tablet (20 mg total) by mouth 4 (four) times a day as needed (abdominal cramping, diarrhea)   • famotidine (PEPCID) 20 mg tablet Take 1 tablet (20 mg total) by mouth 2 (two) times a day   • fluticasone (FLONASE) 50 mcg/act nasal spray    • gabapentin (NEURONTIN) 600 MG tablet Take 600 mg by mouth every evening   • norgestimate-ethinyl estradiol (Sprintec 28) 0.25-35 MG-MCG per tablet Take 1 tablet by mouth daily   • ondansetron (ZOFRAN-ODT) 4 mg disintegrating tablet Take 1 tablet (4 mg total) by mouth every 6 (six) hours as needed for nausea or vomiting   • sucralfate (CARAFATE) 1 g tablet Take 1 tablet (1 g total) by mouth 4 (four) times a day for 7 days   • traZODone (DESYREL) 50 mg tablet Take 50 mg by mouth  daily at bedtime   • ARIPiprazole (ABILIFY) 15 mg tablet Take 0.5 tablets (7.5 mg total) by mouth daily   • famotidine (PEPCID) 20 mg tablet Take 1 tablet (20 mg total) by mouth 2 (two) times a day   • famotidine (PEPCID) 20 mg tablet Take 1 tablet (20 mg total) by mouth 2 (two) times a day   • ondansetron (ZOFRAN-ODT) 4 mg disintegrating tablet Take 1 tablet (4 mg total) by mouth every 6 (six) hours as needed for nausea or vomiting   • venlafaxine (EFFEXOR-XR) 37.5 mg 24 hr capsule  (Patient not taking: Reported on 7/12/2023)     No Known Allergies  Immunization History   Administered Date(s) Administered   • COVID-19 MODERNA VACC 0.5 ML IM 04/22/2021, 05/20/2021   • DTaP 2000, 2000, 1998, 1998, 09/20/2002   • DTaP 5 1998, 1998, 1998, 07/14/1999, 09/20/2002   • HPV Bivalent 04/02/2009, 06/09/2009   • HPV Quadrivalent 04/02/2009, 06/09/2009, 02/04/2011, 05/20/2015   • Hep A, adult 06/05/2008, 02/04/2011   • Hep A, ped/adol, 2 dose 06/05/2008, 05/20/2015   • Hep B, Adolescent or Pediatric 1998, 1998, 03/26/2001   • Hep B, adult 1998, 1998, 03/26/2001   • Hepatitis A 05/20/2015   • HiB 1998, 03/26/2001   • Hib (PRP-OMP) 1998, 1998, 03/26/2001   • Hib (PRP-T) 1998   • INFLUENZA 10/16/2015, 08/16/2016   • IPV 1998, 1998, 04/14/1999, 09/20/2002   • Influenza Quadrivalent Preservative Free 3 years and older IM 09/30/2021   • Influenza Split 10/13/1999, 01/05/2007   • Influenza, injectable, quadrivalent, preservative free 0.5 mL 09/11/2019   • Influenza, recombinant, quadrivalent,injectable, preservative free 09/24/2018   • Influenza, seasonal, injectable 10/13/1999, 01/05/2007, 02/04/2011, 10/05/2011   • MMR 04/14/1999, 09/20/2002   • Meningococcal Conjugate (MCV4O) 05/20/2015   • Meningococcal MCV4P 02/04/2011, 05/20/2015   • Meningococcal, Unknown Serogroups 02/04/2011, 11/19/2014   • Tdap 02/04/2011, 05/20/2015   •  "Varicella 04/14/1999, 06/05/2008, 08/05/2008, 04/02/2009       Objective     /70 (BP Location: Left arm, Patient Position: Sitting, Cuff Size: Standard)   Pulse 87   Temp 99 °F (37.2 °C) (Tympanic)   Resp 16   Ht 4' 11\" (1.499 m)   Wt 88.9 kg (196 lb)   SpO2 97%   BMI 39.59 kg/m²     Physical Exam  Vitals and nursing note reviewed.   Constitutional:       General: She is not in acute distress.     Appearance: She is ill-appearing.   HENT:      Head: Normocephalic and atraumatic.      Right Ear: Tympanic membrane, ear canal and external ear normal.      Left Ear: Tympanic membrane, ear canal and external ear normal.      Nose: Congestion present.      Mouth/Throat:      Mouth: Mucous membranes are moist.   Eyes:      Conjunctiva/sclera: Conjunctivae normal.   Cardiovascular:      Rate and Rhythm: Normal rate and regular rhythm.      Heart sounds: Normal heart sounds.   Pulmonary:      Effort: Pulmonary effort is normal.      Breath sounds: Normal breath sounds.   Musculoskeletal:         General: Normal range of motion.      Cervical back: Normal range of motion.   Lymphadenopathy:      Cervical: Cervical adenopathy present.   Skin:     General: Skin is warm and dry.   Neurological:      Mental Status: She is alert and oriented to person, place, and time.   Psychiatric:         Mood and Affect: Mood normal.         Behavior: Behavior normal.       KEVIN Gann    "

## 2024-01-12 NOTE — ASSESSMENT & PLAN NOTE
- Prescriptions sent for Z-yovana and tessalon perles. Advised of side effects.   - Continue Flonase.   - Increase oral hydration and use humidifier.  - Contact office if symptoms do not improve.

## 2024-01-13 LAB
FLUAV RNA RESP QL NAA+PROBE: NEGATIVE
FLUBV RNA RESP QL NAA+PROBE: NEGATIVE
SARS-COV-2 RNA RESP QL NAA+PROBE: POSITIVE

## 2024-01-16 ENCOUNTER — TELEPHONE (OUTPATIENT)
Dept: FAMILY MEDICINE CLINIC | Facility: CLINIC | Age: 24
End: 2024-01-16

## 2024-02-21 PROBLEM — Z01.419 ENCOUNTER FOR ANNUAL ROUTINE GYNECOLOGICAL EXAMINATION: Status: RESOLVED | Noted: 2019-04-29 | Resolved: 2024-02-21

## 2024-02-21 PROBLEM — J01.00 ACUTE NON-RECURRENT MAXILLARY SINUSITIS: Status: RESOLVED | Noted: 2020-03-23 | Resolved: 2024-02-21

## 2024-02-21 PROBLEM — Z00.00 HEALTHCARE MAINTENANCE: Status: RESOLVED | Noted: 2019-02-27 | Resolved: 2024-02-21

## 2024-03-10 ENCOUNTER — HOSPITAL ENCOUNTER (EMERGENCY)
Facility: HOSPITAL | Age: 24
Discharge: HOME | End: 2024-03-10
Attending: EMERGENCY MEDICINE | Admitting: EMERGENCY MEDICINE
Payer: COMMERCIAL

## 2024-03-10 ENCOUNTER — APPOINTMENT (EMERGENCY)
Dept: ULTRASOUND IMAGING | Facility: HOSPITAL | Age: 24
End: 2024-03-10
Payer: COMMERCIAL

## 2024-03-10 VITALS
WEIGHT: 192.24 LBS | HEART RATE: 74 BPM | SYSTOLIC BLOOD PRESSURE: 148 MMHG | BODY MASS INDEX: 38.83 KG/M2 | OXYGEN SATURATION: 100 % | RESPIRATION RATE: 18 BRPM | TEMPERATURE: 97.5 F | DIASTOLIC BLOOD PRESSURE: 68 MMHG

## 2024-03-10 DIAGNOSIS — N93.9 VAGINAL BLEEDING: Primary | ICD-10-CM

## 2024-03-10 DIAGNOSIS — N84.0 ENDOMETRIAL POLYP: ICD-10-CM

## 2024-03-10 LAB
ANION GAP SERPL CALCULATED.3IONS-SCNC: 9 MMOL/L
BASOPHILS # BLD AUTO: 0.04 THOUSANDS/ÂΜL (ref 0–0.1)
BASOPHILS NFR BLD AUTO: 1 % (ref 0–1)
BILIRUB UR QL STRIP: NEGATIVE
BUN SERPL-MCNC: 9 MG/DL (ref 5–25)
CALCIUM SERPL-MCNC: 9.1 MG/DL (ref 8.4–10.2)
CHLORIDE SERPL-SCNC: 105 MMOL/L (ref 96–108)
CLARITY UR: NORMAL
CO2 SERPL-SCNC: 22 MMOL/L (ref 21–32)
COLOR UR: NORMAL
CREAT SERPL-MCNC: 0.89 MG/DL (ref 0.6–1.3)
EOSINOPHIL # BLD AUTO: 0.05 THOUSAND/ÂΜL (ref 0–0.61)
EOSINOPHIL NFR BLD AUTO: 1 % (ref 0–6)
ERYTHROCYTE [DISTWIDTH] IN BLOOD BY AUTOMATED COUNT: 14.4 % (ref 11.6–15.1)
EXT PREGNANCY TEST URINE: NEGATIVE
EXT. CONTROL: NORMAL
GFR SERPL CREATININE-BSD FRML MDRD: 90 ML/MIN/1.73SQ M
GLUCOSE SERPL-MCNC: 102 MG/DL (ref 65–140)
GLUCOSE UR STRIP-MCNC: NEGATIVE MG/DL
HCT VFR BLD AUTO: 37.3 % (ref 34.8–46.1)
HGB BLD-MCNC: 12 G/DL (ref 11.5–15.4)
HGB UR QL STRIP.AUTO: NEGATIVE
IMM GRANULOCYTES # BLD AUTO: 0.03 THOUSAND/UL (ref 0–0.2)
IMM GRANULOCYTES NFR BLD AUTO: 0 % (ref 0–2)
KETONES UR STRIP-MCNC: NEGATIVE MG/DL
LEUKOCYTE ESTERASE UR QL STRIP: NEGATIVE
LYMPHOCYTES # BLD AUTO: 1.72 THOUSANDS/ÂΜL (ref 0.6–4.47)
LYMPHOCYTES NFR BLD AUTO: 21 % (ref 14–44)
MCH RBC QN AUTO: 25.3 PG (ref 26.8–34.3)
MCHC RBC AUTO-ENTMCNC: 32.2 G/DL (ref 31.4–37.4)
MCV RBC AUTO: 79 FL (ref 82–98)
MONOCYTES # BLD AUTO: 0.52 THOUSAND/ÂΜL (ref 0.17–1.22)
MONOCYTES NFR BLD AUTO: 6 % (ref 4–12)
NEUTROPHILS # BLD AUTO: 5.95 THOUSANDS/ÂΜL (ref 1.85–7.62)
NEUTS SEG NFR BLD AUTO: 71 % (ref 43–75)
NITRITE UR QL STRIP: NEGATIVE
NRBC BLD AUTO-RTO: 0 /100 WBCS
PH UR STRIP.AUTO: 6.5 [PH] (ref 4.5–8)
PLATELET # BLD AUTO: 295 THOUSANDS/UL (ref 149–390)
PMV BLD AUTO: 10 FL (ref 8.9–12.7)
POTASSIUM SERPL-SCNC: 3.5 MMOL/L (ref 3.5–5.3)
PROT UR STRIP-MCNC: NEGATIVE MG/DL
RBC # BLD AUTO: 4.74 MILLION/UL (ref 3.81–5.12)
SODIUM SERPL-SCNC: 136 MMOL/L (ref 135–147)
SP GR UR STRIP.AUTO: >=1.03 (ref 1–1.03)
UROBILINOGEN UR QL STRIP.AUTO: 0.2 E.U./DL
WBC # BLD AUTO: 8.31 THOUSAND/UL (ref 4.31–10.16)

## 2024-03-10 PROCEDURE — 80048 BASIC METABOLIC PNL TOTAL CA: CPT

## 2024-03-10 PROCEDURE — 85025 COMPLETE CBC W/AUTO DIFF WBC: CPT

## 2024-03-10 PROCEDURE — 99284 EMERGENCY DEPT VISIT MOD MDM: CPT

## 2024-03-10 PROCEDURE — 96365 THER/PROPH/DIAG IV INF INIT: CPT

## 2024-03-10 PROCEDURE — 81025 URINE PREGNANCY TEST: CPT

## 2024-03-10 PROCEDURE — 76856 US EXAM PELVIC COMPLETE: CPT

## 2024-03-10 PROCEDURE — 36415 COLL VENOUS BLD VENIPUNCTURE: CPT

## 2024-03-10 PROCEDURE — 76830 TRANSVAGINAL US NON-OB: CPT

## 2024-03-10 PROCEDURE — 81003 URINALYSIS AUTO W/O SCOPE: CPT

## 2024-03-10 PROCEDURE — 96375 TX/PRO/DX INJ NEW DRUG ADDON: CPT

## 2024-03-10 PROCEDURE — 96366 THER/PROPH/DIAG IV INF ADDON: CPT

## 2024-03-10 RX ORDER — IBUPROFEN 600 MG/1
600 TABLET ORAL EVERY 6 HOURS PRN
Qty: 30 TABLET | Refills: 0 | Status: SHIPPED | OUTPATIENT
Start: 2024-03-10

## 2024-03-10 RX ORDER — ONDANSETRON 4 MG/1
4 TABLET, ORALLY DISINTEGRATING ORAL EVERY 6 HOURS PRN
Qty: 20 TABLET | Refills: 0 | Status: SHIPPED | OUTPATIENT
Start: 2024-03-10

## 2024-03-10 RX ORDER — ACETAMINOPHEN 325 MG/1
650 TABLET ORAL ONCE
Status: COMPLETED | OUTPATIENT
Start: 2024-03-10 | End: 2024-03-10

## 2024-03-10 RX ORDER — SODIUM CHLORIDE, SODIUM GLUCONATE, SODIUM ACETATE, POTASSIUM CHLORIDE, MAGNESIUM CHLORIDE, SODIUM PHOSPHATE, DIBASIC, AND POTASSIUM PHOSPHATE .53; .5; .37; .037; .03; .012; .00082 G/100ML; G/100ML; G/100ML; G/100ML; G/100ML; G/100ML; G/100ML
1000 INJECTION, SOLUTION INTRAVENOUS ONCE
Status: COMPLETED | OUTPATIENT
Start: 2024-03-10 | End: 2024-03-10

## 2024-03-10 RX ORDER — ONDANSETRON 2 MG/ML
4 INJECTION INTRAMUSCULAR; INTRAVENOUS ONCE
Status: COMPLETED | OUTPATIENT
Start: 2024-03-10 | End: 2024-03-10

## 2024-03-10 RX ORDER — ACETAMINOPHEN 500 MG
500 TABLET ORAL EVERY 6 HOURS PRN
Qty: 30 TABLET | Refills: 0 | Status: SHIPPED | OUTPATIENT
Start: 2024-03-10

## 2024-03-10 RX ORDER — KETOROLAC TROMETHAMINE 30 MG/ML
15 INJECTION, SOLUTION INTRAMUSCULAR; INTRAVENOUS ONCE
Status: COMPLETED | OUTPATIENT
Start: 2024-03-10 | End: 2024-03-10

## 2024-03-10 RX ADMIN — ACETAMINOPHEN 325MG 650 MG: 325 TABLET ORAL at 16:19

## 2024-03-10 RX ADMIN — KETOROLAC TROMETHAMINE 15 MG: 30 INJECTION, SOLUTION INTRAMUSCULAR; INTRAVENOUS at 16:19

## 2024-03-10 RX ADMIN — SODIUM CHLORIDE, SODIUM GLUCONATE, SODIUM ACETATE, POTASSIUM CHLORIDE, MAGNESIUM CHLORIDE, SODIUM PHOSPHATE, DIBASIC, AND POTASSIUM PHOSPHATE 1000 ML: .53; .5; .37; .037; .03; .012; .00082 INJECTION, SOLUTION INTRAVENOUS at 16:19

## 2024-03-10 RX ADMIN — ONDANSETRON 4 MG: 2 INJECTION INTRAMUSCULAR; INTRAVENOUS at 16:19

## 2024-03-10 NOTE — DISCHARGE INSTRUCTIONS
"Ultrasound showed \"Questionable 4 x 2 x 3 mm lower uterine endometrial polyp.\"    Take motrin and tylenol for the pain   Take zofran for nausea  Schedule follow up with GYN   "

## 2024-03-10 NOTE — ED PROVIDER NOTES
"History  Chief Complaint   Patient presents with    Vaginal Bleeding     Pt reports she started with abd pain, heavy vaginal bleeding with clots, and nausea/vomiting. Reports symptoms started x1 week ago. Patient states, \"this is not my period, I have an IUD.\" Denies chance of pregnancy.      23 YOF presents today with complaint of abdominal pain, nausea, vomiting and heavy vaginal bleeding for about 1 week. Reports she is passing 3-4 clots per day that are bigger than a quarter. Reports she doesn't have bleeding between, it is just passing the clots. States she is also having severe lower abdominal cramping bilaterally. Reports nausea and vomiting. Admits to some milky white vaginal discharge as well. States she has had the Nexplanon in her arm for about 2 years. Reports she is sexually active but has not had intercourse \"in a while\". Denies urinary symptoms. No fever, chills.         Prior to Admission Medications   Prescriptions Last Dose Informant Patient Reported? Taking?   ARIPiprazole (ABILIFY) 15 mg tablet   No No   Sig: Take 0.5 tablets (7.5 mg total) by mouth daily   albuterol (PROVENTIL HFA,VENTOLIN HFA) 90 mcg/act inhaler   No No   Sig: Inhale 2 puffs every 4 (four) hours as needed for wheezing   aluminum-magnesium hydroxide-simethicone (MAALOX) 200-200-20 MG/5ML SUSP   No No   Sig: Take 30 mL by mouth 4 (four) times a day as needed for heartburn or cramping   benzonatate (TESSALON PERLES) 100 mg capsule   No No   Sig: Take 1 capsule (100 mg total) by mouth 3 (three) times a day as needed for cough   dicyclomine (BENTYL) 20 mg tablet   No No   Sig: Take 1 tablet (20 mg total) by mouth 4 (four) times a day as needed (abdominal cramping, diarrhea)   famotidine (PEPCID) 20 mg tablet   No No   Sig: Take 1 tablet (20 mg total) by mouth 2 (two) times a day   famotidine (PEPCID) 20 mg tablet   No No   Sig: Take 1 tablet (20 mg total) by mouth 2 (two) times a day   famotidine (PEPCID) 20 mg tablet   No No   Sig: " Take 1 tablet (20 mg total) by mouth 2 (two) times a day   fluticasone (FLONASE) 50 mcg/act nasal spray   Yes No   gabapentin (NEURONTIN) 600 MG tablet   Yes No   Sig: Take 600 mg by mouth every evening   norgestimate-ethinyl estradiol (Sprintec 28) 0.25-35 MG-MCG per tablet   No No   Sig: Take 1 tablet by mouth daily   sucralfate (CARAFATE) 1 g tablet   No No   Sig: Take 1 tablet (1 g total) by mouth 4 (four) times a day for 7 days   traZODone (DESYREL) 50 mg tablet   Yes No   Sig: Take 50 mg by mouth daily at bedtime   venlafaxine (EFFEXOR-XR) 37.5 mg 24 hr capsule   Yes No   Patient not taking: Reported on 7/12/2023      Facility-Administered Medications: None       Past Medical History:   Diagnosis Date    ADHD     Anxiety     Asthma     Autism     Bipolar disorder (HCC)     Depression     GERD (gastroesophageal reflux disease)        Past Surgical History:   Procedure Laterality Date    CHOLANGIOGRAM N/A 12/01/2018    Procedure: CHOLANGIOGRAM;  Surgeon: Angelo Alcantara MD;  Location:  MAIN OR;  Service: General    CHOLECYSTECTOMY LAPAROSCOPIC N/A 12/01/2018    Procedure: CHOLECYSTECTOMY LAPAROSCOPIC;  Surgeon: Angelo Alcantara MD;  Location:  MAIN OR;  Service: General    EYE MUSCLE SURGERY Bilateral 2006    MT ESOPHAGOGASTRODUODENOSCOPY TRANSORAL DIAGNOSTIC N/A 11/27/2018    Procedure: ESOPHAGOGASTRODUODENOSCOPY (EGD) with bx;  Surgeon: Rashmi Vazquez MD;  Location: [facility];  Service: General       Family History   Problem Relation Age of Onset    Breast cancer Mother     Heart attack Mother     Depression Mother     Mental illness Mother     Coronary artery disease Mother     Hypertension Mother     Diabetes Mother     Hyperlipidemia Mother     Pneumonia Brother     Hypertension Maternal Grandmother     Stroke Maternal Grandmother     Diabetes Maternal Grandmother     Glaucoma Maternal Grandmother     Kidney disease Maternal Grandmother     Sarcoidosis Maternal Grandmother     Diabetes Maternal  Grandfather     Hypertension Maternal Grandfather     Stroke Maternal Grandfather     Glaucoma Maternal Grandfather     Heart attack Maternal Grandfather     Colon cancer Neg Hx     Ovarian cancer Neg Hx      I have reviewed and agree with the history as documented.    E-Cigarette/Vaping    E-Cigarette Use Never User      E-Cigarette/Vaping Substances     Social History     Tobacco Use    Smoking status: Never    Smokeless tobacco: Never    Tobacco comments:     no passive smoke exposure   Vaping Use    Vaping status: Never Used   Substance Use Topics    Alcohol use: Never    Drug use: Never       Review of Systems   Constitutional:  Negative for chills and fever.   Gastrointestinal:  Positive for abdominal pain, nausea and vomiting. Negative for abdominal distention and diarrhea.   Genitourinary:  Positive for menstrual problem, vaginal bleeding and vaginal discharge.   All other systems reviewed and are negative.      Physical Exam  Physical Exam  Vitals and nursing note reviewed.   Constitutional:       General: She is not in acute distress.     Appearance: Normal appearance. She is well-developed. She is obese. She is not ill-appearing.   HENT:      Head: Normocephalic and atraumatic.   Eyes:      Conjunctiva/sclera: Conjunctivae normal.   Cardiovascular:      Rate and Rhythm: Normal rate.   Abdominal:      Tenderness: There is abdominal tenderness. There is guarding.   Musculoskeletal:         General: No swelling. Normal range of motion.      Cervical back: Normal range of motion and neck supple.   Skin:     General: Skin is warm and dry.      Capillary Refill: Capillary refill takes less than 2 seconds.   Neurological:      Mental Status: She is alert.   Psychiatric:         Mood and Affect: Mood normal.         Behavior: Behavior normal.         Vital Signs  ED Triage Vitals   Temperature Pulse Respirations Blood Pressure SpO2   03/10/24 1531 03/10/24 1531 03/10/24 1531 03/10/24 1531 03/10/24 1531   97.5 °F  (36.4 °C) 74 18 148/68 100 %      Temp Source Heart Rate Source Patient Position - Orthostatic VS BP Location FiO2 (%)   03/10/24 1531 03/10/24 1531 03/10/24 1531 03/10/24 1531 --   Oral Monitor Sitting Right arm       Pain Score       03/10/24 1530       10 - Worst Possible Pain           Vitals:    03/10/24 1531   BP: 148/68   Pulse: 74   Patient Position - Orthostatic VS: Sitting         Visual Acuity      ED Medications  Medications   multi-electrolyte (ISOLYTE-S PH 7.4) bolus 1,000 mL (0 mL Intravenous Stopped 3/10/24 1753)   ondansetron (ZOFRAN) injection 4 mg (4 mg Intravenous Given 3/10/24 1619)   ketorolac (TORADOL) injection 15 mg (15 mg Intravenous Given 3/10/24 1619)   acetaminophen (TYLENOL) tablet 650 mg (650 mg Oral Given 3/10/24 1619)       Diagnostic Studies  Results Reviewed       Procedure Component Value Units Date/Time    Basic metabolic panel [[id number]] Collected: 03/10/24 1600    Lab Status: Final result Specimen: Blood from Arm, Right Updated: 03/10/24 1622     Sodium 136 mmol/L      Potassium 3.5 mmol/L      Chloride 105 mmol/L      CO2 22 mmol/L      ANION GAP 9 mmol/L      BUN 9 mg/dL      Creatinine 0.89 mg/dL      Glucose 102 mg/dL      Calcium 9.1 mg/dL      eGFR 90 ml/min/1.73sq m     Narrative:      National Kidney Disease Foundation guidelines for Chronic Kidney Disease (CKD):     Stage 1 with normal or high GFR (GFR > 90 mL/min/1.73 square meters)    Stage 2 Mild CKD (GFR = 60-89 mL/min/1.73 square meters)    Stage 3A Moderate CKD (GFR = 45-59 mL/min/1.73 square meters)    Stage 3B Moderate CKD (GFR = 30-44 mL/min/1.73 square meters)    Stage 4 Severe CKD (GFR = 15-29 mL/min/1.73 square meters)    Stage 5 End Stage CKD (GFR <15 mL/min/1.73 square meters)  Note: GFR calculation is accurate only with a steady state creatinine    POCT pregnancy, urine [[id number]]  (Normal) Resulted: 03/10/24 1606    Lab Status: Final result Updated: 03/10/24 1606     EXT Preg Test, Ur Negative      Control Valid    CBC and differential [[id number]]  (Abnormal) Collected: 03/10/24 1600    Lab Status: Final result Specimen: Blood from Arm, Right Updated: 03/10/24 1605     WBC 8.31 Thousand/uL      RBC 4.74 Million/uL      Hemoglobin 12.0 g/dL      Hematocrit 37.3 %      MCV 79 fL      MCH 25.3 pg      MCHC 32.2 g/dL      RDW 14.4 %      MPV 10.0 fL      Platelets 295 Thousands/uL      nRBC 0 /100 WBCs      Neutrophils Relative 71 %      Immat GRANS % 0 %      Lymphocytes Relative 21 %      Monocytes Relative 6 %      Eosinophils Relative 1 %      Basophils Relative 1 %      Neutrophils Absolute 5.95 Thousands/µL      Immature Grans Absolute 0.03 Thousand/uL      Lymphocytes Absolute 1.72 Thousands/µL      Monocytes Absolute 0.52 Thousand/µL      Eosinophils Absolute 0.05 Thousand/µL      Basophils Absolute 0.04 Thousands/µL     Urine Macroscopic, POC [[id number]] Collected: 03/10/24 1603    Lab Status: Final result Specimen: Urine Updated: 03/10/24 1605     Color, UA Gaby     Clarity, UA Cloudy     pH, UA 6.5     Leukocytes, UA Negative     Nitrite, UA Negative     Protein, UA Negative mg/dl      Glucose, UA Negative mg/dl      Ketones, UA Negative mg/dl      Urobilinogen, UA 0.2 E.U./dl      Bilirubin, UA Negative     Occult Blood, UA Negative     Specific Gravity, UA >=1.030    Narrative:      CLINITEK RESULT                   US pelvis complete w transvaginal   Final Result by Mike Peña MD (03/10 1740)      Questionable 4 x 2 x 3 mm lower uterine endometrial polyp.                              Workstation performed: [id number]                    Procedures  Procedures         ED Course  ED Course as of 03/10/24 1754   Sun Mar 10, 2024   1623 Hemoglobin: 12.0   1745 US pelvis complete w transvaginal  IMPRESSION:     Questionable 4 x 2 x 3 mm lower uterine endometrial polyp.                                               Medical Decision Making  Discussed US results with the pt. Hgb is stable. Instructed  her to follow up with her GYN- pt reports she already has appt on Tuesday. Discussed motrin, tylenol and zofran for at home.     I have discussed the plan to discharge pt from ED. The patient was discharged in stable condition.  Patient ambulated off the department.  Extensive return to emergency department precautions were discussed.  Follow up with appropriate providers including primary care physician was discussed.  Patient and/or their  primary decision maker expressed understanding.  Patient remained stable during entire emergency department stay.      Problems Addressed:  Endometrial polyp: undiagnosed new problem with uncertain prognosis  Vaginal bleeding: acute illness or injury    Amount and/or Complexity of Data Reviewed  Labs: ordered. Decision-making details documented in ED Course.  Radiology: ordered. Decision-making details documented in ED Course.    Risk  OTC drugs.  Prescription drug management.             Disposition  Final diagnoses:   Vaginal bleeding   Endometrial polyp     Time reflects when diagnosis was documented in both MDM as applicable and the Disposition within this note       Time User Action Codes Description Comment    3/10/2024  5:45 PM Jonnie Bronson Add [N93.9] Vaginal bleeding     3/10/2024  5:45 PM Jonnie Bronson Add [N84.0] Endometrial polyp           ED Disposition       ED Disposition   Discharge    Condition   Stable    Date/Time   Sun Mar 10, 2024  5:45 PM    Comment   Trini Olivarez discharge to home/self care.                   Follow-up Information    None         Discharge Medication List as of 3/10/2024  5:46 PM        START taking these medications    Details   acetaminophen (TYLENOL) 500 mg tablet Take 1 tablet (500 mg total) by mouth every 6 (six) hours as needed for mild pain, Starting Sun 3/10/2024, Normal      ibuprofen (MOTRIN) 600 mg tablet Take 1 tablet (600 mg total) by mouth every 6 (six) hours as needed for mild pain, Starting Sun 3/10/2024, Normal       ondansetron (ZOFRAN-ODT) 4 mg disintegrating tablet Take 1 tablet (4 mg total) by mouth every 6 (six) hours as needed for vomiting or nausea, Starting Sun 3/10/2024, Normal           CONTINUE these medications which have NOT CHANGED    Details   albuterol (PROVENTIL HFA,VENTOLIN HFA) 90 mcg/act inhaler Inhale 2 puffs every 4 (four) hours as needed for wheezing, Starting Fri 7/15/2022, Normal      aluminum-magnesium hydroxide-simethicone (MAALOX) 200-200-20 MG/5ML SUSP Take 30 mL by mouth 4 (four) times a day as needed for heartburn or cramping, Starting Mon 1/8/2024, Normal      ARIPiprazole (ABILIFY) 15 mg tablet Take 0.5 tablets (7.5 mg total) by mouth daily, Starting Sun 6/18/2023, Until Tue 7/18/2023, Normal      benzonatate (TESSALON PERLES) 100 mg capsule Take 1 capsule (100 mg total) by mouth 3 (three) times a day as needed for cough, Starting Fri 1/12/2024, Normal      dicyclomine (BENTYL) 20 mg tablet Take 1 tablet (20 mg total) by mouth 4 (four) times a day as needed (abdominal cramping, diarrhea), Starting Wed 12/13/2023, Print      !! famotidine (PEPCID) 20 mg tablet Take 1 tablet (20 mg total) by mouth 2 (two) times a day, Starting Thu 7/18/2019, Normal      !! famotidine (PEPCID) 20 mg tablet Take 1 tablet (20 mg total) by mouth 2 (two) times a day, Starting Wed 12/13/2023, Normal      !! famotidine (PEPCID) 20 mg tablet Take 1 tablet (20 mg total) by mouth 2 (two) times a day, Starting Mon 1/8/2024, Normal      fluticasone (FLONASE) 50 mcg/act nasal spray Starting Sun 6/18/2023, Historical Med      gabapentin (NEURONTIN) 600 MG tablet Take 600 mg by mouth every evening, Starting Mon 6/19/2023, Historical Med      norgestimate-ethinyl estradiol (Sprintec 28) 0.25-35 MG-MCG per tablet Take 1 tablet by mouth daily, Starting Fri 9/1/2023, Normal      sucralfate (CARAFATE) 1 g tablet Take 1 tablet (1 g total) by mouth 4 (four) times a day for 7 days, Starting Mon 1/8/2024, Until Mon 1/15/2024, Normal       traZODone (DESYREL) 50 mg tablet Take 50 mg by mouth daily at bedtime, Starting Wed 6/21/2023, Historical Med      venlafaxine (EFFEXOR-XR) 37.5 mg 24 hr capsule Starting Wed 7/5/2023, Historical Med       !! - Potential duplicate medications found. Please discuss with provider.          No discharge procedures on file.    PDMP Review       None            ED Provider  Electronically Signed by             Jonnie Bronson PA-C  03/10/24 3764

## 2024-03-20 ENCOUNTER — OFFICE VISIT (OUTPATIENT)
Dept: OBGYN CLINIC | Facility: CLINIC | Age: 24
End: 2024-03-20

## 2024-03-20 VITALS
SYSTOLIC BLOOD PRESSURE: 132 MMHG | HEART RATE: 60 BPM | WEIGHT: 194.4 LBS | DIASTOLIC BLOOD PRESSURE: 74 MMHG | HEIGHT: 59 IN | BODY MASS INDEX: 39.19 KG/M2

## 2024-03-20 DIAGNOSIS — N92.6 IRREGULAR MENSES: Primary | ICD-10-CM

## 2024-03-20 PROCEDURE — 99213 OFFICE O/P EST LOW 20 MIN: CPT | Performed by: OBSTETRICS & GYNECOLOGY

## 2024-03-20 RX ORDER — LUMATEPERONE 21 MG/1
21 CAPSULE ORAL DAILY
COMMUNITY
Start: 2024-02-29

## 2024-03-20 RX ORDER — HYDROXYZINE PAMOATE 50 MG/1
50 CAPSULE ORAL 2 TIMES DAILY PRN
COMMUNITY
Start: 2024-01-23

## 2024-03-20 NOTE — PROGRESS NOTES
Formerly [facility], [facility] OBGYN  Trini Olivarez  [id number]  2000      Subjective     Trini Olivarez is a 23 y.o. female here for irregular/heavier menses. She had a nexplanon placed in 2022, and for a long time did not have periods until more recently. She has, over the past few months, started having heavier and more prolonged periods. Most recently, she was passing large clots on 3/10 and went to the ED. A TVUS revealed a possible polyp. She says this bleeding was not period bleeding, and her period did not being until 3/13 and lasted to 3/16. She has continued to have spotting leading up to today.     She is having increased pain with the menses describing it as sharp. We discussed alternating motrin and tylenol.    Patient inquired about D&C for removal of polyp. This may be an option if continued attempts at medical management fail.     Gynecologic History  Patient's last menstrual period was 2024 (exact date).  Contraception:  nexplanon  Last Pap: . Results were: normal      Obstetric History  OB History    Para Term  AB Living   0 0 0 0 0 0   SAB IAB Ectopic Multiple Live Births   0 0 0 0 0         Review of Systems   Constitutional:  Negative for chills and fever.   Eyes:  Negative for visual disturbance.   Respiratory:  Negative for shortness of breath.    Cardiovascular:  Negative for chest pain.   Gastrointestinal:  Negative for diarrhea, nausea and vomiting.   Genitourinary:  Positive for menstrual problem and vaginal bleeding. Negative for dysuria, flank pain, hematuria and vaginal discharge.   Skin:  Negative for rash.   Neurological:  Negative for dizziness, numbness and headaches.   All other systems reviewed and are negative.       Objective    Vitals:    24 1322   BP: 132/74   Pulse: 60     Physical Exam  Constitutional:       General: She is not in acute distress.     Appearance: Normal appearance.   Genitourinary:      Genitourinary Comments: deferred    HENT:      Head: Normocephalic and atraumatic.   Eyes:      Extraocular Movements: Extraocular movements intact.   Cardiovascular:      Rate and Rhythm: Normal rate.      Pulses: Normal pulses.   Pulmonary:      Effort: Pulmonary effort is normal. No respiratory distress.   Abdominal:      Tenderness: There is no abdominal tenderness. There is no guarding.   Musculoskeletal:         General: Normal range of motion.      Cervical back: Normal range of motion.   Neurological:      General: No focal deficit present.      Mental Status: She is alert. Mental status is at baseline.   Skin:     General: Skin is warm and dry.   Psychiatric:         Mood and Affect: Mood normal.         Behavior: Behavior normal.   Vitals reviewed.         Plan    Bleeding has continued to decrease. Continue to monitor with plan to reassess with next menses.  Patient expresses desire for D&C to remove polyp. If symptoms persist would consider repeat TVUS to confirm presence of polyp and consider estrogen therapy prior to proceeding.

## 2024-03-25 ENCOUNTER — APPOINTMENT (OUTPATIENT)
Dept: LAB | Facility: HOSPITAL | Age: 24
End: 2024-03-25
Payer: COMMERCIAL

## 2024-03-25 DIAGNOSIS — Z79.899 ENCOUNTER FOR LONG-TERM (CURRENT) USE OF OTHER MEDICATIONS: ICD-10-CM

## 2024-03-25 DIAGNOSIS — R79.89 HYPOURICEMIA: ICD-10-CM

## 2024-03-25 DIAGNOSIS — F31.81 BIPOLAR II DISORDER (HCC): ICD-10-CM

## 2024-03-25 LAB
ALBUMIN SERPL BCP-MCNC: 4.2 G/DL (ref 3.5–5)
ALP SERPL-CCNC: 89 U/L (ref 34–104)
ALT SERPL W P-5'-P-CCNC: 14 U/L (ref 7–52)
ANION GAP SERPL CALCULATED.3IONS-SCNC: 7 MMOL/L (ref 4–13)
AST SERPL W P-5'-P-CCNC: 13 U/L (ref 13–39)
B-HCG SERPL-ACNC: <1 MIU/ML (ref 0–5)
BASOPHILS # BLD AUTO: 0.04 THOUSANDS/ÂΜL (ref 0–0.1)
BASOPHILS NFR BLD AUTO: 1 % (ref 0–1)
BILIRUB SERPL-MCNC: 0.42 MG/DL (ref 0.2–1)
BUN SERPL-MCNC: 10 MG/DL (ref 5–25)
CALCIUM SERPL-MCNC: 8.9 MG/DL (ref 8.4–10.2)
CHLORIDE SERPL-SCNC: 106 MMOL/L (ref 96–108)
CHOLEST SERPL-MCNC: 189 MG/DL
CO2 SERPL-SCNC: 24 MMOL/L (ref 21–32)
CREAT SERPL-MCNC: 0.91 MG/DL (ref 0.6–1.3)
EOSINOPHIL # BLD AUTO: 0.07 THOUSAND/ÂΜL (ref 0–0.61)
EOSINOPHIL NFR BLD AUTO: 1 % (ref 0–6)
ERYTHROCYTE [DISTWIDTH] IN BLOOD BY AUTOMATED COUNT: 14.8 % (ref 11.6–15.1)
GFR SERPL CREATININE-BSD FRML MDRD: 87 ML/MIN/1.73SQ M
GLUCOSE P FAST SERPL-MCNC: 93 MG/DL (ref 65–99)
HCT VFR BLD AUTO: 37.9 % (ref 34.8–46.1)
HDLC SERPL-MCNC: 50 MG/DL
HGB BLD-MCNC: 11.9 G/DL (ref 11.5–15.4)
IMM GRANULOCYTES # BLD AUTO: 0.02 THOUSAND/UL (ref 0–0.2)
IMM GRANULOCYTES NFR BLD AUTO: 0 % (ref 0–2)
LDLC SERPL CALC-MCNC: 126 MG/DL (ref 0–100)
LYMPHOCYTES # BLD AUTO: 2.11 THOUSANDS/ÂΜL (ref 0.6–4.47)
LYMPHOCYTES NFR BLD AUTO: 31 % (ref 14–44)
MCH RBC QN AUTO: 25.6 PG (ref 26.8–34.3)
MCHC RBC AUTO-ENTMCNC: 31.4 G/DL (ref 31.4–37.4)
MCV RBC AUTO: 82 FL (ref 82–98)
MONOCYTES # BLD AUTO: 0.38 THOUSAND/ÂΜL (ref 0.17–1.22)
MONOCYTES NFR BLD AUTO: 6 % (ref 4–12)
NEUTROPHILS # BLD AUTO: 4.27 THOUSANDS/ÂΜL (ref 1.85–7.62)
NEUTS SEG NFR BLD AUTO: 61 % (ref 43–75)
NONHDLC SERPL-MCNC: 139 MG/DL
NRBC BLD AUTO-RTO: 0 /100 WBCS
PLATELET # BLD AUTO: 288 THOUSANDS/UL (ref 149–390)
PMV BLD AUTO: 10.5 FL (ref 8.9–12.7)
POTASSIUM SERPL-SCNC: 4 MMOL/L (ref 3.5–5.3)
PROT SERPL-MCNC: 6.8 G/DL (ref 6.4–8.4)
RBC # BLD AUTO: 4.65 MILLION/UL (ref 3.81–5.12)
SODIUM SERPL-SCNC: 137 MMOL/L (ref 135–147)
T3FREE SERPL-MCNC: 3.82 PG/ML (ref 2.5–3.9)
T4 FREE SERPL-MCNC: 0.79 NG/DL (ref 0.61–1.12)
TRIGL SERPL-MCNC: 67 MG/DL
TSH SERPL DL<=0.05 MIU/L-ACNC: 2.92 UIU/ML (ref 0.45–4.5)
WBC # BLD AUTO: 6.89 THOUSAND/UL (ref 4.31–10.16)

## 2024-03-25 PROCEDURE — 84481 FREE ASSAY (FT-3): CPT

## 2024-03-25 PROCEDURE — 84439 ASSAY OF FREE THYROXINE: CPT

## 2024-03-25 PROCEDURE — 84443 ASSAY THYROID STIM HORMONE: CPT

## 2024-03-25 PROCEDURE — 80061 LIPID PANEL: CPT

## 2024-03-25 PROCEDURE — 84702 CHORIONIC GONADOTROPIN TEST: CPT

## 2024-03-25 PROCEDURE — 80053 COMPREHEN METABOLIC PANEL: CPT

## 2024-03-25 PROCEDURE — 85025 COMPLETE CBC W/AUTO DIFF WBC: CPT

## 2024-03-25 PROCEDURE — 36415 COLL VENOUS BLD VENIPUNCTURE: CPT

## 2024-04-10 ENCOUNTER — OFFICE VISIT (OUTPATIENT)
Dept: FAMILY MEDICINE CLINIC | Facility: CLINIC | Age: 24
End: 2024-04-10

## 2024-04-10 VITALS
SYSTOLIC BLOOD PRESSURE: 118 MMHG | RESPIRATION RATE: 16 BRPM | DIASTOLIC BLOOD PRESSURE: 72 MMHG | TEMPERATURE: 97.4 F | WEIGHT: 189.2 LBS | BODY MASS INDEX: 38.14 KG/M2 | HEART RATE: 88 BPM | HEIGHT: 59 IN | OXYGEN SATURATION: 98 %

## 2024-04-10 DIAGNOSIS — J01.00 ACUTE NON-RECURRENT MAXILLARY SINUSITIS: Primary | ICD-10-CM

## 2024-04-10 RX ORDER — FLUTICASONE PROPIONATE 50 MCG
1 SPRAY, SUSPENSION (ML) NASAL DAILY
Qty: 16 G | Refills: 0 | Status: SHIPPED | OUTPATIENT
Start: 2024-04-10

## 2024-04-10 RX ORDER — AZITHROMYCIN 250 MG/1
TABLET, FILM COATED ORAL DAILY
Qty: 6 TABLET | Refills: 0 | Status: SHIPPED | OUTPATIENT
Start: 2024-04-10 | End: 2024-04-15

## 2024-04-10 NOTE — PROGRESS NOTES
Name: Trini Olivarez      : 2000      MRN: [id number]  Encounter Provider: KEVIN Gann  Encounter Date: 4/10/2024   Encounter department: [facility]    Assessment & Plan     1. Acute non-recurrent maxillary sinusitis  Assessment & Plan:  - Prescription sent for Z-yovana and Flonase.   - Increase oral hydration and use humidifier.  - Contact office if symptoms do not improve.    Orders:  -     fluticasone (FLONASE) 50 mcg/act nasal spray; 1 spray into each nostril daily  -     azithromycin (Zithromax) 250 mg tablet; Take 2 tablets (500 mg total) by mouth daily for 1 day, THEN 1 tablet (250 mg total) daily for 4 days.         Subjective     Patient presents to office today with complains of cough, congestion, fever, nausea and vomiting. Nausea and vomiting have improved but other symptoms remain. She has been taking OTC Tylenol. She denies any other concerns or complaints today.        Review of Systems   Constitutional:  Positive for fever. Negative for fatigue.   HENT:  Positive for congestion. Negative for trouble swallowing.    Eyes:  Negative for visual disturbance.   Respiratory:  Positive for cough. Negative for shortness of breath.    Cardiovascular:  Negative for chest pain and palpitations.   Gastrointestinal:  Positive for nausea and vomiting. Negative for abdominal pain and blood in stool.   Endocrine: Negative for cold intolerance and heat intolerance.   Genitourinary:  Negative for difficulty urinating and dysuria.   Musculoskeletal:  Negative for gait problem.   Skin:  Negative for rash.   Neurological:  Negative for dizziness, syncope and headaches.   Hematological:  Negative for adenopathy.   Psychiatric/Behavioral:  Negative for behavioral problems.        Past Medical History:   Diagnosis Date   • ADHD    • Anxiety    • Asthma    • Autism    • Bipolar disorder (HCC)    • Depression    • GERD (gastroesophageal reflux disease)      Past Surgical History:    Procedure Laterality Date   • CHOLANGIOGRAM N/A 12/01/2018    Procedure: CHOLANGIOGRAM;  Surgeon: Angelo Alcantara MD;  Location: [facility];  Service: General   • CHOLECYSTECTOMY LAPAROSCOPIC N/A 12/01/2018    Procedure: CHOLECYSTECTOMY LAPAROSCOPIC;  Surgeon: Angelo Alcantara MD;  Location:  MAIN OR;  Service: General   • EYE MUSCLE SURGERY Bilateral 2006   • [place] ESOPHAGOGASTRODUODENOSCOPY TRANSORAL DIAGNOSTIC N/A 11/27/2018    Procedure: ESOPHAGOGASTRODUODENOSCOPY (EGD) with bx;  Surgeon: Rashmi Vazquez MD;  Location: [facility];  Service: General     Family History   Problem Relation Age of Onset   • Breast cancer Mother    • Heart attack Mother    • Depression Mother    • Mental illness Mother    • Coronary artery disease Mother    • Hypertension Mother    • Diabetes Mother    • Hyperlipidemia Mother    • Pneumonia Brother    • Hypertension Maternal Grandmother    • Stroke Maternal Grandmother    • Diabetes Maternal Grandmother    • Glaucoma Maternal Grandmother    • Kidney disease Maternal Grandmother    • Sarcoidosis Maternal Grandmother    • Diabetes Maternal Grandfather    • Hypertension Maternal Grandfather    • Stroke Maternal Grandfather    • Glaucoma Maternal Grandfather    • Heart attack Maternal Grandfather    • Colon cancer Neg Hx    • Ovarian cancer Neg Hx      Social History     Socioeconomic History   • Marital status: Single     Spouse name: None   • Number of children: None   • Years of education: None   • Highest education level: None   Occupational History   • None   Tobacco Use   • Smoking status: Never     Passive exposure: Never   • Smokeless tobacco: Never   • Tobacco comments:     no passive smoke exposure   Vaping Use   • Vaping status: Never Used   Substance and Sexual Activity   • Alcohol use: Never   • Drug use: Never   • Sexual activity: Yes     Birth control/protection: Implant   Other Topics Concern   • None   Social History Narrative   • None     Social Determinants of  Health     Financial Resource Strain: Low Risk  (3/20/2024)    Overall Financial Resource Strain (CARDIA)    • Difficulty of Paying Living Expenses: Not hard at all   Food Insecurity: No Food Insecurity (3/20/2024)    Hunger Vital Sign    • Worried About Running Out of Food in the Last Year: Never true    • Ran Out of Food in the Last Year: Never true   Transportation Needs: No Transportation Needs (3/20/2024)    PRAPARE - Transportation    • Lack of Transportation (Medical): No    • Lack of Transportation (Non-Medical): No   Physical Activity: Not on file   Stress: Not on file   Social Connections: Not on file   Intimate Partner Violence: Not on file   Housing Stability: Low Risk  (3/20/2024)    Housing Stability Vital Sign    • Unable to Pay for Housing in the Last Year: No    • Number of Places Lived in the Last Year: 1    • Unstable Housing in the Last Year: No     Current Outpatient Medications on File Prior to Visit   Medication Sig   • acetaminophen (TYLENOL) 500 mg tablet Take 1 tablet (500 mg total) by mouth every 6 (six) hours as needed for mild pain   • albuterol (PROVENTIL HFA,VENTOLIN HFA) 90 mcg/act inhaler Inhale 2 puffs every 4 (four) hours as needed for wheezing   • aluminum-magnesium hydroxide-simethicone (MAALOX) 200-200-20 MG/5ML SUSP Take 30 mL by mouth 4 (four) times a day as needed for heartburn or cramping   • benzonatate (TESSALON PERLES) 100 mg capsule Take 1 capsule (100 mg total) by mouth 3 (three) times a day as needed for cough   • Caplyta 21 MG CAPS Take 21 mg by mouth daily   • dicyclomine (BENTYL) 20 mg tablet Take 1 tablet (20 mg total) by mouth 4 (four) times a day as needed (abdominal cramping, diarrhea)   • famotidine (PEPCID) 20 mg tablet Take 1 tablet (20 mg total) by mouth 2 (two) times a day   • famotidine (PEPCID) 20 mg tablet Take 1 tablet (20 mg total) by mouth 2 (two) times a day   • famotidine (PEPCID) 20 mg tablet Take 1 tablet (20 mg total) by mouth 2 (two) times a day    • gabapentin (NEURONTIN) 600 MG tablet Take 600 mg by mouth every evening   • hydrOXYzine pamoate (VISTARIL) 50 mg capsule Take 50 mg by mouth 2 (two) times a day as needed for anxiety   • ibuprofen (MOTRIN) 600 mg tablet Take 1 tablet (600 mg total) by mouth every 6 (six) hours as needed for mild pain   • norgestimate-ethinyl estradiol (Sprintec 28) 0.25-35 MG-MCG per tablet Take 1 tablet by mouth daily   • ondansetron (ZOFRAN-ODT) 4 mg disintegrating tablet Take 1 tablet (4 mg total) by mouth every 6 (six) hours as needed for vomiting or nausea   • venlafaxine (EFFEXOR-XR) 37.5 mg 24 hr capsule    • [DISCONTINUED] fluticasone (FLONASE) 50 mcg/act nasal spray    • ARIPiprazole (ABILIFY) 15 mg tablet Take 0.5 tablets (7.5 mg total) by mouth daily   • sucralfate (CARAFATE) 1 g tablet Take 1 tablet (1 g total) by mouth 4 (four) times a day for 7 days   • [DISCONTINUED] traZODone (DESYREL) 50 mg tablet Take 50 mg by mouth daily at bedtime     No Known Allergies  Immunization History   Administered Date(s) Administered   • COVID-19 MODERNA VACC 0.5 ML IM 04/22/2021, 05/20/2021, 12/17/2021   • COVID-19 Moderna Vac BIVALENT 12 Yr+ IM 0.5 ML 09/30/2022   • DTaP 2000, 2000, 2000, 1998, 09/20/2002   • DTaP 5 1998, 1998, 1998, 07/14/1999, 09/20/2002   • HPV Bivalent 04/02/2009, 06/09/2009   • HPV Quadrivalent 04/02/2009, 06/09/2009, 02/04/2011, 05/20/2015   • Hep A, adult 06/05/2008, 02/04/2011   • Hep A, ped/adol, 2 dose 06/05/2008, 05/20/2015   • Hep B, Adolescent or Pediatric 1998, 1998, 03/26/2001   • Hep B, adult 1998, 1998, 03/26/2001   • Hepatitis A 05/20/2015   • HiB 1998, 03/26/2001   • Hib (PRP-OMP) 1998, 1998, 03/26/2001   • Hib (PRP-T) 1998   • INFLUENZA 10/16/2015, 08/16/2016, 09/30/2021, 09/30/2022   • IPV 2000, 1998, 04/14/1999, 09/20/2002   • Influenza Quadrivalent Preservative Free 3 years and older IM  "09/30/2021   • Influenza Split 10/13/1999, 01/05/2007   • Influenza, injectable, quadrivalent, preservative free 0.5 mL 09/11/2019   • Influenza, recombinant, quadrivalent,injectable, preservative free 09/24/2018   • Influenza, seasonal, injectable 10/13/1999, 01/05/2007, 02/04/2011, 10/05/2011   • MMR 04/14/1999, 09/20/2002   • Meningococcal Conjugate (MCV4O) 05/20/2015   • Meningococcal MCV4, Unspecified 02/04/2011, 05/20/2015   • Meningococcal MCV4P 02/04/2011, 05/20/2015   • Meningococcal, Unknown Serogroups 02/04/2011, 11/19/2014   • Pneumococcal Conjugate Vaccine 20-valent (Pcv20), Polysace 08/24/2022   • Tdap 02/04/2011, 05/20/2015   • Varicella 04/14/1999, 06/05/2008, 08/05/2008, 04/02/2009       Objective     /72 (BP Location: Left arm, Patient Position: Sitting, Cuff Size: Large)   Pulse 88   Temp (!) 97.4 °F (36.3 °C) (Tympanic)   Resp 16   Ht 4' 11\" (1.499 m)   Wt 85.8 kg (189 lb 3.2 oz)   LMP 03/13/2024 (Exact Date)   SpO2 98%   BMI 38.21 kg/m²     Physical Exam  Vitals and nursing note reviewed.   Constitutional:       General: She is not in acute distress.     Appearance: Normal appearance.   HENT:      Head: Normocephalic and atraumatic.      Right Ear: Tympanic membrane, ear canal and external ear normal.      Left Ear: Tympanic membrane, ear canal and external ear normal.      Nose: Congestion present.      Mouth/Throat:      Mouth: Mucous membranes are moist.   Eyes:      Conjunctiva/sclera: Conjunctivae normal.   Cardiovascular:      Rate and Rhythm: Normal rate and regular rhythm.      Heart sounds: Normal heart sounds.   Pulmonary:      Effort: Pulmonary effort is normal.      Breath sounds: Normal breath sounds.   Musculoskeletal:         General: Normal range of motion.      Cervical back: Normal range of motion.   Skin:     General: Skin is warm and dry.   Neurological:      Mental Status: She is alert and oriented to person, place, and time.   Psychiatric:         Mood and " Affect: Mood normal.         Behavior: Behavior normal.       KEVIN Gann

## 2024-04-10 NOTE — ASSESSMENT & PLAN NOTE
- Prescription sent for Z-yovana and Flonase.   - Increase oral hydration and use humidifier.  - Contact office if symptoms do not improve.

## 2024-04-11 PROBLEM — N92.6 IRREGULAR MENSES: Status: ACTIVE | Noted: 2024-04-11

## 2024-04-11 NOTE — PROGRESS NOTES
"OB/GYN VISIT  Trini Olivarez  2024  3:02 PM    Subjective:     Trini Olivarez is a 24 y.o.  female who presents for follow up regarding irregular bleeding and TVUS on 3/10 showing \"questionable 4 x 2 x 3mm polyp\".  Patient has had her nexplanon since 2022 and does not desire removal. Since her last visit a month ago, she has had one period which lasted 3 days. She says she changed her pad/tampon every hour.  For her pain, she has tried medications and heating pads which she says does not help.  Patient does not remember being prescribed Sprintec in 2023 but states she would not be good at remembering to take a pill. Patient states she knows her irregular bleeding and dysmenorrhea is due to the polyp and would like a D&C.      Past Medical History:   Diagnosis Date   • ADHD    • Anxiety    • Asthma    • Autism    • Bipolar disorder (HCC)    • Depression    • GERD (gastroesophageal reflux disease)      Past Surgical History:   Procedure Laterality Date   • CHOLANGIOGRAM N/A 2018    Procedure: CHOLANGIOGRAM;  Surgeon: Angelo Alcantara MD;  Location:  MAIN OR;  Service: General   • CHOLECYSTECTOMY LAPAROSCOPIC N/A 2018    Procedure: CHOLECYSTECTOMY LAPAROSCOPIC;  Surgeon: Angelo Alcantara MD;  Location:  MAIN OR;  Service: General   • EYE MUSCLE SURGERY Bilateral    • [place] ESOPHAGOGASTRODUODENOSCOPY TRANSORAL DIAGNOSTIC N/A 2018    Procedure: ESOPHAGOGASTRODUODENOSCOPY (EGD) with bx;  Surgeon: Rashmi Vazquez MD;  Location: [facility];  Service: General       Objective:    Vitals: Blood pressure 124/80, last menstrual period 2024, not currently breastfeeding.There is no height or weight on file to calculate BMI.    General: Well appearing, no distress  Respiratory: Unlabored breathing  Cardiovascular: Regular rate.  Abdomen: Soft, nontender  Extremities: Warm and well perfused.  Non tender.    Assessment/Plan:  1. Irregular menses  Assessment & Plan:  - Nexplanon since " 9/2022 - counseled patient that the most common side effect is unpredictable bleeding  - TVUS showing questionable 4 x 2 x 3mm polyp - patient requests D&C. Discussed with patient that it would be unusual for a polyp of this size to cause dysmenorrhea or menorrhagia. Recommend repeat ultrasound for clarification of if there is a polyp or not  - Encouraged ibuprofen, heating pad for pain. Also encouraged patient to trial Sprintec which was prescribed to her in 9/2023 to help with pain and regulate her period  - Follow -up in clinic after ultrasound results to consent for possible hysteroscopy D&C vs other management options if no polyp seen on repeat ultrasound    Orders:  -     US abdomen and pelvis with transvaginal; Future; Expected date: 04/17/2024      D/w Dr. Abhijit Scanlon MD  4/17/2024  3:02 PM

## 2024-04-17 ENCOUNTER — OFFICE VISIT (OUTPATIENT)
Dept: OBGYN CLINIC | Facility: CLINIC | Age: 24
End: 2024-04-17

## 2024-04-17 VITALS — SYSTOLIC BLOOD PRESSURE: 124 MMHG | DIASTOLIC BLOOD PRESSURE: 80 MMHG

## 2024-04-17 DIAGNOSIS — N92.6 IRREGULAR MENSES: Primary | ICD-10-CM

## 2024-04-17 PROCEDURE — 99213 OFFICE O/P EST LOW 20 MIN: CPT | Performed by: OBSTETRICS & GYNECOLOGY

## 2024-04-17 NOTE — ASSESSMENT & PLAN NOTE
- Nexplanon since 9/2022 - counseled patient that the most common side effect is unpredictable bleeding  - TVUS showing questionable 4 x 2 x 3mm polyp - patient requests D&C. Discussed with patient that it would be unusual for a polyp of this size to cause dysmenorrhea or menorrhagia. Recommend repeat ultrasound for clarification of if there is a polyp or not  - Encouraged ibuprofen, heating pad for pain. Also encouraged patient to trial Sprintec which was prescribed to her in 9/2023 to help with pain and regulate her period  - Follow -up in clinic after ultrasound results to consent for possible hysteroscopy D&C vs other management options if no polyp seen on repeat ultrasound

## 2024-05-01 ENCOUNTER — TELEPHONE (OUTPATIENT)
Age: 24
End: 2024-05-01

## 2024-05-01 NOTE — TELEPHONE ENCOUNTER
Can we send labs from march to [facility]? Fax [phone]. Look out for consent being faxed to us to back fax

## 2024-05-10 PROBLEM — J01.00 ACUTE NON-RECURRENT MAXILLARY SINUSITIS: Status: RESOLVED | Noted: 2024-04-10 | Resolved: 2024-05-10

## 2024-05-15 DIAGNOSIS — G47.9 SLEEP DISORDER, UNSPECIFIED: Primary | ICD-10-CM

## 2024-05-15 DIAGNOSIS — G47.01 SLEEP DISORDER DUE TO A GENERAL MEDICAL CONDITION, INSOMNIA TYPE: ICD-10-CM

## 2024-05-27 PROBLEM — E78.00 PURE HYPERCHOLESTEROLEMIA: Status: ACTIVE | Noted: 2017-03-07

## 2024-05-27 PROBLEM — E66.812 CLASS II OBESITY: Status: ACTIVE | Noted: 2021-03-31

## 2024-05-27 PROBLEM — E66.9 CLASS II OBESITY: Status: ACTIVE | Noted: 2021-03-31

## 2024-06-02 DIAGNOSIS — J01.00 ACUTE NON-RECURRENT MAXILLARY SINUSITIS: ICD-10-CM

## 2024-06-03 ENCOUNTER — TELEPHONE (OUTPATIENT)
Age: 24
End: 2024-06-03

## 2024-06-03 ENCOUNTER — TELEPHONE (OUTPATIENT)
Dept: CARDIOLOGY CLINIC | Facility: CLINIC | Age: 24
End: 2024-06-03

## 2024-06-03 NOTE — TELEPHONE ENCOUNTER
Received call from Laurita with [employer] asking to verify that patient is current with the practice and if there were any concerns (specifically related to hygiene) noted at patient's last visit.  Confirmed that patient is seen byDr. Price, last OV 4/10/24 and no concerns documented in note.

## 2024-06-03 NOTE — TELEPHONE ENCOUNTER
Due to schedule change called pt lvm to r/s her 6/5 appt with Dr Vila,appt can be with any provider

## 2024-06-04 RX ORDER — FLUTICASONE PROPIONATE 50 MCG
SPRAY, SUSPENSION (ML) NASAL
Qty: 24 ML | Refills: 1 | Status: SHIPPED | OUTPATIENT
Start: 2024-06-04

## 2024-06-07 ENCOUNTER — OFFICE VISIT (OUTPATIENT)
Dept: OBGYN CLINIC | Facility: CLINIC | Age: 24
End: 2024-06-07

## 2024-06-07 VITALS
BODY MASS INDEX: 38.18 KG/M2 | DIASTOLIC BLOOD PRESSURE: 80 MMHG | WEIGHT: 189.4 LBS | HEART RATE: 76 BPM | SYSTOLIC BLOOD PRESSURE: 114 MMHG | HEIGHT: 59 IN

## 2024-06-07 DIAGNOSIS — N92.6 IRREGULAR MENSES: Primary | ICD-10-CM

## 2024-06-07 PROCEDURE — 99212 OFFICE O/P EST SF 10 MIN: CPT | Performed by: NURSE PRACTITIONER

## 2024-06-07 NOTE — PROGRESS NOTES
Trini Olivarez presents today c/o vaginal bleeding x3 days.  She reports that she has been using OCP's since here on 4/17/24 (as extended-cycle without placebo week) as directed by Dr. Scanlon.  She reports no vaginal bleeding until 6/3/24 when she started to have heavier bleeding like a period so she stopped taking the OCP's last night and came in today to be seen.  She has not scheduled the pelvic ultrasound which Dr. Scanlon recommended and ordered.    I advised her to resume the OCP's as it sounds like in general they have been working for her and explained that she may still have some breakthrough bleeding at times.  Advised her to schedule the pelvic ultrasound ASAP and return to discuss results and management plan with OBGYN physician 1 week after ultrasound is performed.    Patient verbalizes understanding and agreement.

## 2024-06-12 ENCOUNTER — HOSPITAL ENCOUNTER (OUTPATIENT)
Dept: ULTRASOUND IMAGING | Facility: HOSPITAL | Age: 24
Discharge: HOME | End: 2024-06-12
Payer: COMMERCIAL

## 2024-06-12 DIAGNOSIS — N92.6 IRREGULAR MENSES: ICD-10-CM

## 2024-06-12 PROCEDURE — 76830 TRANSVAGINAL US NON-OB: CPT

## 2024-06-12 PROCEDURE — 76856 US EXAM PELVIC COMPLETE: CPT

## 2024-06-18 ENCOUNTER — TELEPHONE (OUTPATIENT)
Dept: OBGYN CLINIC | Facility: CLINIC | Age: 24
End: 2024-06-18

## 2024-06-18 NOTE — TELEPHONE ENCOUNTER
----- Message from Heidi Scanlon MD sent at 6/18/2024  2:15 PM EDT -----  Please call this patient and let her know her ultrasound was normal and she does not have a polyp. Thank you

## 2024-07-20 ENCOUNTER — OFFICE VISIT (OUTPATIENT)
Dept: URGENT CARE | Age: 24
End: 2024-07-20
Payer: COMMERCIAL

## 2024-07-20 VITALS
SYSTOLIC BLOOD PRESSURE: 115 MMHG | WEIGHT: 186 LBS | RESPIRATION RATE: 18 BRPM | HEART RATE: 67 BPM | TEMPERATURE: 98.3 F | BODY MASS INDEX: 37.57 KG/M2 | DIASTOLIC BLOOD PRESSURE: 58 MMHG | OXYGEN SATURATION: 97 %

## 2024-07-20 DIAGNOSIS — R05.1 ACUTE COUGH: ICD-10-CM

## 2024-07-20 DIAGNOSIS — B34.9 ACUTE VIRAL SYNDROME: Primary | ICD-10-CM

## 2024-07-20 LAB
SARS-COV-2 AG UPPER RESP QL IA: NEGATIVE
VALID CONTROL: NORMAL

## 2024-07-20 PROCEDURE — 99213 OFFICE O/P EST LOW 20 MIN: CPT | Performed by: EMERGENCY MEDICINE

## 2024-07-20 PROCEDURE — 87811 SARS-COV-2 COVID19 W/OPTIC: CPT

## 2024-07-20 NOTE — PATIENT INSTRUCTIONS
Vitamin D3 2000 IU daily  Vitamin C 1000mg twice per day  Multivitamin daily  Fluids and rest  Over the counter cold medication as needed (EX: Coricidin HBP, tylenol/motrin)  Follow up with PCP in 3-5 days.  Proceed to ER if symptoms worsen.

## 2024-07-20 NOTE — PROGRESS NOTES
[facility]        NAME: Trini Olivarez is a 24 y.o. female  : 2000    MRN: [id number]  DATE: 2024  TIME: 5:10 PM    Assessment and Plan   Acute viral syndrome [B34.9]  1. Acute viral syndrome        2. Acute cough  Poct Covid 19 Rapid Antigen Test          POCT rapid covid: Negative    Patient Instructions     Vitamin D3 2000 IU daily  Vitamin C 1000mg twice per day  Multivitamin daily  Fluids and rest  Over the counter cold medication as needed (EX: Coricidin HBP, tylenol/motrin)  Follow up with PCP in 3-5 days.  Proceed to ER if symptoms worsen.    If tests are performed, our office will contact you with results only if changes need to made to the care plan discussed with you at the visit. [facility]    Chief Complaint     Chief Complaint   Patient presents with    sinus pain pain/earache     Patient reports sinus pain, left ear pain, diarrhea, and vomiting x 3 days         History of Present Illness       Patient is a 25 yo female with significant PMH of asthma presenting in the clinic today for cold sx x 3 days. Admits fever (Tmax 100), fatigue, cough, congestion, intermittent headache, left ear pain, sinus pain/pressure, nausea, vomiting (1 episodes of NBNB emesis), and diarrhea. Denies chills, body aches, rhinorrhea, dizziness, sore throat, chest pain, SOB, and abdominal pain. Admits the use of tylenol and Mucinex for sx management. Positive recent sick contacts as patient's mother is experiencing similar sx.        Review of Systems   Review of Systems   Constitutional:  Positive for fatigue and fever. Negative for chills.   HENT:  Positive for ear pain, sinus pressure and sinus pain. Negative for congestion, postnasal drip, rhinorrhea and sore throat.    Respiratory:  Positive for cough. Negative for shortness of breath.    Cardiovascular:  Negative for chest pain.   Gastrointestinal:  Positive for diarrhea, nausea and vomiting.  Negative for abdominal pain.   Musculoskeletal:  Negative for myalgias.   Skin:  Negative for rash.   Neurological:  Positive for headaches. Negative for dizziness.         Current Medications       Current Outpatient Medications:     acetaminophen (TYLENOL) 500 mg tablet, Take 1 tablet (500 mg total) by mouth every 6 (six) hours as needed for mild pain, Disp: 30 tablet, Rfl: 0    albuterol (PROVENTIL HFA,VENTOLIN HFA) 90 mcg/act inhaler, Inhale 2 puffs every 4 (four) hours as needed for wheezing, Disp: 18 g, Rfl: 0    Caplyta 21 MG CAPS, Take 21 mg by mouth daily, Disp: , Rfl:     dicyclomine (BENTYL) 20 mg tablet, Take 1 tablet (20 mg total) by mouth 4 (four) times a day as needed (abdominal cramping, diarrhea), Disp: 12 tablet, Rfl: 0    famotidine (PEPCID) 20 mg tablet, Take 1 tablet (20 mg total) by mouth 2 (two) times a day, Disp: 28 tablet, Rfl: 1    fluticasone (FLONASE) 50 mcg/act nasal spray, SPRAY 1 SPRAY INTO EACH NOSTRIL EVERY DAY, Disp: 24 mL, Rfl: 1    gabapentin (NEURONTIN) 600 MG tablet, Take 600 mg by mouth every evening, Disp: , Rfl:     hydrOXYzine pamoate (VISTARIL) 50 mg capsule, Take 50 mg by mouth 2 (two) times a day as needed for anxiety, Disp: , Rfl:     ibuprofen (MOTRIN) 600 mg tablet, Take 1 tablet (600 mg total) by mouth every 6 (six) hours as needed for mild pain, Disp: 30 tablet, Rfl: 0    ondansetron (ZOFRAN-ODT) 4 mg disintegrating tablet, Take 1 tablet (4 mg total) by mouth every 6 (six) hours as needed for vomiting or nausea, Disp: 20 tablet, Rfl: 0    venlafaxine (EFFEXOR-XR) 37.5 mg 24 hr capsule, , Disp: , Rfl:     aluminum-magnesium hydroxide-simethicone (MAALOX) 200-200-20 MG/5ML SUSP, Take 30 mL by mouth 4 (four) times a day as needed for heartburn or cramping (Patient not taking: Reported on 7/20/2024), Disp: 355 mL, Rfl: 0    ARIPiprazole (ABILIFY) 15 mg tablet, Take 0.5 tablets (7.5 mg total) by mouth daily, Disp: 15 tablet, Rfl: 0    benzonatate (TESSALON PERLES) 100 mg  capsule, Take 1 capsule (100 mg total) by mouth 3 (three) times a day as needed for cough (Patient not taking: Reported on 7/20/2024), Disp: 20 capsule, Rfl: 0    famotidine (PEPCID) 20 mg tablet, Take 1 tablet (20 mg total) by mouth 2 (two) times a day (Patient not taking: Reported on 6/7/2024), Disp: 28 tablet, Rfl: 0    famotidine (PEPCID) 20 mg tablet, Take 1 tablet (20 mg total) by mouth 2 (two) times a day (Patient not taking: Reported on 6/7/2024), Disp: 30 tablet, Rfl: 0    norgestimate-ethinyl estradiol (Sprintec 28) 0.25-35 MG-MCG per tablet, Take 1 tablet by mouth daily (Patient not taking: Reported on 6/7/2024), Disp: 30 tablet, Rfl: 0    sucralfate (CARAFATE) 1 g tablet, Take 1 tablet (1 g total) by mouth 4 (four) times a day for 7 days, Disp: 28 tablet, Rfl: 0    Current Allergies     Allergies as of 07/20/2024    (No Known Allergies)            The following portions of the patient's history were reviewed and updated as appropriate: allergies, current medications, past family history, past medical history, past social history, past surgical history and problem list.     Past Medical History:   Diagnosis Date    ADHD     Anxiety     Asthma     Autism     Bipolar disorder (HCC)     Depression     GERD (gastroesophageal reflux disease)        Past Surgical History:   Procedure Laterality Date    CHOLANGIOGRAM N/A 12/01/2018    Procedure: CHOLANGIOGRAM;  Surgeon: Angelo Alcantara MD;  Location:  MAIN OR;  Service: General    CHOLECYSTECTOMY LAPAROSCOPIC N/A 12/01/2018    Procedure: CHOLECYSTECTOMY LAPAROSCOPIC;  Surgeon: Angelo Alcantara MD;  Location:  MAIN OR;  Service: General    EYE MUSCLE SURGERY Bilateral 2006    [place] ESOPHAGOGASTRODUODENOSCOPY TRANSORAL DIAGNOSTIC N/A 11/27/2018    Procedure: ESOPHAGOGASTRODUODENOSCOPY (EGD) with bx;  Surgeon: Rashmi Vazquez MD;  Location: [facility];  Service: General       Family History   Problem Relation Age of Onset    Breast cancer Mother     Heart  attack Mother     Depression Mother     Mental illness Mother     Coronary artery disease Mother     Hypertension Mother     Diabetes Mother     Hyperlipidemia Mother     Pneumonia Brother     Hypertension Maternal Grandmother     Stroke Maternal Grandmother     Diabetes Maternal Grandmother     Glaucoma Maternal Grandmother     Kidney disease Maternal Grandmother     Sarcoidosis Maternal Grandmother     Diabetes Maternal Grandfather     Hypertension Maternal Grandfather     Stroke Maternal Grandfather     Glaucoma Maternal Grandfather     Heart attack Maternal Grandfather     Colon cancer Neg Hx     Ovarian cancer Neg Hx          Medications have been verified.        Objective   /58   Pulse 67   Temp 98.3 °F (36.8 °C)   Resp 18   Wt 84.4 kg (186 lb)   SpO2 97%   BMI 37.57 kg/m²        Physical Exam     Physical Exam  Vitals reviewed.   Constitutional:       General: She is not in acute distress.     Appearance: Normal appearance. She is overweight. She is not ill-appearing.   HENT:      Head: Normocephalic.      Right Ear: Hearing, tympanic membrane, ear canal and external ear normal. No middle ear effusion. There is no impacted cerumen. Tympanic membrane is not erythematous or bulging.      Left Ear: Hearing, tympanic membrane, ear canal and external ear normal.  No middle ear effusion. There is no impacted cerumen. Tympanic membrane is not erythematous or bulging.      Nose: Nose normal. No congestion or rhinorrhea.      Right Sinus: No maxillary sinus tenderness or frontal sinus tenderness.      Left Sinus: No maxillary sinus tenderness or frontal sinus tenderness.      Mouth/Throat:      Lips: Pink.      Mouth: Mucous membranes are moist.      Pharynx: Oropharynx is clear. Uvula midline. No pharyngeal swelling, oropharyngeal exudate, posterior oropharyngeal erythema or uvula swelling.   Eyes:      General:         Right eye: No discharge.         Left eye: No discharge.      Conjunctiva/sclera:  Conjunctivae normal.   Cardiovascular:      Rate and Rhythm: Normal rate and regular rhythm.      Pulses: Normal pulses.      Heart sounds: Normal heart sounds. No murmur heard.     No friction rub. No gallop.   Pulmonary:      Effort: Pulmonary effort is normal.      Breath sounds: Normal breath sounds. No wheezing, rhonchi or rales.   Musculoskeletal:      Cervical back: Normal range of motion and neck supple. No tenderness.   Lymphadenopathy:      Cervical: No cervical adenopathy.   Skin:     General: Skin is warm.      Findings: No rash.   Neurological:      Mental Status: She is alert.   Psychiatric:         Mood and Affect: Mood normal.         Behavior: Behavior normal.

## 2024-08-01 DIAGNOSIS — J01.00 ACUTE NON-RECURRENT MAXILLARY SINUSITIS: ICD-10-CM

## 2024-08-01 RX ORDER — FLUTICASONE PROPIONATE 50 MCG
SPRAY, SUSPENSION (ML) NASAL
Qty: 48 ML | Refills: 1 | Status: SHIPPED | OUTPATIENT
Start: 2024-08-01

## 2024-08-08 ENCOUNTER — TELEPHONE (OUTPATIENT)
Dept: OBGYN CLINIC | Facility: CLINIC | Age: 24
End: 2024-08-08

## 2024-08-15 ENCOUNTER — HOSPITAL ENCOUNTER (EMERGENCY)
Facility: HOSPITAL | Age: 24
Discharge: HOME | End: 2024-08-15
Attending: EMERGENCY MEDICINE
Payer: COMMERCIAL

## 2024-08-15 ENCOUNTER — APPOINTMENT (EMERGENCY)
Dept: RADIOLOGY | Facility: HOSPITAL | Age: 24
End: 2024-08-15
Payer: COMMERCIAL

## 2024-08-15 VITALS
DIASTOLIC BLOOD PRESSURE: 53 MMHG | TEMPERATURE: 98.6 F | HEART RATE: 83 BPM | SYSTOLIC BLOOD PRESSURE: 114 MMHG | OXYGEN SATURATION: 96 % | RESPIRATION RATE: 16 BRPM | BODY MASS INDEX: 38.03 KG/M2 | WEIGHT: 188.27 LBS

## 2024-08-15 VITALS
OXYGEN SATURATION: 93 % | TEMPERATURE: 98.5 F | DIASTOLIC BLOOD PRESSURE: 58 MMHG | BODY MASS INDEX: 39.58 KG/M2 | SYSTOLIC BLOOD PRESSURE: 111 MMHG | RESPIRATION RATE: 16 BRPM | WEIGHT: 195.99 LBS | HEART RATE: 70 BPM

## 2024-08-15 DIAGNOSIS — J45.909 ASTHMA: ICD-10-CM

## 2024-08-15 DIAGNOSIS — J18.9 PNEUMONIA: ICD-10-CM

## 2024-08-15 DIAGNOSIS — R55 SYNCOPE: Primary | ICD-10-CM

## 2024-08-15 DIAGNOSIS — J06.9 URI (UPPER RESPIRATORY INFECTION): Primary | ICD-10-CM

## 2024-08-15 LAB
ALBUMIN SERPL BCG-MCNC: 4 G/DL (ref 3.5–5)
ALP SERPL-CCNC: 87 U/L (ref 34–104)
ALT SERPL W P-5'-P-CCNC: 12 U/L (ref 7–52)
ANION GAP SERPL CALCULATED.3IONS-SCNC: 8 MMOL/L (ref 4–13)
AST SERPL W P-5'-P-CCNC: 11 U/L (ref 13–39)
ATRIAL RATE: 72 BPM
BASOPHILS # BLD AUTO: 0.04 THOUSANDS/ÂΜL (ref 0–0.1)
BASOPHILS NFR BLD AUTO: 0 % (ref 0–1)
BILIRUB SERPL-MCNC: 0.28 MG/DL (ref 0.2–1)
BUN SERPL-MCNC: 11 MG/DL (ref 5–25)
CALCIUM SERPL-MCNC: 9 MG/DL (ref 8.4–10.2)
CARDIAC TROPONIN I PNL SERPL HS: <2 NG/L
CHLORIDE SERPL-SCNC: 104 MMOL/L (ref 96–108)
CO2 SERPL-SCNC: 25 MMOL/L (ref 21–32)
CREAT SERPL-MCNC: 0.98 MG/DL (ref 0.6–1.3)
EOSINOPHIL # BLD AUTO: 0.13 THOUSAND/ÂΜL (ref 0–0.61)
EOSINOPHIL NFR BLD AUTO: 1 % (ref 0–6)
ERYTHROCYTE [DISTWIDTH] IN BLOOD BY AUTOMATED COUNT: 14 % (ref 11.6–15.1)
GFR SERPL CREATININE-BSD FRML MDRD: 79 ML/MIN/1.73SQ M
GLUCOSE SERPL-MCNC: 87 MG/DL (ref 65–140)
HCT VFR BLD AUTO: 38 % (ref 34.8–46.1)
HGB BLD-MCNC: 12.1 G/DL (ref 11.5–15.4)
IMM GRANULOCYTES # BLD AUTO: 0.03 THOUSAND/UL (ref 0–0.2)
IMM GRANULOCYTES NFR BLD AUTO: 0 % (ref 0–2)
LYMPHOCYTES # BLD AUTO: 2.14 THOUSANDS/ÂΜL (ref 0.6–4.47)
LYMPHOCYTES NFR BLD AUTO: 20 % (ref 14–44)
MAGNESIUM SERPL-MCNC: 2 MG/DL (ref 1.9–2.7)
MCH RBC QN AUTO: 26.7 PG (ref 26.8–34.3)
MCHC RBC AUTO-ENTMCNC: 31.8 G/DL (ref 31.4–37.4)
MCV RBC AUTO: 84 FL (ref 82–98)
MONOCYTES # BLD AUTO: 0.67 THOUSAND/ÂΜL (ref 0.17–1.22)
MONOCYTES NFR BLD AUTO: 6 % (ref 4–12)
NEUTROPHILS # BLD AUTO: 7.88 THOUSANDS/ÂΜL (ref 1.85–7.62)
NEUTS SEG NFR BLD AUTO: 73 % (ref 43–75)
NRBC BLD AUTO-RTO: 0 /100 WBCS
P AXIS: 32 DEGREES
PLATELET # BLD AUTO: 307 THOUSANDS/UL (ref 149–390)
PMV BLD AUTO: 9.9 FL (ref 8.9–12.7)
POTASSIUM SERPL-SCNC: 4 MMOL/L (ref 3.5–5.3)
PR INTERVAL: 116 MS
PROT SERPL-MCNC: 6.9 G/DL (ref 6.4–8.4)
QRS AXIS: 37 DEGREES
QRSD INTERVAL: 96 MS
QT INTERVAL: 388 MS
QTC INTERVAL: 424 MS
RBC # BLD AUTO: 4.54 MILLION/UL (ref 3.81–5.12)
SODIUM SERPL-SCNC: 137 MMOL/L (ref 135–147)
T WAVE AXIS: 33 DEGREES
VENTRICULAR RATE: 72 BPM
WBC # BLD AUTO: 10.89 THOUSAND/UL (ref 4.31–10.16)

## 2024-08-15 PROCEDURE — 93010 ELECTROCARDIOGRAM REPORT: CPT | Performed by: INTERNAL MEDICINE

## 2024-08-15 PROCEDURE — 83735 ASSAY OF MAGNESIUM: CPT | Performed by: EMERGENCY MEDICINE

## 2024-08-15 PROCEDURE — 99284 EMERGENCY DEPT VISIT MOD MDM: CPT

## 2024-08-15 PROCEDURE — 71045 X-RAY EXAM CHEST 1 VIEW: CPT

## 2024-08-15 PROCEDURE — 85025 COMPLETE CBC W/AUTO DIFF WBC: CPT | Performed by: EMERGENCY MEDICINE

## 2024-08-15 PROCEDURE — 80053 COMPREHEN METABOLIC PANEL: CPT | Performed by: EMERGENCY MEDICINE

## 2024-08-15 PROCEDURE — 94640 AIRWAY INHALATION TREATMENT: CPT

## 2024-08-15 PROCEDURE — 93005 ELECTROCARDIOGRAM TRACING: CPT

## 2024-08-15 PROCEDURE — 84484 ASSAY OF TROPONIN QUANT: CPT | Performed by: EMERGENCY MEDICINE

## 2024-08-15 PROCEDURE — 99285 EMERGENCY DEPT VISIT HI MDM: CPT | Performed by: EMERGENCY MEDICINE

## 2024-08-15 PROCEDURE — 36415 COLL VENOUS BLD VENIPUNCTURE: CPT | Performed by: EMERGENCY MEDICINE

## 2024-08-15 PROCEDURE — 99285 EMERGENCY DEPT VISIT HI MDM: CPT

## 2024-08-15 RX ORDER — MECLIZINE HYDROCHLORIDE 25 MG/1
25 TABLET ORAL 3 TIMES DAILY PRN
Qty: 30 TABLET | Refills: 0 | Status: SHIPPED | OUTPATIENT
Start: 2024-08-15

## 2024-08-15 RX ORDER — AZITHROMYCIN 250 MG/1
500 TABLET, FILM COATED ORAL ONCE
Status: COMPLETED | OUTPATIENT
Start: 2024-08-15 | End: 2024-08-15

## 2024-08-15 RX ORDER — ACETAMINOPHEN 500 MG
500 TABLET ORAL EVERY 6 HOURS PRN
Qty: 30 TABLET | Refills: 0 | Status: SHIPPED | OUTPATIENT
Start: 2024-08-15

## 2024-08-15 RX ORDER — IBUPROFEN 600 MG/1
600 TABLET, FILM COATED ORAL ONCE
Status: COMPLETED | OUTPATIENT
Start: 2024-08-15 | End: 2024-08-15

## 2024-08-15 RX ORDER — AZITHROMYCIN 250 MG/1
250 TABLET, FILM COATED ORAL EVERY 24 HOURS
Qty: 4 TABLET | Refills: 0 | Status: SHIPPED | OUTPATIENT
Start: 2024-08-15 | End: 2024-08-19

## 2024-08-15 RX ORDER — ALBUTEROL SULFATE 90 UG/1
2 AEROSOL, METERED RESPIRATORY (INHALATION) ONCE
Status: COMPLETED | OUTPATIENT
Start: 2024-08-15 | End: 2024-08-15

## 2024-08-15 RX ORDER — ACETAMINOPHEN 325 MG/1
975 TABLET ORAL ONCE
Status: COMPLETED | OUTPATIENT
Start: 2024-08-15 | End: 2024-08-15

## 2024-08-15 RX ORDER — IPRATROPIUM BROMIDE AND ALBUTEROL SULFATE 2.5; .5 MG/3ML; MG/3ML
3 SOLUTION RESPIRATORY (INHALATION) 4 TIMES DAILY
Qty: 3 ML | Refills: 0 | Status: SHIPPED | OUTPATIENT
Start: 2024-08-15

## 2024-08-15 RX ORDER — IPRATROPIUM BROMIDE AND ALBUTEROL SULFATE 2.5; .5 MG/3ML; MG/3ML
3 SOLUTION RESPIRATORY (INHALATION) ONCE
Status: COMPLETED | OUTPATIENT
Start: 2024-08-15 | End: 2024-08-15

## 2024-08-15 RX ORDER — MECLIZINE HYDROCHLORIDE 25 MG/1
25 TABLET ORAL ONCE
Status: COMPLETED | OUTPATIENT
Start: 2024-08-15 | End: 2024-08-15

## 2024-08-15 RX ADMIN — IBUPROFEN 600 MG: 600 TABLET, FILM COATED ORAL at 20:50

## 2024-08-15 RX ADMIN — MECLIZINE HYDROCHLORIDE 25 MG: 25 TABLET ORAL at 20:50

## 2024-08-15 RX ADMIN — IPRATROPIUM BROMIDE AND ALBUTEROL SULFATE 3 ML: .5; 3 SOLUTION RESPIRATORY (INHALATION) at 20:51

## 2024-08-15 RX ADMIN — SODIUM CHLORIDE 1000 ML: 0.9 INJECTION, SOLUTION INTRAVENOUS at 15:40

## 2024-08-15 RX ADMIN — ALBUTEROL SULFATE 2 PUFF: 90 AEROSOL, METERED RESPIRATORY (INHALATION) at 21:47

## 2024-08-15 RX ADMIN — AZITHROMYCIN DIHYDRATE 500 MG: 250 TABLET ORAL at 16:02

## 2024-08-15 RX ADMIN — ACETAMINOPHEN 975 MG: 325 TABLET, FILM COATED ORAL at 20:50

## 2024-08-15 NOTE — ED PROVIDER NOTES
"History  Chief Complaint   Patient presents with    Dizziness     Pt brought in by EMS, per pt she was feeling dizzy and \"passed out..well, I was laying on my bed and blanked out.\"  Pt reports 1 day of chest pain with coughing.     24-year-old female presenting emerged department with dizziness and possible syncopal episode.  Patient notes that she was laying on her bed and then woke up thinking that maybe she passed out.  1 day of chest pain with coughing.  No fever or chills.  No abdominal pain.  No nausea vomiting diarrhea.  Shortness of breath with exertion.        Prior to Admission Medications   Prescriptions Last Dose Informant Patient Reported? Taking?   ARIPiprazole (ABILIFY) 15 mg tablet   No No   Sig: Take 0.5 tablets (7.5 mg total) by mouth daily   Caplyta 21 MG CAPS   Yes No   Sig: Take 21 mg by mouth daily   acetaminophen (TYLENOL) 500 mg tablet   No No   Sig: Take 1 tablet (500 mg total) by mouth every 6 (six) hours as needed for mild pain   albuterol (PROVENTIL HFA,VENTOLIN HFA) 90 mcg/act inhaler   No No   Sig: Inhale 2 puffs every 4 (four) hours as needed for wheezing   aluminum-magnesium hydroxide-simethicone (MAALOX) 200-200-20 MG/5ML SUSP   No No   Sig: Take 30 mL by mouth 4 (four) times a day as needed for heartburn or cramping   Patient not taking: Reported on 7/20/2024   benzonatate (TESSALON PERLES) 100 mg capsule   No No   Sig: Take 1 capsule (100 mg total) by mouth 3 (three) times a day as needed for cough   Patient not taking: Reported on 7/20/2024   dicyclomine (BENTYL) 20 mg tablet   No No   Sig: Take 1 tablet (20 mg total) by mouth 4 (four) times a day as needed (abdominal cramping, diarrhea)   famotidine (PEPCID) 20 mg tablet   No No   Sig: Take 1 tablet (20 mg total) by mouth 2 (two) times a day   famotidine (PEPCID) 20 mg tablet   No No   Sig: Take 1 tablet (20 mg total) by mouth 2 (two) times a day   Patient not taking: Reported on 6/7/2024   famotidine (PEPCID) 20 mg tablet   No " No   Sig: Take 1 tablet (20 mg total) by mouth 2 (two) times a day   Patient not taking: Reported on 6/7/2024   fluticasone (FLONASE) 50 mcg/act nasal spray   No No   Sig: SPRAY 1 SPRAY INTO EACH NOSTRIL EVERY DAY   gabapentin (NEURONTIN) 600 MG tablet   Yes No   Sig: Take 600 mg by mouth every evening   hydrOXYzine pamoate (VISTARIL) 50 mg capsule   Yes No   Sig: Take 50 mg by mouth 2 (two) times a day as needed for anxiety   ibuprofen (MOTRIN) 600 mg tablet   No No   Sig: Take 1 tablet (600 mg total) by mouth every 6 (six) hours as needed for mild pain   norgestimate-ethinyl estradiol (Sprintec 28) 0.25-35 MG-MCG per tablet   No No   Sig: Take 1 tablet by mouth daily   Patient not taking: Reported on 6/7/2024   ondansetron (ZOFRAN-ODT) 4 mg disintegrating tablet   No No   Sig: Take 1 tablet (4 mg total) by mouth every 6 (six) hours as needed for vomiting or nausea   sucralfate (CARAFATE) 1 g tablet   No No   Sig: Take 1 tablet (1 g total) by mouth 4 (four) times a day for 7 days   venlafaxine (EFFEXOR-XR) 37.5 mg 24 hr capsule   Yes No      Facility-Administered Medications: None       Past Medical History:   Diagnosis Date    ADHD     Anxiety     Asthma     Autism     Bipolar disorder (HCC)     Depression     GERD (gastroesophageal reflux disease)        Past Surgical History:   Procedure Laterality Date    CHOLANGIOGRAM N/A 12/01/2018    Procedure: CHOLANGIOGRAM;  Surgeon: Angelo Alcantara MD;  Location:  MAIN OR;  Service: General    CHOLECYSTECTOMY LAPAROSCOPIC N/A 12/01/2018    Procedure: CHOLECYSTECTOMY LAPAROSCOPIC;  Surgeon: Angelo Alcantara MD;  Location:  MAIN OR;  Service: General    EYE MUSCLE SURGERY Bilateral 2006    [place] ESOPHAGOGASTRODUODENOSCOPY TRANSORAL DIAGNOSTIC N/A 11/27/2018    Procedure: ESOPHAGOGASTRODUODENOSCOPY (EGD) with bx;  Surgeon: Rashmi Vazquez MD;  Location: [facility];  Service: General       Family History   Problem Relation Age of Onset    Breast cancer Mother     Heart  attack Mother     Depression Mother     Mental illness Mother     Coronary artery disease Mother     Hypertension Mother     Diabetes Mother     Hyperlipidemia Mother     Pneumonia Brother     Hypertension Maternal Grandmother     Stroke Maternal Grandmother     Diabetes Maternal Grandmother     Glaucoma Maternal Grandmother     Kidney disease Maternal Grandmother     Sarcoidosis Maternal Grandmother     Diabetes Maternal Grandfather     Hypertension Maternal Grandfather     Stroke Maternal Grandfather     Glaucoma Maternal Grandfather     Heart attack Maternal Grandfather     Colon cancer Neg Hx     Ovarian cancer Neg Hx      I have reviewed and agree with the history as documented.    E-Cigarette/Vaping    E-Cigarette Use Never User      E-Cigarette/Vaping Substances    Nicotine No     THC No     CBD No     Flavoring No     Other No     Unknown No      Social History     Tobacco Use    Smoking status: Never     Passive exposure: Never    Smokeless tobacco: Never    Tobacco comments:     no passive smoke exposure   Vaping Use    Vaping status: Never Used   Substance Use Topics    Alcohol use: Never    Drug use: Never       Review of Systems   Constitutional:  Negative for chills and fever.   HENT:  Negative for ear pain and sore throat.    Eyes:  Negative for pain and visual disturbance.   Respiratory:  Positive for cough. Negative for shortness of breath.    Cardiovascular:  Negative for chest pain and palpitations.   Gastrointestinal:  Negative for abdominal pain and vomiting.   Genitourinary:  Negative for dysuria and hematuria.   Musculoskeletal:  Negative for arthralgias and back pain.   Skin:  Negative for color change and rash.   Neurological:  Negative for seizures and syncope.   All other systems reviewed and are negative.      Physical Exam  Physical Exam  Vitals and nursing note reviewed.   Constitutional:       General: She is not in acute distress.     Appearance: She is well-developed.   HENT:       Head: Normocephalic and atraumatic.   Eyes:      Conjunctiva/sclera: Conjunctivae normal.   Cardiovascular:      Rate and Rhythm: Normal rate and regular rhythm.      Heart sounds: No murmur heard.  Pulmonary:      Effort: Pulmonary effort is normal. No respiratory distress.      Comments: Left lower lobe crackles  Abdominal:      Palpations: Abdomen is soft.      Tenderness: There is no abdominal tenderness.   Musculoskeletal:         General: No swelling.      Cervical back: Neck supple.   Skin:     General: Skin is warm and dry.      Capillary Refill: Capillary refill takes less than 2 seconds.   Neurological:      Mental Status: She is alert.   Psychiatric:         Mood and Affect: Mood normal.         Vital Signs  ED Triage Vitals [08/15/24 1456]   Temperature Pulse Respirations Blood Pressure SpO2   98.5 °F (36.9 °C) 77 16 128/65 91 %      Temp Source Heart Rate Source Patient Position - Orthostatic VS BP Location FiO2 (%)   Oral Monitor Lying Left arm --      Pain Score       --           Vitals:    08/15/24 1456 08/15/24 1540   BP: 128/65 111/58   Pulse: 77 70   Patient Position - Orthostatic VS: Lying          Visual Acuity      ED Medications  Medications   sodium chloride 0.9 % bolus 1,000 mL (0 mL Intravenous Stopped 8/15/24 1608)   azithromycin (ZITHROMAX) tablet 500 mg (500 mg Oral Given 8/15/24 1602)       Diagnostic Studies  Results Reviewed       Procedure Component Value Units Date/Time    HS Troponin I 4hr [[id number]]     Lab Status: No result Specimen: Blood     HS Troponin 0hr (reflex protocol) [[id number]]  (Normal) Collected: 08/15/24 1510    Lab Status: Final result Specimen: Blood from Arm, Left Updated: 08/15/24 1540     hs TnI 0hr <2 ng/L     HS Troponin I 2hr [[id number]]     Lab Status: No result Specimen: Blood     Comprehensive metabolic panel [[id number]]  (Abnormal) Collected: 08/15/24 1510    Lab Status: Final result Specimen: Blood from Arm, Left Updated: 08/15/24 1531     Sodium 137  mmol/L      Potassium 4.0 mmol/L      Chloride 104 mmol/L      CO2 25 mmol/L      ANION GAP 8 mmol/L      BUN 11 mg/dL      Creatinine 0.98 mg/dL      Glucose 87 mg/dL      Calcium 9.0 mg/dL      AST 11 U/L      ALT 12 U/L      Alkaline Phosphatase 87 U/L      Total Protein 6.9 g/dL      Albumin 4.0 g/dL      Total Bilirubin 0.28 mg/dL      eGFR 79 ml/min/1.73sq m     Narrative:      National Kidney Disease Foundation guidelines for Chronic Kidney Disease (CKD):     Stage 1 with normal or high GFR (GFR > 90 mL/min/1.73 square meters)    Stage 2 Mild CKD (GFR = 60-89 mL/min/1.73 square meters)    Stage 3A Moderate CKD (GFR = 45-59 mL/min/1.73 square meters)    Stage 3B Moderate CKD (GFR = 30-44 mL/min/1.73 square meters)    Stage 4 Severe CKD (GFR = 15-29 mL/min/1.73 square meters)    Stage 5 End Stage CKD (GFR <15 mL/min/1.73 square meters)  Note: GFR calculation is accurate only with a steady state creatinine    Magnesium [[id number]]  (Normal) Collected: 08/15/24 1510    Lab Status: Final result Specimen: Blood from Arm, Left Updated: 08/15/24 1531     Magnesium 2.0 mg/dL     CBC and differential [[id number]]  (Abnormal) Collected: 08/15/24 1510    Lab Status: Final result Specimen: Blood from Arm, Left Updated: 08/15/24 1515     WBC 10.89 Thousand/uL      RBC 4.54 Million/uL      Hemoglobin 12.1 g/dL      Hematocrit 38.0 %      MCV 84 fL      MCH 26.7 pg      MCHC 31.8 g/dL      RDW 14.0 %      MPV 9.9 fL      Platelets 307 Thousands/uL      nRBC 0 /100 WBCs      Segmented % 73 %      Immature Grans % 0 %      Lymphocytes % 20 %      Monocytes % 6 %      Eosinophils Relative 1 %      Basophils Relative 0 %      Absolute Neutrophils 7.88 Thousands/µL      Absolute Immature Grans 0.03 Thousand/uL      Absolute Lymphocytes 2.14 Thousands/µL      Absolute Monocytes 0.67 Thousand/µL      Eosinophils Absolute 0.13 Thousand/µL      Basophils Absolute 0.04 Thousands/µL                    XR chest 1 view portable   Final  Result by Raudel Hadley MD (08/15 1617)      No acute cardiopulmonary disease.            Workstation performed: [id number]                    Procedures  Procedures         ED Course               HEART Risk Score      Flowsheet Row Most Recent Value   Heart Score Risk Calculator    History 0 Filed at: 08/15/2024 1735   ECG 0 Filed at: 08/15/2024 1735   Age 0 Filed at: 08/15/2024 1735   Risk Factors 1 Filed at: 08/15/2024 1735   Troponin 0 Filed at: 08/15/2024 1735   HEART Score 1 Filed at: 08/15/2024 1735                          SBIRT 20yo+      Flowsheet Row Most Recent Value   Initial Alcohol Screen: US AUDIT-C     1. How often do you have a drink containing alcohol? 0 Filed at: 08/15/2024 1456   2. How many drinks containing alcohol do you have on a typical day you are drinking?  0 Filed at: 08/15/2024 1456   3a. Male UNDER 65: How often do you have five or more drinks on one occasion? 0 Filed at: 08/15/2024 1456   3b. FEMALE Any Age, or MALE 65+: How often do you have 4 or more drinks on one occassion? 0 Filed at: 08/15/2024 1456   Audit-C Score 0 Filed at: 08/15/2024 1456   AILYN: How many times in the past year have you...    Used an illegal drug or used a prescription medication for non-medical reasons? Never Filed at: 08/15/2024 1456                      Medical Decision Making  24-year-old female with cough and chest pain along with possible syncopal episode.  Differential diagnose includes ACS, pneumonia, pneumothorax.  Low risk for ACS by heart score with normal troponin and normal EKG on my interpretation.  Chest x-ray shows left lower lobe infiltrate on my interpretation.  Will treat with azithromycin for pneumonia, PCP follow-up.    Amount and/or Complexity of Data Reviewed  Labs: ordered.  Radiology: ordered.    Risk  Prescription drug management.                 Disposition  Final diagnoses:   Syncope   Pneumonia     Time reflects when diagnosis was documented in both MDM as applicable and the  Disposition within this note       Time User Action Codes Description Comment    8/15/2024  3:44 PM Sahra Slade Add [R55] Syncope     8/15/2024  3:48 PM Sahra Slade Add [J18.9] Pneumonia           ED Disposition       ED Disposition   Discharge    Condition   Stable    Date/Time   Thu Aug 15, 2024 1544    Comment   Trini Olivarez discharge to home/self care.                   Follow-up Information       Follow up With Specialties Details Why Contact Info    Jay Price MD Family Medicine   [address] [place] [id number] [phone]              Discharge Medication List as of 8/15/2024  3:56 PM        START taking these medications    Details   azithromycin (ZITHROMAX) 250 mg tablet Take 1 tablet (250 mg total) by mouth every 24 hours for 4 days, Starting Thu 8/15/2024, Until Mon 8/19/2024, Normal           CONTINUE these medications which have NOT CHANGED    Details   acetaminophen (TYLENOL) 500 mg tablet Take 1 tablet (500 mg total) by mouth every 6 (six) hours as needed for mild pain, Starting Sun 3/10/2024, Normal      albuterol (PROVENTIL HFA,VENTOLIN HFA) 90 mcg/act inhaler Inhale 2 puffs every 4 (four) hours as needed for wheezing, Starting Fri 7/15/2022, Normal      aluminum-magnesium hydroxide-simethicone (MAALOX) 200-200-20 MG/5ML SUSP Take 30 mL by mouth 4 (four) times a day as needed for heartburn or cramping, Starting Mon 1/8/2024, Normal      ARIPiprazole (ABILIFY) 15 mg tablet Take 0.5 tablets (7.5 mg total) by mouth daily, Starting Sun 6/18/2023, Until Tue 7/18/2023, Normal      benzonatate (TESSALON PERLES) 100 mg capsule Take 1 capsule (100 mg total) by mouth 3 (three) times a day as needed for cough, Starting Fri 1/12/2024, Normal      Caplyta 21 MG CAPS Take 21 mg by mouth daily, Starting Thu 2/29/2024, Historical Med      dicyclomine (BENTYL) 20 mg tablet Take 1 tablet (20 mg total) by mouth 4 (four) times a day as needed (abdominal cramping, diarrhea), Starting Wed  12/13/2023, Print      !! famotidine (PEPCID) 20 mg tablet Take 1 tablet (20 mg total) by mouth 2 (two) times a day, Starting Thu 7/18/2019, Normal      !! famotidine (PEPCID) 20 mg tablet Take 1 tablet (20 mg total) by mouth 2 (two) times a day, Starting Wed 12/13/2023, Normal      !! famotidine (PEPCID) 20 mg tablet Take 1 tablet (20 mg total) by mouth 2 (two) times a day, Starting Mon 1/8/2024, Normal      fluticasone (FLONASE) 50 mcg/act nasal spray SPRAY 1 SPRAY INTO EACH NOSTRIL EVERY DAY, Normal      gabapentin (NEURONTIN) 600 MG tablet Take 600 mg by mouth every evening, Starting Mon 6/19/2023, Historical Med      hydrOXYzine pamoate (VISTARIL) 50 mg capsule Take 50 mg by mouth 2 (two) times a day as needed for anxiety, Starting Tue 1/23/2024, Historical Med      ibuprofen (MOTRIN) 600 mg tablet Take 1 tablet (600 mg total) by mouth every 6 (six) hours as needed for mild pain, Starting Sun 3/10/2024, Normal      norgestimate-ethinyl estradiol (Sprintec 28) 0.25-35 MG-MCG per tablet Take 1 tablet by mouth daily, Starting Fri 9/1/2023, Normal      ondansetron (ZOFRAN-ODT) 4 mg disintegrating tablet Take 1 tablet (4 mg total) by mouth every 6 (six) hours as needed for vomiting or nausea, Starting Sun 3/10/2024, Normal      sucralfate (CARAFATE) 1 g tablet Take 1 tablet (1 g total) by mouth 4 (four) times a day for 7 days, Starting Mon 1/8/2024, Until Mon 1/15/2024, Normal      venlafaxine (EFFEXOR-XR) 37.5 mg 24 hr capsule Starting Wed 7/5/2023, Historical Med       !! - Potential duplicate medications found. Please discuss with provider.          No discharge procedures on file.    PDMP Review       None            ED Provider  Electronically Signed by             Sahra Slade MD  08/15/24 1558

## 2024-08-16 LAB
ATRIAL RATE: 74 BPM
P AXIS: 37 DEGREES
PR INTERVAL: 120 MS
QRS AXIS: 53 DEGREES
QRSD INTERVAL: 94 MS
QT INTERVAL: 374 MS
QTC INTERVAL: 415 MS
T WAVE AXIS: 40 DEGREES
VENTRICULAR RATE: 74 BPM

## 2024-08-16 PROCEDURE — 93010 ELECTROCARDIOGRAM REPORT: CPT | Performed by: INTERNAL MEDICINE

## 2024-08-16 NOTE — DISCHARGE INSTRUCTIONS
Follow-up with your PCP ASAP    Take Antivert as prescribed as needed for dizziness    Take Tylenol as prescribed as needed for body aches    Use DuoNeb nebulizations as needed for shortness of breath, as well as albuterol inhaler    Return for chest pain, trouble breathing, leg swelling, coughing up blood,  or any other new/concerning symptoms

## 2024-08-16 NOTE — ED PROVIDER NOTES
History  Chief Complaint   Patient presents with    Chest Pain     Pt c/on chest pain, SOB, and dizziness. Pt reports being seen at [place] earlier today and was dx with pneumonia, pt having no improvement of symptoms.     The patient is a 24-year-old female presenting to the ED for evaluation of shortness of breath and chest pain. She reports her symptoms began this morning. She also notes having cough and chills. She was seen at [facility] ED at  3 PM for the same symptoms. She was diagnosed with pneumonia based on exam, and was discharged on Azithromycin. She had labs including a troponin, an EKG, and a CXR which were without acute abnormality.   She denies fever, nausea, vomiting, abdominal pain, diarrhea, melena, hematochezia, hemoptysis, leg swelling.        Prior to Admission Medications   Prescriptions Last Dose Informant Patient Reported? Taking?   ARIPiprazole (ABILIFY) 15 mg tablet   No No   Sig: Take 0.5 tablets (7.5 mg total) by mouth daily   Caplyta 21 MG CAPS   Yes No   Sig: Take 21 mg by mouth daily   acetaminophen (TYLENOL) 500 mg tablet   No No   Sig: Take 1 tablet (500 mg total) by mouth every 6 (six) hours as needed for mild pain   albuterol (PROVENTIL HFA,VENTOLIN HFA) 90 mcg/act inhaler   No No   Sig: Inhale 2 puffs every 4 (four) hours as needed for wheezing   aluminum-magnesium hydroxide-simethicone (MAALOX) 200-200-20 MG/5ML SUSP   No No   Sig: Take 30 mL by mouth 4 (four) times a day as needed for heartburn or cramping   Patient not taking: Reported on 7/20/2024   azithromycin (ZITHROMAX) 250 mg tablet   No No   Sig: Take 1 tablet (250 mg total) by mouth every 24 hours for 4 days   benzonatate (TESSALON PERLES) 100 mg capsule   No No   Sig: Take 1 capsule (100 mg total) by mouth 3 (three) times a day as needed for cough   Patient not taking: Reported on 7/20/2024   dicyclomine (BENTYL) 20 mg tablet   No No   Sig: Take 1 tablet (20 mg total) by mouth 4 (four) times a day as needed  (abdominal cramping, diarrhea)   famotidine (PEPCID) 20 mg tablet   No No   Sig: Take 1 tablet (20 mg total) by mouth 2 (two) times a day   famotidine (PEPCID) 20 mg tablet   No No   Sig: Take 1 tablet (20 mg total) by mouth 2 (two) times a day   Patient not taking: Reported on 6/7/2024   famotidine (PEPCID) 20 mg tablet   No No   Sig: Take 1 tablet (20 mg total) by mouth 2 (two) times a day   Patient not taking: Reported on 6/7/2024   fluticasone (FLONASE) 50 mcg/act nasal spray   No No   Sig: SPRAY 1 SPRAY INTO EACH NOSTRIL EVERY DAY   gabapentin (NEURONTIN) 600 MG tablet   Yes No   Sig: Take 600 mg by mouth every evening   hydrOXYzine pamoate (VISTARIL) 50 mg capsule   Yes No   Sig: Take 50 mg by mouth 2 (two) times a day as needed for anxiety   ibuprofen (MOTRIN) 600 mg tablet   No No   Sig: Take 1 tablet (600 mg total) by mouth every 6 (six) hours as needed for mild pain   norgestimate-ethinyl estradiol (Sprintec 28) 0.25-35 MG-MCG per tablet   No No   Sig: Take 1 tablet by mouth daily   Patient not taking: Reported on 6/7/2024   ondansetron (ZOFRAN-ODT) 4 mg disintegrating tablet   No No   Sig: Take 1 tablet (4 mg total) by mouth every 6 (six) hours as needed for vomiting or nausea   sucralfate (CARAFATE) 1 g tablet   No No   Sig: Take 1 tablet (1 g total) by mouth 4 (four) times a day for 7 days   venlafaxine (EFFEXOR-XR) 37.5 mg 24 hr capsule   Yes No      Facility-Administered Medications: None       Past Medical History:   Diagnosis Date    ADHD     Anxiety     Asthma     Autism     Bipolar disorder (HCC)     Depression     GERD (gastroesophageal reflux disease)        Past Surgical History:   Procedure Laterality Date    CHOLANGIOGRAM N/A 12/01/2018    Procedure: CHOLANGIOGRAM;  Surgeon: Angelo Alcantara MD;  Location:  MAIN OR;  Service: General    CHOLECYSTECTOMY LAPAROSCOPIC N/A 12/01/2018    Procedure: CHOLECYSTECTOMY LAPAROSCOPIC;  Surgeon: Angelo Alcantara MD;  Location:  MAIN OR;  Service:  General    EYE MUSCLE SURGERY Bilateral 2006    AK ESOPHAGOGASTRODUODENOSCOPY TRANSORAL DIAGNOSTIC N/A 11/27/2018    Procedure: ESOPHAGOGASTRODUODENOSCOPY (EGD) with bx;  Surgeon: Rashmi Vazquez MD;  Location: [facility];  Service: General       Family History   Problem Relation Age of Onset    Breast cancer Mother     Heart attack Mother     Depression Mother     Mental illness Mother     Coronary artery disease Mother     Hypertension Mother     Diabetes Mother     Hyperlipidemia Mother     Pneumonia Brother     Hypertension Maternal Grandmother     Stroke Maternal Grandmother     Diabetes Maternal Grandmother     Glaucoma Maternal Grandmother     Kidney disease Maternal Grandmother     Sarcoidosis Maternal Grandmother     Diabetes Maternal Grandfather     Hypertension Maternal Grandfather     Stroke Maternal Grandfather     Glaucoma Maternal Grandfather     Heart attack Maternal Grandfather     Colon cancer Neg Hx     Ovarian cancer Neg Hx      I have reviewed and agree with the history as documented.    E-Cigarette/Vaping    E-Cigarette Use Never User      E-Cigarette/Vaping Substances    Nicotine No     THC No     CBD No     Flavoring No     Other No     Unknown No      Social History     Tobacco Use    Smoking status: Never     Passive exposure: Never    Smokeless tobacco: Never    Tobacco comments:     no passive smoke exposure   Vaping Use    Vaping status: Never Used   Substance Use Topics    Alcohol use: Never    Drug use: Never       Review of Systems   Constitutional:  Negative for chills and fever.   HENT:  Negative for congestion and rhinorrhea.    Respiratory:  Positive for cough and shortness of breath.    Cardiovascular:  Positive for chest pain. Negative for leg swelling.   Gastrointestinal:  Negative for abdominal pain, constipation, diarrhea, nausea and vomiting.   Genitourinary:  Negative for dysuria and flank pain.   Musculoskeletal:  Positive for myalgias. Negative for arthralgias.   Skin:   Negative for rash and wound.   Neurological:  Positive for dizziness. Negative for weakness, numbness and headaches.       Physical Exam  Physical Exam  Vitals and nursing note reviewed.   Constitutional:       General: She is not in acute distress.     Appearance: She is well-developed. She is not toxic-appearing.   HENT:      Head: Normocephalic and atraumatic.   Eyes:      Conjunctiva/sclera: Conjunctivae normal.   Cardiovascular:      Rate and Rhythm: Normal rate and regular rhythm.      Heart sounds: No murmur heard.  Pulmonary:      Effort: Pulmonary effort is normal. No respiratory distress.      Breath sounds: Normal breath sounds. No wheezing, rhonchi or rales.   Abdominal:      Palpations: Abdomen is soft.      Tenderness: There is no abdominal tenderness.   Musculoskeletal:         General: No swelling.      Cervical back: Neck supple.      Right lower leg: No tenderness. No edema.      Left lower leg: No tenderness. No edema.   Skin:     General: Skin is warm and dry.      Capillary Refill: Capillary refill takes less than 2 seconds.   Neurological:      Mental Status: She is alert.   Psychiatric:         Mood and Affect: Mood normal.         Vital Signs  ED Triage Vitals [08/15/24 2016]   Temperature Pulse Respirations Blood Pressure SpO2   98.6 °F (37 °C) 82 20 114/61 96 %      Temp Source Heart Rate Source Patient Position - Orthostatic VS BP Location FiO2 (%)   Oral Monitor Sitting Right arm --      Pain Score       8           Vitals:    08/15/24 2016 08/15/24 2147   BP: 114/61 114/53   Pulse: 82 83   Patient Position - Orthostatic VS: Sitting Lying         Visual Acuity      ED Medications  Medications   ipratropium-albuterol (DUO-NEB) 0.5-2.5 mg/3 mL inhalation solution 3 mL (3 mL Nebulization Given 8/15/24 2051)   acetaminophen (TYLENOL) tablet 975 mg (975 mg Oral Given 8/15/24 2050)   meclizine (ANTIVERT) tablet 25 mg (25 mg Oral Given 8/15/24 2050)   ibuprofen (MOTRIN) tablet 600 mg (600 mg  Oral Given 8/15/24 2050)   albuterol (PROVENTIL HFA,VENTOLIN HFA) inhaler 2 puff (2 puffs Inhalation Given 8/15/24 2147)       Diagnostic Studies  Results Reviewed       None                   No orders to display              Procedures  Procedures         ED Course     EKG normal sinus rhythm at 74 bpm, normal axis, , QTc 415, no ST elevation or depression as interpreted by me    SBIRT 20yo+      Flowsheet Row Most Recent Value   Initial Alcohol Screen: US AUDIT-C     1. How often do you have a drink containing alcohol? 0 Filed at: 08/15/2024 2015   2. How many drinks containing alcohol do you have on a typical day you are drinking?  0 Filed at: 08/15/2024 2015   3b. FEMALE Any Age, or MALE 65+: How often do you have 4 or more drinks on one occassion? 0 Filed at: 08/15/2024 2015   Audit-C Score 0 Filed at: 08/15/2024 2015   AILYN: How many times in the past year have you...    Used an illegal drug or used a prescription medication for non-medical reasons? Never Filed at: 08/15/2024 2015            Medical Decision Making  DDx including but not limited to: URI, bronchitis, pneumonia, viral illness.    Reviewed previous ED visit today. Normal cardiac workup. VSS at this time. Will obtain EKG, treat symptomatically and re-examine    EKG without significant change.  Patient reports significant improvement in symptoms following nebulization. She remains resting comfortably in no acute distress. Will dc    At the time of discharge, the patient is in no acute distress. I discussed with the patient the diagnosis, treatment plan, follow-up, return precautions, and discharge instructions; they were given the opportunity to ask questions and verbalized understanding. Patient ambulated with steady gait and without respiratory distress.     Problems Addressed:  Asthma: acute illness or injury  URI (upper respiratory infection): acute illness or injury    Amount and/or Complexity of Data Reviewed  External Data Reviewed:  labs, radiology and notes.    Risk  OTC drugs.  Prescription drug management.                 Disposition  Final diagnoses:   URI (upper respiratory infection)   Asthma     Time reflects when diagnosis was documented in both MDM as applicable and the Disposition within this note       Time User Action Codes Description Comment    8/15/2024  9:42 PM Simona Vazquez Add [J06.9] URI (upper respiratory infection)     8/15/2024  9:42 PM Simona Vazquez Add [J45.909] Asthma           ED Disposition       ED Disposition   Discharge    Condition   Stable    Date/Time   Thu Aug 15, 2024 2142    Comment   Trini Olivarez discharge to home/self care.                   Follow-up Information       Follow up With Specialties Details Why Contact Info    Jay Price MD Family Medicine   [address] [facility]  [place] [address] [phone]              Discharge Medication List as of 8/15/2024  9:46 PM        START taking these medications    Details   !! acetaminophen (TYLENOL) 500 mg tablet Take 1 tablet (500 mg total) by mouth every 6 (six) hours as needed for mild pain or moderate pain, Starting Thu 8/15/2024, Normal      ipratropium-albuterol (DUO-NEB) 0.5-2.5 mg/3 mL nebulizer solution Take 3 mL by nebulization 4 (four) times a day, Starting Thu 8/15/2024, Normal      meclizine (ANTIVERT) 25 mg tablet Take 1 tablet (25 mg total) by mouth 3 (three) times a day as needed for dizziness, Starting Thu 8/15/2024, Normal       !! - Potential duplicate medications found. Please discuss with provider.        CONTINUE these medications which have NOT CHANGED    Details   !! acetaminophen (TYLENOL) 500 mg tablet Take 1 tablet (500 mg total) by mouth every 6 (six) hours as needed for mild pain, Starting Sun 3/10/2024, Normal      albuterol (PROVENTIL HFA,VENTOLIN HFA) 90 mcg/act inhaler Inhale 2 puffs every 4 (four) hours as needed for wheezing, Starting Fri 7/15/2022, Normal      aluminum-magnesium hydroxide-simethicone  (MAALOX) 200-200-20 MG/5ML SUSP Take 30 mL by mouth 4 (four) times a day as needed for heartburn or cramping, Starting Mon 1/8/2024, Normal      ARIPiprazole (ABILIFY) 15 mg tablet Take 0.5 tablets (7.5 mg total) by mouth daily, Starting Sun 6/18/2023, Until Tue 7/18/2023, Normal      azithromycin (ZITHROMAX) 250 mg tablet Take 1 tablet (250 mg total) by mouth every 24 hours for 4 days, Starting Thu 8/15/2024, Until Mon 8/19/2024, Normal      benzonatate (TESSALON PERLES) 100 mg capsule Take 1 capsule (100 mg total) by mouth 3 (three) times a day as needed for cough, Starting Fri 1/12/2024, Normal      Caplyta 21 MG CAPS Take 21 mg by mouth daily, Starting Thu 2/29/2024, Historical Med      dicyclomine (BENTYL) 20 mg tablet Take 1 tablet (20 mg total) by mouth 4 (four) times a day as needed (abdominal cramping, diarrhea), Starting Wed 12/13/2023, Print      !! famotidine (PEPCID) 20 mg tablet Take 1 tablet (20 mg total) by mouth 2 (two) times a day, Starting Thu 7/18/2019, Normal      !! famotidine (PEPCID) 20 mg tablet Take 1 tablet (20 mg total) by mouth 2 (two) times a day, Starting Wed 12/13/2023, Normal      !! famotidine (PEPCID) 20 mg tablet Take 1 tablet (20 mg total) by mouth 2 (two) times a day, Starting Mon 1/8/2024, Normal      fluticasone (FLONASE) 50 mcg/act nasal spray SPRAY 1 SPRAY INTO EACH NOSTRIL EVERY DAY, Normal      gabapentin (NEURONTIN) 600 MG tablet Take 600 mg by mouth every evening, Starting Mon 6/19/2023, Historical Med      hydrOXYzine pamoate (VISTARIL) 50 mg capsule Take 50 mg by mouth 2 (two) times a day as needed for anxiety, Starting Tue 1/23/2024, Historical Med      ibuprofen (MOTRIN) 600 mg tablet Take 1 tablet (600 mg total) by mouth every 6 (six) hours as needed for mild pain, Starting Sun 3/10/2024, Normal      norgestimate-ethinyl estradiol (Sprintec 28) 0.25-35 MG-MCG per tablet Take 1 tablet by mouth daily, Starting Fri 9/1/2023, Normal      ondansetron (ZOFRAN-ODT) 4 mg  disintegrating tablet Take 1 tablet (4 mg total) by mouth every 6 (six) hours as needed for vomiting or nausea, Starting Sun 3/10/2024, Normal      sucralfate (CARAFATE) 1 g tablet Take 1 tablet (1 g total) by mouth 4 (four) times a day for 7 days, Starting Mon 1/8/2024, Until Mon 1/15/2024, Normal      venlafaxine (EFFEXOR-XR) 37.5 mg 24 hr capsule Starting Wed 7/5/2023, Historical Med       !! - Potential duplicate medications found. Please discuss with provider.          No discharge procedures on file.    PDMP Review       None            ED Provider  Electronically Signed by             Simona Vazquez PA-C  08/15/24 [id number]

## 2024-08-20 ENCOUNTER — HOSPITAL ENCOUNTER (EMERGENCY)
Facility: HOSPITAL | Age: 24
Discharge: HOME | End: 2024-08-20
Attending: EMERGENCY MEDICINE | Admitting: EMERGENCY MEDICINE
Payer: COMMERCIAL

## 2024-08-20 VITALS
SYSTOLIC BLOOD PRESSURE: 125 MMHG | OXYGEN SATURATION: 98 % | BODY MASS INDEX: 37.67 KG/M2 | WEIGHT: 186.51 LBS | RESPIRATION RATE: 17 BRPM | TEMPERATURE: 98.6 F | HEART RATE: 77 BPM | DIASTOLIC BLOOD PRESSURE: 74 MMHG

## 2024-08-20 DIAGNOSIS — R11.2 NAUSEA AND VOMITING: Primary | ICD-10-CM

## 2024-08-20 LAB
ALBUMIN SERPL BCG-MCNC: 4.5 G/DL (ref 3.5–5)
ALP SERPL-CCNC: 89 U/L (ref 34–104)
ALT SERPL W P-5'-P-CCNC: 12 U/L (ref 7–52)
ANION GAP SERPL CALCULATED.3IONS-SCNC: 11 MMOL/L (ref 4–13)
AST SERPL W P-5'-P-CCNC: 13 U/L (ref 13–39)
BASOPHILS # BLD AUTO: 0.04 THOUSANDS/ÂΜL (ref 0–0.1)
BASOPHILS NFR BLD AUTO: 0 % (ref 0–1)
BILIRUB SERPL-MCNC: 0.41 MG/DL (ref 0.2–1)
BUN SERPL-MCNC: 12 MG/DL (ref 5–25)
CALCIUM SERPL-MCNC: 9.4 MG/DL (ref 8.4–10.2)
CHLORIDE SERPL-SCNC: 106 MMOL/L (ref 96–108)
CO2 SERPL-SCNC: 21 MMOL/L (ref 21–32)
CREAT SERPL-MCNC: 1.02 MG/DL (ref 0.6–1.3)
EOSINOPHIL # BLD AUTO: 0.05 THOUSAND/ÂΜL (ref 0–0.61)
EOSINOPHIL NFR BLD AUTO: 1 % (ref 0–6)
ERYTHROCYTE [DISTWIDTH] IN BLOOD BY AUTOMATED COUNT: 14 % (ref 11.6–15.1)
GFR SERPL CREATININE-BSD FRML MDRD: 76 ML/MIN/1.73SQ M
GLUCOSE SERPL-MCNC: 104 MG/DL (ref 65–140)
HCT VFR BLD AUTO: 41.2 % (ref 34.8–46.1)
HGB BLD-MCNC: 13.1 G/DL (ref 11.5–15.4)
IMM GRANULOCYTES # BLD AUTO: 0.03 THOUSAND/UL (ref 0–0.2)
IMM GRANULOCYTES NFR BLD AUTO: 0 % (ref 0–2)
LYMPHOCYTES # BLD AUTO: 1.77 THOUSANDS/ÂΜL (ref 0.6–4.47)
LYMPHOCYTES NFR BLD AUTO: 17 % (ref 14–44)
MCH RBC QN AUTO: 25.3 PG (ref 26.8–34.3)
MCHC RBC AUTO-ENTMCNC: 31.8 G/DL (ref 31.4–37.4)
MCV RBC AUTO: 80 FL (ref 82–98)
MONOCYTES # BLD AUTO: 0.39 THOUSAND/ÂΜL (ref 0.17–1.22)
MONOCYTES NFR BLD AUTO: 4 % (ref 4–12)
NEUTROPHILS # BLD AUTO: 8.14 THOUSANDS/ÂΜL (ref 1.85–7.62)
NEUTS SEG NFR BLD AUTO: 78 % (ref 43–75)
NRBC BLD AUTO-RTO: 0 /100 WBCS
PLATELET # BLD AUTO: 352 THOUSANDS/UL (ref 149–390)
PMV BLD AUTO: 9.9 FL (ref 8.9–12.7)
POTASSIUM SERPL-SCNC: 4.1 MMOL/L (ref 3.5–5.3)
PROT SERPL-MCNC: 7.5 G/DL (ref 6.4–8.4)
RBC # BLD AUTO: 5.17 MILLION/UL (ref 3.81–5.12)
SODIUM SERPL-SCNC: 138 MMOL/L (ref 135–147)
WBC # BLD AUTO: 10.42 THOUSAND/UL (ref 4.31–10.16)

## 2024-08-20 PROCEDURE — 96361 HYDRATE IV INFUSION ADD-ON: CPT

## 2024-08-20 PROCEDURE — 36415 COLL VENOUS BLD VENIPUNCTURE: CPT | Performed by: PHYSICIAN ASSISTANT

## 2024-08-20 PROCEDURE — 96374 THER/PROPH/DIAG INJ IV PUSH: CPT

## 2024-08-20 PROCEDURE — 80053 COMPREHEN METABOLIC PANEL: CPT | Performed by: PHYSICIAN ASSISTANT

## 2024-08-20 PROCEDURE — 96375 TX/PRO/DX INJ NEW DRUG ADDON: CPT

## 2024-08-20 PROCEDURE — 99283 EMERGENCY DEPT VISIT LOW MDM: CPT

## 2024-08-20 PROCEDURE — 99284 EMERGENCY DEPT VISIT MOD MDM: CPT | Performed by: PHYSICIAN ASSISTANT

## 2024-08-20 PROCEDURE — 85025 COMPLETE CBC W/AUTO DIFF WBC: CPT | Performed by: PHYSICIAN ASSISTANT

## 2024-08-20 RX ORDER — DROPERIDOL 2.5 MG/ML
0.62 INJECTION, SOLUTION INTRAMUSCULAR; INTRAVENOUS ONCE
Status: COMPLETED | OUTPATIENT
Start: 2024-08-20 | End: 2024-08-20

## 2024-08-20 RX ORDER — ONDANSETRON 4 MG/1
4 TABLET, ORALLY DISINTEGRATING ORAL EVERY 6 HOURS PRN
Qty: 20 TABLET | Refills: 0 | Status: SHIPPED | OUTPATIENT
Start: 2024-08-20

## 2024-08-20 RX ORDER — ONDANSETRON 2 MG/ML
4 INJECTION INTRAMUSCULAR; INTRAVENOUS ONCE
Status: COMPLETED | OUTPATIENT
Start: 2024-08-20 | End: 2024-08-20

## 2024-08-20 RX ADMIN — ONDANSETRON 4 MG: 2 INJECTION INTRAMUSCULAR; INTRAVENOUS at 14:26

## 2024-08-20 RX ADMIN — DROPERIDOL 0.62 MG: 2.5 INJECTION, SOLUTION INTRAMUSCULAR; INTRAVENOUS at 15:33

## 2024-08-20 RX ADMIN — SODIUM CHLORIDE 1000 ML: 0.9 INJECTION, SOLUTION INTRAVENOUS at 14:25

## 2024-08-20 NOTE — ED PROVIDER NOTES
History  Chief Complaint   Patient presents with    Flu Symptoms     Pt states she was just here, woke up this am w sob, nausea, vomiting, took tylenol this am.      Trini is a 23 yo F presenting with nausea, vomiting with onset this am. Notes mild, generalized abdominal cramping associated. Notes family member who also awoke today with similar symptoms. No recent travel. No suspicious food intake. No fevers/chills.       History provided by:  Patient   used: No        Prior to Admission Medications   Prescriptions Last Dose Informant Patient Reported? Taking?   ARIPiprazole (ABILIFY) 15 mg tablet   No No   Sig: Take 0.5 tablets (7.5 mg total) by mouth daily   Caplyta 21 MG CAPS   Yes No   Sig: Take 21 mg by mouth daily   acetaminophen (TYLENOL) 500 mg tablet   No No   Sig: Take 1 tablet (500 mg total) by mouth every 6 (six) hours as needed for mild pain   acetaminophen (TYLENOL) 500 mg tablet   No No   Sig: Take 1 tablet (500 mg total) by mouth every 6 (six) hours as needed for mild pain or moderate pain   albuterol (PROVENTIL HFA,VENTOLIN HFA) 90 mcg/act inhaler   No No   Sig: Inhale 2 puffs every 4 (four) hours as needed for wheezing   aluminum-magnesium hydroxide-simethicone (MAALOX) 200-200-20 MG/5ML SUSP   No No   Sig: Take 30 mL by mouth 4 (four) times a day as needed for heartburn or cramping   Patient not taking: Reported on 7/20/2024   azithromycin (ZITHROMAX) 250 mg tablet   No No   Sig: Take 1 tablet (250 mg total) by mouth every 24 hours for 4 days   benzonatate (TESSALON PERLES) 100 mg capsule   No No   Sig: Take 1 capsule (100 mg total) by mouth 3 (three) times a day as needed for cough   Patient not taking: Reported on 7/20/2024   dicyclomine (BENTYL) 20 mg tablet   No No   Sig: Take 1 tablet (20 mg total) by mouth 4 (four) times a day as needed (abdominal cramping, diarrhea)   famotidine (PEPCID) 20 mg tablet   No No   Sig: Take 1 tablet (20 mg total) by mouth 2 (two) times a  day   famotidine (PEPCID) 20 mg tablet   No No   Sig: Take 1 tablet (20 mg total) by mouth 2 (two) times a day   Patient not taking: Reported on 6/7/2024   famotidine (PEPCID) 20 mg tablet   No No   Sig: Take 1 tablet (20 mg total) by mouth 2 (two) times a day   Patient not taking: Reported on 6/7/2024   fluticasone (FLONASE) 50 mcg/act nasal spray   No No   Sig: SPRAY 1 SPRAY INTO EACH NOSTRIL EVERY DAY   gabapentin (NEURONTIN) 600 MG tablet   Yes No   Sig: Take 600 mg by mouth every evening   hydrOXYzine pamoate (VISTARIL) 50 mg capsule   Yes No   Sig: Take 50 mg by mouth 2 (two) times a day as needed for anxiety   ibuprofen (MOTRIN) 600 mg tablet   No No   Sig: Take 1 tablet (600 mg total) by mouth every 6 (six) hours as needed for mild pain   ipratropium-albuterol (DUO-NEB) 0.5-2.5 mg/3 mL nebulizer solution   No No   Sig: Take 3 mL by nebulization 4 (four) times a day   meclizine (ANTIVERT) 25 mg tablet   No No   Sig: Take 1 tablet (25 mg total) by mouth 3 (three) times a day as needed for dizziness   norgestimate-ethinyl estradiol (Sprintec 28) 0.25-35 MG-MCG per tablet   No No   Sig: Take 1 tablet by mouth daily   Patient not taking: Reported on 6/7/2024   sucralfate (CARAFATE) 1 g tablet   No No   Sig: Take 1 tablet (1 g total) by mouth 4 (four) times a day for 7 days   venlafaxine (EFFEXOR-XR) 37.5 mg 24 hr capsule   Yes No      Facility-Administered Medications: None       Past Medical History:   Diagnosis Date    ADHD     Anxiety     Asthma     Autism     Bipolar disorder (HCC)     Depression     GERD (gastroesophageal reflux disease)        Past Surgical History:   Procedure Laterality Date    CHOLANGIOGRAM N/A 12/01/2018    Procedure: CHOLANGIOGRAM;  Surgeon: Angelo Alcantara MD;  Location:  MAIN OR;  Service: General    CHOLECYSTECTOMY LAPAROSCOPIC N/A 12/01/2018    Procedure: CHOLECYSTECTOMY LAPAROSCOPIC;  Surgeon: Angelo Alcantara MD;  Location:  MAIN OR;  Service: General    EYE MUSCLE SURGERY  Bilateral 2006    NH ESOPHAGOGASTRODUODENOSCOPY TRANSORAL DIAGNOSTIC N/A 11/27/2018    Procedure: ESOPHAGOGASTRODUODENOSCOPY (EGD) with bx;  Surgeon: Rashmi Vazquez MD;  Location: [facility];  Service: General       Family History   Problem Relation Age of Onset    Breast cancer Mother     Heart attack Mother     Depression Mother     Mental illness Mother     Coronary artery disease Mother     Hypertension Mother     Diabetes Mother     Hyperlipidemia Mother     Pneumonia Brother     Hypertension Maternal Grandmother     Stroke Maternal Grandmother     Diabetes Maternal Grandmother     Glaucoma Maternal Grandmother     Kidney disease Maternal Grandmother     Sarcoidosis Maternal Grandmother     Diabetes Maternal Grandfather     Hypertension Maternal Grandfather     Stroke Maternal Grandfather     Glaucoma Maternal Grandfather     Heart attack Maternal Grandfather     Colon cancer Neg Hx     Ovarian cancer Neg Hx      I have reviewed and agree with the history as documented.    E-Cigarette/Vaping    E-Cigarette Use Never User      E-Cigarette/Vaping Substances    Nicotine No     THC No     CBD No     Flavoring No     Other No     Unknown No      Social History     Tobacco Use    Smoking status: Never     Passive exposure: Never    Smokeless tobacco: Never    Tobacco comments:     no passive smoke exposure   Vaping Use    Vaping status: Never Used   Substance Use Topics    Alcohol use: Never    Drug use: Never       Review of Systems   Constitutional:  Negative for chills and fever.   HENT:  Negative for congestion, rhinorrhea and sore throat.    Eyes:  Negative for pain and visual disturbance.   Respiratory:  Negative for cough, shortness of breath and wheezing.    Cardiovascular:  Negative for chest pain and palpitations.   Gastrointestinal:  Positive for abdominal pain, nausea and vomiting. Negative for blood in stool, constipation and diarrhea.   Genitourinary:  Negative for dysuria, frequency and urgency.    Musculoskeletal:  Negative for back pain, neck pain and neck stiffness.   Skin:  Negative for rash and wound.   Neurological:  Negative for dizziness, weakness, light-headedness and numbness.       Physical Exam  Physical Exam  Constitutional:       General: She is not in acute distress.     Appearance: She is well-developed. She is not toxic-appearing or diaphoretic.   HENT:      Head: Normocephalic and atraumatic.      Right Ear: External ear normal.      Left Ear: External ear normal.   Eyes:      Extraocular Movements:      Right eye: Normal extraocular motion.      Left eye: Normal extraocular motion.      Conjunctiva/sclera: Conjunctivae normal.      Pupils: Pupils are equal, round, and reactive to light.   Cardiovascular:      Rate and Rhythm: Normal rate and regular rhythm.   Pulmonary:      Effort: Pulmonary effort is normal. No accessory muscle usage or respiratory distress.   Abdominal:      General: Abdomen is flat. There is no distension.      Tenderness: There is abdominal tenderness. There is no right CVA tenderness, left CVA tenderness or guarding.      Comments: Mild generalized TTP, no rigidity/rebound/guarding. No CVAT. Negative Sanchez's.    Musculoskeletal:      Cervical back: Normal range of motion. No rigidity.   Skin:     General: Skin is warm and dry.      Capillary Refill: Capillary refill takes less than 2 seconds.      Findings: No erythema or rash.   Neurological:      Mental Status: She is alert and oriented to person, place, and time.      Motor: No abnormal muscle tone.      Coordination: Coordination normal.   Psychiatric:         Behavior: Behavior normal.         Thought Content: Thought content normal.         Judgment: Judgment normal.         Vital Signs  ED Triage Vitals [08/20/24 1352]   Temperature Pulse Respirations Blood Pressure SpO2   98.6 °F (37 °C) 77 17 125/74 98 %      Temp Source Heart Rate Source Patient Position - Orthostatic VS BP Location FiO2 (%)   Oral Monitor  Sitting Right arm --      Pain Score       --           Vitals:    08/20/24 1352   BP: 125/74   Pulse: 77   Patient Position - Orthostatic VS: Sitting         Visual Acuity      ED Medications  Medications   sodium chloride 0.9 % bolus 1,000 mL (0 mL Intravenous Stopped 8/20/24 1601)   ondansetron (ZOFRAN) injection 4 mg (4 mg Intravenous Given 8/20/24 1426)   droperidol (INAPSINE) injection 0.625 mg (0.625 mg Intravenous Given 8/20/24 1533)       Diagnostic Studies  Results Reviewed       Procedure Component Value Units Date/Time    Comprehensive metabolic panel [[id number]] Collected: 08/20/24 1425    Lab Status: Final result Specimen: Blood from Arm, Right Updated: 08/20/24 1458     Sodium 138 mmol/L      Potassium 4.1 mmol/L      Chloride 106 mmol/L      CO2 21 mmol/L      ANION GAP 11 mmol/L      BUN 12 mg/dL      Creatinine 1.02 mg/dL      Glucose 104 mg/dL      Calcium 9.4 mg/dL      AST 13 U/L      ALT 12 U/L      Alkaline Phosphatase 89 U/L      Total Protein 7.5 g/dL      Albumin 4.5 g/dL      Total Bilirubin 0.41 mg/dL      eGFR 76 ml/min/1.73sq m     Narrative:      National Kid[employer] Foundation guidelines for Chronic Kidney Disease (CKD):     Stage 1 with normal or high GFR (GFR > 90 mL/min/1.73 square meters)    Stage 2 Mild CKD (GFR = 60-89 mL/min/1.73 square meters)    Stage 3A Moderate CKD (GFR = 45-59 mL/min/1.73 square meters)    Stage 3B Moderate CKD (GFR = 30-44 mL/min/1.73 square meters)    Stage 4 Severe CKD (GFR = 15-29 mL/min/1.73 square meters)    Stage 5 End Stage CKD (GFR <15 mL/min/1.73 square meters)  Note: GFR calculation is accurate only with a steady state creatinine    CBC and differential [[id number]]  (Abnormal) Collected: 08/20/24 1425    Lab Status: Final result Specimen: Blood from Arm, Right Updated: 08/20/24 1431     WBC 10.42 Thousand/uL      RBC 5.17 Million/uL      Hemoglobin 13.1 g/dL      Hematocrit 41.2 %      MCV 80 fL      MCH 25.3 pg      MCHC 31.8 g/dL      RDW  14.0 %      MPV 9.9 fL      Platelets 352 Thousands/uL      nRBC 0 /100 WBCs      Segmented % 78 %      Immature Grans % 0 %      Lymphocytes % 17 %      Monocytes % 4 %      Eosinophils Relative 1 %      Basophils Relative 0 %      Absolute Neutrophils 8.14 Thousands/µL      Absolute Immature Grans 0.03 Thousand/uL      Absolute Lymphocytes 1.77 Thousands/µL      Absolute Monocytes 0.39 Thousand/µL      Eosinophils Absolute 0.05 Thousand/µL      Basophils Absolute 0.04 Thousands/µL                    No orders to display              Procedures  Procedures         ED Course                                 SBIRT 22yo+      Flowsheet Row Most Recent Value   Initial Alcohol Screen: US AUDIT-C     1. How often do you have a drink containing alcohol? 0 Filed at: 08/20/2024 1352   2. How many drinks containing alcohol do you have on a typical day you are drinking?  0 Filed at: 08/20/2024 1352   3b. FEMALE Any Age, or MALE 65+: How often do you have 4 or more drinks on one occassion? 0 Filed at: 08/20/2024 1352   Audit-C Score 0 Filed at: 08/20/2024 [facility]   AILYN: How many times in the past year have you...    Used an illegal drug or used a prescription medication for non-medical reasons? Never Filed at: 08/20/2024 [facility]                      Medical Decision Making  Nausea, vomiting, mild generalized abd cramping with onset this am. Family member sick with similar symptoms. CBC, CMP grossly WNL. Given Zofran here with only slight improvement reported, subsequently droperidol with resolution of nausea and improvement in pain. Given IV fluids in ED. Suspect infectious gastroenteritis clinically. Will d/c with supportive care. Plenty of fluids encouraged. Strict return indications reviewed.     Amount and/or Complexity of Data Reviewed  Labs: ordered.    Risk  Prescription drug management.                 Disposition  Final diagnoses:   Nausea and vomiting     Time reflects when diagnosis was documented in both MDM as  applicable and the Disposition within this note       Time User Action Codes Description Comment    8/20/2024  4:18 PM Patric Muir Add [R11.2] Nausea and vomiting           ED Disposition       ED Disposition   Discharge    Condition   Stable    Date/Time   Tue Aug 20, 2024 1617    Comment   Trini Olivarez discharge to home/self care.                   Follow-up Information       Follow up With Specialties Details Why Contact Info Additional Information    [facility] Emergency Medicine  If symptoms worsen [address] [id number] [phone] [facility] Emergency Department, [address] [place], [place], [address]    Jay Price MD Family Medicine Call   [phone]   [place] [id number] [phone]               Discharge Medication List as of 8/20/2024  4:19 PM        START taking these medications    Details   ondansetron (ZOFRAN-ODT) 4 mg disintegrating tablet Take 1 tablet (4 mg total) by mouth every 6 (six) hours as needed for nausea or vomiting, Starting Tue 8/20/2024, Normal           CONTINUE these medications which have NOT CHANGED    Details   !! acetaminophen (TYLENOL) 500 mg tablet Take 1 tablet (500 mg total) by mouth every 6 (six) hours as needed for mild pain, Starting Sun 3/10/2024, Normal      !! acetaminophen (TYLENOL) 500 mg tablet Take 1 tablet (500 mg total) by mouth every 6 (six) hours as needed for mild pain or moderate pain, Starting Thu 8/15/2024, Normal      albuterol (PROVENTIL HFA,VENTOLIN HFA) 90 mcg/act inhaler Inhale 2 puffs every 4 (four) hours as needed for wheezing, Starting Fri 7/15/2022, Normal      aluminum-magnesium hydroxide-simethicone (MAALOX) 200-200-20 MG/5ML SUSP Take 30 mL by mouth 4 (four) times a day as needed for heartburn or cramping, Starting Mon 1/8/2024, Normal      ARIPiprazole (ABILIFY) 15 mg tablet Take 0.5 tablets (7.5 mg total) by mouth daily,  Starting Sun 6/18/2023, Until Tue 7/18/2023, Normal      benzonatate (TESSALON PERLES) 100 mg capsule Take 1 capsule (100 mg total) by mouth 3 (three) times a day as needed for cough, Starting Fri 1/12/2024, Normal      Caplyta 21 MG CAPS Take 21 mg by mouth daily, Starting Thu 2/29/2024, Historical Med      dicyclomine (BENTYL) 20 mg tablet Take 1 tablet (20 mg total) by mouth 4 (four) times a day as needed (abdominal cramping, diarrhea), Starting Wed 12/13/2023, Print      !! famotidine (PEPCID) 20 mg tablet Take 1 tablet (20 mg total) by mouth 2 (two) times a day, Starting Thu 7/18/2019, Normal      !! famotidine (PEPCID) 20 mg tablet Take 1 tablet (20 mg total) by mouth 2 (two) times a day, Starting Wed 12/13/2023, Normal      !! famotidine (PEPCID) 20 mg tablet Take 1 tablet (20 mg total) by mouth 2 (two) times a day, Starting Mon 1/8/2024, Normal      fluticasone (FLONASE) 50 mcg/act nasal spray SPRAY 1 SPRAY INTO EACH NOSTRIL EVERY DAY, Normal      gabapentin (NEURONTIN) 600 MG tablet Take 600 mg by mouth every evening, Starting Mon 6/19/2023, Historical Med      hydrOXYzine pamoate (VISTARIL) 50 mg capsule Take 50 mg by mouth 2 (two) times a day as needed for anxiety, Starting Tue 1/23/2024, Historical Med      ibuprofen (MOTRIN) 600 mg tablet Take 1 tablet (600 mg total) by mouth every 6 (six) hours as needed for mild pain, Starting Sun 3/10/2024, Normal      ipratropium-albuterol (DUO-NEB) 0.5-2.5 mg/3 mL nebulizer solution Take 3 mL by nebulization 4 (four) times a day, Starting Thu 8/15/2024, Normal      meclizine (ANTIVERT) 25 mg tablet Take 1 tablet (25 mg total) by mouth 3 (three) times a day as needed for dizziness, Starting Thu 8/15/2024, Normal      norgestimate-ethinyl estradiol (Sprintec 28) 0.25-35 MG-MCG per tablet Take 1 tablet by mouth daily, Starting Fri 9/1/2023, Normal      sucralfate (CARAFATE) 1 g tablet Take 1 tablet (1 g total) by mouth 4 (four) times a day for 7 days, Starting Mon  1/8/2024, Until Mon 1/15/2024, Normal      venlafaxine (EFFEXOR-XR) 37.5 mg 24 hr capsule Starting Wed 7/5/2023, Historical Med       !! - Potential duplicate medications found. Please discuss with provider.        STOP taking these medications       azithromycin (ZITHROMAX) 250 mg tablet Comments:   Reason for Stopping:               No discharge procedures on file.    PDMP Review       None            ED Provider  Electronically Signed by             Patric Muir PA-C  08/20/24 [id number]

## 2024-08-30 ENCOUNTER — APPOINTMENT (EMERGENCY)
Dept: RADIOLOGY | Facility: HOSPITAL | Age: 24
End: 2024-08-30
Payer: COMMERCIAL

## 2024-08-30 ENCOUNTER — HOSPITAL ENCOUNTER (EMERGENCY)
Facility: HOSPITAL | Age: 24
Discharge: HOME | End: 2024-08-30
Attending: EMERGENCY MEDICINE
Payer: COMMERCIAL

## 2024-08-30 VITALS
RESPIRATION RATE: 18 BRPM | SYSTOLIC BLOOD PRESSURE: 116 MMHG | DIASTOLIC BLOOD PRESSURE: 75 MMHG | HEART RATE: 72 BPM | OXYGEN SATURATION: 98 % | TEMPERATURE: 98.2 F

## 2024-08-30 DIAGNOSIS — J45.20 MILD INTERMITTENT ASTHMA, UNSPECIFIED WHETHER COMPLICATED: ICD-10-CM

## 2024-08-30 DIAGNOSIS — R06.00 DYSPNEA: ICD-10-CM

## 2024-08-30 DIAGNOSIS — R11.2 NAUSEA AND VOMITING: Primary | ICD-10-CM

## 2024-08-30 LAB
ANION GAP SERPL CALCULATED.3IONS-SCNC: 7 MMOL/L (ref 4–13)
ATRIAL RATE: 51 BPM
BASOPHILS # BLD AUTO: 0.05 THOUSANDS/ÂΜL (ref 0–0.1)
BASOPHILS NFR BLD AUTO: 1 % (ref 0–1)
BUN SERPL-MCNC: 9 MG/DL (ref 5–25)
CALCIUM SERPL-MCNC: 9.3 MG/DL (ref 8.4–10.2)
CARDIAC TROPONIN I PNL SERPL HS: <2 NG/L
CHLORIDE SERPL-SCNC: 107 MMOL/L (ref 96–108)
CO2 SERPL-SCNC: 24 MMOL/L (ref 21–32)
CREAT SERPL-MCNC: 0.88 MG/DL (ref 0.6–1.3)
EOSINOPHIL # BLD AUTO: 0.05 THOUSAND/ÂΜL (ref 0–0.61)
EOSINOPHIL NFR BLD AUTO: 1 % (ref 0–6)
ERYTHROCYTE [DISTWIDTH] IN BLOOD BY AUTOMATED COUNT: 14.5 % (ref 11.6–15.1)
EXT PREGNANCY TEST URINE: NEGATIVE
EXT. CONTROL: NORMAL
FLUAV RNA RESP QL NAA+PROBE: NEGATIVE
FLUBV RNA RESP QL NAA+PROBE: NEGATIVE
GFR SERPL CREATININE-BSD FRML MDRD: 90 ML/MIN/1.73SQ M
GLUCOSE SERPL-MCNC: 99 MG/DL (ref 65–140)
HCT VFR BLD AUTO: 37.9 % (ref 34.8–46.1)
HGB BLD-MCNC: 12.1 G/DL (ref 11.5–15.4)
IMM GRANULOCYTES # BLD AUTO: 0.03 THOUSAND/UL (ref 0–0.2)
IMM GRANULOCYTES NFR BLD AUTO: 0 % (ref 0–2)
LYMPHOCYTES # BLD AUTO: 1.63 THOUSANDS/ÂΜL (ref 0.6–4.47)
LYMPHOCYTES NFR BLD AUTO: 20 % (ref 14–44)
MCH RBC QN AUTO: 25.6 PG (ref 26.8–34.3)
MCHC RBC AUTO-ENTMCNC: 31.9 G/DL (ref 31.4–37.4)
MCV RBC AUTO: 80 FL (ref 82–98)
MONOCYTES # BLD AUTO: 0.38 THOUSAND/ÂΜL (ref 0.17–1.22)
MONOCYTES NFR BLD AUTO: 5 % (ref 4–12)
NEUTROPHILS # BLD AUTO: 6.04 THOUSANDS/ÂΜL (ref 1.85–7.62)
NEUTS SEG NFR BLD AUTO: 73 % (ref 43–75)
NRBC BLD AUTO-RTO: 0 /100 WBCS
P AXIS: 33 DEGREES
PLATELET # BLD AUTO: 263 THOUSANDS/UL (ref 149–390)
PMV BLD AUTO: 10.5 FL (ref 8.9–12.7)
POTASSIUM SERPL-SCNC: 3.8 MMOL/L (ref 3.5–5.3)
PR INTERVAL: 130 MS
QRS AXIS: 56 DEGREES
QRSD INTERVAL: 94 MS
QT INTERVAL: 440 MS
QTC INTERVAL: 405 MS
RBC # BLD AUTO: 4.73 MILLION/UL (ref 3.81–5.12)
RSV RNA RESP QL NAA+PROBE: NEGATIVE
SARS-COV-2 RNA RESP QL NAA+PROBE: NEGATIVE
SODIUM SERPL-SCNC: 138 MMOL/L (ref 135–147)
T WAVE AXIS: 56 DEGREES
VENTRICULAR RATE: 51 BPM
WBC # BLD AUTO: 8.18 THOUSAND/UL (ref 4.31–10.16)

## 2024-08-30 PROCEDURE — 93005 ELECTROCARDIOGRAM TRACING: CPT

## 2024-08-30 PROCEDURE — 96361 HYDRATE IV INFUSION ADD-ON: CPT

## 2024-08-30 PROCEDURE — 36415 COLL VENOUS BLD VENIPUNCTURE: CPT

## 2024-08-30 PROCEDURE — 93010 ELECTROCARDIOGRAM REPORT: CPT | Performed by: INTERNAL MEDICINE

## 2024-08-30 PROCEDURE — 94640 AIRWAY INHALATION TREATMENT: CPT

## 2024-08-30 PROCEDURE — 80048 BASIC METABOLIC PNL TOTAL CA: CPT

## 2024-08-30 PROCEDURE — 0241U HB NFCT DS VIR RESP RNA 4 TRGT: CPT

## 2024-08-30 PROCEDURE — 84484 ASSAY OF TROPONIN QUANT: CPT

## 2024-08-30 PROCEDURE — 85025 COMPLETE CBC W/AUTO DIFF WBC: CPT

## 2024-08-30 PROCEDURE — 99284 EMERGENCY DEPT VISIT MOD MDM: CPT

## 2024-08-30 PROCEDURE — 81025 URINE PREGNANCY TEST: CPT

## 2024-08-30 PROCEDURE — 96374 THER/PROPH/DIAG INJ IV PUSH: CPT

## 2024-08-30 PROCEDURE — 71046 X-RAY EXAM CHEST 2 VIEWS: CPT

## 2024-08-30 RX ORDER — ALBUTEROL SULFATE 0.83 MG/ML
2.5 SOLUTION RESPIRATORY (INHALATION) ONCE
Status: COMPLETED | OUTPATIENT
Start: 2024-08-30 | End: 2024-08-30

## 2024-08-30 RX ORDER — ONDANSETRON 2 MG/ML
4 INJECTION INTRAMUSCULAR; INTRAVENOUS ONCE
Status: COMPLETED | OUTPATIENT
Start: 2024-08-30 | End: 2024-08-30

## 2024-08-30 RX ORDER — ALBUTEROL SULFATE 90 UG/1
2 AEROSOL, METERED RESPIRATORY (INHALATION) EVERY 4 HOURS PRN
Qty: 18 G | Refills: 0 | Status: SHIPPED | OUTPATIENT
Start: 2024-08-30

## 2024-08-30 RX ORDER — IPRATROPIUM BROMIDE AND ALBUTEROL SULFATE .5; 3 MG/3ML; MG/3ML
1 SOLUTION RESPIRATORY (INHALATION) ONCE
Status: DISCONTINUED | OUTPATIENT
Start: 2024-08-30 | End: 2024-08-30

## 2024-08-30 RX ADMIN — SODIUM CHLORIDE 1000 ML: 0.9 INJECTION, SOLUTION INTRAVENOUS at 13:06

## 2024-08-30 RX ADMIN — ALBUTEROL SULFATE 2.5 MG: 2.5 SOLUTION RESPIRATORY (INHALATION) at 13:06

## 2024-08-30 RX ADMIN — ONDANSETRON 4 MG: 2 INJECTION INTRAMUSCULAR; INTRAVENOUS at 13:02

## 2024-08-30 RX ADMIN — IPRATROPIUM BROMIDE 0.5 MG: 0.5 SOLUTION RESPIRATORY (INHALATION) at 13:06

## 2024-08-30 NOTE — ED PROVIDER NOTES
History  Chief Complaint   Patient presents with    Vomiting     Pt reports nausea, vomiting, dizziness and cough x2 days.      This is a 24-year-old female with asthma who presents to the ED for evaluation of multiple complaints.  Patient does report nausea and vomiting since yesterday, reports vomiting approximately 3 times yesterday, once earlier this morning.  Does report she was able to eat this morning after vomiting.  She does also complain of intermittent dizziness which she denies any sensation of dizziness at time of ED evaluation.  She denies any balance issues or gait abnormality.  Ambulatory in the ED with a strong steady gait.  Does report intermittent dyspnea over the last couple days, does have an asthma history.  States she does have an inhaler at home but has not used it.  Does report associated nonproductive cough over approximately 2 days and runny nose.  She denies any headaches, vision changes, neck pain, back pain, chest pain, abdominal pain, diarrhea, dysuria.  She denies any recent prolonged travel, denies any recent trauma or surgeries, denies any known history of blood clots, denies any pain or swelling in either leg.          Prior to Admission Medications   Prescriptions Last Dose Informant Patient Reported? Taking?   ARIPiprazole (ABILIFY) 15 mg tablet   No No   Sig: Take 0.5 tablets (7.5 mg total) by mouth daily   Caplyta 21 MG CAPS   Yes No   Sig: Take 21 mg by mouth daily   acetaminophen (TYLENOL) 500 mg tablet   No No   Sig: Take 1 tablet (500 mg total) by mouth every 6 (six) hours as needed for mild pain   acetaminophen (TYLENOL) 500 mg tablet   No No   Sig: Take 1 tablet (500 mg total) by mouth every 6 (six) hours as needed for mild pain or moderate pain   albuterol (PROVENTIL HFA,VENTOLIN HFA) 90 mcg/act inhaler   No No   Sig: Inhale 2 puffs every 4 (four) hours as needed for wheezing   albuterol (PROVENTIL HFA,VENTOLIN HFA) 90 mcg/act inhaler   No Yes   Sig: Inhale 2 puffs every  4 (four) hours as needed for wheezing   aluminum-magnesium hydroxide-simethicone (MAALOX) 200-200-20 MG/5ML SUSP   No No   Sig: Take 30 mL by mouth 4 (four) times a day as needed for heartburn or cramping   Patient not taking: Reported on 7/20/2024   benzonatate (TESSALON PERLES) 100 mg capsule   No No   Sig: Take 1 capsule (100 mg total) by mouth 3 (three) times a day as needed for cough   Patient not taking: Reported on 7/20/2024   dicyclomine (BENTYL) 20 mg tablet   No No   Sig: Take 1 tablet (20 mg total) by mouth 4 (four) times a day as needed (abdominal cramping, diarrhea)   famotidine (PEPCID) 20 mg tablet   No No   Sig: Take 1 tablet (20 mg total) by mouth 2 (two) times a day   famotidine (PEPCID) 20 mg tablet   No No   Sig: Take 1 tablet (20 mg total) by mouth 2 (two) times a day   Patient not taking: Reported on 6/7/2024   famotidine (PEPCID) 20 mg tablet   No No   Sig: Take 1 tablet (20 mg total) by mouth 2 (two) times a day   Patient not taking: Reported on 6/7/2024   fluticasone (FLONASE) 50 mcg/act nasal spray   No No   Sig: SPRAY 1 SPRAY INTO EACH NOSTRIL EVERY DAY   gabapentin (NEURONTIN) 600 MG tablet   Yes No   Sig: Take 600 mg by mouth every evening   hydrOXYzine pamoate (VISTARIL) 50 mg capsule   Yes No   Sig: Take 50 mg by mouth 2 (two) times a day as needed for anxiety   ibuprofen (MOTRIN) 600 mg tablet   No No   Sig: Take 1 tablet (600 mg total) by mouth every 6 (six) hours as needed for mild pain   ipratropium-albuterol (DUO-NEB) 0.5-2.5 mg/3 mL nebulizer solution   No No   Sig: Take 3 mL by nebulization 4 (four) times a day   meclizine (ANTIVERT) 25 mg tablet   No No   Sig: Take 1 tablet (25 mg total) by mouth 3 (three) times a day as needed for dizziness   norgestimate-ethinyl estradiol (Sprintec 28) 0.25-35 MG-MCG per tablet   No No   Sig: Take 1 tablet by mouth daily   Patient not taking: Reported on 6/7/2024   ondansetron (ZOFRAN-ODT) 4 mg disintegrating tablet   No No   Sig: Take 1  tablet (4 mg total) by mouth every 6 (six) hours as needed for nausea or vomiting   sucralfate (CARAFATE) 1 g tablet   No No   Sig: Take 1 tablet (1 g total) by mouth 4 (four) times a day for 7 days   venlafaxine (EFFEXOR-XR) 37.5 mg 24 hr capsule   Yes No      Facility-Administered Medications: None       Past Medical History:   Diagnosis Date    ADHD     Anxiety     Asthma     Autism     Bipolar disorder (HCC)     Depression     GERD (gastroesophageal reflux disease)        Past Surgical History:   Procedure Laterality Date    CHOLANGIOGRAM N/A 12/01/2018    Procedure: CHOLANGIOGRAM;  Surgeon: Angelo Alcantara MD;  Location:  MAIN OR;  Service: General    CHOLECYSTECTOMY LAPAROSCOPIC N/A 12/01/2018    Procedure: CHOLECYSTECTOMY LAPAROSCOPIC;  Surgeon: Angelo Alcantara MD;  Location:  MAIN OR;  Service: General    EYE MUSCLE SURGERY Bilateral 2006    [place] ESOPHAGOGASTRODUODENOSCOPY TRANSORAL DIAGNOSTIC N/A 11/27/2018    Procedure: ESOPHAGOGASTRODUODENOSCOPY (EGD) with bx;  Surgeon: Rashmi Vazquez MD;  Location: [facility];  Service: General       Family History   Problem Relation Age of Onset    Breast cancer Mother     Heart attack Mother     Depression Mother     Mental illness Mother     Coronary artery disease Mother     Hypertension Mother     Diabetes Mother     Hyperlipidemia Mother     Pneumonia Brother     Hypertension Maternal Grandmother     Stroke Maternal Grandmother     Diabetes Maternal Grandmother     Glaucoma Maternal Grandmother     Kidney disease Maternal Grandmother     Sarcoidosis Maternal Grandmother     Diabetes Maternal Grandfather     Hypertension Maternal Grandfather     Stroke Maternal Grandfather     Glaucoma Maternal Grandfather     Heart attack Maternal Grandfather     Colon cancer Neg Hx     Ovarian cancer Neg Hx      I have reviewed and agree with the history as documented.    E-Cigarette/Vaping    E-Cigarette Use Never User      E-Cigarette/Vaping Substances    Nicotine No      THC No     CBD No     Flavoring No     Other No     Unknown No      Social History     Tobacco Use    Smoking status: Never     Passive exposure: Never    Smokeless tobacco: Never    Tobacco comments:     no passive smoke exposure   Vaping Use    Vaping status: Never Used   Substance Use Topics    Alcohol use: Never    Drug use: Never       Review of Systems   Constitutional:  Negative for chills and fever.   Eyes:  Negative for photophobia and visual disturbance.   Respiratory:  Positive for cough and shortness of breath.    Cardiovascular:  Negative for chest pain and palpitations.   Gastrointestinal:  Positive for nausea and vomiting. Negative for abdominal pain and diarrhea.   Genitourinary:  Negative for difficulty urinating, dysuria, flank pain and hematuria.   Musculoskeletal:  Negative for neck pain and neck stiffness.   Neurological:  Positive for dizziness. Negative for syncope, weakness, light-headedness and headaches.       Physical Exam  Physical Exam  Vitals and nursing note reviewed.   Constitutional:       General: She is not in acute distress.     Appearance: She is well-developed. She is not ill-appearing, toxic-appearing or diaphoretic.      Comments: Well-appearing patient on exam.  No signs of acute distress or significant discomfort at time of ED evaluation.   HENT:      Head: Normocephalic and atraumatic.   Eyes:      Extraocular Movements: Extraocular movements intact.      Right eye: Normal extraocular motion and no nystagmus.      Left eye: Normal extraocular motion and no nystagmus.      Conjunctiva/sclera: Conjunctivae normal.      Pupils: Pupils are equal, round, and reactive to light.   Cardiovascular:      Rate and Rhythm: Normal rate and regular rhythm.      Heart sounds: No murmur heard.  Pulmonary:      Effort: Pulmonary effort is normal. No tachypnea or respiratory distress.      Breath sounds: Normal breath sounds. No decreased air movement. No decreased breath sounds, wheezing,  rhonchi or rales.      Comments: No signs of tachypnea or conversational dyspnea.  Lungs are clear to auscultation bilaterally.  No signs of increased work of breathing.  Abdominal:      Palpations: Abdomen is soft.      Tenderness: There is no abdominal tenderness.   Musculoskeletal:         General: No swelling.      Cervical back: Normal range of motion and neck supple. No rigidity.      Right lower leg: No edema.      Left lower leg: No edema.   Skin:     General: Skin is warm and dry.      Capillary Refill: Capillary refill takes less than 2 seconds.   Neurological:      General: No focal deficit present.      Mental Status: She is alert and oriented to person, place, and time.      GCS: GCS eye subscore is 4. GCS verbal subscore is 5. GCS motor subscore is 6.      Cranial Nerves: Cranial nerves 2-12 are intact. No cranial nerve deficit.      Sensory: Sensation is intact.      Motor: Motor function is intact.      Coordination: Coordination is intact.      Gait: Gait is intact. Gait (Able to ambulate with a steady gait in the ED) normal.   Psychiatric:         Mood and Affect: Mood normal.         Vital Signs  ED Triage Vitals   Temperature Pulse Respirations Blood Pressure SpO2   08/30/24 1131 08/30/24 1131 08/30/24 1131 08/30/24 1131 08/30/24 1131   98.2 °F (36.8 °C) 66 16 120/65 97 %      Temp src Heart Rate Source Patient Position - Orthostatic VS BP Location FiO2 (%)   -- 08/30/24 1343 08/30/24 1433 08/30/24 1433 --    Monitor Sitting Right arm       Pain Score       --                  Vitals:    08/30/24 1131 08/30/24 1343 08/30/24 1433   BP: 120/65 114/73 116/75   Pulse: 66 76 72   Patient Position - Orthostatic VS:   Sitting         Visual Acuity      ED Medications  Medications   sodium chloride 0.9 % bolus 1,000 mL (0 mL Intravenous Stopped 8/30/24 1431)   ondansetron (ZOFRAN) injection 4 mg (4 mg Intravenous Given 8/30/24 1302)   albuterol inhalation solution 2.5 mg (2.5 mg Nebulization Given  8/30/24 1306)   ipratropium (ATROVENT) 0.02 % inhalation solution 0.5 mg (0.5 mg Nebulization Given 8/30/24 1306)       Diagnostic Studies  Results Reviewed       Procedure Component Value Units Date/Time    FLU/RSV/COVID - if FLU/RSV clinically relevant [[id number]]  (Normal) Collected: 08/30/24 1341    Lab Status: Final result Specimen: Nares from Nose Updated: 08/30/24 1425     SARS-CoV-2 Negative     INFLUENZA A PCR Negative     INFLUENZA B PCR Negative     RSV PCR Negative    Narrative:      This test has been performed using the CoV-2/Flu/RSV plus assay on the Pro Stream + GeneXpert platform. This test has been validated by the  and verified by the performing laboratory.     This test is designed to amplify and detect the following: nucleocapsid (N), envelope (E), and RNA-dependent RNA polymerase (RdRP) genes of the SARS-CoV-2 genome; matrix (M), basic polymerase (PB2), and acidic protein (PA) segments of the influenza A genome; matrix (M) and non-structural protein (NS) segments of the influenza B genome, and the nucleocapsid genes of RSV A and RSV B.     Positive results are indicative of the presence of Flu A, Flu B, RSV, and/or SARS-CoV-2 RNA. Positive results for SARS-CoV-2 or suspected novel influenza should be reported to state, local, or federal health departments according to local reporting requirements.      All results should be assessed in conjunction with clinical presentation and other laboratory markers for clinical management.     FOR PEDIATRIC PATIENTS - copy/paste COVID Guidelines URL to browser: [email or web address]       HS Troponin 0hr (reflex protocol) [[id number]]  (Normal) Collected: 08/30/24 1311    Lab Status: Final result Specimen: Blood from Arm, Left Updated: 08/30/24 1339     hs TnI 0hr <2 ng/L     Basic metabolic panel [[id number]] Collected: 08/30/24 1311    Lab Status: Final result Specimen: Blood from Arm, Left  Updated: 08/30/24 1339     Sodium 138 mmol/L      Potassium 3.8 mmol/L      Chloride 107 mmol/L      CO2 24 mmol/L      ANION GAP 7 mmol/L      BUN 9 mg/dL      Creatinine 0.88 mg/dL      Glucose 99 mg/dL      Calcium 9.3 mg/dL      eGFR 90 ml/min/1.73sq m     Narrative:      National Kid[employer] guidelines for Chronic Kidney Disease (CKD):     Stage 1 with normal or high GFR (GFR > 90 mL/min/1.73 square meters)    Stage 2 Mild CKD (GFR = 60-89 mL/min/1.73 square meters)    Stage 3A Moderate CKD (GFR = 45-59 mL/min/1.73 square meters)    Stage 3B Moderate CKD (GFR = 30-44 mL/min/1.73 square meters)    Stage 4 Severe CKD (GFR = 15-29 mL/min/1.73 square meters)    Stage 5 End Stage CKD (GFR <15 mL/min/1.73 square meters)  Note: GFR calculation is accurate only with a steady state creatinine    CBC and differential [[id number]]  (Abnormal) Collected: 08/30/24 1311    Lab Status: Final result Specimen: Blood from Arm, Left Updated: 08/30/24 1316     WBC 8.18 Thousand/uL      RBC 4.73 Million/uL      Hemoglobin 12.1 g/dL      Hematocrit 37.9 %      MCV 80 fL      MCH 25.6 pg      MCHC 31.9 g/dL      RDW 14.5 %      MPV 10.5 fL      Platelets 263 Thousands/uL      nRBC 0 /100 WBCs      Segmented % 73 %      Immature Grans % 0 %      Lymphocytes % 20 %      Monocytes % 5 %      Eosinophils Relative 1 %      Basophils Relative 1 %      Absolute Neutrophils 6.04 Thousands/µL      Absolute Immature Grans 0.03 Thousand/uL      Absolute Lymphocytes 1.63 Thousands/µL      Absolute Monocytes 0.38 Thousand/µL      Eosinophils Absolute 0.05 Thousand/µL      Basophils Absolute 0.05 Thousands/µL     POCT pregnancy, urine [[id number]]  (Normal) Resulted: 08/30/24 1235    Lab Status: Final result Updated: 08/30/24 1235     EXT Preg Test, Ur Negative     Control Valid                   XR chest 2 views   ED Interpretation by Tano Gardner PA-C (08/30 1322)   ED Interpretation: No acute cardiopulmonary disease.      Final  Result by Je Pedro MD (08/30 1446)      No acute cardiopulmonary disease.            Resident: La Yañez I, the attending radiologist, have reviewed the images and agree with the final report above.      Workstation performed: [id number]                    Procedures  ECG 12 Lead Documentation Only    Date/Time: 8/30/2024 1:16 PM    Performed by: Tano Gardner PA-C  Authorized by: Tano Gardner PA-C    Patient location:  ED  Previous ECG:     Previous ECG:  Compared to current    Similarity:  No change  Rate:     ECG rate:  51    ECG rate assessment: bradycardic    Rhythm:     Rhythm: sinus bradycardia    Ectopy:     Ectopy: none    QRS:     QRS axis:  Normal  Conduction:     Conduction: normal    ST segments:     ST segments:  Normal  T waves:     T waves: normal             ED Course  ED Course as of 08/30/24 1624   Fri Aug 30, 2024   1427 Patient  reports improvement in symptoms at this time.  Denies any sensation nausea or SOB.  Is asking for refill of her home inhaler.                         PERC Rule for PE      Flowsheet Row Most Recent Value   PERC Rule for PE    Age >=50 0 Filed at: 08/30/2024 1423   HR >=100 0 Filed at: 08/30/2024 1423   O2 Sat on room air < 95% 0 Filed at: 08/30/2024 1423   History of PE or DVT 0 Filed at: 08/30/2024 1423   Recent trauma or surgery 0 Filed at: 08/30/2024 1423   Hemoptysis 0 Filed at: 08/30/2024 1423   Exogenous estrogen 0 Filed at: 08/30/2024 1423   Unilateral leg swelling 0 Filed at: 08/30/2024 1423   PERC Rule for PE Results 0 Filed at: 08/30/2024 1423                SBIRT 20yo+      Flowsheet Row Most Recent Value   Initial Alcohol Screen: US AUDIT-C     1. How often do you have a drink containing alcohol? 0 Filed at: 08/30/2024 1303   2. How many drinks containing alcohol do you have on a typical day you are drinking?  0 Filed at: 08/30/2024 1303   3a. Male UNDER 65: How often do you have five or more drinks on one occasion? 0 Filed at: 08/30/2024  1303   3b. FEMALE Any Age, or MALE 65+: How often do you have 4 or more drinks on one occassion? 0 Filed at: 08/30/2024 1303   Audit-C Score 0 Filed at: 08/30/2024 1303   AILYN: How many times in the past year have you...    Used an illegal drug or used a prescription medication for non-medical reasons? Never Filed at: 08/30/2024 1303                      Medical Decision Making  24-year-old female presents to the ED for evaluation of nausea and vomiting since yesterday, also complains of dyspnea.  Patient afebrile with normal vital signs in ED, well-appearing on exam.  Labs acutely unremarkable, ED interpretation of CXR showed no acute cardiopulmonary disease.  Patient reported improvement of dyspnea status post neb treatment, does have a history of asthma.  She denied any sensation of nausea, or SOB at time of ED discharge.  Patient's inhaler refilled at her request.  Patient vies follow-up with her PCP for further evaluation and management.  ED return precautions discussed with patient.  Patient verbalized understanding and agreement with plan.    Amount and/or Complexity of Data Reviewed  Labs: ordered.  Radiology: ordered and independent interpretation performed.    Risk  Prescription drug management.                 Disposition  Final diagnoses:   Nausea and vomiting   Dyspnea     Time reflects when diagnosis was documented in both MDM as applicable and the Disposition within this note       Time User Action Codes Description Comment    8/30/2024  2:26 PM Tano Gardner Add [J45.20] Mild intermittent asthma, unspecified whether complicated     8/30/2024  2:27 PM Tano Gardner Add [R11.2] Nausea and vomiting     8/30/2024  2:28 PM Tano Gardner [R06.00] Dyspnea           ED Disposition       ED Disposition   Discharge    Condition   Stable    Date/Time   Fri Aug 30, 2024 1428    Comment   Trini Olivarez discharge to home/self care.                   Follow-up Information       Follow up With Specialties  Details Why Contact Info    Jay Price MD Family Medicine Schedule an appointment as soon as possible for a visit  For re-check [id number] [place] [id number] [phone]              Discharge Medication List as of 8/30/2024  2:28 PM        CONTINUE these medications which have CHANGED    Details   albuterol (PROVENTIL HFA,VENTOLIN HFA) 90 mcg/act inhaler Inhale 2 puffs every 4 (four) hours as needed for wheezing, Starting Fri 8/30/2024, Normal           CONTINUE these medications which have NOT CHANGED    Details   !! acetaminophen (TYLENOL) 500 mg tablet Take 1 tablet (500 mg total) by mouth every 6 (six) hours as needed for mild pain, Starting Sun 3/10/2024, Normal      !! acetaminophen (TYLENOL) 500 mg tablet Take 1 tablet (500 mg total) by mouth every 6 (six) hours as needed for mild pain or moderate pain, Starting Thu 8/15/2024, Normal      aluminum-magnesium hydroxide-simethicone (MAALOX) 200-200-20 MG/5ML SUSP Take 30 mL by mouth 4 (four) times a day as needed for heartburn or cramping, Starting Mon 1/8/2024, Normal      ARIPiprazole (ABILIFY) 15 mg tablet Take 0.5 tablets (7.5 mg total) by mouth daily, Starting Sun 6/18/2023, Until Tue 7/18/2023, Normal      benzonatate (TESSALON PERLES) 100 mg capsule Take 1 capsule (100 mg total) by mouth 3 (three) times a day as needed for cough, Starting Fri 1/12/2024, Normal      Caplyta 21 MG CAPS Take 21 mg by mouth daily, Starting Thu 2/29/2024, Historical Med      dicyclomine (BENTYL) 20 mg tablet Take 1 tablet (20 mg total) by mouth 4 (four) times a day as needed (abdominal cramping, diarrhea), Starting Wed 12/13/2023, Print      !! famotidine (PEPCID) 20 mg tablet Take 1 tablet (20 mg total) by mouth 2 (two) times a day, Starting Thu 7/18/2019, Normal      !! famotidine (PEPCID) 20 mg tablet Take 1 tablet (20 mg total) by mouth 2 (two) times a day, Starting Wed 12/13/2023, Normal      !! famotidine (PEPCID) 20 mg tablet Take 1 tablet (20 mg  total) by mouth 2 (two) times a day, Starting Mon 1/8/2024, Normal      fluticasone (FLONASE) 50 mcg/act nasal spray SPRAY 1 SPRAY INTO EACH NOSTRIL EVERY DAY, Normal      gabapentin (NEURONTIN) 600 MG tablet Take 600 mg by mouth every evening, Starting Mon 6/19/2023, Historical Med      hydrOXYzine pamoate (VISTARIL) 50 mg capsule Take 50 mg by mouth 2 (two) times a day as needed for anxiety, Starting Tue 1/23/2024, Historical Med      ibuprofen (MOTRIN) 600 mg tablet Take 1 tablet (600 mg total) by mouth every 6 (six) hours as needed for mild pain, Starting Sun 3/10/2024, Normal      ipratropium-albuterol (DUO-NEB) 0.5-2.5 mg/3 mL nebulizer solution Take 3 mL by nebulization 4 (four) times a day, Starting Thu 8/15/2024, Normal      meclizine (ANTIVERT) 25 mg tablet Take 1 tablet (25 mg total) by mouth 3 (three) times a day as needed for dizziness, Starting Thu 8/15/2024, Normal      norgestimate-ethinyl estradiol (Sprintec 28) 0.25-35 MG-MCG per tablet Take 1 tablet by mouth daily, Starting Fri 9/1/2023, Normal      ondansetron (ZOFRAN-ODT) 4 mg disintegrating tablet Take 1 tablet (4 mg total) by mouth every 6 (six) hours as needed for nausea or vomiting, Starting Tue 8/20/2024, Normal      sucralfate (CARAFATE) 1 g tablet Take 1 tablet (1 g total) by mouth 4 (four) times a day for 7 days, Starting Mon 1/8/2024, Until Mon 1/15/2024, Normal      venlafaxine (EFFEXOR-XR) 37.5 mg 24 hr capsule Starting Wed 7/5/2023, Historical Med       !! - Potential duplicate medications found. Please discuss with provider.          No discharge procedures on file.    PDMP Review       None            ED Provider  Electronically Signed by             Tano Gardner PA-C  08/30/24 [id number]

## 2024-08-30 NOTE — DISCHARGE INSTRUCTIONS
Please follow-up with your primary care provider for further evaluation and management.  Return to the ED if you develop any new or worsening symptoms as discussed prior to discharge.

## 2024-09-20 ENCOUNTER — OFFICE VISIT (OUTPATIENT)
Dept: OBGYN CLINIC | Facility: CLINIC | Age: 24
End: 2024-09-20

## 2024-09-20 VITALS
HEIGHT: 58 IN | SYSTOLIC BLOOD PRESSURE: 137 MMHG | HEART RATE: 78 BPM | WEIGHT: 184.8 LBS | BODY MASS INDEX: 38.79 KG/M2 | DIASTOLIC BLOOD PRESSURE: 87 MMHG

## 2024-09-20 DIAGNOSIS — Z30.46 NEXPLANON REMOVAL: Primary | ICD-10-CM

## 2024-09-20 PROCEDURE — 11982 REMOVE DRUG IMPLANT DEVICE: CPT | Performed by: OBSTETRICS & GYNECOLOGY

## 2024-09-20 RX ORDER — LIDOCAINE HYDROCHLORIDE 20 MG/ML
2 INJECTION, SOLUTION EPIDURAL; INFILTRATION; INTRACAUDAL; PERINEURAL ONCE
Status: COMPLETED | OUTPATIENT
Start: 2024-09-20 | End: 2024-09-20

## 2024-09-20 RX ADMIN — LIDOCAINE HYDROCHLORIDE 2 ML: 20 INJECTION, SOLUTION EPIDURAL; INFILTRATION; INTRACAUDAL; PERINEURAL at 10:59

## 2024-09-20 NOTE — PROGRESS NOTES
"Subjective:     Trini Olivarez is a 24 y.o.  female who presents for Nexplanon removal. She has had it in place for 2 years. She reports she is no longer in need of birth control because she is currently single so she would like it removed.  Objective:    Vitals: Blood pressure 137/87, pulse 78, height 4' 10\" (1.473 m), weight 83.8 kg (184 lb 12.8 oz), last menstrual period 2024, not currently breastfeeding.Body mass index is 38.62 kg/m².    Physical Exam  Constitutional:       Appearance: She is well-developed.   Cardiovascular:      Rate and Rhythm: Normal rate and regular rhythm.      Heart sounds: Normal heart sounds. No murmur heard.     No friction rub. No gallop.   Pulmonary:      Effort: Pulmonary effort is normal. No respiratory distress.      Breath sounds: No wheezing.   Abdominal:      Palpations: Abdomen is soft.      Tenderness: There is no abdominal tenderness.   Musculoskeletal:         General: No tenderness.   Neurological:      Mental Status: She is alert and oriented to person, place, and time.   Vitals reviewed.       Universal Protocol:  Consent: Verbal consent obtained. Written consent obtained.  Risks and benefits: risks, benefits and alternatives were discussed  Consent given by: patient  Patient understanding: patient states understanding of the procedure being performed  Patient consent: the patient's understanding of the procedure matches consent given  Procedure consent: procedure consent matches procedure scheduled  Relevant documents: relevant documents present and verified  Required items: required blood products, implants, devices, and special equipment available  Patient identity confirmed: verbally with patient  Remove and insert drug implant    Date/Time: 2024 10:45 AM    Performed by: Linette Ascencio MD  Authorized by: Linette Ascencio MD    Indication:     Indication: Presence of non-biodegradable drug delivery implant    Pre-procedure:     Prepped with: " povidone-iodine      Local anesthetic:  Lidocaine 1%    The site was cleaned and prepped in a sterile fashion: yes    Procedure:     Procedure:  Removal    Small stab incision was made in arm: yes      Left/right:  Left    Site was closed with steri-strips and pressure bandage applied: yes          Assessment/Plan:    Problem List Items Addressed This Visit    None  Visit Diagnoses       Nexplanon removal    -  Primary    Relevant Medications    lidocaine (PF) (XYLOCAINE-MPF) 2 % injection 2 mL (Start on 9/20/2024 11:00 AM)    Other Relevant Orders    Remove and insert drug implant              Linette Ascencio MD  9/20/2024  10:58 AM

## 2024-10-29 ENCOUNTER — TELEPHONE (OUTPATIENT)
Dept: OBGYN CLINIC | Facility: CLINIC | Age: 24
End: 2024-10-29

## 2024-10-29 DIAGNOSIS — N93.9 ABNORMAL UTERINE BLEEDING: ICD-10-CM

## 2024-10-29 RX ORDER — NORGESTIMATE AND ETHINYL ESTRADIOL 0.25-0.035
1 KIT ORAL DAILY
Qty: 84 TABLET | Refills: 3 | Status: SHIPPED | OUTPATIENT
Start: 2024-10-29 | End: 2025-01-21

## 2024-10-29 NOTE — TELEPHONE ENCOUNTER
"Patient called and  left voicemail:     \"Hi, this is Ifeoma Olivarez, [birth year]. I'd like to schedule an appointment for birth control pills. My phone number is [phone].\"    Returned patient call and patient stated she would like to schedule an appointment to be prescribed Oral Contraceptive pills. Please advise if patient is to come in for a consult or if provider can call pt and prescribe OCP after phone consultation.     "

## 2024-11-10 ENCOUNTER — OFFICE VISIT (OUTPATIENT)
Dept: URGENT CARE | Age: 24
End: 2024-11-10
Payer: COMMERCIAL

## 2024-11-10 VITALS
TEMPERATURE: 97.9 F | SYSTOLIC BLOOD PRESSURE: 118 MMHG | RESPIRATION RATE: 18 BRPM | DIASTOLIC BLOOD PRESSURE: 70 MMHG | OXYGEN SATURATION: 97 % | HEART RATE: 84 BPM

## 2024-11-10 DIAGNOSIS — J20.9 ACUTE BRONCHITIS, UNSPECIFIED ORGANISM: Primary | ICD-10-CM

## 2024-11-10 PROCEDURE — 99213 OFFICE O/P EST LOW 20 MIN: CPT

## 2024-11-10 RX ORDER — AZITHROMYCIN 250 MG/1
TABLET, FILM COATED ORAL
Qty: 6 TABLET | Refills: 0 | Status: SHIPPED | OUTPATIENT
Start: 2024-11-10 | End: 2024-11-14

## 2024-11-10 NOTE — PATIENT INSTRUCTIONS
Start antibiotic as prescribed  Vitamin D3 2000 IU daily  Vitamin C 1000mg twice per day  Multivitamin daily  Fluids and rest  Over the counter cold medication as needed (EX: Mucinex, tylenol/motrin)  Follow up with PCP in 3-5 days.  Proceed to ER if symptoms worsen.    Eat yogurt with live and active cultures and/or take a probiotic at least 3 hours before or after antibiotic dose. Monitor stool for diarrhea and/or blood. If this occurs, contact primary care doctor ASAP.

## 2024-11-10 NOTE — PROGRESS NOTES
[facility]        NAME: Trini Olivarez is a 24 y.o. female  : 2000    MRN: [id number]  DATE: November 10, 2024  TIME: 3:38 PM    Assessment and Plan   Acute bronchitis, unspecified organism [J20.9]  1. Acute bronchitis, unspecified organism  azithromycin (ZITHROMAX) 250 mg tablet            Patient Instructions     Start antibiotic as prescribed  Vitamin D3 2000 IU daily  Vitamin C 1000mg twice per day  Multivitamin daily  Fluids and rest  Over the counter cold medication as needed (EX: Mucinex, tylenol/motrin)  Follow up with PCP in 3-5 days.  Proceed to ER if symptoms worsen.    Eat yogurt with live and active cultures and/or take a probiotic at least 3 hours before or after antibiotic dose. Monitor stool for diarrhea and/or blood. If this occurs, contact primary care doctor ASAP.    If tests are performed, our office will contact you with results only if changes need to made to the care plan discussed with you at the visit. You can review your full results on [facility].    Chief Complaint     Chief Complaint   Patient presents with    Fever     Patient reports fever, cough, congestion X 1 week. She reports she has tried many OTC medications with no relief.         History of Present Illness       Patient is a 23 yo female with significant PMH of asthma and autism spectrum disorder presenting in the clinic today for cold sx x 1 week. Admits fever (Tmax 100.2), body aches, cough, congestion, left ear pain, headache, and diarrhea. Patient notes her sx are unchanged since onset. Denies chills, sore throat, sinus pain/pressure, dizziness, chest pain, SOB, wheezing, abdominal pain, n/v. Admits the use of Mucinex, ibuprofen, and tylenol for sx management. Denies recent sick contacts.        Review of Systems   Review of Systems   Constitutional:  Positive for fever. Negative for chills and fatigue.   HENT:  Positive for congestion and ear pain. Negative for postnasal drip, rhinorrhea, sinus  pressure, sinus pain and sore throat.    Respiratory:  Positive for cough. Negative for shortness of breath.    Cardiovascular:  Negative for chest pain.   Gastrointestinal:  Positive for diarrhea. Negative for abdominal pain, nausea and vomiting.   Musculoskeletal:  Negative for myalgias.   Skin:  Negative for rash.   Neurological:  Positive for headaches. Negative for dizziness.         Current Medications       Current Outpatient Medications:     acetaminophen (TYLENOL) 500 mg tablet, Take 1 tablet (500 mg total) by mouth every 6 (six) hours as needed for mild pain, Disp: 30 tablet, Rfl: 0    acetaminophen (TYLENOL) 500 mg tablet, Take 1 tablet (500 mg total) by mouth every 6 (six) hours as needed for mild pain or moderate pain, Disp: 30 tablet, Rfl: 0    albuterol (PROVENTIL HFA,VENTOLIN HFA) 90 mcg/act inhaler, Inhale 2 puffs every 4 (four) hours as needed for wheezing, Disp: 18 g, Rfl: 0    azithromycin (ZITHROMAX) 250 mg tablet, Take 2 tablets today then 1 tablet daily x 4 days, Disp: 6 tablet, Rfl: 0    Caplyta 21 MG CAPS, Take 21 mg by mouth daily, Disp: , Rfl:     dicyclomine (BENTYL) 20 mg tablet, Take 1 tablet (20 mg total) by mouth 4 (four) times a day as needed (abdominal cramping, diarrhea), Disp: 12 tablet, Rfl: 0    famotidine (PEPCID) 20 mg tablet, Take 1 tablet (20 mg total) by mouth 2 (two) times a day, Disp: 28 tablet, Rfl: 1    fluticasone (FLONASE) 50 mcg/act nasal spray, SPRAY 1 SPRAY INTO EACH NOSTRIL EVERY DAY, Disp: 48 mL, Rfl: 1    gabapentin (NEURONTIN) 600 MG tablet, Take 600 mg by mouth every evening, Disp: , Rfl:     hydrOXYzine pamoate (VISTARIL) 50 mg capsule, Take 50 mg by mouth 2 (two) times a day as needed for anxiety, Disp: , Rfl:     ibuprofen (MOTRIN) 600 mg tablet, Take 1 tablet (600 mg total) by mouth every 6 (six) hours as needed for mild pain, Disp: 30 tablet, Rfl: 0    meclizine (ANTIVERT) 25 mg tablet, Take 1 tablet (25 mg total) by mouth 3 (three) times a day as needed  for dizziness, Disp: 30 tablet, Rfl: 0    norgestimate-ethinyl estradiol (Sprintec 28) 0.25-35 MG-MCG per tablet, Take 1 tablet by mouth daily, Disp: 84 tablet, Rfl: 3    ondansetron (ZOFRAN-ODT) 4 mg disintegrating tablet, Take 1 tablet (4 mg total) by mouth every 6 (six) hours as needed for nausea or vomiting, Disp: 20 tablet, Rfl: 0    venlafaxine (EFFEXOR-XR) 37.5 mg 24 hr capsule, , Disp: , Rfl:     aluminum-magnesium hydroxide-simethicone (MAALOX) 200-200-20 MG/5ML SUSP, Take 30 mL by mouth 4 (four) times a day as needed for heartburn or cramping (Patient not taking: Reported on 7/20/2024), Disp: 355 mL, Rfl: 0    ARIPiprazole (ABILIFY) 15 mg tablet, Take 0.5 tablets (7.5 mg total) by mouth daily (Patient not taking: Reported on 9/20/2024), Disp: 15 tablet, Rfl: 0    benzonatate (TESSALON PERLES) 100 mg capsule, Take 1 capsule (100 mg total) by mouth 3 (three) times a day as needed for cough (Patient not taking: Reported on 7/20/2024), Disp: 20 capsule, Rfl: 0    famotidine (PEPCID) 20 mg tablet, Take 1 tablet (20 mg total) by mouth 2 (two) times a day (Patient not taking: Reported on 6/7/2024), Disp: 28 tablet, Rfl: 0    famotidine (PEPCID) 20 mg tablet, Take 1 tablet (20 mg total) by mouth 2 (two) times a day (Patient not taking: Reported on 6/7/2024), Disp: 30 tablet, Rfl: 0    ipratropium-albuterol (DUO-NEB) 0.5-2.5 mg/3 mL nebulizer solution, Take 3 mL by nebulization 4 (four) times a day (Patient not taking: Reported on 9/20/2024), Disp: 3 mL, Rfl: 0    sucralfate (CARAFATE) 1 g tablet, Take 1 tablet (1 g total) by mouth 4 (four) times a day for 7 days, Disp: 28 tablet, Rfl: 0    Current Allergies     Allergies as of 11/10/2024    (No Known Allergies)            The following portions of the patient's history were reviewed and updated as appropriate: allergies, current medications, past family history, past medical history, past social history, past surgical history and problem list.     Past Medical  History:   Diagnosis Date    ADHD     Anxiety     Asthma     Autism     Bipolar disorder (HCC)     Depression     GERD (gastroesophageal reflux disease)        Past Surgical History:   Procedure Laterality Date    CHOLANGIOGRAM N/A 12/01/2018    Procedure: CHOLANGIOGRAM;  Surgeon: Angelo Alcantara MD;  Location: [facility];  Service: General    CHOLECYSTECTOMY LAPAROSCOPIC N/A 12/01/2018    Procedure: CHOLECYSTECTOMY LAPAROSCOPIC;  Surgeon: Angelo Alcantara MD;  Location:  MAIN OR;  Service: General    EYE MUSCLE SURGERY Bilateral 2006    IL ESOPHAGOGASTRODUODENOSCOPY TRANSORAL DIAGNOSTIC N/A 11/27/2018    Procedure: ESOPHAGOGASTRODUODENOSCOPY (EGD) with bx;  Surgeon: Rashmi Vazquez MD;  Location: [facility];  Service: General       Family History   Problem Relation Age of Onset    Breast cancer Mother     Heart attack Mother     Depression Mother     Mental illness Mother     Coronary artery disease Mother     Hypertension Mother     Diabetes Mother     Hyperlipidemia Mother     Pneumonia Brother     Hypertension Maternal Grandmother     Stroke Maternal Grandmother     Diabetes Maternal Grandmother     Glaucoma Maternal Grandmother     Kidney disease Maternal Grandmother     Sarcoidosis Maternal Grandmother     Diabetes Maternal Grandfather     Hypertension Maternal Grandfather     Stroke Maternal Grandfather     Glaucoma Maternal Grandfather     Heart attack Maternal Grandfather     Colon cancer Neg Hx     Ovarian cancer Neg Hx          Medications have been verified.        Objective   /70   Pulse 84   Temp 97.9 °F (36.6 °C)   Resp 18   SpO2 97%        Physical Exam     Physical Exam  Vitals reviewed.   Constitutional:       General: She is not in acute distress.     Appearance: She is obese. She is not ill-appearing.      Comments: Poorly groomed.   HENT:      Head: Normocephalic.      Right Ear: Hearing, tympanic membrane, ear canal and external ear normal. No middle ear effusion. There is no  impacted cerumen. Tympanic membrane is not erythematous or bulging.      Left Ear: Hearing, tympanic membrane, ear canal and external ear normal.  No middle ear effusion. There is no impacted cerumen. Tympanic membrane is not erythematous or bulging.      Nose: Nose normal. No congestion or rhinorrhea.      Right Sinus: No maxillary sinus tenderness or frontal sinus tenderness.      Left Sinus: No maxillary sinus tenderness or frontal sinus tenderness.      Mouth/Throat:      Lips: Pink.      Mouth: Mucous membranes are moist.      Pharynx: Oropharynx is clear. Uvula midline. No pharyngeal swelling, oropharyngeal exudate, posterior oropharyngeal erythema or uvula swelling.      Tonsils: No tonsillar exudate or tonsillar abscesses. 1+ on the right. 1+ on the left.   Eyes:      General:         Right eye: No discharge.         Left eye: No discharge.      Conjunctiva/sclera: Conjunctivae normal.   Cardiovascular:      Rate and Rhythm: Normal rate and regular rhythm.      Pulses: Normal pulses.      Heart sounds: Normal heart sounds. No murmur heard.     No friction rub. No gallop.   Pulmonary:      Effort: Pulmonary effort is normal. No respiratory distress.      Breath sounds: Normal breath sounds. No stridor. No wheezing, rhonchi or rales.   Musculoskeletal:      Cervical back: Normal range of motion and neck supple. No tenderness.   Lymphadenopathy:      Cervical: No cervical adenopathy.   Skin:     General: Skin is warm.      Findings: No rash.   Neurological:      Mental Status: She is alert.   Psychiatric:         Mood and Affect: Mood normal.         Behavior: Behavior normal.

## 2024-11-18 ENCOUNTER — APPOINTMENT (EMERGENCY)
Dept: CT IMAGING | Facility: HOSPITAL | Age: 24
End: 2024-11-18
Payer: COMMERCIAL

## 2024-11-18 ENCOUNTER — HOSPITAL ENCOUNTER (EMERGENCY)
Facility: HOSPITAL | Age: 24
Discharge: HOME | End: 2024-11-18
Attending: EMERGENCY MEDICINE
Payer: COMMERCIAL

## 2024-11-18 VITALS
TEMPERATURE: 98 F | RESPIRATION RATE: 16 BRPM | OXYGEN SATURATION: 100 % | DIASTOLIC BLOOD PRESSURE: 84 MMHG | BODY MASS INDEX: 37.23 KG/M2 | SYSTOLIC BLOOD PRESSURE: 167 MMHG | HEART RATE: 82 BPM | WEIGHT: 178.13 LBS

## 2024-11-18 DIAGNOSIS — R11.2 NAUSEA AND VOMITING: Primary | ICD-10-CM

## 2024-11-18 DIAGNOSIS — R19.7 DIARRHEA: ICD-10-CM

## 2024-11-18 LAB
ALBUMIN SERPL BCG-MCNC: 4.6 G/DL (ref 3.5–5)
ALP SERPL-CCNC: 90 U/L (ref 34–104)
ALT SERPL W P-5'-P-CCNC: 198 U/L (ref 7–52)
ANION GAP SERPL CALCULATED.3IONS-SCNC: 10 MMOL/L (ref 4–13)
AST SERPL W P-5'-P-CCNC: 78 U/L (ref 13–39)
BACTERIA UR QL AUTO: ABNORMAL /HPF
BASOPHILS # BLD AUTO: 0.06 THOUSANDS/ÂΜL (ref 0–0.1)
BASOPHILS NFR BLD AUTO: 0 % (ref 0–1)
BILIRUB SERPL-MCNC: 0.46 MG/DL (ref 0.2–1)
BILIRUB UR QL STRIP: ABNORMAL
BUN SERPL-MCNC: 11 MG/DL (ref 5–25)
CALCIUM SERPL-MCNC: 9.2 MG/DL (ref 8.4–10.2)
CHLORIDE SERPL-SCNC: 103 MMOL/L (ref 96–108)
CLARITY UR: CLEAR
CO2 SERPL-SCNC: 23 MMOL/L (ref 21–32)
COLOR UR: YELLOW
CREAT SERPL-MCNC: 0.83 MG/DL (ref 0.6–1.3)
EOSINOPHIL # BLD AUTO: 0.11 THOUSAND/ÂΜL (ref 0–0.61)
EOSINOPHIL NFR BLD AUTO: 1 % (ref 0–6)
ERYTHROCYTE [DISTWIDTH] IN BLOOD BY AUTOMATED COUNT: 14.3 % (ref 11.6–15.1)
EXT PREGNANCY TEST URINE: NEGATIVE
EXT. CONTROL: NORMAL
FLUAV AG UPPER RESP QL IA.RAPID: NEGATIVE
FLUBV AG UPPER RESP QL IA.RAPID: NEGATIVE
GFR SERPL CREATININE-BSD FRML MDRD: 97 ML/MIN/1.73SQ M
GLUCOSE SERPL-MCNC: 100 MG/DL (ref 65–140)
GLUCOSE SERPL-MCNC: 108 MG/DL (ref 65–140)
GLUCOSE UR STRIP-MCNC: NEGATIVE MG/DL
HCT VFR BLD AUTO: 37.8 % (ref 34.8–46.1)
HGB BLD-MCNC: 12.3 G/DL (ref 11.5–15.4)
HGB UR QL STRIP.AUTO: NEGATIVE
IMM GRANULOCYTES # BLD AUTO: 0.08 THOUSAND/UL (ref 0–0.2)
IMM GRANULOCYTES NFR BLD AUTO: 1 % (ref 0–2)
KETONES UR STRIP-MCNC: ABNORMAL MG/DL
LACTATE SERPL-SCNC: 0.9 MMOL/L (ref 0.5–2)
LEUKOCYTE ESTERASE UR QL STRIP: NEGATIVE
LIPASE SERPL-CCNC: 16 U/L (ref 11–82)
LYMPHOCYTES # BLD AUTO: 1.76 THOUSANDS/ÂΜL (ref 0.6–4.47)
LYMPHOCYTES NFR BLD AUTO: 12 % (ref 14–44)
MCH RBC QN AUTO: 26.7 PG (ref 26.8–34.3)
MCHC RBC AUTO-ENTMCNC: 32.5 G/DL (ref 31.4–37.4)
MCV RBC AUTO: 82 FL (ref 82–98)
MONOCYTES # BLD AUTO: 0.58 THOUSAND/ÂΜL (ref 0.17–1.22)
MONOCYTES NFR BLD AUTO: 4 % (ref 4–12)
MUCOUS THREADS UR QL AUTO: ABNORMAL
NEUTROPHILS # BLD AUTO: 12.23 THOUSANDS/ÂΜL (ref 1.85–7.62)
NEUTS SEG NFR BLD AUTO: 82 % (ref 43–75)
NITRITE UR QL STRIP: NEGATIVE
NON-SQ EPI CELLS URNS QL MICRO: ABNORMAL /HPF
NRBC BLD AUTO-RTO: 0 /100 WBCS
PH UR STRIP.AUTO: 6 [PH] (ref 4.5–8)
PLATELET # BLD AUTO: 402 THOUSANDS/UL (ref 149–390)
PMV BLD AUTO: 9.9 FL (ref 8.9–12.7)
POTASSIUM SERPL-SCNC: 3.1 MMOL/L (ref 3.5–5.3)
PROT SERPL-MCNC: 7.5 G/DL (ref 6.4–8.4)
PROT UR STRIP-MCNC: ABNORMAL MG/DL
RBC # BLD AUTO: 4.6 MILLION/UL (ref 3.81–5.12)
RBC #/AREA URNS AUTO: ABNORMAL /HPF
SARS-COV+SARS-COV-2 AG RESP QL IA.RAPID: NEGATIVE
SODIUM SERPL-SCNC: 136 MMOL/L (ref 135–147)
SP GR UR STRIP.AUTO: >=1.03 (ref 1–1.03)
UROBILINOGEN UR QL STRIP.AUTO: 1 E.U./DL
WBC # BLD AUTO: 14.82 THOUSAND/UL (ref 4.31–10.16)
WBC #/AREA URNS AUTO: ABNORMAL /HPF

## 2024-11-18 PROCEDURE — 96361 HYDRATE IV INFUSION ADD-ON: CPT

## 2024-11-18 PROCEDURE — 85025 COMPLETE CBC W/AUTO DIFF WBC: CPT | Performed by: EMERGENCY MEDICINE

## 2024-11-18 PROCEDURE — 87811 SARS-COV-2 COVID19 W/OPTIC: CPT

## 2024-11-18 PROCEDURE — 36415 COLL VENOUS BLD VENIPUNCTURE: CPT

## 2024-11-18 PROCEDURE — 99284 EMERGENCY DEPT VISIT MOD MDM: CPT

## 2024-11-18 PROCEDURE — 83690 ASSAY OF LIPASE: CPT | Performed by: EMERGENCY MEDICINE

## 2024-11-18 PROCEDURE — 96375 TX/PRO/DX INJ NEW DRUG ADDON: CPT

## 2024-11-18 PROCEDURE — 87804 INFLUENZA ASSAY W/OPTIC: CPT

## 2024-11-18 PROCEDURE — 81001 URINALYSIS AUTO W/SCOPE: CPT

## 2024-11-18 PROCEDURE — 96374 THER/PROPH/DIAG INJ IV PUSH: CPT

## 2024-11-18 PROCEDURE — 80053 COMPREHEN METABOLIC PANEL: CPT | Performed by: EMERGENCY MEDICINE

## 2024-11-18 PROCEDURE — 74177 CT ABD & PELVIS W/CONTRAST: CPT

## 2024-11-18 PROCEDURE — 82948 REAGENT STRIP/BLOOD GLUCOSE: CPT

## 2024-11-18 PROCEDURE — 81025 URINE PREGNANCY TEST: CPT

## 2024-11-18 PROCEDURE — 83605 ASSAY OF LACTIC ACID: CPT

## 2024-11-18 PROCEDURE — 99285 EMERGENCY DEPT VISIT HI MDM: CPT

## 2024-11-18 RX ORDER — DICYCLOMINE HCL 20 MG
20 TABLET ORAL 2 TIMES DAILY
Qty: 20 TABLET | Refills: 0 | Status: SHIPPED | OUTPATIENT
Start: 2024-11-18

## 2024-11-18 RX ORDER — KETOROLAC TROMETHAMINE 30 MG/ML
15 INJECTION, SOLUTION INTRAMUSCULAR; INTRAVENOUS ONCE
Status: COMPLETED | OUTPATIENT
Start: 2024-11-18 | End: 2024-11-18

## 2024-11-18 RX ORDER — POTASSIUM CHLORIDE 1500 MG/1
40 TABLET, EXTENDED RELEASE ORAL ONCE
Status: COMPLETED | OUTPATIENT
Start: 2024-11-18 | End: 2024-11-18

## 2024-11-18 RX ORDER — DROPERIDOL 2.5 MG/ML
0.62 INJECTION, SOLUTION INTRAMUSCULAR; INTRAVENOUS ONCE
Status: COMPLETED | OUTPATIENT
Start: 2024-11-18 | End: 2024-11-18

## 2024-11-18 RX ORDER — ONDANSETRON 4 MG/1
4 TABLET, ORALLY DISINTEGRATING ORAL EVERY 6 HOURS PRN
Qty: 12 TABLET | Refills: 0 | Status: SHIPPED | OUTPATIENT
Start: 2024-11-18

## 2024-11-18 RX ORDER — ONDANSETRON 2 MG/ML
4 INJECTION INTRAMUSCULAR; INTRAVENOUS ONCE
Status: COMPLETED | OUTPATIENT
Start: 2024-11-18 | End: 2024-11-18

## 2024-11-18 RX ADMIN — POTASSIUM CHLORIDE 40 MEQ: 1500 TABLET, EXTENDED RELEASE ORAL at 11:47

## 2024-11-18 RX ADMIN — ONDANSETRON 4 MG: 2 INJECTION INTRAMUSCULAR; INTRAVENOUS at 11:44

## 2024-11-18 RX ADMIN — IOHEXOL 100 ML: 350 INJECTION, SOLUTION INTRAVENOUS at 12:24

## 2024-11-18 RX ADMIN — SODIUM CHLORIDE 1000 ML: 0.9 INJECTION, SOLUTION INTRAVENOUS at 11:44

## 2024-11-18 RX ADMIN — DROPERIDOL 0.62 MG: 2.5 INJECTION, SOLUTION INTRAMUSCULAR; INTRAVENOUS at 14:00

## 2024-11-18 RX ADMIN — SODIUM CHLORIDE 1000 ML: 0.9 INJECTION, SOLUTION INTRAVENOUS at 13:41

## 2024-11-18 RX ADMIN — KETOROLAC TROMETHAMINE 15 MG: 30 INJECTION, SOLUTION INTRAMUSCULAR; INTRAVENOUS at 12:46

## 2024-11-18 NOTE — INCIDENTAL FINDINGS
The following findings require follow up:  Radiographic finding   Finding: Inflammatory colitis   Follow up required: GI   Follow up should be done within 1 week(s)    Please notify the following clinician to assist with the follow up:   [facility]    Incidental finding results were discussed with the Patient by Rodo Pal PA-C on 11/18/24.   They expressed understanding and all questions answered.

## 2024-11-18 NOTE — ED PROVIDER NOTES
Time reflects when diagnosis was documented in both MDM as applicable and the Disposition within this note       Time User Action Codes Description Comment    11/18/2024  2:48 PM Rodo Pal [R11.2] Nausea and vomiting     11/18/2024  2:48 PM Rodo Pal [R19.7] Diarrhea           ED Disposition       ED Disposition   Discharge    Condition   Stable    Date/Time   Mon Nov 18, 2024  2:49 PM    Comment   Trini ANDRAE Leonardan discharge to home/self care.                   Assessment & Plan       Medical Decision Making  24-year-old female presenting ED with nausea vomiting and diarrhea over the past 3 days.  Cardiopulmonary exam is benign.  Vitals unremarkable on presentation.  Patient sitting comfortably in exam bed in no acute distress.  Abdominal exam is positive for generalized tenderness but no rebound tenderness Sanchez sign is negative.  No McBurney point tenderness.  No erythema edema or ecchymosis overlying the abdomen.  Patient stated that she has had some decreased oral intake over the past day or 2.  On baseline labs patient having a slight leukocytosis at 1400.  CMP positive hypokalemia at 3.1.  Repleted in the ED.  Patient also having slight elevation in liver function and enzymes AST and ALT.  Lipase is normal.  UA is positive for 3+ ketones patient receiving 2 L of fluids in ED ketones likely due to hydration status.  No leukocytes or nitrates positive in the UA.  Viral panel was negative.  Toradol Zofran given in ED.  Patient still having some pain after this in her abdomen.  Droperidol given in ED which gave patient significant relief to her symptoms.  She stated that she was at the point where she was basically asymptomatic at this time.  CT abdomen pelvis showing no acute process.  There are concerning factors that show a possible inflammatory colitis.  Patient having no hematochezia or melena.  I did consult GI on this patient.  Advised that if patient is still having symptoms we may  "have to scope earlier.  As patient is asymptomatic at this time advised supportive treatment outpatient with Bentyl Zofran.  Advised obtaining stool studies in which the patient is unable to provide in ED in which an outpatient lab order was made for fecal calprotectin, stool enteric and C. difficile panels.  Patient was given ambulatory referral to GI.  Patient was given strict return to ED protocol with any worsening symptoms explained on discharge.  Disposition was explained with follow-ups.    Patient understood diagnosis and treatment plan and had no further questions.  Patient was discharged in stable condition.  Patient was advised to follow-up with her PCP in 1 to 2 days.  Patient was advised to return to the ED with any worsening symptoms that were explained on discharge including but not limited to chest pain, shortness of breath, irretractable vomiting or diarrhea, vision loss, loss of function, loss of sensation, syncope, hemoptysis, hematochezia, hematemesis, melena, decreased oral intake, feeling ill.     Ddx-bowel obstruction, GERD, gastritis, pancreatitis, colitis, infectious diarrhea, inflammatory diarrhea, appendicitis    Portions of the record may have been created with voice recognition software. Occasional wrong word or \"sound a like\" substitutions may have occurred due to the inherent limitations of voice recognition software. Read the chart carefully and recognize, using context, where substitutions have occurred.      Amount and/or Complexity of Data Reviewed  Labs: ordered. Decision-making details documented in ED Course.     Details: See MDM  Radiology: ordered.     Details: See MDM  Discussion of management or test interpretation with external provider(s): GI consult-advised the patient is asymptomatic for symptom improvement in ED patient can follow-up outpatient.  Did advise obtaining stool panels which were ordered outpatient as patient unable to provide sample.  Advised Александр and " Zofran outpatient.    Risk  Prescription drug management.  Risk Details: Risk of worsening symptoms along with signs and symptoms worsening symptoms were thoroughly explained on discharge.  Risk of incomplete follow-up was discussed.  Patient had full understanding of all risks had no further questions and was discharged in stable condition.         ED Course as of 11/18/24 2049 Mon Nov 18, 2024   1224 WBC(!): 14.82   1224 Potassium(!): 3.1   1224 AST(!): 78   1224 ALT(!): 198       Medications   sodium chloride 0.9 % bolus 1,000 mL (0 mL Intravenous Stopped 11/18/24 1341)   ondansetron (ZOFRAN) injection 4 mg (4 mg Intravenous Given 11/18/24 1144)   potassium chloride (Klor-Con M20) CR tablet 40 mEq (40 mEq Oral Given 11/18/24 1147)   iohexol (OMNIPAQUE) 350 MG/ML injection (SINGLE-DOSE) 100 mL (100 mL Intravenous Given 11/18/24 1224)   ketorolac (TORADOL) injection 15 mg (15 mg Intravenous Given 11/18/24 1246)   sodium chloride 0.9 % bolus 1,000 mL (0 mL Intravenous Stopped 11/18/24 1448)   droperidol (INAPSINE) injection 0.625 mg (0.625 mg Intravenous Given 11/18/24 1400)       ED Risk Strat Scores                           SBIRT 22yo+      Flowsheet Row Most Recent Value   Initial Alcohol Screen: US AUDIT-C     1. How often do you have a drink containing alcohol? 0 Filed at: 11/18/2024 1050   2. How many drinks containing alcohol do you have on a typical day you are drinking?  0 Filed at: 11/18/2024 1050   3b. FEMALE Any Age, or MALE 65+: How often do you have 4 or more drinks on one occassion? 0 Filed at: 11/18/2024 1050   Audit-C Score 0 Filed at: 11/18/2024 1050   AILYN: How many times in the past year have you...    Used an illegal drug or used a prescription medication for non-medical reasons? Never Filed at: 11/18/2024 1050                            History of Present Illness       Chief Complaint   Patient presents with    Abdominal Pain     Abd pain nausea vomiting diarrhea for 3 days        Past  Medical History:   Diagnosis Date    ADHD     Anxiety     Asthma     Autism     Bipolar disorder (HCC)     Depression     GERD (gastroesophageal reflux disease)       Past Surgical History:   Procedure Laterality Date    CHOLANGIOGRAM N/A 12/01/2018    Procedure: CHOLANGIOGRAM;  Surgeon: Angelo Alcantara MD;  Location:  MAIN OR;  Service: General    CHOLECYSTECTOMY LAPAROSCOPIC N/A 12/01/2018    Procedure: CHOLECYSTECTOMY LAPAROSCOPIC;  Surgeon: Angelo Alcantara MD;  Location:  MAIN OR;  Service: General    EYE MUSCLE SURGERY Bilateral 2006    IL ESOPHAGOGASTRODUODENOSCOPY TRANSORAL DIAGNOSTIC N/A 11/27/2018    Procedure: ESOPHAGOGASTRODUODENOSCOPY (EGD) with bx;  Surgeon: Rashmi Vazquez MD;  Location: [facility];  Service: General      Family History   Problem Relation Age of Onset    Breast cancer Mother     Heart attack Mother     Depression Mother     Mental illness Mother     Coronary artery disease Mother     Hypertension Mother     Diabetes Mother     Hyperlipidemia Mother     Pneumonia Brother     Hypertension Maternal Grandmother     Stroke Maternal Grandmother     Diabetes Maternal Grandmother     Glaucoma Maternal Grandmother     Kidney disease Maternal Grandmother     Sarcoidosis Maternal Grandmother     Diabetes Maternal Grandfather     Hypertension Maternal Grandfather     Stroke Maternal Grandfather     Glaucoma Maternal Grandfather     Heart attack Maternal Grandfather     Colon cancer Neg Hx     Ovarian cancer Neg Hx       Social History     Tobacco Use    Smoking status: Never     Passive exposure: Never    Smokeless tobacco: Never    Tobacco comments:     no passive smoke exposure   Vaping Use    Vaping status: Never Used   Substance Use Topics    Alcohol use: Never    Drug use: Never      E-Cigarette/Vaping    E-Cigarette Use Never User       E-Cigarette/Vaping Substances    Nicotine No     THC No     CBD No     Flavoring No     Other No     Unknown No       I have reviewed and agree with  the history as documented.     24-year-old female presented ED with a chief complaint of generalized abdominal pain.  Endorses nausea vomiting diarrhea over the past 3 days.  Patient locating the pain to the epigastrium stated spread throughout the abdomen.  Stated that she has been having difficulty eating as she is having trouble keeping anything down.  She rates the pain a 10 out of 10.  She denies any recent sick contacts.  She denies any melena, hematochezia, hematemesis.  Stated that she has noticed that she has not been urinating as much as she usually does but denies any urinary symptoms. denies chills fevers diaphoresis. Patient denies any chest pain, shortness of breath, chills, diaphoresis, fevers, loss of consciousness, syncope,  visual symptoms, vision loss, loss of function, loss of sensation, decreased oral intake, hemoptysis, hematochezia, hematemesis, melena, confusion.         Review of Systems   Constitutional:  Positive for appetite change. Negative for activity change, chills, diaphoresis, fatigue and fever.   HENT:  Negative for congestion, ear discharge, ear pain, postnasal drip, rhinorrhea, sinus pressure, sinus pain and sore throat.    Eyes:  Negative for photophobia and visual disturbance.   Respiratory:  Negative for cough, chest tightness and shortness of breath.    Cardiovascular:  Negative for chest pain and palpitations.   Gastrointestinal:  Positive for diarrhea, nausea and vomiting. Negative for abdominal distention, abdominal pain and constipation.   Genitourinary:  Negative for difficulty urinating, dysuria, flank pain, frequency and hematuria.   Musculoskeletal:  Negative for arthralgias, back pain, joint swelling, myalgias, neck pain and neck stiffness.   Skin:  Negative for rash and wound.   Neurological:  Negative for dizziness, tremors, syncope, facial asymmetry, weakness, light-headedness, numbness and headaches.           Objective       ED Triage Vitals   Temperature Pulse  Blood Pressure Respirations SpO2 Patient Position - Orthostatic VS   11/18/24 1051 11/18/24 1051 11/18/24 1051 11/18/24 1051 11/18/24 1051 11/18/24 1051   98 °F (36.7 °C) 66 144/84 17 100 % Sitting      Temp Source Heart Rate Source BP Location FiO2 (%) Pain Score    11/18/24 1051 11/18/24 1051 11/18/24 1051 -- 11/18/24 1246    Oral Monitor Right arm  10 - Worst Possible Pain      Vitals      Date and Time Temp Pulse SpO2 Resp BP Pain Score FACES Pain Rating User   11/18/24 1445 -- 82 100 % 16 167/84 5 -- SR   11/18/24 1430 -- 80 100 % -- -- -- -- SR   11/18/24 1342 -- 68 100 % 16 139/71 10 - Worst Possible Pain -- SR   11/18/24 1246 -- -- -- -- -- 10 - Worst Possible Pain -- SR   11/18/24 1051 98 °F (36.7 °C) 66 100 % 17 144/84 -- -- GABINO            Physical Exam  Constitutional:       General: She is not in acute distress.     Appearance: Normal appearance. She is not ill-appearing, toxic-appearing or diaphoretic.   HENT:      Head: Normocephalic.      Right Ear: Tympanic membrane, ear canal and external ear normal.      Left Ear: Tympanic membrane, ear canal and external ear normal.      Nose: Nose normal. No congestion or rhinorrhea.      Mouth/Throat:      Mouth: Mucous membranes are moist.      Pharynx: Oropharynx is clear. No oropharyngeal exudate or posterior oropharyngeal erythema.   Eyes:      General:         Right eye: No discharge.         Left eye: No discharge.      Extraocular Movements: Extraocular movements intact.      Conjunctiva/sclera: Conjunctivae normal.      Pupils: Pupils are equal, round, and reactive to light.   Cardiovascular:      Rate and Rhythm: Normal rate and regular rhythm.      Pulses: Normal pulses.   Pulmonary:      Effort: Pulmonary effort is normal. No respiratory distress.      Breath sounds: Normal breath sounds. No stridor. No wheezing, rhonchi or rales.   Chest:      Chest wall: No tenderness.   Abdominal:      General: Bowel sounds are normal. There is distension.       Palpations: Abdomen is soft. There is no mass.      Tenderness: There is abdominal tenderness. There is no right CVA tenderness, left CVA tenderness, guarding or rebound.      Comments: Generalized abdominal pain.  No guarding or rebound tenderness on exam.  No McBurney point tenderness.  Sanchez sign is negative.  Will CT to further evaluate   Musculoskeletal:         General: No tenderness. Normal range of motion.      Cervical back: Neck supple. No rigidity or tenderness.   Lymphadenopathy:      Cervical: No cervical adenopathy.   Skin:     General: Skin is warm and dry.      Capillary Refill: Capillary refill takes less than 2 seconds.      Findings: No rash.   Neurological:      Mental Status: She is alert and oriented to person, place, and time.   Psychiatric:         Mood and Affect: Mood normal.         Results Reviewed       Procedure Component Value Units Date/Time    Stool Enteric Bacterial Panel by PCR [[id number]]     Lab Status: No result Specimen: Stool from Rectum     Clostridium difficile toxin by PCR with EIA [[id number]]     Lab Status: No result Specimen: Stool from Per Rectum     Urine Microscopic [[id number]]  (Abnormal) Collected: 11/18/24 1210    Lab Status: Final result Specimen: Urine, Clean Catch Updated: 11/18/24 1252     RBC, UA 1-2 /hpf      WBC, UA 4-10 /hpf      Epithelial Cells Moderate /hpf      Bacteria, UA Innumerable /hpf      MUCUS THREADS Innumerable    POCT pregnancy, urine [[id number]]  (Normal) Collected: 11/18/24 1215    Lab Status: Final result Updated: 11/18/24 1215     EXT Preg Test, Ur Negative     Control Valid    Urine Macroscopic, POC [[id number]]  (Abnormal) Collected: 11/18/24 1210    Lab Status: Final result Specimen: Urine Updated: 11/18/24 1212     Color, UA Yellow     Clarity, UA Clear     pH, UA 6.0     Leukocytes, UA Negative     Nitrite, UA Negative     Protein, UA 30 (1+) mg/dl      Glucose, UA Negative mg/dl      Ketones, UA 80 (3+) mg/dl      Urobilinogen, UA  1.0 E.U./dl      Bilirubin, UA Moderate     Occult Blood, UA Negative     Specific Gravity, UA >=1.030    Narrative:      CLINITEK RESULT    FLU/COVID Rapid Antigen (30 min. TAT) - Preferred screening test in ED [[id number]]  (Normal) Collected: 11/18/24 1144    Lab Status: Final result Specimen: Nares from Nose Updated: 11/18/24 1208     SARS COV Rapid Antigen Negative     Influenza A Rapid Antigen Negative     Influenza B Rapid Antigen Negative    Narrative:      This test has been performed using the [employer] [facility] 2 FLU+SARS Antigen test under the Emergency Use Authorization (EUA). This test has been validated by the  and verified by the performing laboratory. The Faith uses lateral flow immunofluorescent sandwich assay to detect SARS-COV, Influenza A and Influenza B Antigen.     The [employer] [facility] 2 SARS Antigen test does not differentiate between SARS-CoV and SARS-CoV-2.     Negative results are presumptive and may be confirmed with a molecular assay, if necessary, for patient management. Negative results do not rule out SARS-CoV-2 or influenza infection and should not be used as the sole basis for treatment or patient management decisions. A negative test result may occur if the level of antigen in a sample is below the limit of detection of this test.     Positive results are indicative of the presence of viral antigens, but do not rule out bacterial infection or co-infection with other viruses.     All test results should be used as an adjunct to clinical observations and other information available to the provider.    FOR PEDIATRIC PATIENTS - copy/paste COVID Guidelines URL to browser: [email or web address]    Lactic acid, plasma (w/reflex if result > 2.0) [[id number]]  (Normal) Collected: 11/18/24 1144    Lab Status: Final result Specimen: Blood from Arm, Right Updated: 11/18/24 1206     LACTIC ACID 0.9 mmol/L     Narrative:      Result may be  elevated if tourniquet was used during collection.    Fingerstick Glucose (POCT) [[id number]]  (Normal) Collected: 11/18/24 1139    Lab Status: Final result Specimen: Blood Updated: 11/18/24 1140     POC Glucose 100 mg/dl     Lipase [[id number]]  (Normal) Collected: 11/18/24 1103    Lab Status: Final result Specimen: Blood from Arm, Right Updated: 11/18/24 1129     Lipase 16 u/L     Comprehensive metabolic panel [[id number]]  (Abnormal) Collected: 11/18/24 1103    Lab Status: Final result Specimen: Blood from Arm, Right Updated: 11/18/24 1129     Sodium 136 mmol/L      Potassium 3.1 mmol/L      Chloride 103 mmol/L      CO2 23 mmol/L      ANION GAP 10 mmol/L      BUN 11 mg/dL      Creatinine 0.83 mg/dL      Glucose 108 mg/dL      Calcium 9.2 mg/dL      AST 78 U/L       U/L      Alkaline Phosphatase 90 U/L      Total Protein 7.5 g/dL      Albumin 4.6 g/dL      Total Bilirubin 0.46 mg/dL      eGFR 97 ml/min/1.73sq m     Narrative:      National Kid[employer] Disease Foundation guidelines for Chronic Kidney Disease (CKD):     Stage 1 with normal or high GFR (GFR > 90 mL/min/1.73 square meters)    Stage 2 Mild CKD (GFR = 60-89 mL/min/1.73 square meters)    Stage 3A Moderate CKD (GFR = 45-59 mL/min/1.73 square meters)    Stage 3B Moderate CKD (GFR = 30-44 mL/min/1.73 square meters)    Stage 4 Severe CKD (GFR = 15-29 mL/min/1.73 square meters)    Stage 5 End Stage CKD (GFR <15 mL/min/1.73 square meters)  Note: GFR calculation is accurate only with a steady state creatinine    CBC and differential [[id number]]  (Abnormal) Collected: 11/18/24 1103    Lab Status: Final result Specimen: Blood from Arm, Right Updated: 11/18/24 1113     WBC 14.82 Thousand/uL      RBC 4.60 Million/uL      Hemoglobin 12.3 g/dL      Hematocrit 37.8 %      MCV 82 fL      MCH 26.7 pg      MCHC 32.5 g/dL      RDW 14.3 %      MPV 9.9 fL      Platelets 402 Thousands/uL      nRBC 0 /100 WBCs      Segmented % 82 %      Immature Grans % 1 %      Lymphocytes %  12 %      Monocytes % 4 %      Eosinophils Relative 1 %      Basophils Relative 0 %      Absolute Neutrophils 12.23 Thousands/µL      Absolute Immature Grans 0.08 Thousand/uL      Absolute Lymphocytes 1.76 Thousands/µL      Absolute Monocytes 0.58 Thousand/µL      Eosinophils Absolute 0.11 Thousand/µL      Basophils Absolute 0.06 Thousands/µL             CT abdomen pelvis with contrast   Final Interpretation by Terry Slade MD (11/18 8754)      No acute process in the abdomen and pelvis      Underdistended colon, limiting assessment. There is however submucosal fat deposition including the colon, with possible loss of haustral pattern involving the left hemicolon, concerning for chronic findings of inflammatory colitis such as ulcerative    colitis. GI consultation is recommended for further management.      The study was marked in EPIC for immediate notification.      Workstation performed: [id number]             Procedures    ED Medication and Procedure Management   Prior to Admission Medications   Prescriptions Last Dose Informant Patient Reported? Taking?   ARIPiprazole (ABILIFY) 15 mg tablet   No No   Sig: Take 0.5 tablets (7.5 mg total) by mouth daily   Patient not taking: Reported on 9/20/2024   Caplyta 21 MG CAPS   Yes No   Sig: Take 21 mg by mouth daily   acetaminophen (TYLENOL) 500 mg tablet   No No   Sig: Take 1 tablet (500 mg total) by mouth every 6 (six) hours as needed for mild pain   acetaminophen (TYLENOL) 500 mg tablet   No No   Sig: Take 1 tablet (500 mg total) by mouth every 6 (six) hours as needed for mild pain or moderate pain   albuterol (PROVENTIL HFA,VENTOLIN HFA) 90 mcg/act inhaler   No No   Sig: Inhale 2 puffs every 4 (four) hours as needed for wheezing   aluminum-magnesium hydroxide-simethicone (MAALOX) 200-200-20 MG/5ML SUSP   No No   Sig: Take 30 mL by mouth 4 (four) times a day as needed for heartburn or cramping   Patient not taking: Reported on 7/20/2024   benzonatate (TESSALON  PERLES) 100 mg capsule   No No   Sig: Take 1 capsule (100 mg total) by mouth 3 (three) times a day as needed for cough   Patient not taking: Reported on 7/20/2024   dicyclomine (BENTYL) 20 mg tablet   No No   Sig: Take 1 tablet (20 mg total) by mouth 4 (four) times a day as needed (abdominal cramping, diarrhea)   famotidine (PEPCID) 20 mg tablet   No No   Sig: Take 1 tablet (20 mg total) by mouth 2 (two) times a day   famotidine (PEPCID) 20 mg tablet   No No   Sig: Take 1 tablet (20 mg total) by mouth 2 (two) times a day   Patient not taking: Reported on 6/7/2024   famotidine (PEPCID) 20 mg tablet   No No   Sig: Take 1 tablet (20 mg total) by mouth 2 (two) times a day   Patient not taking: Reported on 6/7/2024   fluticasone (FLONASE) 50 mcg/act nasal spray   No No   Sig: SPRAY 1 SPRAY INTO EACH NOSTRIL EVERY DAY   gabapentin (NEURONTIN) 600 MG tablet   Yes No   Sig: Take 600 mg by mouth every evening   hydrOXYzine pamoate (VISTARIL) 50 mg capsule   Yes No   Sig: Take 50 mg by mouth 2 (two) times a day as needed for anxiety   ibuprofen (MOTRIN) 600 mg tablet   No No   Sig: Take 1 tablet (600 mg total) by mouth every 6 (six) hours as needed for mild pain   ipratropium-albuterol (DUO-NEB) 0.5-2.5 mg/3 mL nebulizer solution   No No   Sig: Take 3 mL by nebulization 4 (four) times a day   Patient not taking: Reported on 9/20/2024   meclizine (ANTIVERT) 25 mg tablet   No No   Sig: Take 1 tablet (25 mg total) by mouth 3 (three) times a day as needed for dizziness   norgestimate-ethinyl estradiol (Sprintec 28) 0.25-35 MG-MCG per tablet   No No   Sig: Take 1 tablet by mouth daily   ondansetron (ZOFRAN-ODT) 4 mg disintegrating tablet   No No   Sig: Take 1 tablet (4 mg total) by mouth every 6 (six) hours as needed for nausea or vomiting   sucralfate (CARAFATE) 1 g tablet   No No   Sig: Take 1 tablet (1 g total) by mouth 4 (four) times a day for 7 days   venlafaxine (EFFEXOR-XR) 37.5 mg 24 hr capsule   Yes No       Facility-Administered Medications: None     Discharge Medication List as of 11/18/2024  2:51 PM        START taking these medications    Details   !! dicyclomine (BENTYL) 20 mg tablet Take 1 tablet (20 mg total) by mouth 2 (two) times a day, Starting Mon 11/18/2024, Normal      !! ondansetron (ZOFRAN-ODT) 4 mg disintegrating tablet Take 1 tablet (4 mg total) by mouth every 6 (six) hours as needed for nausea or vomiting, Starting Mon 11/18/2024, Normal       !! - Potential duplicate medications found. Please discuss with provider.        CONTINUE these medications which have NOT CHANGED    Details   !! acetaminophen (TYLENOL) 500 mg tablet Take 1 tablet (500 mg total) by mouth every 6 (six) hours as needed for mild pain, Starting Sun 3/10/2024, Normal      !! acetaminophen (TYLENOL) 500 mg tablet Take 1 tablet (500 mg total) by mouth every 6 (six) hours as needed for mild pain or moderate pain, Starting Thu 8/15/2024, Normal      albuterol (PROVENTIL HFA,VENTOLIN HFA) 90 mcg/act inhaler Inhale 2 puffs every 4 (four) hours as needed for wheezing, Starting Fri 8/30/2024, Normal      aluminum-magnesium hydroxide-simethicone (MAALOX) 200-200-20 MG/5ML SUSP Take 30 mL by mouth 4 (four) times a day as needed for heartburn or cramping, Starting Mon 1/8/2024, Normal      ARIPiprazole (ABILIFY) 15 mg tablet Take 0.5 tablets (7.5 mg total) by mouth daily, Starting Sun 6/18/2023, Until Tue 7/18/2023, Normal      benzonatate (TESSALON PERLES) 100 mg capsule Take 1 capsule (100 mg total) by mouth 3 (three) times a day as needed for cough, Starting Fri 1/12/2024, Normal      Caplyta 21 MG CAPS Take 21 mg by mouth daily, Starting Thu 2/29/2024, Historical Med      !! dicyclomine (BENTYL) 20 mg tablet Take 1 tablet (20 mg total) by mouth 4 (four) times a day as needed (abdominal cramping, diarrhea), Starting Wed 12/13/2023, Print      !! famotidine (PEPCID) 20 mg tablet Take 1 tablet (20 mg total) by mouth 2 (two) times a day,  Starting Thu 7/18/2019, Normal      !! famotidine (PEPCID) 20 mg tablet Take 1 tablet (20 mg total) by mouth 2 (two) times a day, Starting Wed 12/13/2023, Normal      !! famotidine (PEPCID) 20 mg tablet Take 1 tablet (20 mg total) by mouth 2 (two) times a day, Starting Mon 1/8/2024, Normal      fluticasone (FLONASE) 50 mcg/act nasal spray SPRAY 1 SPRAY INTO EACH NOSTRIL EVERY DAY, Normal      gabapentin (NEURONTIN) 600 MG tablet Take 600 mg by mouth every evening, Starting Mon 6/19/2023, Historical Med      hydrOXYzine pamoate (VISTARIL) 50 mg capsule Take 50 mg by mouth 2 (two) times a day as needed for anxiety, Starting Tue 1/23/2024, Historical Med      ibuprofen (MOTRIN) 600 mg tablet Take 1 tablet (600 mg total) by mouth every 6 (six) hours as needed for mild pain, Starting Sun 3/10/2024, Normal      ipratropium-albuterol (DUO-NEB) 0.5-2.5 mg/3 mL nebulizer solution Take 3 mL by nebulization 4 (four) times a day, Starting Thu 8/15/2024, Normal      meclizine (ANTIVERT) 25 mg tablet Take 1 tablet (25 mg total) by mouth 3 (three) times a day as needed for dizziness, Starting Thu 8/15/2024, Normal      norgestimate-ethinyl estradiol (Sprintec 28) 0.25-35 MG-MCG per tablet Take 1 tablet by mouth daily, Starting Tue 10/29/2024, Until Tue 1/21/2025, Normal      !! ondansetron (ZOFRAN-ODT) 4 mg disintegrating tablet Take 1 tablet (4 mg total) by mouth every 6 (six) hours as needed for nausea or vomiting, Starting Tue 8/20/2024, Normal      sucralfate (CARAFATE) 1 g tablet Take 1 tablet (1 g total) by mouth 4 (four) times a day for 7 days, Starting Mon 1/8/2024, Until Mon 1/15/2024, Normal      venlafaxine (EFFEXOR-XR) 37.5 mg 24 hr capsule Starting Wed 7/5/2023, Historical Med       !! - Potential duplicate medications found. Please discuss with provider.        Outpatient Discharge Orders   Stool Enteric Bacterial Panel by PCR   Standing Status: Future Standing Exp. Date: 11/18/25     Clostridium difficile toxin by  PCR with EIA   Standing Status: Future Standing Exp. Date: 11/18/25     Calprotectin,Fecal   Standing Status: Future Standing Exp. Date: 11/18/25     ED SEPSIS DOCUMENTATION   Time reflects when diagnosis was documented in both MDM as applicable and the Disposition within this note       Time User Action Codes Description Comment    11/18/2024  2:48 PM Rodo Pal [R11.2] Nausea and vomiting     11/18/2024  2:48 PM Rodo Pal [R19.7] Diarrhea                  Rodo Pal PA-C  11/18/24 2049

## 2024-11-18 NOTE — DISCHARGE INSTRUCTIONS
Patient advised to take prescribed medication as prescribed.  Patient vies return to the ED with any worsening symptoms explained on discharge.  Ambulatory referral to GI    Patient was advised to return to the ED with any worsening symptoms that were explained on discharge including but not limited to chest pain, shortness of breath, irretractable vomiting or diarrhea, vision loss, loss of function, loss of sensation, syncope, hemoptysis, hematochezia, hematemesis, melena, decreased oral intake, feeling ill.

## 2024-11-18 NOTE — Clinical Note
Trini Olivarez was seen and treated in our emergency department on 11/18/2024.                Diagnosis: Colitis    Trini  may return to work on return date.    She may return on this date: 11/19/2024         If you have any questions or concerns, please don't hesitate to call.      Rodo Pal PA-C    ______________________________           _______________          _______________  Hospital Representative                              Date                                Time

## 2024-11-20 ENCOUNTER — TELEPHONE (OUTPATIENT)
Age: 24
End: 2024-11-20

## 2024-11-20 NOTE — TELEPHONE ENCOUNTER
----- Message from Angelo Frazier MD sent at 11/18/2024  2:30 PM EST -----  Hey this is Angelo ERICKSON, can we get this pt set up for follow up in clinic in 2-3 weeks please? She presented to [facility] ED for nausea, vomiting with CT showing intestinal changes.    Thanks,    Angelo

## 2025-02-07 ENCOUNTER — OFFICE VISIT (OUTPATIENT)
Dept: URGENT CARE | Age: 25
End: 2025-02-07
Payer: COMMERCIAL

## 2025-02-07 VITALS
RESPIRATION RATE: 20 BRPM | TEMPERATURE: 97.6 F | SYSTOLIC BLOOD PRESSURE: 118 MMHG | DIASTOLIC BLOOD PRESSURE: 74 MMHG | OXYGEN SATURATION: 99 % | BODY MASS INDEX: 36.37 KG/M2 | HEART RATE: 125 BPM | WEIGHT: 174 LBS

## 2025-02-07 DIAGNOSIS — R68.89 FLU-LIKE SYMPTOMS: Primary | ICD-10-CM

## 2025-02-07 DIAGNOSIS — J02.9 SORE THROAT: ICD-10-CM

## 2025-02-07 LAB — S PYO AG THROAT QL: NEGATIVE

## 2025-02-07 PROCEDURE — G0463 HOSPITAL OUTPT CLINIC VISIT: HCPCS | Performed by: STUDENT IN AN ORGANIZED HEALTH CARE EDUCATION/TRAINING PROGRAM

## 2025-02-07 PROCEDURE — 99213 OFFICE O/P EST LOW 20 MIN: CPT | Performed by: STUDENT IN AN ORGANIZED HEALTH CARE EDUCATION/TRAINING PROGRAM

## 2025-02-07 PROCEDURE — 87880 STREP A ASSAY W/OPTIC: CPT | Performed by: STUDENT IN AN ORGANIZED HEALTH CARE EDUCATION/TRAINING PROGRAM

## 2025-02-07 NOTE — PROGRESS NOTES
[facility]        NAME: Trini Olivarez is a 24 y.o. female  : 2000    MRN: [id number]  DATE: 2025  TIME: 1:15 PM    Assessment and Orders   Flu-like symptoms [R68.89]  1. Flu-like symptoms        2. Sore throat  POCT rapid ANTIGEN strepA            Plan and Discussion      Symptoms and exam consistent with viral illness, likely influenza. Rapid strep was negative. Declines FLU/COVID testing. Discussed supportive care.     Risks and benefits discussed. Patient understands and agrees with the plan.     PATIENT INSTRUCTIONS      If tests have been performed at [facility], our office will contact you with results if changes need to be made to the care plan discussed with you at the visit.  You can review your full results on [facility].    Follow up with PCP.     If any of the following occur, please report to your nearest ED for evaluation or call 911.   Difficultly breathing or shortness of breath  Chest pain  Acutely worsening symptoms.         Chief Complaint     Chief Complaint   Patient presents with    Cold Like Symptoms     Started yesterday with fever of 100, cough, sore throats, body aches, vomiting and diarrhea.  Taking Tylenol for sxs, last dose .           History of Present Illness       URI   This is a new problem. The current episode started yesterday. The problem has been gradually worsening. The maximum temperature recorded prior to her arrival was 101 - 101.9 F. The fever has been present for Less than 1 day. Associated symptoms include congestion, coughing, diarrhea, nausea, a sore throat and vomiting. Pertinent negatives include no abdominal pain.       Review of Systems   Review of Systems   HENT:  Positive for congestion and sore throat.    Respiratory:  Positive for cough.    Gastrointestinal:  Positive for diarrhea, nausea and vomiting. Negative for abdominal pain.         Current Medications       Current Outpatient Medications:     acetaminophen  (TYLENOL) 500 mg tablet, Take 1 tablet (500 mg total) by mouth every 6 (six) hours as needed for mild pain, Disp: 30 tablet, Rfl: 0    acetaminophen (TYLENOL) 500 mg tablet, Take 1 tablet (500 mg total) by mouth every 6 (six) hours as needed for mild pain or moderate pain, Disp: 30 tablet, Rfl: 0    albuterol (PROVENTIL HFA,VENTOLIN HFA) 90 mcg/act inhaler, Inhale 2 puffs every 4 (four) hours as needed for wheezing, Disp: 18 g, Rfl: 0    Caplyta 21 MG CAPS, Take 21 mg by mouth daily, Disp: , Rfl:     dicyclomine (BENTYL) 20 mg tablet, Take 1 tablet (20 mg total) by mouth 4 (four) times a day as needed (abdominal cramping, diarrhea), Disp: 12 tablet, Rfl: 0    famotidine (PEPCID) 20 mg tablet, Take 1 tablet (20 mg total) by mouth 2 (two) times a day, Disp: 28 tablet, Rfl: 1    fluticasone (FLONASE) 50 mcg/act nasal spray, SPRAY 1 SPRAY INTO EACH NOSTRIL EVERY DAY, Disp: 48 mL, Rfl: 1    gabapentin (NEURONTIN) 600 MG tablet, Take 600 mg by mouth every evening, Disp: , Rfl:     hydrOXYzine pamoate (VISTARIL) 50 mg capsule, Take 50 mg by mouth 2 (two) times a day as needed for anxiety, Disp: , Rfl:     ibuprofen (MOTRIN) 600 mg tablet, Take 1 tablet (600 mg total) by mouth every 6 (six) hours as needed for mild pain, Disp: 30 tablet, Rfl: 0    meclizine (ANTIVERT) 25 mg tablet, Take 1 tablet (25 mg total) by mouth 3 (three) times a day as needed for dizziness, Disp: 30 tablet, Rfl: 0    ondansetron (ZOFRAN-ODT) 4 mg disintegrating tablet, Take 1 tablet (4 mg total) by mouth every 6 (six) hours as needed for nausea or vomiting, Disp: 20 tablet, Rfl: 0    venlafaxine (EFFEXOR-XR) 37.5 mg 24 hr capsule, , Disp: , Rfl:     aluminum-magnesium hydroxide-simethicone (MAALOX) 200-200-20 MG/5ML SUSP, Take 30 mL by mouth 4 (four) times a day as needed for heartburn or cramping (Patient not taking: Reported on 2/7/2025), Disp: 355 mL, Rfl: 0    ARIPiprazole (ABILIFY) 15 mg tablet, Take 0.5 tablets (7.5 mg total) by mouth daily  (Patient not taking: Reported on 9/20/2024), Disp: 15 tablet, Rfl: 0    benzonatate (TESSALON PERLES) 100 mg capsule, Take 1 capsule (100 mg total) by mouth 3 (three) times a day as needed for cough (Patient not taking: Reported on 2/7/2025), Disp: 20 capsule, Rfl: 0    dicyclomine (BENTYL) 20 mg tablet, Take 1 tablet (20 mg total) by mouth 2 (two) times a day (Patient not taking: Reported on 2/7/2025), Disp: 20 tablet, Rfl: 0    famotidine (PEPCID) 20 mg tablet, Take 1 tablet (20 mg total) by mouth 2 (two) times a day (Patient not taking: Reported on 2/7/2025), Disp: 28 tablet, Rfl: 0    famotidine (PEPCID) 20 mg tablet, Take 1 tablet (20 mg total) by mouth 2 (two) times a day (Patient not taking: Reported on 2/7/2025), Disp: 30 tablet, Rfl: 0    ipratropium-albuterol (DUO-NEB) 0.5-2.5 mg/3 mL nebulizer solution, Take 3 mL by nebulization 4 (four) times a day (Patient not taking: Reported on 2/7/2025), Disp: 3 mL, Rfl: 0    norgestimate-ethinyl estradiol (Sprintec 28) 0.25-35 MG-MCG per tablet, Take 1 tablet by mouth daily, Disp: 84 tablet, Rfl: 3    ondansetron (ZOFRAN-ODT) 4 mg disintegrating tablet, Take 1 tablet (4 mg total) by mouth every 6 (six) hours as needed for nausea or vomiting (Patient not taking: Reported on 2/7/2025), Disp: 12 tablet, Rfl: 0    sucralfate (CARAFATE) 1 g tablet, Take 1 tablet (1 g total) by mouth 4 (four) times a day for 7 days, Disp: 28 tablet, Rfl: 0    Current Allergies     Allergies as of 02/07/2025    (No Known Allergies)            The following portions of the patient's history were reviewed and updated as appropriate: allergies, current medications, past family history, past medical history, past social history, past surgical history and problem list.     Past Medical History:   Diagnosis Date    ADHD     Anxiety     Asthma     Autism     Bipolar disorder (HCC)     Depression     GERD (gastroesophageal reflux disease)        Past Surgical History:   Procedure Laterality Date     CHOLANGIOGRAM N/A 12/01/2018    Procedure: CHOLANGIOGRAM;  Surgeon: Angelo Alcantara MD;  Location: [facility];  Service: General    CHOLECYSTECTOMY LAPAROSCOPIC N/A 12/01/2018    Procedure: CHOLECYSTECTOMY LAPAROSCOPIC;  Surgeon: Angelo Alcantara MD;  Location: [facility];  Service: General    EYE MUSCLE SURGERY Bilateral 2006    [place] ESOPHAGOGASTRODUODENOSCOPY TRANSORAL DIAGNOSTIC N/A 11/27/2018    Procedure: ESOPHAGOGASTRODUODENOSCOPY (EGD) with bx;  Surgeon: Rashmi Vazquez MD;  Location: [facility];  Service: General       Family History   Problem Relation Age of Onset    Breast cancer Mother     Heart attack Mother     Depression Mother     Mental illness Mother     Coronary artery disease Mother     Hypertension Mother     Diabetes Mother     Hyperlipidemia Mother     Pneumonia Brother     Hypertension Maternal Grandmother     Stroke Maternal Grandmother     Diabetes Maternal Grandmother     Glaucoma Maternal Grandmother     Kidney disease Maternal Grandmother     Sarcoidosis Maternal Grandmother     Diabetes Maternal Grandfather     Hypertension Maternal Grandfather     Stroke Maternal Grandfather     Glaucoma Maternal Grandfather     Heart attack Maternal Grandfather     Colon cancer Neg Hx     Ovarian cancer Neg Hx          Medications have been verified.        Objective   /74   Pulse (!) 125   Temp 97.6 °F (36.4 °C)   Resp 20   Wt 78.9 kg (174 lb)   LMP 01/07/2025 (Approximate)   SpO2 99%   BMI 36.37 kg/m²   Patient's last menstrual period was 01/07/2025 (approximate).       Physical Exam     Physical Exam  Constitutional:       General: She is not in acute distress.     Appearance: She is not ill-appearing or toxic-appearing.   HENT:      Head: Normocephalic and atraumatic.      Nose: Congestion present.      Comments: Boggy nasal turbinates     Mouth/Throat:      Pharynx: Posterior oropharyngeal erythema present.      Comments: Cobblestone appearance in posterior  pharynx  Cardiovascular:      Rate and Rhythm: Regular rhythm. Tachycardia present.   Pulmonary:      Effort: Pulmonary effort is normal. No respiratory distress.      Breath sounds: No wheezing.   Neurological:      General: No focal deficit present.      Mental Status: She is alert and oriented to person, place, and time.   Psychiatric:         Mood and Affect: Mood normal.         Behavior: Behavior normal.         Thought Content: Thought content normal.         Judgment: Judgment normal.               Flores Parker DO

## 2025-02-24 ENCOUNTER — TELEPHONE (OUTPATIENT)
Dept: OBGYN CLINIC | Facility: CLINIC | Age: 25
End: 2025-02-24

## 2025-02-24 ENCOUNTER — OFFICE VISIT (OUTPATIENT)
Dept: OBGYN CLINIC | Facility: CLINIC | Age: 25
End: 2025-02-24

## 2025-02-24 VITALS — DIASTOLIC BLOOD PRESSURE: 80 MMHG | SYSTOLIC BLOOD PRESSURE: 118 MMHG | WEIGHT: 174 LBS | BODY MASS INDEX: 36.37 KG/M2

## 2025-02-24 DIAGNOSIS — Z30.8 ENCOUNTER FOR OTHER CONTRACEPTIVE MANAGEMENT: Primary | ICD-10-CM

## 2025-02-24 PROCEDURE — 99213 OFFICE O/P EST LOW 20 MIN: CPT | Performed by: OBSTETRICS & GYNECOLOGY

## 2025-02-24 NOTE — PROGRESS NOTES
PROBLEM GYNECOLOGICAL VISIT    Trini Olivarez is a 24 y.o. female who presents today requesting nexplanon.  Her general medical history has been reviewed and she reports it as follows:    Past Medical History:   Diagnosis Date    ADHD     Anxiety     Asthma     Autism     Bipolar disorder (HCC)     Depression     GERD (gastroesophageal reflux disease)      Past Surgical History:   Procedure Laterality Date    CHOLANGIOGRAM N/A 2018    Procedure: CHOLANGIOGRAM;  Surgeon: Angelo Alcantara MD;  Location:  MAIN OR;  Service: General    CHOLECYSTECTOMY LAPAROSCOPIC N/A 2018    Procedure: CHOLECYSTECTOMY LAPAROSCOPIC;  Surgeon: Angelo Alcantara MD;  Location:  MAIN OR;  Service: General    EYE MUSCLE SURGERY Bilateral 2006    [place] ESOPHAGOGASTRODUODENOSCOPY TRANSORAL DIAGNOSTIC N/A 2018    Procedure: ESOPHAGOGASTRODUODENOSCOPY (EGD) with bx;  Surgeon: Rashmi Vazquez MD;  Location: [facility];  Service: General     OB History          0    Para   0    Term   0       0    AB   0    Living   0         SAB   0    IAB   0    Ectopic   0    Multiple   0    Live Births   0               Social History     Tobacco Use    Smoking status: Never     Passive exposure: Never    Smokeless tobacco: Never    Tobacco comments:     no passive smoke exposure   Vaping Use    Vaping status: Never Used   Substance Use Topics    Alcohol use: Never    Drug use: Never     Social History     Substance and Sexual Activity   Sexual Activity Yes    Partners: Male    Birth control/protection: OCP       Current Outpatient Medications   Medication Instructions    acetaminophen (TYLENOL) 500 mg, Oral, Every 6 hours PRN    acetaminophen (TYLENOL) 500 mg, Oral, Every 6 hours PRN    albuterol (PROVENTIL HFA,VENTOLIN HFA) 90 mcg/act inhaler 2 puffs, Inhalation, Every 4 hours PRN    aluminum-magnesium hydroxide-simethicone (MAALOX) 200-200-20 MG/5ML SUSP 30 mL, Oral, 4 times daily PRN    ARIPiprazole (ABILIFY) 7.5 mg,  Oral, Daily    benzonatate (TESSALON PERLES) 100 mg, Oral, 3 times daily PRN    Caplyta 21 mg, Daily    dicyclomine (BENTYL) 20 mg, Oral, 4 times daily PRN    dicyclomine (BENTYL) 20 mg, Oral, 2 times daily    famotidine (PEPCID) 20 mg, Oral, 2 times daily    famotidine (PEPCID) 20 mg, Oral, 2 times daily    famotidine (PEPCID) 20 mg, Oral, 2 times daily    fluticasone (FLONASE) 50 mcg/act nasal spray SPRAY 1 SPRAY INTO EACH NOSTRIL EVERY DAY    gabapentin (NEURONTIN) 600 mg, Every evening    hydrOXYzine pamoate (VISTARIL) 50 mg, 2 times daily PRN    ibuprofen (MOTRIN) 600 mg, Oral, Every 6 hours PRN    ipratropium-albuterol (DUO-NEB) 0.5-2.5 mg/3 mL nebulizer solution 3 mL, Nebulization, 4 times daily    meclizine (ANTIVERT) 25 mg, Oral, 3 times daily PRN    norgestimate-ethinyl estradiol (Sprintec 28) 0.25-35 MG-MCG per tablet 1 tablet, Oral, Daily    ondansetron (ZOFRAN-ODT) 4 mg, Oral, Every 6 hours PRN    ondansetron (ZOFRAN-ODT) 4 mg, Oral, Every 6 hours PRN    sucralfate (CARAFATE) 1 g, Oral, 4 times daily    venlafaxine (EFFEXOR-XR) 37.5 mg 24 hr capsule No dose, route, or frequency recorded.       History of Present Illness:   Patient presents requesting reinsertion of nexplanon for contraceptive.    Review of Systems:  Review of Systems   Genitourinary:  Negative for menstrual problem, pelvic pain, vaginal bleeding and vaginal discharge.   All other systems reviewed and are negative.      Physical Exam:  /80 (BP Location: Left arm, Patient Position: Sitting, Cuff Size: Standard)   Wt 78.9 kg (174 lb)   LMP 02/01/2025 (Approximate)   BMI 36.37 kg/m²   Physical Exam  Constitutional:       Appearance: Normal appearance.   Neurological:      Mental Status: She is alert.   Vitals and nursing note reviewed.           Assessment:   1. Contraceptive management   2. Nexplanon    Plan:   1. Nexplanon ordered   2. Return to office will notify for insertion.       Reviewed with patient that test results are  available in [facility] immediately, but that they will not necessarily be reviewed by me immediately.  Explained that I will review results at my earliest opportunity and contact patient appropriately.

## 2025-02-24 NOTE — TELEPHONE ENCOUNTER
VOICE MESSAGE:  Hi, this is Ifeoma Olivarez, Birthday [birth year], phone number [phone]. I'd like to make an appointment for next for the next broad insertion as soon as possible 'cause my pillows are giving me a problem. I need you to call back anytime. Thank you.      Called PT and scheduled an appointment for 2/24/25 at 3:00 pm and PT agreed.

## 2025-02-24 NOTE — TELEPHONE ENCOUNTER
"Patient called and left voicemail:  \"Hi, this is Ifeoma Olivarez, Birth[birth year], phone number [phone]. I'd like to make an appointment for next for the next broad insertion as soon as possible 'cause my pillows are giving me a problem. I need you to call back anytime. Thank you.\"  Patient has been scheduled    "

## 2025-03-05 NOTE — TELEPHONE ENCOUNTER
PT called inquiring about the nexplanon.  Explained PT we have not received it and when the office does receive we will contact her.  PT verbalized understanding.

## 2025-03-06 ENCOUNTER — TELEPHONE (OUTPATIENT)
Age: 25
End: 2025-03-06

## 2025-03-06 NOTE — TELEPHONE ENCOUNTER
Patient called and requested to make new patient appointment. Writer offered to add her to waitlist, but she declined.

## 2025-03-11 ENCOUNTER — OFFICE VISIT (OUTPATIENT)
Dept: FAMILY MEDICINE CLINIC | Facility: CLINIC | Age: 25
End: 2025-03-11
Payer: COMMERCIAL

## 2025-03-11 ENCOUNTER — TELEPHONE (OUTPATIENT)
Dept: OBGYN CLINIC | Facility: CLINIC | Age: 25
End: 2025-03-11

## 2025-03-11 VITALS
BODY MASS INDEX: 34.19 KG/M2 | HEART RATE: 124 BPM | WEIGHT: 169.6 LBS | RESPIRATION RATE: 16 BRPM | HEIGHT: 59 IN | TEMPERATURE: 101 F | OXYGEN SATURATION: 98 % | DIASTOLIC BLOOD PRESSURE: 80 MMHG | SYSTOLIC BLOOD PRESSURE: 124 MMHG

## 2025-03-11 DIAGNOSIS — B34.9 VIRAL ILLNESS: Primary | ICD-10-CM

## 2025-03-11 DIAGNOSIS — R05.8 OTHER COUGH: ICD-10-CM

## 2025-03-11 DIAGNOSIS — J45.20 MILD INTERMITTENT ASTHMA, UNSPECIFIED WHETHER COMPLICATED: ICD-10-CM

## 2025-03-11 DIAGNOSIS — E66.09 CLASS 1 OBESITY DUE TO EXCESS CALORIES WITH SERIOUS COMORBIDITY AND BODY MASS INDEX (BMI) OF 34.0 TO 34.9 IN ADULT: ICD-10-CM

## 2025-03-11 DIAGNOSIS — E66.811 CLASS 1 OBESITY DUE TO EXCESS CALORIES WITH SERIOUS COMORBIDITY AND BODY MASS INDEX (BMI) OF 34.0 TO 34.9 IN ADULT: ICD-10-CM

## 2025-03-11 DIAGNOSIS — K21.9 GASTROESOPHAGEAL REFLUX DISEASE WITHOUT ESOPHAGITIS: ICD-10-CM

## 2025-03-11 DIAGNOSIS — F33.3 SEVERE EPISODE OF RECURRENT MAJOR DEPRESSIVE DISORDER, WITH PSYCHOTIC FEATURES (HCC): ICD-10-CM

## 2025-03-11 DIAGNOSIS — Z00.00 HEALTHCARE MAINTENANCE: ICD-10-CM

## 2025-03-11 DIAGNOSIS — R50.9 FEVER, UNSPECIFIED FEVER CAUSE: ICD-10-CM

## 2025-03-11 PROBLEM — R05.9 COUGH: Status: ACTIVE | Noted: 2025-03-11

## 2025-03-11 PROCEDURE — 99395 PREV VISIT EST AGE 18-39: CPT | Performed by: NURSE PRACTITIONER

## 2025-03-11 PROCEDURE — 99214 OFFICE O/P EST MOD 30 MIN: CPT | Performed by: NURSE PRACTITIONER

## 2025-03-11 PROCEDURE — 87636 SARSCOV2 & INF A&B AMP PRB: CPT | Performed by: NURSE PRACTITIONER

## 2025-03-11 RX ORDER — PREDNISONE 50 MG/1
50 TABLET ORAL DAILY
Qty: 5 TABLET | Refills: 0 | Status: SHIPPED | OUTPATIENT
Start: 2025-03-11 | End: 2025-03-16

## 2025-03-11 RX ORDER — OSELTAMIVIR PHOSPHATE 75 MG/1
75 CAPSULE ORAL EVERY 12 HOURS SCHEDULED
Qty: 10 CAPSULE | Refills: 0 | Status: SHIPPED | OUTPATIENT
Start: 2025-03-11 | End: 2025-03-16

## 2025-03-11 NOTE — PROGRESS NOTES
Name: Trini Olivarez      : 2000      MRN: [id number]  Encounter Provider: KEVIN Gann  Encounter Date: 3/11/2025   Encounter department: [facility]    Assessment & Plan  Viral illness  - Prescription sent for Tamiflu. Discussed side effects.  - Continue Tylenol/Motrin for fever.  - Increase oral hydration and use humidifier.    Orders:  •  oseltamivir (TAMIFLU) 75 mg capsule; Take 1 capsule (75 mg total) by mouth every 12 (twelve) hours for 5 days    Fever, unspecified fever cause    Orders:  •  Covid/Flu- Office Collect Normal; Future    Other cough  - Prescription sent for prednisone. Advised of side effects.  - Increase oral hydration and use humidifier.    Orders:  •  predniSONE 50 mg tablet; Take 1 tablet (50 mg total) by mouth daily for 5 days    Severe episode of recurrent major depressive disorder, with psychotic features (HCC)  - Stable on Effexor.   - Continue routine follow up with Psychiatrist.        Class 1 obesity due to excess calories with serious comorbidity and body mass index (BMI) of 34.0 to 34.9 in adult  - Encourage healthy diet and regular exercise.       Mild intermittent asthma, unspecified whether complicated  - Well controlled.  - Continue albuterol as needed.  - Will continue to monitor.       Gastroesophageal reflux disease without esophagitis  - well controlled on Pepcid daily.  Continue same.  - will continue to monitor.       Healthcare maintenance  - Reviewed immunizations and screenings.  - Discussed regular dental, vision, and GYN exams.   - Routine labs ordered.   Orders:  •  CBC and differential; Future  •  Comprehensive metabolic panel; Future  •  Lipid Panel with Direct LDL reflex; Future  •  TSH, 3rd generation with Free T4 reflex; Future         History of Present Illness     Patient presents to office today with complaints of fever, chills, and headache that started 2 days ago. Denies any sick contacts. Has been taking  Tylenol. Denies any other concerns or complaints today.           Review of Systems   Constitutional:  Positive for chills and fever. Negative for fatigue.   HENT:  Positive for congestion. Negative for postnasal drip and trouble swallowing.    Eyes:  Negative for visual disturbance.   Respiratory:  Positive for cough. Negative for shortness of breath.    Cardiovascular:  Negative for chest pain and palpitations.   Gastrointestinal:  Negative for abdominal pain and blood in stool.   Endocrine: Negative for cold intolerance and heat intolerance.   Genitourinary:  Negative for difficulty urinating and dysuria.   Musculoskeletal:  Positive for myalgias. Negative for gait problem.   Skin:  Negative for rash.   Neurological:  Positive for headaches. Negative for dizziness and syncope.   Hematological:  Negative for adenopathy.   Psychiatric/Behavioral:  Negative for behavioral problems.      Past Medical History:   Diagnosis Date   • ADHD    • Anxiety    • Asthma    • Autism    • Bipolar disorder (HCC)    • Depression    • GERD (gastroesophageal reflux disease)      Past Surgical History:   Procedure Laterality Date   • CHOLANGIOGRAM N/A 12/01/2018    Procedure: CHOLANGIOGRAM;  Surgeon: Angelo Alcantara MD;  Location:  MAIN OR;  Service: General   • CHOLECYSTECTOMY LAPAROSCOPIC N/A 12/01/2018    Procedure: CHOLECYSTECTOMY LAPAROSCOPIC;  Surgeon: Angelo Alcantara MD;  Location:  MAIN OR;  Service: General   • EYE MUSCLE SURGERY Bilateral 2006   • RI ESOPHAGOGASTRODUODENOSCOPY TRANSORAL DIAGNOSTIC N/A 11/27/2018    Procedure: ESOPHAGOGASTRODUODENOSCOPY (EGD) with bx;  Surgeon: Rashmi Vazquez MD;  Location: [facility];  Service: General     Family History   Problem Relation Age of Onset   • Breast cancer Mother    • Heart attack Mother    • Depression Mother    • Mental illness Mother    • Coronary artery disease Mother    • Hypertension Mother    • Diabetes Mother    • Hyperlipidemia Mother    • Pneumonia Brother     • Hypertension Maternal Grandmother    • Stroke Maternal Grandmother    • Diabetes Maternal Grandmother    • Glaucoma Maternal Grandmother    • Kidney disease Maternal Grandmother    • Sarcoidosis Maternal Grandmother    • Diabetes Maternal Grandfather    • Hypertension Maternal Grandfather    • Stroke Maternal Grandfather    • Glaucoma Maternal Grandfather    • Heart attack Maternal Grandfather    • Colon cancer Neg Hx    • Ovarian cancer Neg Hx      Social History     Tobacco Use   • Smoking status: Never     Passive exposure: Never   • Smokeless tobacco: Never   • Tobacco comments:     no passive smoke exposure   Vaping Use   • Vaping status: Never Used   Substance and Sexual Activity   • Alcohol use: Never   • Drug use: Never   • Sexual activity: Yes     Partners: Male     Birth control/protection: OCP     Current Outpatient Medications on File Prior to Visit   Medication Sig   • acetaminophen (TYLENOL) 500 mg tablet Take 1 tablet (500 mg total) by mouth every 6 (six) hours as needed for mild pain   • Caplyta 21 MG CAPS Take 21 mg by mouth daily   • dicyclomine (BENTYL) 20 mg tablet Take 1 tablet (20 mg total) by mouth 4 (four) times a day as needed (abdominal cramping, diarrhea)   • famotidine (PEPCID) 20 mg tablet Take 1 tablet (20 mg total) by mouth 2 (two) times a day   • fluticasone (FLONASE) 50 mcg/act nasal spray SPRAY 1 SPRAY INTO EACH NOSTRIL EVERY DAY   • gabapentin (NEURONTIN) 600 MG tablet Take 600 mg by mouth every evening   • hydrOXYzine pamoate (VISTARIL) 50 mg capsule Take 50 mg by mouth 2 (two) times a day as needed for anxiety   • ibuprofen (MOTRIN) 600 mg tablet Take 1 tablet (600 mg total) by mouth every 6 (six) hours as needed for mild pain   • ipratropium-albuterol (DUO-NEB) 0.5-2.5 mg/3 mL nebulizer solution Take 3 mL by nebulization 4 (four) times a day   • meclizine (ANTIVERT) 25 mg tablet Take 1 tablet (25 mg total) by mouth 3 (three) times a day as needed for dizziness   •  ondansetron (ZOFRAN-ODT) 4 mg disintegrating tablet Take 1 tablet (4 mg total) by mouth every 6 (six) hours as needed for nausea or vomiting   • venlafaxine (EFFEXOR-XR) 37.5 mg 24 hr capsule    • [DISCONTINUED] acetaminophen (TYLENOL) 500 mg tablet Take 1 tablet (500 mg total) by mouth every 6 (six) hours as needed for mild pain or moderate pain   • albuterol (PROVENTIL HFA,VENTOLIN HFA) 90 mcg/act inhaler Inhale 2 puffs every 4 (four) hours as needed for wheezing   • aluminum-magnesium hydroxide-simethicone (MAALOX) 200-200-20 MG/5ML SUSP Take 30 mL by mouth 4 (four) times a day as needed for heartburn or cramping (Patient not taking: Reported on 2/7/2025)   • benzonatate (TESSALON PERLES) 100 mg capsule Take 1 capsule (100 mg total) by mouth 3 (three) times a day as needed for cough (Patient not taking: Reported on 2/7/2025)   • dicyclomine (BENTYL) 20 mg tablet Take 1 tablet (20 mg total) by mouth 2 (two) times a day (Patient not taking: Reported on 2/7/2025)   • famotidine (PEPCID) 20 mg tablet Take 1 tablet (20 mg total) by mouth 2 (two) times a day (Patient not taking: Reported on 2/7/2025)   • famotidine (PEPCID) 20 mg tablet Take 1 tablet (20 mg total) by mouth 2 (two) times a day (Patient not taking: Reported on 2/7/2025)   • norgestimate-ethinyl estradiol (Sprintec 28) 0.25-35 MG-MCG per tablet Take 1 tablet by mouth daily   • ondansetron (ZOFRAN-ODT) 4 mg disintegrating tablet Take 1 tablet (4 mg total) by mouth every 6 (six) hours as needed for nausea or vomiting (Patient not taking: Reported on 2/7/2025)   • sucralfate (CARAFATE) 1 g tablet Take 1 tablet (1 g total) by mouth 4 (four) times a day for 7 days   • [DISCONTINUED] ARIPiprazole (ABILIFY) 15 mg tablet Take 0.5 tablets (7.5 mg total) by mouth daily (Patient not taking: Reported on 9/20/2024)     No Known Allergies  Immunization History   Administered Date(s) Administered   • COVID-19 MODERNA VACC 0.5 ML IM 04/22/2021, 05/20/2021, 12/17/2021   •  "COVID-19 Moderna Vac BIVALENT 12 Yr+ IM 0.5 ML 09/30/2022   • DTaP 2000, 2000, 1998, 1998, 09/20/2002   • DTaP 5 1998, 1998, 1998, 07/14/1999, 09/20/2002   • HPV Bivalent 04/02/2009, 06/09/2009   • HPV Quadrivalent 04/02/2009, 06/09/2009, 02/04/2011, 05/20/2015   • Hep A, adult 06/05/2008, 02/04/2011   • Hep A, ped/adol, 2 dose 06/05/2008, 05/20/2015   • Hep B, Adolescent or Pediatric 1998, 1998, 03/26/2001   • Hep B, adult 1998, 1998, 03/26/2001   • Hepatitis A 05/20/2015   • HiB 1998, 03/26/2001   • Hib (PRP-OMP) 1998, 1998, 03/26/2001   • Hib (PRP-T) 1998   • INFLUENZA 10/16/2015, 08/16/2016, 09/30/2021, 09/30/2022, 02/25/2025   • IPV 2000, 1998, 04/14/1999, 09/20/2002   • Influenza Quadrivalent Preservative Free 3 years and older IM 09/30/2021   • Influenza Split 10/13/1999, 01/05/2007   • Influenza, injectable, quadrivalent, preservative free 0.5 mL 09/11/2019   • Influenza, recombinant, quadrivalent,injectable, preservative free 09/24/2018   • Influenza, seasonal, injectable 10/13/1999, 01/05/2007, 02/04/2011, 10/05/2011   • MMR 04/14/1999, 09/20/2002   • Meningococcal Conjugate (MCV4O) 05/20/2015   • Meningococcal MCV4, Unspecified 02/04/2011, 05/20/2015   • Meningococcal MCV4P 02/04/2011, 05/20/2015   • Meningococcal, Unknown Serogroups 02/04/2011, 11/19/2014   • Pneumococcal Conjugate Vaccine 20-valent (Pcv20), Polysace 08/24/2022   • Tdap 02/04/2011, 05/20/2015   • Varicella 04/14/1999, 06/05/2008, 08/05/2008, 04/02/2009     Objective   /80 (BP Location: Left arm, Patient Position: Sitting, Cuff Size: Large)   Pulse (!) 124   Temp (!) 101 °F (38.3 °C) (Tympanic)   Resp 16   Ht 4' 11\" (1.499 m)   Wt 76.9 kg (169 lb 9.6 oz)   LMP 02/01/2025 (Approximate)   SpO2 98%   BMI 34.26 kg/m²     Physical Exam  Vitals and nursing note reviewed.   Constitutional:       General: She is not in acute " distress.     Appearance: She is ill-appearing.   HENT:      Right Ear: Tympanic membrane, ear canal and external ear normal.      Left Ear: Tympanic membrane, ear canal and external ear normal.      Nose: Congestion present.      Mouth/Throat:      Mouth: Mucous membranes are moist.   Eyes:      Conjunctiva/sclera: Conjunctivae normal.      Pupils: Pupils are equal, round, and reactive to light.   Cardiovascular:      Rate and Rhythm: Tachycardia present.      Pulses: Normal pulses.      Heart sounds: Normal heart sounds.   Pulmonary:      Effort: Pulmonary effort is normal.      Breath sounds: Normal breath sounds.   Abdominal:      General: Bowel sounds are normal.      Palpations: Abdomen is soft.   Musculoskeletal:         General: Normal range of motion.      Cervical back: Normal range of motion.   Skin:     General: Skin is warm.   Neurological:      Mental Status: She is alert and oriented to person, place, and time.   Psychiatric:         Mood and Affect: Mood normal.         Behavior: Behavior normal.

## 2025-03-11 NOTE — TELEPHONE ENCOUNTER
VOICE MESSAGE:  Hi this is Ifeoma Olivarez [phone]. I got a text I want my next alarm sigifredo that they need the [facility] needs you guys to send documents in up for it. I'm able to get it shipped to use, so if you could please call me back at [phone].

## 2025-03-11 NOTE — ASSESSMENT & PLAN NOTE
- Reviewed immunizations and screenings.  - Discussed regular dental, vision, and GYN exams.   - Routine labs ordered.   Orders:  •  CBC and differential; Future  •  Comprehensive metabolic panel; Future  •  Lipid Panel with Direct LDL reflex; Future  •  TSH, 3rd generation with Free T4 reflex; Future

## 2025-03-11 NOTE — ASSESSMENT & PLAN NOTE
- Prescription sent for Tamiflu. Discussed side effects.  - Continue Tylenol/Motrin for fever.  - Increase oral hydration and use humidifier.    Orders:  •  oseltamivir (TAMIFLU) 75 mg capsule; Take 1 capsule (75 mg total) by mouth every 12 (twelve) hours for 5 days

## 2025-03-11 NOTE — ASSESSMENT & PLAN NOTE
- Prescription sent for prednisone. Advised of side effects.  - Increase oral hydration and use humidifier.    Orders:  •  predniSONE 50 mg tablet; Take 1 tablet (50 mg total) by mouth daily for 5 days

## 2025-03-12 ENCOUNTER — RESULTS FOLLOW-UP (OUTPATIENT)
Dept: FAMILY MEDICINE CLINIC | Facility: CLINIC | Age: 25
End: 2025-03-12

## 2025-03-12 LAB
FLUAV RNA RESP QL NAA+PROBE: POSITIVE
FLUBV RNA RESP QL NAA+PROBE: NEGATIVE
SARS-COV-2 RNA RESP QL NAA+PROBE: NEGATIVE

## 2025-03-19 NOTE — ED PROVIDER NOTES
History  Chief Complaint   Patient presents with    Vomiting     pt has been having abd paina nd vomiting for 2 days  unable to keep anything down       History provided by:  Patient   used: No    Medical Problem   Location:  Pt with nausea vomiting and diarrhea for 3 days     Severity:  Moderate  Onset quality:  Gradual  Duration:  3 days  Timing:  Constant  Progression:  Unchanged  Chronicity:  New  Associated symptoms: abdominal pain, diarrhea and nausea    Associated symptoms: no chest pain, no congestion, no cough, no ear pain, no fatigue, no fever, no headaches, no loss of consciousness, no myalgias, no rash, no rhinorrhea, no shortness of breath, no sore throat, no vomiting and no wheezing        Prior to Admission Medications   Prescriptions Last Dose Informant Patient Reported? Taking? [facility] IBUPROFEN 200 MG tablet  Self Yes No   albuterol (VENTOLIN HFA) 90 mcg/act inhaler  Self Yes No   Sig: Inhale 2 puffs   clindamycin (CLINDAGEL) 1 % gel  Self Yes No   Sig: Every 12 hours   gabapentin (NEURONTIN) 600 MG tablet  Self Yes No   levonorgestrel-ethinyl estradiol (AVIANE,ALESSE,LESSINA) 0 1-20 MG-MCG per tablet  Self No No   Sig: Take 1 tablet by mouth daily   loratadine (CLARITIN) 10 mg tablet  Self Yes No   Sig: every 24 hours   lurasidone (LATUDA) 40 mg tablet  Self Yes No   Sig: Take 20 mg by mouth daily with breakfast      Facility-Administered Medications: None       Past Medical History:   Diagnosis Date    Asthma     Scoliosis        History reviewed  No pertinent surgical history  History reviewed  No pertinent family history  I have reviewed and agree with the history as documented  Social History   Substance Use Topics    Smoking status: Never Smoker    Smokeless tobacco: Never Used      Comment: no passive smoke exposure    Alcohol use No        Review of Systems   Constitutional: Negative  Negative for fatigue and fever  HENT: Negative    Negative for no lesions,  no deformities,  no traumatic injuries,  no significant scars are present,  chest wall non-tender,  no masses present, breathing is unlabored without accessory muscle use,normal breath sounds congestion, ear pain, rhinorrhea and sore throat  Eyes: Negative  Respiratory: Negative  Negative for cough, shortness of breath and wheezing  Cardiovascular: Negative  Negative for chest pain  Gastrointestinal: Positive for abdominal pain, diarrhea and nausea  Negative for vomiting  Endocrine: Negative  Genitourinary: Negative  Musculoskeletal: Negative  Negative for myalgias  Skin: Negative  Negative for rash  Allergic/Immunologic: Negative  Neurological: Negative  Negative for loss of consciousness and headaches  Hematological: Negative  Psychiatric/Behavioral: Negative  All other systems reviewed and are negative  Physical Exam  Physical Exam   Constitutional: She is oriented to person, place, and time  She appears well-developed and well-nourished  Pt feeling much better    HENT:   Head: Normocephalic and atraumatic  Right Ear: External ear normal    Left Ear: External ear normal    Nose: Nose normal    Mouth/Throat: Oropharynx is clear and moist    Eyes: Pupils are equal, round, and reactive to light  Conjunctivae and EOM are normal    Neck: Normal range of motion  Neck supple  Cardiovascular: Normal rate, regular rhythm and normal heart sounds  Pulmonary/Chest: Effort normal and breath sounds normal    Abdominal:   midepigastric tenderness  No ruq pain no lorenzo sign no mcburney tender no right abdomen pain    Musculoskeletal: Normal range of motion  Neurological: She is alert and oriented to person, place, and time  Skin: Skin is warm  Psychiatric: She has a normal mood and affect  Her behavior is normal    Nursing note and vitals reviewed        Vital Signs  ED Triage Vitals   Temperature Pulse Respirations Blood Pressure SpO2   10/31/18 0743 10/31/18 0743 10/31/18 0743 10/31/18 0743 10/31/18 0743   (!) 97 °F (36 1 °C) 81 16 141/60 98 %      Temp Source Heart Rate Source Patient Position - Orthostatic VS BP Location FiO2 (%)   10/31/18 0743 10/31/18 1158 10/31/18 0743 10/31/18 0743 --   Tympanic Monitor Sitting Left arm       Pain Score       10/31/18 0844       5           Vitals:    10/31/18 0743 10/31/18 0957 10/31/18 1210   BP: 141/60 105/52 135/76   Pulse: 81 65 73   Patient Position - Orthostatic VS: Sitting Sitting Lying       Visual Acuity      ED Medications  Medications   ondansetron (ZOFRAN) injection 4 mg (4 mg Intravenous Given 10/31/18 0803)   acetaminophen (TYLENOL) tablet 650 mg (650 mg Oral Given 10/31/18 0844)   aluminum-magnesium hydroxide-simethicone (MYLANTA) 200-200-20 mg/5 mL oral suspension 30 mL (30 mL Oral Given 10/31/18 0843)   sucralfate (CARAFATE) oral suspension 1,000 mg (1,000 mg Oral Given 10/31/18 0843)       Diagnostic Studies  Results Reviewed     Procedure Component Value Units Date/Time    Urine Microscopic [[id number]]  (Abnormal) Collected:  10/31/18 1131    Lab Status:  Final result Specimen:  Urine from Urine, Clean Catch Updated:  10/31/18 1155     RBC, UA 30-50 (A) /hpf      WBC, UA 4-10 (A) /hpf      Epithelial Cells Innumerable (A) /hpf      Bacteria, UA Occasional /hpf      MUCOUS THREADS Innumerable (A)    UA w Reflex to Microscopic w Reflex to Culture [[id number]]  (Abnormal) Collected:  10/31/18 1131    Lab Status:  Final result Specimen:  Urine from Urine, Clean Catch Updated:  10/31/18 1143     Color, UA Brown (A)     Clarity, UA Slightly Cloudy (A)     Specific Gravity, UA 1 020     pH, UA 6 0     Leukocytes, UA 25 0 (A)     Nitrite, UA Negative     Protein, UA 30 (1+) (A) mg/dl      Glucose, UA Negative mg/dl      Ketones, UA >=150 (3+) (A) mg/dl      Bilirubin, UA 3 mg/dL (A)     Blood,  0 (A)     UROBILINOGEN UA 8 0 (A) mg/dL     hCG, quantitative [[id number]]  (Normal) Collected:  10/31/18 0816    Lab Status:  Final result Specimen:  Blood from Arm, Right Updated:  10/31/18 0852     HCG, Quant <3 mIU/mL     Narrative:         Pregnant Gestational Age           HCG Range (mIU/mL)  4 weeks 44 - 0981  5 weeks                              348 - 89052        6 - 7 weeks                          586 - 298882  8 - 10 weeks                         88107 - 971166  66 - 12 weeks                        76241 - 886711  07 - 27 weeks (2nd Trimester)        6630 - 547715  36 - 40 weeks (3rd Trimester)        690 751 610 - 154304                 Comprehensive metabolic panel [[id number]]  (Abnormal) Collected:  10/31/18 0816    Lab Status:  Final result Specimen:  Blood from Arm, Right Updated:  10/31/18 0835     Sodium 140 mmol/L      Potassium 3 6 mmol/L      Chloride 105 mmol/L      CO2 24 mmol/L      ANION GAP 11 mmol/L      BUN 10 mg/dL      Creatinine 0 76 mg/dL      Glucose 95 mg/dL      Calcium 9 2 mg/dL       (H) U/L       (H) U/L      Alkaline Phosphatase 96 U/L      Total Protein 7 4 g/dL      Albumin 4 6 g/dL      Total Bilirubin 0 90 mg/dL      eGFR 113 ml/min/1 73sq m     Narrative:         National Kidney Disease Education Program recommendations are as follows:  GFR calculation is accurate only with a steady state creatinine  Chronic Kidney disease less than 60 ml/min/1 73 sq  meters  Kidney failure less than 15 ml/min/1 73 sq  meters      Lipase [[id number]]  (Normal) Collected:  10/31/18 0816    Lab Status:  Final result Specimen:  Blood from Arm, Right Updated:  10/31/18 0835     Lipase 30 u/L     CBC and differential [[id number]]  (Abnormal) Collected:  10/31/18 0816    Lab Status:  Final result Specimen:  Blood from Arm, Right Updated:  10/31/18 0827     WBC 8 60 Thousand/uL      RBC 4 43 Million/uL      Hemoglobin 11 5 (L) g/dL      Hematocrit 34 8 (L) %      MCV 79 (L) fL      MCH 25 9 (L) pg      MCHC 33 0 g/dL      RDW 14 3 %      MPV 9 1 fL      Platelets 725 Thousands/uL      Neutrophils Relative 65 %      Lymphocytes Relative 25 %      Monocytes Relative 9 %      Eosinophils Relative 1 %      Basophils Relative 1 %      Neutrophils Absolute 5 60 Thousands/µL      Lymphocytes Absolute 2 10 Thousands/µL      Monocytes Absolute 0 80 Thousand/µL      Eosinophils Absolute 0 10 Thousand/µL      Basophils Absolute 0 00 Thousands/µL                  US gallbladder   Final Result by Jayro Chris DO (10/31 8434)   Pancreatic tail partially obscured by overlying bowel gas  Cholelithiasis without sonographic evidence of cholecystitis  Workstation performed: [id number]                    Procedures  Procedures       Phone Contacts  ED Phone Contact    ED Course                               MDM  CritCare Time    Disposition  Final diagnoses:   UTI (urinary tract infection)   Gastritis   Cholelithiasis     Time reflects when diagnosis was documented in both MDM as applicable and the Disposition within this note     Time User Action Codes Description Comment    10/31/2018 11:59 AM Ck Sloan Add [N39 0] UTI (urinary tract infection)     10/31/2018 11:59 AM America Ramírez Add [K29 70] Gastritis     10/31/2018 12:00 PM America Ramírez Add [K80 20] Cholelithiasis       ED Disposition     ED Disposition Condition Comment    Discharge  Halima Sheffield discharge to home/self care      Condition at discharge: Good        Follow-up Information     Follow up With Specialties Details Why Contact Info Additional [address] [facility] Medicine  return if condition worsens  [address], [address] [facility] [address]  [address], [address], [address] [place], [place], [address]          Discharge Medication List as of 10/31/2018 12:03 PM      START taking these medications    Details   cephalexin (KEFLEX) 500 mg capsule Take 1 capsule (500 mg total) by mouth every 8 (eight) hours for 10 days, Starting Wed 10/31/2018, Until Sat 11/10/2018, Print      famotidine (PEPCID) 20 mg tablet Take 1 tablet (20 mg total) by mouth 2 (two) times a day, Starting Wed 10/31/2018, Print      ondansetron (ZOFRAN) 4 mg tablet Take 1 tablet (4 mg total) by mouth every 6 (six) hours, Starting Wed 10/31/2018, Print      sucralfate (CARAFATE) 1 g tablet Take 1 tablet (1 g total) by mouth 4 (four) times a day, Starting Wed 10/31/2018, Print         CONTINUE these medications which have NOT CHANGED    Details   albuterol (VENTOLIN HFA) 90 mcg/act inhaler Inhale 2 puffs, Starting Wed 11/19/2014, Historical Med      clindamycin (CLINDAGEL) 1 % gel Every 12 hours, Starting Wed 5/30/2018, Historical Med      [facility] IBUPROFEN 200 MG tablet Starting Fri 8/31/2018, Historical Med      gabapentin (NEURONTIN) 600 MG tablet Starting Wed 10/24/2018, Historical Med      levonorgestrel-ethinyl estradiol (AVIANE,ALESSE,LESSINA) 0 1-20 MG-MCG per tablet Take 1 tablet by mouth daily, Starting Tue 9/18/2018, Normal      loratadine (CLARITIN) 10 mg tablet every 24 hours, Starting Mon 8/28/2017, Historical Med      lurasidone (LATUDA) 40 mg tablet Take 20 mg by mouth daily with breakfast, Historical Med           No discharge procedures on file      ED Provider  Electronically Signed by           Shannon Saini PA-C  10/31/18 6186

## 2025-04-01 ENCOUNTER — PROCEDURE VISIT (OUTPATIENT)
Dept: OBGYN CLINIC | Facility: CLINIC | Age: 25
End: 2025-04-01

## 2025-04-01 VITALS
BODY MASS INDEX: 33.47 KG/M2 | HEIGHT: 59 IN | SYSTOLIC BLOOD PRESSURE: 124 MMHG | WEIGHT: 166 LBS | DIASTOLIC BLOOD PRESSURE: 84 MMHG

## 2025-04-01 DIAGNOSIS — Z30.017 NEXPLANON INSERTION: Primary | ICD-10-CM

## 2025-04-01 LAB — SL AMB POCT URINE HCG: NEGATIVE

## 2025-04-01 NOTE — PROGRESS NOTES
Universal Protocol:  Procedure performed by: (Sobia Cole and Abhijit)  Consent: Verbal consent obtained. Written consent obtained.  Risks and benefits: risks, benefits and alternatives were discussed  Consent given by: patient  Patient understanding: patient states understanding of the procedure being performed  Patient consent: the patient's understanding of the procedure matches consent given  Procedure consent: procedure consent matches procedure scheduled  Relevant documents: relevant documents present and verified  Required items: required blood products, implants, devices, and special equipment available  Patient identity confirmed: verbally with patient  Remove and insert drug implant    Date/Time: 4/1/2025 2:30 PM    Performed by: Juan Manuel Cole MD  Authorized by: Juan Manuel Cole MD    Indication:     Indication: Insertion of non-biodegradable drug delivery implant    Pre-procedure:     Prepped with: alcohol 70% and povidone-iodine      Local anesthetic:  Lidocaine with epinephrine    The site was cleaned and prepped in a sterile fashion: yes    Procedure:     Procedure:  Insertion    Left/right:  Left    Preloaded contraceptive capsule trocar was placed subdermally: yes      Visualization of implant was obtained: yes      Contraceptive capsule was inserted and trocar removed: yes      Visualization of notch in stylet and palpation of device: yes      Palpation confirms placement by provider and patient: yes      Site was closed with steri-strips and pressure bandage applied: yes    Comments:       [id number]  EXP 06/2027  LOT [id number]  [id number]

## 2025-04-09 RX ORDER — LIDOCAINE HYDROCHLORIDE AND EPINEPHRINE 10; 10 MG/ML; UG/ML
1 INJECTION, SOLUTION INFILTRATION; PERINEURAL ONCE
Status: SHIPPED | OUTPATIENT
Start: 2025-04-09

## 2025-04-10 PROBLEM — R50.9 FEVER: Status: RESOLVED | Noted: 2025-03-11 | Resolved: 2025-04-10

## 2025-04-10 PROBLEM — R05.9 COUGH: Status: RESOLVED | Noted: 2025-03-11 | Resolved: 2025-04-10

## 2025-04-10 PROBLEM — Z00.00 HEALTHCARE MAINTENANCE: Status: RESOLVED | Noted: 2025-03-11 | Resolved: 2025-04-10

## 2025-06-10 ENCOUNTER — TELEPHONE (OUTPATIENT)
Dept: OBGYN CLINIC | Facility: CLINIC | Age: 25
End: 2025-06-10

## 2025-06-30 ENCOUNTER — TELEPHONE (OUTPATIENT)
Dept: CARDIOLOGY CLINIC | Facility: CLINIC | Age: 25
End: 2025-06-30

## (undated) DEVICE — SPONGE GAUZE 2 X 2 4PLY STRL

## (undated) DEVICE — PAD GROUNDING ADULT

## (undated) DEVICE — SUT VICRYL 0 UR-6 27 IN J603H

## (undated) DEVICE — 3M™ TEGADERM™ TRANSPARENT FILM DRESSING FRAME STYLE, 1626W, 4 IN X 4-3/4 IN (10 CM X 12 CM), 50/CT 4CT/CASE: Brand: 3M™ TEGADERM™

## (undated) DEVICE — TAUT CATH INTRODUCER 4.5 FR

## (undated) DEVICE — FOGARTY BILIARY BALLOON PROBE 5F 23CM: Brand: FOGARTY

## (undated) DEVICE — INTENDED FOR TISSUE SEPARATION, AND OTHER PROCEDURES THAT REQUIRE A SHARP SURGICAL BLADE TO PUNCTURE OR CUT.: Brand: BARD-PARKER SAFETY BLADES SIZE 11, STERILE

## (undated) DEVICE — ALLENTOWN LAP CHOLE APP PACK: Brand: CARDINAL HEALTH

## (undated) DEVICE — ENDOPATH XCEL UNIVERSAL TROCAR STABLILITY SLEEVES: Brand: ENDOPATH XCEL

## (undated) DEVICE — ENDOPOUCH RETRIEVER SPECIMEN RETRIEVAL BAGS: Brand: ENDOPOUCH RETRIEVER

## (undated) DEVICE — SYRINGE 20ML LL

## (undated) DEVICE — LIGAMAX 5 MM ENDOSCOPIC MULTIPLE CLIP APPLIER: Brand: LIGAMAX

## (undated) DEVICE — SUT MONOCRYL 4-0 PS-2 27 IN Y426H

## (undated) DEVICE — DRAPE C-ARM X-RAY

## (undated) DEVICE — CHLORAPREP HI-LITE 26ML ORANGE

## (undated) DEVICE — IV CATH 14 G SAFETY

## (undated) DEVICE — ADHESIVE SKN CLSR HISTOACRYL FLEX 0.5ML LF

## (undated) DEVICE — SCD SEQUENTIAL COMPRESSION COMFORT SLEEVE MEDIUM KNEE LENGTH: Brand: KENDALL SCD

## (undated) DEVICE — 2000CC GUARDIAN II: Brand: GUARDIAN

## (undated) DEVICE — BLUE HEAT SCOPE WARMER

## (undated) DEVICE — SINGLE-USE BIOPSY FORCEPS: Brand: RADIAL JAW 4

## (undated) DEVICE — ENDOPATH XCEL BLADELESS TROCARS WITH STABILITY SLEEVES: Brand: ENDOPATH XCEL

## (undated) DEVICE — GLOVE INDICATOR PI UNDERGLOVE SZ 7.5 BLUE

## (undated) DEVICE — HARMONIC ACE 5MM DIAMETER SHEARS 36CM SHAFT LENGTH + ADAPTIVE TISSUE TECHNOLOGY FOR USE WITH GENERATOR G11: Brand: HARMONIC ACE

## (undated) DEVICE — GLOVE SRG BIOGEL 7